# Patient Record
Sex: FEMALE | Race: WHITE | NOT HISPANIC OR LATINO | Employment: OTHER | URBAN - METROPOLITAN AREA
[De-identification: names, ages, dates, MRNs, and addresses within clinical notes are randomized per-mention and may not be internally consistent; named-entity substitution may affect disease eponyms.]

---

## 2017-01-01 ENCOUNTER — DOCUMENTATION (OUTPATIENT)
Dept: CARDIAC REHAB | Facility: HOSPITAL | Age: 82
End: 2017-01-01

## 2017-01-01 ENCOUNTER — TRANSITIONAL CARE MANAGEMENT TELEPHONE ENCOUNTER (OUTPATIENT)
Dept: INTERNAL MEDICINE | Facility: CLINIC | Age: 82
End: 2017-01-01

## 2017-01-01 ENCOUNTER — TELEPHONE (OUTPATIENT)
Dept: INTERNAL MEDICINE | Facility: CLINIC | Age: 82
End: 2017-01-01

## 2017-01-01 ENCOUNTER — PATIENT OUTREACH (OUTPATIENT)
Dept: CASE MANAGEMENT | Facility: OTHER | Age: 82
End: 2017-01-01

## 2017-01-01 ENCOUNTER — EPISODE CHANGES (OUTPATIENT)
Dept: CASE MANAGEMENT | Facility: OTHER | Age: 82
End: 2017-01-01

## 2017-01-01 ENCOUNTER — APPOINTMENT (OUTPATIENT)
Dept: CARDIOLOGY | Facility: HOSPITAL | Age: 82
End: 2017-01-01

## 2017-01-01 ENCOUNTER — OFFICE VISIT (OUTPATIENT)
Dept: ENDOCRINOLOGY | Facility: CLINIC | Age: 82
End: 2017-01-01

## 2017-01-01 ENCOUNTER — APPOINTMENT (OUTPATIENT)
Dept: GENERAL RADIOLOGY | Facility: HOSPITAL | Age: 82
End: 2017-01-01

## 2017-01-01 ENCOUNTER — HOSPITAL ENCOUNTER (INPATIENT)
Facility: HOSPITAL | Age: 82
LOS: 6 days | Discharge: HOME | End: 2017-11-07
Attending: EMERGENCY MEDICINE | Admitting: INTERNAL MEDICINE

## 2017-01-01 VITALS
DIASTOLIC BLOOD PRESSURE: 58 MMHG | SYSTOLIC BLOOD PRESSURE: 108 MMHG | HEART RATE: 61 BPM | HEIGHT: 66 IN | BODY MASS INDEX: 32.47 KG/M2 | WEIGHT: 202 LBS | OXYGEN SATURATION: 93 %

## 2017-01-01 VITALS
RESPIRATION RATE: 16 BRPM | HEIGHT: 66 IN | OXYGEN SATURATION: 98 % | SYSTOLIC BLOOD PRESSURE: 124 MMHG | TEMPERATURE: 97.8 F | HEART RATE: 79 BPM | WEIGHT: 203.6 LBS | BODY MASS INDEX: 32.72 KG/M2 | DIASTOLIC BLOOD PRESSURE: 55 MMHG

## 2017-01-01 VITALS
WEIGHT: 205 LBS | DIASTOLIC BLOOD PRESSURE: 60 MMHG | SYSTOLIC BLOOD PRESSURE: 110 MMHG | HEART RATE: 59 BPM | HEIGHT: 66 IN | BODY MASS INDEX: 32.95 KG/M2 | OXYGEN SATURATION: 94 %

## 2017-01-01 VITALS
SYSTOLIC BLOOD PRESSURE: 124 MMHG | WEIGHT: 206 LBS | HEIGHT: 67 IN | HEART RATE: 75 BPM | BODY MASS INDEX: 32.33 KG/M2 | DIASTOLIC BLOOD PRESSURE: 64 MMHG | OXYGEN SATURATION: 95 %

## 2017-01-01 DIAGNOSIS — N17.9 ACUTE RENAL FAILURE, UNSPECIFIED ACUTE RENAL FAILURE TYPE (HCC): ICD-10-CM

## 2017-01-01 DIAGNOSIS — J18.9 RECURRENT PNEUMONIA: ICD-10-CM

## 2017-01-01 DIAGNOSIS — I21.4 NON-STEMI (NON-ST ELEVATED MYOCARDIAL INFARCTION) (HCC): ICD-10-CM

## 2017-01-01 DIAGNOSIS — N39.0 COMPLICATED UTI (URINARY TRACT INFECTION): Primary | ICD-10-CM

## 2017-01-01 DIAGNOSIS — R09.02 HYPOXIA: ICD-10-CM

## 2017-01-01 DIAGNOSIS — R60.0 LOCALIZED EDEMA: ICD-10-CM

## 2017-01-01 DIAGNOSIS — I10 ESSENTIAL HYPERTENSION: ICD-10-CM

## 2017-01-01 DIAGNOSIS — R53.1 WEAKNESS: ICD-10-CM

## 2017-01-01 DIAGNOSIS — Z78.9 IMPAIRED MOBILITY AND ADLS: ICD-10-CM

## 2017-01-01 DIAGNOSIS — A41.9 SEPSIS, DUE TO UNSPECIFIED ORGANISM: ICD-10-CM

## 2017-01-01 DIAGNOSIS — N18.30 CHRONIC KIDNEY DISEASE (CKD), STAGE III (MODERATE) (HCC): ICD-10-CM

## 2017-01-01 DIAGNOSIS — E11.9 TYPE 2 DIABETES MELLITUS WITHOUT COMPLICATION, UNSPECIFIED LONG TERM INSULIN USE STATUS: Primary | ICD-10-CM

## 2017-01-01 DIAGNOSIS — J44.9 CHRONIC OBSTRUCTIVE PULMONARY DISEASE, UNSPECIFIED COPD TYPE (HCC): Primary | ICD-10-CM

## 2017-01-01 DIAGNOSIS — G40.909 SEIZURE DISORDER (HCC): ICD-10-CM

## 2017-01-01 DIAGNOSIS — R05.9 COUGH: ICD-10-CM

## 2017-01-01 DIAGNOSIS — E78.2 MIXED HYPERLIPIDEMIA: ICD-10-CM

## 2017-01-01 DIAGNOSIS — Z74.09 IMPAIRED FUNCTIONAL MOBILITY, BALANCE, GAIT, AND ENDURANCE: ICD-10-CM

## 2017-01-01 DIAGNOSIS — N39.0 COMPLICATED UTI (URINARY TRACT INFECTION): ICD-10-CM

## 2017-01-01 DIAGNOSIS — I50.31 ACUTE DIASTOLIC CONGESTIVE HEART FAILURE (HCC): ICD-10-CM

## 2017-01-01 DIAGNOSIS — Z74.09 IMPAIRED MOBILITY AND ADLS: ICD-10-CM

## 2017-01-01 DIAGNOSIS — J18.9 PNEUMONIA OF LEFT LOWER LOBE DUE TO INFECTIOUS ORGANISM: Primary | ICD-10-CM

## 2017-01-01 DIAGNOSIS — D72.829 LEUKOCYTOSIS, UNSPECIFIED TYPE: ICD-10-CM

## 2017-01-01 DIAGNOSIS — E11.9 TYPE 2 DIABETES MELLITUS WITHOUT COMPLICATION, UNSPECIFIED LONG TERM INSULIN USE STATUS: ICD-10-CM

## 2017-01-01 DIAGNOSIS — J45.40 MODERATE PERSISTENT ASTHMA WITHOUT COMPLICATION: ICD-10-CM

## 2017-01-01 LAB
ALBUMIN SERPL-MCNC: 3.5 G/DL (ref 3.2–4.8)
ALBUMIN SERPL-MCNC: 3.9 G/DL (ref 3.2–4.8)
ALBUMIN SERPL-MCNC: 4 G/DL (ref 3.2–4.8)
ALBUMIN/GLOB SERPL: 1.5 G/DL (ref 1.5–2.5)
ALBUMIN/GLOB SERPL: 1.5 G/DL (ref 1.5–2.5)
ALBUMIN/GLOB SERPL: 1.8 G/DL (ref 1.5–2.5)
ALP SERPL-CCNC: 150 U/L (ref 25–100)
ALP SERPL-CCNC: 154 U/L (ref 25–100)
ALP SERPL-CCNC: 199 U/L (ref 25–100)
ALT SERPL W P-5'-P-CCNC: 22 U/L (ref 7–40)
ALT SERPL W P-5'-P-CCNC: 34 U/L (ref 7–40)
ALT SERPL W P-5'-P-CCNC: 9 U/L (ref 7–40)
ANION GAP SERPL CALCULATED.3IONS-SCNC: 10 MMOL/L (ref 3–11)
ANION GAP SERPL CALCULATED.3IONS-SCNC: 6 MMOL/L (ref 3–11)
ANION GAP SERPL CALCULATED.3IONS-SCNC: 8 MMOL/L (ref 3–11)
ANION GAP SERPL CALCULATED.3IONS-SCNC: 9 MMOL/L (ref 3–11)
APTT PPP: 28.4 SECONDS (ref 45–60)
APTT PPP: 34.1 SECONDS (ref 45–60)
APTT PPP: 48.8 SECONDS (ref 45–60)
ARTICHOKE IGE QN: 71 MG/DL (ref 0–130)
AST SERPL-CCNC: 11 U/L (ref 0–33)
AST SERPL-CCNC: 36 U/L (ref 0–33)
AST SERPL-CCNC: 56 U/L (ref 0–33)
BACTERIA SPEC AEROBE CULT: NORMAL
BACTERIA SPEC AEROBE CULT: NORMAL
BACTERIA UR QL AUTO: ABNORMAL /HPF
BASOPHILS # BLD AUTO: 0.03 10*3/MM3 (ref 0–0.2)
BASOPHILS # BLD AUTO: 0.06 10*3/MM3 (ref 0–0.2)
BASOPHILS NFR BLD AUTO: 0.1 % (ref 0–1)
BASOPHILS NFR BLD AUTO: 0.3 % (ref 0–1)
BASOPHILS NFR BLD AUTO: 0.5 % (ref 0–1)
BASOPHILS NFR BLD AUTO: 0.6 % (ref 0–1)
BASOPHILS NFR BLD AUTO: 0.7 % (ref 0–1)
BH CV ECHO MEAS - BSA(HAYCOCK): 2.1 M^2
BH CV ECHO MEAS - BSA: 2 M^2
BH CV ECHO MEAS - BZI_BMI: 32.6 KILOGRAMS/M^2
BH CV ECHO MEAS - BZI_METRIC_HEIGHT: 167.6 CM
BH CV ECHO MEAS - BZI_METRIC_WEIGHT: 91.6 KG
BH CV ECHO MEAS - CONTRAST EF (2CH): 46.7 ML/M^2
BH CV ECHO MEAS - CONTRAST EF 4CH: 56.1 ML/M^2
BH CV ECHO MEAS - EDV(CUBED): 106.6 ML
BH CV ECHO MEAS - EDV(MOD-SP2): 150 ML
BH CV ECHO MEAS - EDV(MOD-SP4): 132 ML
BH CV ECHO MEAS - EDV(TEICH): 104.5 ML
BH CV ECHO MEAS - EF(CUBED): 74.7 %
BH CV ECHO MEAS - EF(MOD-SP2): 46.7 %
BH CV ECHO MEAS - EF(MOD-SP4): 51 %
BH CV ECHO MEAS - EF(TEICH): 66.6 %
BH CV ECHO MEAS - ESV(CUBED): 26.9 ML
BH CV ECHO MEAS - ESV(MOD-SP2): 80 ML
BH CV ECHO MEAS - ESV(MOD-SP4): 58 ML
BH CV ECHO MEAS - ESV(TEICH): 34.9 ML
BH CV ECHO MEAS - FS: 36.8 %
BH CV ECHO MEAS - IVS/LVPW: 1
BH CV ECHO MEAS - IVSD: 1.2 CM
BH CV ECHO MEAS - LA DIMENSION: 4.5 CM
BH CV ECHO MEAS - LAT PEAK E' VEL: 11.1 CM/SEC
BH CV ECHO MEAS - LV DIASTOLIC VOL/BSA (35-75): 65.7 ML/M^2
BH CV ECHO MEAS - LV IVRT: 0.09 SEC
BH CV ECHO MEAS - LV MASS(C)D: 202.4 GRAMS
BH CV ECHO MEAS - LV MASS(C)DI: 100.8 GRAMS/M^2
BH CV ECHO MEAS - LV SYSTOLIC VOL/BSA (12-30): 28.9 ML/M^2
BH CV ECHO MEAS - LVIDD: 4.7 CM
BH CV ECHO MEAS - LVIDS: 3 CM
BH CV ECHO MEAS - LVLD AP2: 8 CM
BH CV ECHO MEAS - LVLD AP4: 8.3 CM
BH CV ECHO MEAS - LVLS AP2: 7.3 CM
BH CV ECHO MEAS - LVLS AP4: 7.6 CM
BH CV ECHO MEAS - LVOT AREA (M): 2.8 CM^2
BH CV ECHO MEAS - LVOT AREA: 2.9 CM^2
BH CV ECHO MEAS - LVOT DIAM: 1.9 CM
BH CV ECHO MEAS - LVPWD: 1.1 CM
BH CV ECHO MEAS - MED PEAK E' VEL: 7.02 CM/SEC
BH CV ECHO MEAS - MPA AREA: 9.4 CM^2
BH CV ECHO MEAS - MPA DIAM: 3.5 CM
BH CV ECHO MEAS - MR ALIAS VEL: 27.7 CM/SEC
BH CV ECHO MEAS - MR ERO: 0.2 CM^2
BH CV ECHO MEAS - MR FLOW RATE: 101.2 CM^3/SEC
BH CV ECHO MEAS - MR MAX PG: 107.5 MMHG
BH CV ECHO MEAS - MR MAX VEL: 518.5 CM/SEC
BH CV ECHO MEAS - MR MEAN PG: 67.8 MMHG
BH CV ECHO MEAS - MR MEAN VEL: 381.9 CM/SEC
BH CV ECHO MEAS - MR PISA RADIUS: 0.76 CM
BH CV ECHO MEAS - MR PISA: 3.7 CM^2
BH CV ECHO MEAS - MR VOLUME: 33.1 ML
BH CV ECHO MEAS - MR VTI: 169.5 CM
BH CV ECHO MEAS - MV A MAX VEL: 114.5 CM/SEC
BH CV ECHO MEAS - MV AREA (1 DIAM): 7.5 CM^2
BH CV ECHO MEAS - MV DEC TIME: 0.16 SEC
BH CV ECHO MEAS - MV DIAM: 3.1 CM
BH CV ECHO MEAS - MV E MAX VEL: 131.6 CM/SEC
BH CV ECHO MEAS - MV E/A: 1.1
BH CV ECHO MEAS - MV FLOW AREA(1DIAM): 7.5 CM^2
BH CV ECHO MEAS - PA ACC SLOPE: 728.9 CM/SEC^2
BH CV ECHO MEAS - PA ACC TIME: 0.11 SEC
BH CV ECHO MEAS - PA PR(ACCEL): 31.5 MMHG
BH CV ECHO MEAS - PI END-D VEL: 136.9 CM/SEC
BH CV ECHO MEAS - RAP SYSTOLE: 8 MMHG
BH CV ECHO MEAS - RVSP: 43 MMHG
BH CV ECHO MEAS - SI(CUBED): 39.7 ML/M^2
BH CV ECHO MEAS - SI(MOD-SP2): 34.8 ML/M^2
BH CV ECHO MEAS - SI(MOD-SP4): 36.8 ML/M^2
BH CV ECHO MEAS - SI(TEICH): 34.6 ML/M^2
BH CV ECHO MEAS - SV(CUBED): 79.7 ML
BH CV ECHO MEAS - SV(MOD-SP2): 70 ML
BH CV ECHO MEAS - SV(MOD-SP4): 74 ML
BH CV ECHO MEAS - SV(TEICH): 69.6 ML
BH CV ECHO MEAS - TAPSE (>1.6): 1.6 CM2
BH CV ECHO MEAS - TR MAX V: 35 MMHG
BH CV ECHO MEAS - TR MAX VEL: 294 CM/SEC
BH CV VAS BP LEFT ARM: NORMAL MMHG
BH CV VAS BP LEFT ARM: NORMAL MMHG
BH CV XLRA - RV BASE: 5 CM
BH CV XLRA - RV LENGTH: 7 CM
BH CV XLRA - RV MID: 4.4 CM
BH CV XLRA - TDI S': 8.55 CM/SEC
BILIRUB SERPL-MCNC: 0.4 MG/DL (ref 0.3–1.2)
BILIRUB SERPL-MCNC: 1 MG/DL (ref 0.3–1.2)
BILIRUB SERPL-MCNC: 1.3 MG/DL (ref 0.3–1.2)
BILIRUB UR QL STRIP: NEGATIVE
BNP SERPL-MCNC: 2368 PG/ML (ref 0–100)
BNP SERPL-MCNC: 676 PG/ML (ref 0–100)
BNP SERPL-MCNC: 712 PG/ML (ref 0–100)
BUN BLD-MCNC: 25 MG/DL (ref 9–23)
BUN BLD-MCNC: 25 MG/DL (ref 9–23)
BUN BLD-MCNC: 27 MG/DL (ref 9–23)
BUN BLD-MCNC: 28 MG/DL (ref 9–23)
BUN BLD-MCNC: 29 MG/DL (ref 9–23)
BUN BLD-MCNC: 30 MG/DL (ref 9–23)
BUN BLD-MCNC: 33 MG/DL (ref 9–23)
BUN/CREAT SERPL: 13.9 (ref 7–25)
BUN/CREAT SERPL: 13.9 (ref 7–25)
BUN/CREAT SERPL: 15 (ref 7–25)
BUN/CREAT SERPL: 15 (ref 7–25)
BUN/CREAT SERPL: 17.1 (ref 7–25)
BUN/CREAT SERPL: 17.4 (ref 7–25)
BUN/CREAT SERPL: 17.5 (ref 7–25)
CALCIUM SPEC-SCNC: 10 MG/DL (ref 8.7–10.4)
CALCIUM SPEC-SCNC: 8.8 MG/DL (ref 8.7–10.4)
CALCIUM SPEC-SCNC: 9.1 MG/DL (ref 8.7–10.4)
CALCIUM SPEC-SCNC: 9.3 MG/DL (ref 8.7–10.4)
CALCIUM SPEC-SCNC: 9.4 MG/DL (ref 8.7–10.4)
CHLORIDE SERPL-SCNC: 100 MMOL/L (ref 99–109)
CHLORIDE SERPL-SCNC: 100 MMOL/L (ref 99–109)
CHLORIDE SERPL-SCNC: 101 MMOL/L (ref 99–109)
CHLORIDE SERPL-SCNC: 102 MMOL/L (ref 99–109)
CHLORIDE SERPL-SCNC: 102 MMOL/L (ref 99–109)
CHOLEST SERPL-MCNC: 148 MG/DL (ref 0–200)
CLARITY UR: ABNORMAL
CO2 SERPL-SCNC: 29 MMOL/L (ref 20–31)
CO2 SERPL-SCNC: 30 MMOL/L (ref 20–31)
CO2 SERPL-SCNC: 32 MMOL/L (ref 20–31)
CO2 SERPL-SCNC: 33 MMOL/L (ref 20–31)
CO2 SERPL-SCNC: 36 MMOL/L (ref 20–31)
COLOR UR: ABNORMAL
CREAT BLD-MCNC: 1.6 MG/DL (ref 0.6–1.3)
CREAT BLD-MCNC: 1.7 MG/DL (ref 0.6–1.3)
CREAT BLD-MCNC: 1.8 MG/DL (ref 0.6–1.3)
CREAT BLD-MCNC: 1.9 MG/DL (ref 0.6–1.3)
CREAT BLD-MCNC: 2 MG/DL (ref 0.6–1.3)
CREAT UR-MCNC: 251.8 MG/DL
D-LACTATE SERPL-SCNC: 1.6 MMOL/L (ref 0.5–2)
DEPRECATED RDW RBC AUTO: 52.6 FL (ref 37–54)
DEPRECATED RDW RBC AUTO: 52.6 FL (ref 37–54)
DEPRECATED RDW RBC AUTO: 53.5 FL (ref 37–54)
DEPRECATED RDW RBC AUTO: 54.2 FL (ref 37–54)
DEPRECATED RDW RBC AUTO: 54.4 FL (ref 37–54)
DEPRECATED RDW RBC AUTO: 54.7 FL (ref 37–54)
DEPRECATED RDW RBC AUTO: 54.8 FL (ref 37–54)
DEPRECATED RDW RBC AUTO: 55.8 FL (ref 37–54)
E/E' RATIO: 15.1
EOSINOPHIL # BLD AUTO: 0 10*3/MM3 (ref 0–0.3)
EOSINOPHIL # BLD AUTO: 0.02 10*3/MM3 (ref 0–0.3)
EOSINOPHIL # BLD AUTO: 0.27 10*3/MM3 (ref 0–0.3)
EOSINOPHIL # BLD AUTO: 0.29 10*3/MM3 (ref 0–0.3)
EOSINOPHIL # BLD AUTO: 0.44 10*3/MM3 (ref 0–0.3)
EOSINOPHIL NFR BLD AUTO: 0 % (ref 0–3)
EOSINOPHIL NFR BLD AUTO: 0.1 % (ref 0–3)
EOSINOPHIL NFR BLD AUTO: 2.3 % (ref 0–3)
EOSINOPHIL NFR BLD AUTO: 3.3 % (ref 0–3)
EOSINOPHIL NFR BLD AUTO: 4.4 % (ref 0–3)
ERYTHROCYTE [DISTWIDTH] IN BLOOD BY AUTOMATED COUNT: 14.3 % (ref 11.3–14.5)
ERYTHROCYTE [DISTWIDTH] IN BLOOD BY AUTOMATED COUNT: 14.7 % (ref 11.3–14.5)
ERYTHROCYTE [DISTWIDTH] IN BLOOD BY AUTOMATED COUNT: 14.9 % (ref 11.3–14.5)
FLUAV AG NPH QL: NEGATIVE
FLUBV AG NPH QL IA: NEGATIVE
GFR SERPL CREATININE-BSD FRML MDRD: 24 ML/MIN/1.73
GFR SERPL CREATININE-BSD FRML MDRD: 25 ML/MIN/1.73
GFR SERPL CREATININE-BSD FRML MDRD: 27 ML/MIN/1.73
GFR SERPL CREATININE-BSD FRML MDRD: 28 ML/MIN/1.73
GFR SERPL CREATININE-BSD FRML MDRD: 30 ML/MIN/1.73
GLOBULIN UR ELPH-MCNC: 2.2 GM/DL
GLOBULIN UR ELPH-MCNC: 2.4 GM/DL
GLOBULIN UR ELPH-MCNC: 2.6 GM/DL
GLUCOSE BLD-MCNC: 106 MG/DL (ref 70–100)
GLUCOSE BLD-MCNC: 111 MG/DL (ref 70–100)
GLUCOSE BLD-MCNC: 117 MG/DL (ref 70–100)
GLUCOSE BLD-MCNC: 121 MG/DL (ref 70–100)
GLUCOSE BLD-MCNC: 122 MG/DL (ref 70–100)
GLUCOSE BLD-MCNC: 125 MG/DL (ref 70–100)
GLUCOSE BLD-MCNC: 96 MG/DL (ref 70–100)
GLUCOSE BLDC GLUCOMTR-MCNC: 115 MG/DL (ref 70–130)
GLUCOSE BLDC GLUCOMTR-MCNC: 119 MG/DL (ref 70–130)
GLUCOSE BLDC GLUCOMTR-MCNC: 119 MG/DL (ref 70–130)
GLUCOSE BLDC GLUCOMTR-MCNC: 121 MG/DL (ref 70–130)
GLUCOSE BLDC GLUCOMTR-MCNC: 122 MG/DL (ref 70–130)
GLUCOSE BLDC GLUCOMTR-MCNC: 123 MG/DL (ref 70–130)
GLUCOSE BLDC GLUCOMTR-MCNC: 127 MG/DL (ref 70–130)
GLUCOSE BLDC GLUCOMTR-MCNC: 130 MG/DL (ref 70–130)
GLUCOSE BLDC GLUCOMTR-MCNC: 131 MG/DL (ref 70–130)
GLUCOSE BLDC GLUCOMTR-MCNC: 131 MG/DL (ref 70–130)
GLUCOSE BLDC GLUCOMTR-MCNC: 132 MG/DL (ref 70–130)
GLUCOSE BLDC GLUCOMTR-MCNC: 132 MG/DL (ref 70–130)
GLUCOSE BLDC GLUCOMTR-MCNC: 133 MG/DL (ref 70–130)
GLUCOSE BLDC GLUCOMTR-MCNC: 136 MG/DL (ref 70–130)
GLUCOSE BLDC GLUCOMTR-MCNC: 138 MG/DL (ref 70–130)
GLUCOSE BLDC GLUCOMTR-MCNC: 139 MG/DL (ref 70–130)
GLUCOSE BLDC GLUCOMTR-MCNC: 145 MG/DL (ref 70–130)
GLUCOSE BLDC GLUCOMTR-MCNC: 146 MG/DL (ref 70–130)
GLUCOSE BLDC GLUCOMTR-MCNC: 147 MG/DL (ref 70–130)
GLUCOSE BLDC GLUCOMTR-MCNC: 151 MG/DL (ref 70–130)
GLUCOSE BLDC GLUCOMTR-MCNC: 157 MG/DL (ref 70–130)
GLUCOSE BLDC GLUCOMTR-MCNC: 171 MG/DL (ref 70–130)
GLUCOSE BLDC GLUCOMTR-MCNC: 96 MG/DL (ref 70–130)
GLUCOSE BLDC GLUCOMTR-MCNC: 98 MG/DL (ref 70–130)
GLUCOSE UR STRIP-MCNC: NEGATIVE MG/DL
HBA1C MFR BLD: 5.9 %
HCT VFR BLD AUTO: 31.8 % (ref 34.5–44)
HCT VFR BLD AUTO: 32.1 % (ref 34.5–44)
HCT VFR BLD AUTO: 33.9 % (ref 34.5–44)
HCT VFR BLD AUTO: 34.4 % (ref 34.5–44)
HCT VFR BLD AUTO: 35.2 % (ref 34.5–44)
HCT VFR BLD AUTO: 35.8 % (ref 34.5–44)
HCT VFR BLD AUTO: 37.1 % (ref 34.5–44)
HCT VFR BLD AUTO: 39 % (ref 34.5–44)
HDLC SERPL-MCNC: 49 MG/DL (ref 40–60)
HGB BLD-MCNC: 10.4 G/DL (ref 11.5–15.5)
HGB BLD-MCNC: 10.4 G/DL (ref 11.5–15.5)
HGB BLD-MCNC: 10.7 G/DL (ref 11.5–15.5)
HGB BLD-MCNC: 10.9 G/DL (ref 11.5–15.5)
HGB BLD-MCNC: 11.4 G/DL (ref 11.5–15.5)
HGB BLD-MCNC: 11.9 G/DL (ref 11.5–15.5)
HGB BLD-MCNC: 9.8 G/DL (ref 11.5–15.5)
HGB BLD-MCNC: 9.9 G/DL (ref 11.5–15.5)
HGB UR QL STRIP.AUTO: NEGATIVE
HYALINE CASTS UR QL AUTO: ABNORMAL /LPF
IMM GRANULOCYTES # BLD: 0.01 10*3/MM3 (ref 0–0.03)
IMM GRANULOCYTES # BLD: 0.01 10*3/MM3 (ref 0–0.03)
IMM GRANULOCYTES # BLD: 0.04 10*3/MM3 (ref 0–0.03)
IMM GRANULOCYTES # BLD: 0.05 10*3/MM3 (ref 0–0.03)
IMM GRANULOCYTES # BLD: 0.12 10*3/MM3 (ref 0–0.03)
IMM GRANULOCYTES NFR BLD: 0.1 % (ref 0–0.6)
IMM GRANULOCYTES NFR BLD: 0.1 % (ref 0–0.6)
IMM GRANULOCYTES NFR BLD: 0.3 % (ref 0–0.6)
IMM GRANULOCYTES NFR BLD: 0.3 % (ref 0–0.6)
IMM GRANULOCYTES NFR BLD: 0.5 % (ref 0–0.6)
INR PPP: 1.15
KETONES UR QL STRIP: NEGATIVE
LEFT ATRIUM VOLUME INDEX: 46.3 ML/M2
LEUKOCYTE ESTERASE UR QL STRIP.AUTO: ABNORMAL
LV EF 2D ECHO EST: 51 %
LV EF 2D ECHO EST: 60 %
LYMPHOCYTES # BLD AUTO: 1.58 10*3/MM3 (ref 0.6–4.8)
LYMPHOCYTES # BLD AUTO: 2.17 10*3/MM3 (ref 0.6–4.8)
LYMPHOCYTES # BLD AUTO: 2.23 10*3/MM3 (ref 0.6–4.8)
LYMPHOCYTES # BLD AUTO: 2.29 10*3/MM3 (ref 0.6–4.8)
LYMPHOCYTES # BLD AUTO: 3.41 10*3/MM3 (ref 0.6–4.8)
LYMPHOCYTES NFR BLD AUTO: 19 % (ref 24–44)
LYMPHOCYTES NFR BLD AUTO: 19.1 % (ref 24–44)
LYMPHOCYTES NFR BLD AUTO: 23.1 % (ref 24–44)
LYMPHOCYTES NFR BLD AUTO: 24.8 % (ref 24–44)
LYMPHOCYTES NFR BLD AUTO: 6.7 % (ref 24–44)
Lab: 3.2 CM
Lab: 8.3 CM^2
MCH RBC QN AUTO: 30.4 PG (ref 27–31)
MCH RBC QN AUTO: 30.6 PG (ref 27–31)
MCH RBC QN AUTO: 31 PG (ref 27–31)
MCH RBC QN AUTO: 31.1 PG (ref 27–31)
MCH RBC QN AUTO: 31.3 PG (ref 27–31)
MCH RBC QN AUTO: 31.4 PG (ref 27–31)
MCH RBC QN AUTO: 31.4 PG (ref 27–31)
MCH RBC QN AUTO: 31.9 PG (ref 27–31)
MCHC RBC AUTO-ENTMCNC: 30.2 G/DL (ref 32–36)
MCHC RBC AUTO-ENTMCNC: 30.4 G/DL (ref 32–36)
MCHC RBC AUTO-ENTMCNC: 30.4 G/DL (ref 32–36)
MCHC RBC AUTO-ENTMCNC: 30.5 G/DL (ref 32–36)
MCHC RBC AUTO-ENTMCNC: 30.7 G/DL (ref 32–36)
MCHC RBC AUTO-ENTMCNC: 30.7 G/DL (ref 32–36)
MCHC RBC AUTO-ENTMCNC: 30.8 G/DL (ref 32–36)
MCHC RBC AUTO-ENTMCNC: 30.8 G/DL (ref 32–36)
MCV RBC AUTO: 100.6 FL (ref 80–99)
MCV RBC AUTO: 101.8 FL (ref 80–99)
MCV RBC AUTO: 101.9 FL (ref 80–99)
MCV RBC AUTO: 102.4 FL (ref 80–99)
MCV RBC AUTO: 102.9 FL (ref 80–99)
MCV RBC AUTO: 103.9 FL (ref 80–99)
MONOCYTES # BLD AUTO: 0.86 10*3/MM3 (ref 0–1)
MONOCYTES # BLD AUTO: 1.03 10*3/MM3 (ref 0–1)
MONOCYTES # BLD AUTO: 1.36 10*3/MM3 (ref 0–1)
MONOCYTES # BLD AUTO: 1.67 10*3/MM3 (ref 0–1)
MONOCYTES # BLD AUTO: 1.87 10*3/MM3 (ref 0–1)
MONOCYTES NFR BLD AUTO: 10.4 % (ref 0–12)
MONOCYTES NFR BLD AUTO: 11.7 % (ref 0–12)
MONOCYTES NFR BLD AUTO: 11.8 % (ref 0–12)
MONOCYTES NFR BLD AUTO: 7.1 % (ref 0–12)
MONOCYTES NFR BLD AUTO: 8.7 % (ref 0–12)
NEUTROPHILS # BLD AUTO: 12.53 10*3/MM3 (ref 1.5–8.3)
NEUTROPHILS # BLD AUTO: 20.28 10*3/MM3 (ref 1.5–8.3)
NEUTROPHILS # BLD AUTO: 5.2 10*3/MM3 (ref 1.5–8.3)
NEUTROPHILS # BLD AUTO: 6.24 10*3/MM3 (ref 1.5–8.3)
NEUTROPHILS # BLD AUTO: 7.7 10*3/MM3 (ref 1.5–8.3)
NEUTROPHILS NFR BLD AUTO: 59.3 % (ref 41–71)
NEUTROPHILS NFR BLD AUTO: 63.1 % (ref 41–71)
NEUTROPHILS NFR BLD AUTO: 66.1 % (ref 41–71)
NEUTROPHILS NFR BLD AUTO: 69.9 % (ref 41–71)
NEUTROPHILS NFR BLD AUTO: 85.6 % (ref 41–71)
NITRITE UR QL STRIP: NEGATIVE
PH UR STRIP.AUTO: <=5 [PH] (ref 5–8)
PLAT MORPH BLD: NORMAL
PLAT MORPH BLD: NORMAL
PLATELET # BLD AUTO: 363 10*3/MM3 (ref 150–450)
PLATELET # BLD AUTO: 367 10*3/MM3 (ref 150–450)
PLATELET # BLD AUTO: 379 10*3/MM3 (ref 150–450)
PLATELET # BLD AUTO: 381 10*3/MM3 (ref 150–450)
PLATELET # BLD AUTO: 410 10*3/MM3 (ref 150–450)
PLATELET # BLD AUTO: 420 10*3/MM3 (ref 150–450)
PLATELET # BLD AUTO: 426 10*3/MM3 (ref 150–450)
PLATELET # BLD AUTO: 462 10*3/MM3 (ref 150–450)
PMV BLD AUTO: 10.2 FL (ref 6–12)
PMV BLD AUTO: 10.2 FL (ref 6–12)
PMV BLD AUTO: 10.4 FL (ref 6–12)
PMV BLD AUTO: 10.4 FL (ref 6–12)
PMV BLD AUTO: 10.5 FL (ref 6–12)
PMV BLD AUTO: 10.5 FL (ref 6–12)
PMV BLD AUTO: 10.6 FL (ref 6–12)
PMV BLD AUTO: 10.6 FL (ref 6–12)
POTASSIUM BLD-SCNC: 3.4 MMOL/L (ref 3.5–5.5)
POTASSIUM BLD-SCNC: 3.6 MMOL/L (ref 3.5–5.5)
POTASSIUM BLD-SCNC: 3.6 MMOL/L (ref 3.5–5.5)
POTASSIUM BLD-SCNC: 3.8 MMOL/L (ref 3.5–5.5)
POTASSIUM BLD-SCNC: 4.2 MMOL/L (ref 3.5–5.5)
POTASSIUM BLD-SCNC: 4.6 MMOL/L (ref 3.5–5.5)
POTASSIUM BLD-SCNC: 4.8 MMOL/L (ref 3.5–5.5)
PROCALCITONIN SERPL-MCNC: 25.21 NG/ML
PROT SERPL-MCNC: 5.9 G/DL (ref 5.7–8.2)
PROT SERPL-MCNC: 6.1 G/DL (ref 5.7–8.2)
PROT SERPL-MCNC: 6.6 G/DL (ref 5.7–8.2)
PROT UR QL STRIP: ABNORMAL
PROTHROMBIN TIME: 12.6 SECONDS (ref 9.6–11.5)
RBC # BLD AUTO: 3.15 10*6/MM3 (ref 3.89–5.14)
RBC # BLD AUTO: 3.16 10*6/MM3 (ref 3.89–5.14)
RBC # BLD AUTO: 3.31 10*6/MM3 (ref 3.89–5.14)
RBC # BLD AUTO: 3.42 10*6/MM3 (ref 3.89–5.14)
RBC # BLD AUTO: 3.48 10*6/MM3 (ref 3.89–5.14)
RBC # BLD AUTO: 3.5 10*6/MM3 (ref 3.89–5.14)
RBC # BLD AUTO: 3.57 10*6/MM3 (ref 3.89–5.14)
RBC # BLD AUTO: 3.83 10*6/MM3 (ref 3.89–5.14)
RBC # UR: ABNORMAL /HPF
RBC MORPH BLD: NORMAL
RBC MORPH BLD: NORMAL
REF LAB TEST METHOD: ABNORMAL
SODIUM BLD-SCNC: 139 MMOL/L (ref 132–146)
SODIUM BLD-SCNC: 140 MMOL/L (ref 132–146)
SODIUM BLD-SCNC: 141 MMOL/L (ref 132–146)
SODIUM BLD-SCNC: 143 MMOL/L (ref 132–146)
SODIUM BLD-SCNC: 144 MMOL/L (ref 132–146)
SODIUM UR-SCNC: 22 MMOL/L (ref 30–90)
SP GR UR STRIP: 1.02 (ref 1–1.03)
SQUAMOUS #/AREA URNS HPF: ABNORMAL /HPF
TRIGL SERPL-MCNC: 163 MG/DL (ref 0–150)
TROPONIN I SERPL-MCNC: 10.82 NG/ML
TROPONIN I SERPL-MCNC: 11.13 NG/ML
TROPONIN I SERPL-MCNC: 14.65 NG/ML
TROPONIN I SERPL-MCNC: 5.46 NG/ML (ref 0–0.07)
TROPONIN I SERPL-MCNC: 5.6 NG/ML (ref 0–0.07)
TSH SERPL DL<=0.05 MIU/L-ACNC: 2.35 MIU/ML (ref 0.35–5.35)
UROBILINOGEN UR QL STRIP: ABNORMAL
UUN 24H UR-MCNC: 583 MG/DL
VANCOMYCIN SERPL-MCNC: 14.7 MCG/ML (ref 5–40)
WBC MORPH BLD: NORMAL
WBC MORPH BLD: NORMAL
WBC NRBC COR # BLD: 11.66 10*3/MM3 (ref 3.5–10.8)
WBC NRBC COR # BLD: 17.94 10*3/MM3 (ref 3.5–10.8)
WBC NRBC COR # BLD: 23.68 10*3/MM3 (ref 3.5–10.8)
WBC NRBC COR # BLD: 8.75 10*3/MM3 (ref 3.5–10.8)
WBC NRBC COR # BLD: 8.76 10*3/MM3 (ref 3.5–10.8)
WBC NRBC COR # BLD: 9.48 10*3/MM3 (ref 3.5–10.8)
WBC NRBC COR # BLD: 9.89 10*3/MM3 (ref 3.5–10.8)
WBC NRBC COR # BLD: 9.9 10*3/MM3 (ref 3.5–10.8)
WBC UR QL AUTO: ABNORMAL /HPF

## 2017-01-01 PROCEDURE — 25010000002 PIPERACILLIN SOD-TAZOBACTAM PER 1 G: Performed by: HOSPITALIST

## 2017-01-01 PROCEDURE — 94640 AIRWAY INHALATION TREATMENT: CPT

## 2017-01-01 PROCEDURE — 85730 THROMBOPLASTIN TIME PARTIAL: CPT | Performed by: PHYSICIAN ASSISTANT

## 2017-01-01 PROCEDURE — 82962 GLUCOSE BLOOD TEST: CPT

## 2017-01-01 PROCEDURE — 82570 ASSAY OF URINE CREATININE: CPT | Performed by: HOSPITALIST

## 2017-01-01 PROCEDURE — 85007 BL SMEAR W/DIFF WBC COUNT: CPT | Performed by: PHYSICIAN ASSISTANT

## 2017-01-01 PROCEDURE — 80048 BASIC METABOLIC PNL TOTAL CA: CPT | Performed by: INTERNAL MEDICINE

## 2017-01-01 PROCEDURE — 99233 SBSQ HOSP IP/OBS HIGH 50: CPT | Performed by: HOSPITALIST

## 2017-01-01 PROCEDURE — 85025 COMPLETE CBC W/AUTO DIFF WBC: CPT | Performed by: EMERGENCY MEDICINE

## 2017-01-01 PROCEDURE — 84484 ASSAY OF TROPONIN QUANT: CPT | Performed by: FAMILY MEDICINE

## 2017-01-01 PROCEDURE — 94799 UNLISTED PULMONARY SVC/PX: CPT

## 2017-01-01 PROCEDURE — 99232 SBSQ HOSP IP/OBS MODERATE 35: CPT | Performed by: INTERNAL MEDICINE

## 2017-01-01 PROCEDURE — 99214 OFFICE O/P EST MOD 30 MIN: CPT | Performed by: INTERNAL MEDICINE

## 2017-01-01 PROCEDURE — 25010000002 HEPARIN (PORCINE) PER 1000 UNITS: Performed by: INTERNAL MEDICINE

## 2017-01-01 PROCEDURE — 99496 TRANSJ CARE MGMT HIGH F2F 7D: CPT | Performed by: INTERNAL MEDICINE

## 2017-01-01 PROCEDURE — 90670 PCV13 VACCINE IM: CPT | Performed by: INTERNAL MEDICINE

## 2017-01-01 PROCEDURE — 93306 TTE W/DOPPLER COMPLETE: CPT

## 2017-01-01 PROCEDURE — 93312 ECHO TRANSESOPHAGEAL: CPT | Performed by: INTERNAL MEDICINE

## 2017-01-01 PROCEDURE — 85025 COMPLETE CBC W/AUTO DIFF WBC: CPT | Performed by: PHYSICIAN ASSISTANT

## 2017-01-01 PROCEDURE — 82947 ASSAY GLUCOSE BLOOD QUANT: CPT | Performed by: INTERNAL MEDICINE

## 2017-01-01 PROCEDURE — G0009 ADMIN PNEUMOCOCCAL VACCINE: HCPCS | Performed by: INTERNAL MEDICINE

## 2017-01-01 PROCEDURE — 84540 ASSAY OF URINE/UREA-N: CPT | Performed by: HOSPITALIST

## 2017-01-01 PROCEDURE — 93005 ELECTROCARDIOGRAM TRACING: CPT

## 2017-01-01 PROCEDURE — 25010000002 VANCOMYCIN HCL IN NACL 2-0.9 GM/500ML-% SOLUTION: Performed by: EMERGENCY MEDICINE

## 2017-01-01 PROCEDURE — 99285 EMERGENCY DEPT VISIT HI MDM: CPT

## 2017-01-01 PROCEDURE — 71010 HC CHEST PA OR AP: CPT

## 2017-01-01 PROCEDURE — 25010000002 PIPERACILLIN SOD-TAZOBACTAM PER 1 G: Performed by: EMERGENCY MEDICINE

## 2017-01-01 PROCEDURE — 94760 N-INVAS EAR/PLS OXIMETRY 1: CPT

## 2017-01-01 PROCEDURE — 25010000002 HEPARIN (PORCINE) PER 1000 UNITS: Performed by: HOSPITALIST

## 2017-01-01 PROCEDURE — 85730 THROMBOPLASTIN TIME PARTIAL: CPT

## 2017-01-01 PROCEDURE — 97165 OT EVAL LOW COMPLEX 30 MIN: CPT

## 2017-01-01 PROCEDURE — 25010000002 VANCOMYCIN 10 G RECONSTITUTED SOLUTION

## 2017-01-01 PROCEDURE — 80202 ASSAY OF VANCOMYCIN: CPT

## 2017-01-01 PROCEDURE — 85027 COMPLETE CBC AUTOMATED: CPT | Performed by: HOSPITALIST

## 2017-01-01 PROCEDURE — 84300 ASSAY OF URINE SODIUM: CPT | Performed by: HOSPITALIST

## 2017-01-01 PROCEDURE — 93010 ELECTROCARDIOGRAM REPORT: CPT | Performed by: INTERNAL MEDICINE

## 2017-01-01 PROCEDURE — 25010000002 FUROSEMIDE PER 20 MG: Performed by: INTERNAL MEDICINE

## 2017-01-01 PROCEDURE — 99221 1ST HOSP IP/OBS SF/LOW 40: CPT | Performed by: INTERNAL MEDICINE

## 2017-01-01 PROCEDURE — 80053 COMPREHEN METABOLIC PANEL: CPT | Performed by: HOSPITALIST

## 2017-01-01 PROCEDURE — 80053 COMPREHEN METABOLIC PANEL: CPT | Performed by: EMERGENCY MEDICINE

## 2017-01-01 PROCEDURE — 25010000002 VANCOMYCIN PER 500 MG

## 2017-01-01 PROCEDURE — 94660 CPAP INITIATION&MGMT: CPT

## 2017-01-01 PROCEDURE — 93325 DOPPLER ECHO COLOR FLOW MAPG: CPT

## 2017-01-01 PROCEDURE — 83880 ASSAY OF NATRIURETIC PEPTIDE: CPT | Performed by: EMERGENCY MEDICINE

## 2017-01-01 PROCEDURE — 84484 ASSAY OF TROPONIN QUANT: CPT

## 2017-01-01 PROCEDURE — 85025 COMPLETE CBC W/AUTO DIFF WBC: CPT | Performed by: HOSPITALIST

## 2017-01-01 PROCEDURE — 87804 INFLUENZA ASSAY W/OPTIC: CPT | Performed by: EMERGENCY MEDICINE

## 2017-01-01 PROCEDURE — 83036 HEMOGLOBIN GLYCOSYLATED A1C: CPT | Performed by: INTERNAL MEDICINE

## 2017-01-01 PROCEDURE — 87040 BLOOD CULTURE FOR BACTERIA: CPT | Performed by: EMERGENCY MEDICINE

## 2017-01-01 PROCEDURE — 80048 BASIC METABOLIC PNL TOTAL CA: CPT | Performed by: NURSE PRACTITIONER

## 2017-01-01 PROCEDURE — 80048 BASIC METABOLIC PNL TOTAL CA: CPT | Performed by: HOSPITALIST

## 2017-01-01 PROCEDURE — 93005 ELECTROCARDIOGRAM TRACING: CPT | Performed by: EMERGENCY MEDICINE

## 2017-01-01 PROCEDURE — 85610 PROTHROMBIN TIME: CPT | Performed by: PHYSICIAN ASSISTANT

## 2017-01-01 PROCEDURE — 99239 HOSP IP/OBS DSCHRG MGMT >30: CPT | Performed by: INTERNAL MEDICINE

## 2017-01-01 PROCEDURE — 25010000002 HEPARIN (PORCINE) PER 1000 UNITS

## 2017-01-01 PROCEDURE — 97161 PT EVAL LOW COMPLEX 20 MIN: CPT

## 2017-01-01 PROCEDURE — 25010000002 ENOXAPARIN PER 10 MG: Performed by: EMERGENCY MEDICINE

## 2017-01-01 PROCEDURE — 85007 BL SMEAR W/DIFF WBC COUNT: CPT | Performed by: EMERGENCY MEDICINE

## 2017-01-01 PROCEDURE — 81001 URINALYSIS AUTO W/SCOPE: CPT | Performed by: EMERGENCY MEDICINE

## 2017-01-01 PROCEDURE — 25010000002 MIDAZOLAM PER 1 MG: Performed by: INTERNAL MEDICINE

## 2017-01-01 PROCEDURE — 83605 ASSAY OF LACTIC ACID: CPT | Performed by: EMERGENCY MEDICINE

## 2017-01-01 PROCEDURE — 93312 ECHO TRANSESOPHAGEAL: CPT

## 2017-01-01 PROCEDURE — 80053 COMPREHEN METABOLIC PANEL: CPT | Performed by: INTERNAL MEDICINE

## 2017-01-01 PROCEDURE — 85027 COMPLETE CBC AUTOMATED: CPT | Performed by: NURSE PRACTITIONER

## 2017-01-01 PROCEDURE — 25010000002 FUROSEMIDE PER 20 MG: Performed by: PHYSICIAN ASSISTANT

## 2017-01-01 PROCEDURE — 93005 ELECTROCARDIOGRAM TRACING: CPT | Performed by: NURSE PRACTITIONER

## 2017-01-01 PROCEDURE — 99223 1ST HOSP IP/OBS HIGH 75: CPT | Performed by: HOSPITALIST

## 2017-01-01 PROCEDURE — 84145 PROCALCITONIN (PCT): CPT | Performed by: EMERGENCY MEDICINE

## 2017-01-01 PROCEDURE — 80061 LIPID PANEL: CPT | Performed by: INTERNAL MEDICINE

## 2017-01-01 PROCEDURE — 93320 DOPPLER ECHO COMPLETE: CPT

## 2017-01-01 PROCEDURE — 93306 TTE W/DOPPLER COMPLETE: CPT | Performed by: INTERNAL MEDICINE

## 2017-01-01 PROCEDURE — 25010000002 SULFUR HEXAFLUORIDE MICROSPH 60.7-25 MG RECONSTITUTED SUSPENSION: Performed by: HOSPITALIST

## 2017-01-01 PROCEDURE — 83880 ASSAY OF NATRIURETIC PEPTIDE: CPT | Performed by: INTERNAL MEDICINE

## 2017-01-01 PROCEDURE — 85025 COMPLETE CBC W/AUTO DIFF WBC: CPT | Performed by: INTERNAL MEDICINE

## 2017-01-01 PROCEDURE — 84484 ASSAY OF TROPONIN QUANT: CPT | Performed by: HOSPITALIST

## 2017-01-01 PROCEDURE — 99231 SBSQ HOSP IP/OBS SF/LOW 25: CPT | Performed by: NURSE PRACTITIONER

## 2017-01-01 PROCEDURE — 84443 ASSAY THYROID STIM HORMONE: CPT | Performed by: INTERNAL MEDICINE

## 2017-01-01 PROCEDURE — 93325 DOPPLER ECHO COLOR FLOW MAPG: CPT | Performed by: INTERNAL MEDICINE

## 2017-01-01 RX ORDER — CLONAZEPAM 1 MG/1
1 TABLET ORAL 2 TIMES DAILY
Qty: 60 TABLET | Refills: 3 | Status: SHIPPED | OUTPATIENT
Start: 2017-01-01 | End: 2018-01-01 | Stop reason: SDUPTHER

## 2017-01-01 RX ORDER — CONJUGATED ESTROGENS 0.62 MG/G
CREAM VAGINAL
Refills: 3 | COMMUNITY
Start: 2017-07-26 | End: 2017-01-01

## 2017-01-01 RX ORDER — POTASSIUM CHLORIDE 7.45 MG/ML
10 INJECTION INTRAVENOUS
Status: DISCONTINUED | OUTPATIENT
Start: 2017-01-01 | End: 2017-01-01 | Stop reason: HOSPADM

## 2017-01-01 RX ORDER — CEFTRIAXONE SODIUM 1 G/50ML
1 INJECTION, SOLUTION INTRAVENOUS ONCE
Status: DISCONTINUED | OUTPATIENT
Start: 2017-01-01 | End: 2017-01-01

## 2017-01-01 RX ORDER — ALBUTEROL SULFATE 90 UG/1
2 AEROSOL, METERED RESPIRATORY (INHALATION) EVERY 4 HOURS PRN
COMMUNITY
End: 2018-01-01 | Stop reason: SDUPTHER

## 2017-01-01 RX ORDER — FUROSEMIDE 10 MG/ML
40 INJECTION INTRAMUSCULAR; INTRAVENOUS DAILY
Status: DISCONTINUED | OUTPATIENT
Start: 2017-01-01 | End: 2017-01-01

## 2017-01-01 RX ORDER — INFLUENZA A VIRUS A/MICHIGAN/45/2015 X-275 (H1N1) ANTIGEN (FORMALDEHYDE INACTIVATED), INFLUENZA A VIRUS A/SINGAPORE/INFIMH-16-0019/2016 IVR-186 (H3N2) ANTIGEN (FORMALDEHYDE INACTIVATED), AND INFLUENZA B VIRUS B/MARYLAND/15/2016 BX-69A (A B/COLORADO/6/2017-LIKE VIRUS) ANTIGEN (FORMALDEHYDE INACTIVATED) 60; 60; 60 UG/.5ML; UG/.5ML; UG/.5ML
INJECTION, SUSPENSION INTRAMUSCULAR
Refills: 0 | COMMUNITY
Start: 2017-01-01 | End: 2017-01-01

## 2017-01-01 RX ORDER — FUROSEMIDE 40 MG/1
40 TABLET ORAL DAILY
Qty: 30 TABLET | Refills: 1 | Status: SHIPPED | OUTPATIENT
Start: 2017-01-01 | End: 2018-01-01 | Stop reason: SDUPTHER

## 2017-01-01 RX ORDER — FUROSEMIDE 10 MG/ML
80 INJECTION INTRAMUSCULAR; INTRAVENOUS ONCE
Status: COMPLETED | OUTPATIENT
Start: 2017-01-01 | End: 2017-01-01

## 2017-01-01 RX ORDER — DILTIAZEM HYDROCHLORIDE 60 MG/1
TABLET, FILM COATED ORAL
Qty: 20.4 G | Refills: 0 | Status: SHIPPED | OUTPATIENT
Start: 2017-01-01 | End: 2018-01-01 | Stop reason: SDUPTHER

## 2017-01-01 RX ORDER — ROSUVASTATIN CALCIUM 10 MG/1
10 TABLET, COATED ORAL NIGHTLY
Status: DISCONTINUED | OUTPATIENT
Start: 2017-01-01 | End: 2017-01-01 | Stop reason: HOSPADM

## 2017-01-01 RX ORDER — LEVETIRACETAM 750 MG/1
TABLET ORAL
Qty: 180 TABLET | Refills: 0 | Status: SHIPPED | OUTPATIENT
Start: 2017-01-01 | End: 2017-01-01 | Stop reason: SDUPTHER

## 2017-01-01 RX ORDER — CIPROFLOXACIN 250 MG/1
125 TABLET, FILM COATED ORAL DAILY
COMMUNITY
End: 2017-01-01

## 2017-01-01 RX ORDER — ALLOPURINOL 100 MG/1
200 TABLET ORAL DAILY
COMMUNITY

## 2017-01-01 RX ORDER — POTASSIUM CHLORIDE 750 MG/1
CAPSULE, EXTENDED RELEASE ORAL
Qty: 30 CAPSULE | Refills: 5 | Status: SHIPPED | OUTPATIENT
Start: 2017-01-01 | End: 2017-01-01 | Stop reason: SDUPTHER

## 2017-01-01 RX ORDER — ASPIRIN 81 MG/1
324 TABLET, CHEWABLE ORAL ONCE
Status: COMPLETED | OUTPATIENT
Start: 2017-01-01 | End: 2017-01-01

## 2017-01-01 RX ORDER — MELATONIN
5000 DAILY
Status: DISCONTINUED | OUTPATIENT
Start: 2017-01-01 | End: 2017-01-01 | Stop reason: HOSPADM

## 2017-01-01 RX ORDER — ONDANSETRON 2 MG/ML
4 INJECTION INTRAMUSCULAR; INTRAVENOUS EVERY 6 HOURS PRN
Status: DISCONTINUED | OUTPATIENT
Start: 2017-01-01 | End: 2017-01-01 | Stop reason: HOSPADM

## 2017-01-01 RX ORDER — HEPARIN SODIUM 1000 [USP'U]/ML
30 INJECTION, SOLUTION INTRAVENOUS; SUBCUTANEOUS AS NEEDED
Status: DISCONTINUED | OUTPATIENT
Start: 2017-01-01 | End: 2017-01-01

## 2017-01-01 RX ORDER — CETIRIZINE HYDROCHLORIDE 10 MG/1
10 TABLET ORAL DAILY
Status: DISCONTINUED | OUTPATIENT
Start: 2017-01-01 | End: 2017-01-01 | Stop reason: HOSPADM

## 2017-01-01 RX ORDER — HEPARIN SODIUM 1000 [USP'U]/ML
30 INJECTION, SOLUTION INTRAVENOUS; SUBCUTANEOUS ONCE
Status: COMPLETED | OUTPATIENT
Start: 2017-01-01 | End: 2017-01-01

## 2017-01-01 RX ORDER — LISINOPRIL 5 MG/1
5 TABLET ORAL
Status: DISCONTINUED | OUTPATIENT
Start: 2017-01-01 | End: 2017-01-01

## 2017-01-01 RX ORDER — MIDAZOLAM HYDROCHLORIDE 1 MG/ML
INJECTION INTRAMUSCULAR; INTRAVENOUS
Status: COMPLETED | OUTPATIENT
Start: 2017-01-01 | End: 2017-01-01

## 2017-01-01 RX ORDER — HEPARIN SODIUM 5000 [USP'U]/ML
5000 INJECTION, SOLUTION INTRAVENOUS; SUBCUTANEOUS EVERY 8 HOURS SCHEDULED
Status: DISCONTINUED | OUTPATIENT
Start: 2017-01-01 | End: 2017-01-01 | Stop reason: HOSPADM

## 2017-01-01 RX ORDER — POTASSIUM CHLORIDE 1.5 G/1.77G
40 POWDER, FOR SOLUTION ORAL AS NEEDED
Status: DISCONTINUED | OUTPATIENT
Start: 2017-01-01 | End: 2017-01-01 | Stop reason: ALTCHOICE

## 2017-01-01 RX ORDER — PANTOPRAZOLE SODIUM 40 MG/1
40 TABLET, DELAYED RELEASE ORAL
Status: DISCONTINUED | OUTPATIENT
Start: 2017-01-01 | End: 2017-01-01 | Stop reason: HOSPADM

## 2017-01-01 RX ORDER — ACETAMINOPHEN 325 MG/1
650 TABLET ORAL EVERY 4 HOURS PRN
Status: DISCONTINUED | OUTPATIENT
Start: 2017-01-01 | End: 2017-01-01 | Stop reason: HOSPADM

## 2017-01-01 RX ORDER — ALBUTEROL SULFATE 2.5 MG/3ML
2.5 SOLUTION RESPIRATORY (INHALATION)
Status: DISCONTINUED | OUTPATIENT
Start: 2017-01-01 | End: 2017-01-01 | Stop reason: CLARIF

## 2017-01-01 RX ORDER — POTASSIUM CHLORIDE 750 MG/1
10 CAPSULE, EXTENDED RELEASE ORAL DAILY
Qty: 30 CAPSULE | Refills: 0 | Status: SHIPPED | OUTPATIENT
Start: 2017-01-01 | End: 2018-01-01 | Stop reason: SDUPTHER

## 2017-01-01 RX ORDER — ASPIRIN 81 MG/1
81 TABLET ORAL DAILY
Status: DISCONTINUED | OUTPATIENT
Start: 2017-01-01 | End: 2017-01-01 | Stop reason: HOSPADM

## 2017-01-01 RX ORDER — ONDANSETRON 4 MG/1
4 TABLET, FILM COATED ORAL EVERY 6 HOURS PRN
Status: DISCONTINUED | OUTPATIENT
Start: 2017-01-01 | End: 2017-01-01 | Stop reason: HOSPADM

## 2017-01-01 RX ORDER — POLYETHYLENE GLYCOL 3350 17 G/17G
17 POWDER, FOR SOLUTION ORAL DAILY PRN
COMMUNITY

## 2017-01-01 RX ORDER — ALLOPURINOL 100 MG/1
200 TABLET ORAL DAILY
Status: DISCONTINUED | OUTPATIENT
Start: 2017-01-01 | End: 2017-01-01

## 2017-01-01 RX ORDER — SODIUM CHLORIDE 0.9 % (FLUSH) 0.9 %
1-10 SYRINGE (ML) INJECTION AS NEEDED
Status: DISCONTINUED | OUTPATIENT
Start: 2017-01-01 | End: 2017-01-01 | Stop reason: HOSPADM

## 2017-01-01 RX ORDER — CLOPIDOGREL BISULFATE 75 MG/1
600 TABLET ORAL ONCE
Status: COMPLETED | OUTPATIENT
Start: 2017-01-01 | End: 2017-01-01

## 2017-01-01 RX ORDER — HEPARIN SODIUM 1000 [USP'U]/ML
60 INJECTION, SOLUTION INTRAVENOUS; SUBCUTANEOUS AS NEEDED
Status: DISCONTINUED | OUTPATIENT
Start: 2017-01-01 | End: 2017-01-01

## 2017-01-01 RX ORDER — FUROSEMIDE 40 MG/1
40 TABLET ORAL DAILY
Status: ON HOLD | COMMUNITY
End: 2017-01-01

## 2017-01-01 RX ORDER — CLOPIDOGREL BISULFATE 75 MG/1
75 TABLET ORAL DAILY
Qty: 30 TABLET | Refills: 1 | Status: SHIPPED | OUTPATIENT
Start: 2017-01-01

## 2017-01-01 RX ORDER — BUDESONIDE AND FORMOTEROL FUMARATE DIHYDRATE 80; 4.5 UG/1; UG/1
2 AEROSOL RESPIRATORY (INHALATION)
COMMUNITY
End: 2017-01-01 | Stop reason: SDUPTHER

## 2017-01-01 RX ORDER — LISINOPRIL 10 MG/1
TABLET ORAL
Qty: 90 TABLET | Refills: 1 | Status: SHIPPED | OUTPATIENT
Start: 2017-01-01 | End: 2018-01-01 | Stop reason: SDUPTHER

## 2017-01-01 RX ORDER — MIRTAZAPINE 15 MG/1
15 TABLET, FILM COATED ORAL NIGHTLY PRN
COMMUNITY

## 2017-01-01 RX ORDER — PANTOPRAZOLE SODIUM 40 MG/1
TABLET, DELAYED RELEASE ORAL
Qty: 90 TABLET | Refills: 0 | Status: SHIPPED | OUTPATIENT
Start: 2017-01-01 | End: 2017-01-01 | Stop reason: SDUPTHER

## 2017-01-01 RX ORDER — GUAIFENESIN/DEXTROMETHORPHAN 100-10MG/5
5 SYRUP ORAL EVERY 4 HOURS PRN
Status: DISCONTINUED | OUTPATIENT
Start: 2017-01-01 | End: 2017-01-01 | Stop reason: HOSPADM

## 2017-01-01 RX ORDER — CLOPIDOGREL BISULFATE 75 MG/1
75 TABLET ORAL DAILY
Status: DISCONTINUED | OUTPATIENT
Start: 2017-01-01 | End: 2017-01-01 | Stop reason: HOSPADM

## 2017-01-01 RX ORDER — POTASSIUM CHLORIDE 750 MG/1
40 CAPSULE, EXTENDED RELEASE ORAL AS NEEDED
Status: DISCONTINUED | OUTPATIENT
Start: 2017-01-01 | End: 2017-01-01 | Stop reason: HOSPADM

## 2017-01-01 RX ORDER — VANCOMYCIN HYDROCHLORIDE
15 ONCE
Status: COMPLETED | OUTPATIENT
Start: 2017-01-01 | End: 2017-01-01

## 2017-01-01 RX ORDER — VANCOMYCIN 2 GRAM/500 ML IN 0.9 % SODIUM CHLORIDE INTRAVENOUS
20 ONCE
Status: COMPLETED | OUTPATIENT
Start: 2017-01-01 | End: 2017-01-01

## 2017-01-01 RX ORDER — VANCOMYCIN HYDROCHLORIDE 1 G/200ML
1000 INJECTION, SOLUTION INTRAVENOUS ONCE
Status: COMPLETED | OUTPATIENT
Start: 2017-01-01 | End: 2017-01-01

## 2017-01-01 RX ORDER — ALLOPURINOL 100 MG/1
100 TABLET ORAL DAILY
Status: DISCONTINUED | OUTPATIENT
Start: 2017-01-01 | End: 2017-01-01 | Stop reason: HOSPADM

## 2017-01-01 RX ORDER — ALLOPURINOL 100 MG/1
TABLET ORAL
Qty: 180 TABLET | Refills: 1 | Status: SHIPPED | OUTPATIENT
Start: 2017-01-01

## 2017-01-01 RX ORDER — ASPIRIN 81 MG/1
81 TABLET, CHEWABLE ORAL DAILY
Status: DISCONTINUED | OUTPATIENT
Start: 2017-01-01 | End: 2017-01-01

## 2017-01-01 RX ORDER — FUROSEMIDE 10 MG/ML
40 INJECTION INTRAMUSCULAR; INTRAVENOUS EVERY 12 HOURS
Status: DISCONTINUED | OUTPATIENT
Start: 2017-01-01 | End: 2017-01-01

## 2017-01-01 RX ORDER — ROSUVASTATIN CALCIUM 10 MG/1
10 TABLET, COATED ORAL NIGHTLY
Qty: 30 TABLET | Refills: 1 | Status: SHIPPED | OUTPATIENT
Start: 2017-01-01

## 2017-01-01 RX ORDER — LEVETIRACETAM 750 MG/1
750 TABLET ORAL EVERY 12 HOURS SCHEDULED
Status: DISCONTINUED | OUTPATIENT
Start: 2017-01-01 | End: 2017-01-01 | Stop reason: HOSPADM

## 2017-01-01 RX ORDER — BUDESONIDE AND FORMOTEROL FUMARATE DIHYDRATE 80; 4.5 UG/1; UG/1
2 AEROSOL RESPIRATORY (INHALATION)
Status: DISCONTINUED | OUTPATIENT
Start: 2017-01-01 | End: 2017-01-01 | Stop reason: HOSPADM

## 2017-01-01 RX ORDER — CLONAZEPAM 1 MG/1
1 TABLET ORAL 2 TIMES DAILY
Status: DISCONTINUED | OUTPATIENT
Start: 2017-01-01 | End: 2017-01-01 | Stop reason: HOSPADM

## 2017-01-01 RX ORDER — PANTOPRAZOLE SODIUM 40 MG/1
TABLET, DELAYED RELEASE ORAL
Qty: 90 TABLET | Refills: 1 | Status: SHIPPED | OUTPATIENT
Start: 2017-01-01 | End: 2018-01-01 | Stop reason: SDUPTHER

## 2017-01-01 RX ORDER — POTASSIUM CHLORIDE 750 MG/1
CAPSULE, EXTENDED RELEASE ORAL
Qty: 180 CAPSULE | Refills: 1 | Status: SHIPPED | OUTPATIENT
Start: 2017-01-01 | End: 2017-01-01

## 2017-01-01 RX ORDER — LANOLIN ALCOHOL/MO/W.PET/CERES
1000 CREAM (GRAM) TOPICAL DAILY
Status: DISCONTINUED | OUTPATIENT
Start: 2017-01-01 | End: 2017-01-01 | Stop reason: HOSPADM

## 2017-01-01 RX ORDER — POTASSIUM CHLORIDE 750 MG/1
10 CAPSULE, EXTENDED RELEASE ORAL 2 TIMES DAILY
Status: ON HOLD | COMMUNITY
End: 2017-01-01

## 2017-01-01 RX ORDER — COLCHICINE 0.6 MG/1
0.6 TABLET ORAL DAILY PRN
COMMUNITY

## 2017-01-01 RX ADMIN — CETIRIZINE HYDROCHLORIDE 10 MG: 10 TABLET, FILM COATED ORAL at 08:47

## 2017-01-01 RX ADMIN — TAZOBACTAM SODIUM AND PIPERACILLIN SODIUM 4.5 G: 500; 4 INJECTION, SOLUTION INTRAVENOUS at 06:21

## 2017-01-01 RX ADMIN — TAZOBACTAM SODIUM AND PIPERACILLIN SODIUM 4.5 G: 500; 4 INJECTION, SOLUTION INTRAVENOUS at 06:06

## 2017-01-01 RX ADMIN — HEPARIN SODIUM 5000 UNITS: 5000 INJECTION, SOLUTION INTRAVENOUS; SUBCUTANEOUS at 14:18

## 2017-01-01 RX ADMIN — ALBUTEROL SULFATE 2.5 MG: 2.5 SOLUTION RESPIRATORY (INHALATION) at 13:28

## 2017-01-01 RX ADMIN — ASPIRIN 81 MG: 81 TABLET, COATED ORAL at 08:35

## 2017-01-01 RX ADMIN — Medication 2000 MG: at 08:56

## 2017-01-01 RX ADMIN — HEPARIN SODIUM 5000 UNITS: 5000 INJECTION, SOLUTION INTRAVENOUS; SUBCUTANEOUS at 06:22

## 2017-01-01 RX ADMIN — CETIRIZINE HYDROCHLORIDE 10 MG: 10 TABLET, FILM COATED ORAL at 08:58

## 2017-01-01 RX ADMIN — ALBUTEROL SULFATE 2.5 MG: 2.5 SOLUTION RESPIRATORY (INHALATION) at 07:56

## 2017-01-01 RX ADMIN — CLOPIDOGREL BISULFATE 75 MG: 75 TABLET ORAL at 08:21

## 2017-01-01 RX ADMIN — ALLOPURINOL 200 MG: 100 TABLET ORAL at 08:59

## 2017-01-01 RX ADMIN — LEVETIRACETAM 750 MG: 750 TABLET, FILM COATED ORAL at 20:31

## 2017-01-01 RX ADMIN — TAZOBACTAM SODIUM AND PIPERACILLIN SODIUM 4.5 G: 500; 4 INJECTION, SOLUTION INTRAVENOUS at 14:19

## 2017-01-01 RX ADMIN — ROSUVASTATIN CALCIUM 10 MG: 10 TABLET, FILM COATED ORAL at 20:31

## 2017-01-01 RX ADMIN — TAZOBACTAM SODIUM AND PIPERACILLIN SODIUM 4.5 G: 500; 4 INJECTION, SOLUTION INTRAVENOUS at 23:07

## 2017-01-01 RX ADMIN — CLONAZEPAM 1 MG: 1 TABLET ORAL at 09:47

## 2017-01-01 RX ADMIN — ALLOPURINOL 200 MG: 100 TABLET ORAL at 14:11

## 2017-01-01 RX ADMIN — FUROSEMIDE 40 MG: 10 INJECTION, SOLUTION INTRAMUSCULAR; INTRAVENOUS at 08:46

## 2017-01-01 RX ADMIN — CETIRIZINE HYDROCHLORIDE 10 MG: 10 TABLET, FILM COATED ORAL at 14:10

## 2017-01-01 RX ADMIN — CYANOCOBALAMIN TAB 1000 MCG 1000 MCG: 1000 TAB at 08:21

## 2017-01-01 RX ADMIN — ALBUTEROL SULFATE 2.5 MG: 2.5 SOLUTION RESPIRATORY (INHALATION) at 00:26

## 2017-01-01 RX ADMIN — LEVETIRACETAM 750 MG: 750 TABLET, FILM COATED ORAL at 20:45

## 2017-01-01 RX ADMIN — FUROSEMIDE 80 MG: 10 INJECTION, SOLUTION INTRAMUSCULAR; INTRAVENOUS at 16:45

## 2017-01-01 RX ADMIN — TAZOBACTAM SODIUM AND PIPERACILLIN SODIUM 4.5 G: 500; 4 INJECTION, SOLUTION INTRAVENOUS at 22:24

## 2017-01-01 RX ADMIN — CLOPIDOGREL BISULFATE 75 MG: 75 TABLET ORAL at 08:47

## 2017-01-01 RX ADMIN — LEVETIRACETAM 750 MG: 750 TABLET, FILM COATED ORAL at 14:10

## 2017-01-01 RX ADMIN — ALLOPURINOL 200 MG: 100 TABLET ORAL at 08:47

## 2017-01-01 RX ADMIN — ALBUTEROL SULFATE 2.5 MG: 2.5 SOLUTION RESPIRATORY (INHALATION) at 18:56

## 2017-01-01 RX ADMIN — BUDESONIDE AND FORMOTEROL FUMARATE DIHYDRATE 2 PUFF: 80; 4.5 AEROSOL RESPIRATORY (INHALATION) at 07:23

## 2017-01-01 RX ADMIN — HEPARIN SODIUM 5000 UNITS: 5000 INJECTION, SOLUTION INTRAVENOUS; SUBCUTANEOUS at 15:03

## 2017-01-01 RX ADMIN — ALLOPURINOL 200 MG: 100 TABLET ORAL at 08:21

## 2017-01-01 RX ADMIN — TAZOBACTAM SODIUM AND PIPERACILLIN SODIUM 4.5 G: 500; 4 INJECTION, SOLUTION INTRAVENOUS at 16:45

## 2017-01-01 RX ADMIN — LISINOPRIL 5 MG: 5 TABLET ORAL at 18:12

## 2017-01-01 RX ADMIN — LEVETIRACETAM 750 MG: 750 TABLET, FILM COATED ORAL at 21:13

## 2017-01-01 RX ADMIN — CLONAZEPAM 1 MG: 1 TABLET ORAL at 09:00

## 2017-01-01 RX ADMIN — CLONAZEPAM 1 MG: 1 TABLET ORAL at 08:35

## 2017-01-01 RX ADMIN — ROSUVASTATIN CALCIUM 10 MG: 10 TABLET, FILM COATED ORAL at 21:31

## 2017-01-01 RX ADMIN — BUDESONIDE AND FORMOTEROL FUMARATE DIHYDRATE 2 PUFF: 80; 4.5 AEROSOL RESPIRATORY (INHALATION) at 18:56

## 2017-01-01 RX ADMIN — CETIRIZINE HYDROCHLORIDE 10 MG: 10 TABLET, FILM COATED ORAL at 08:21

## 2017-01-01 RX ADMIN — CLONAZEPAM 1 MG: 1 TABLET ORAL at 08:41

## 2017-01-01 RX ADMIN — VITAMIN D, TAB 1000IU (100/BT) 5000 UNITS: 25 TAB at 08:35

## 2017-01-01 RX ADMIN — CLONAZEPAM 1 MG: 1 TABLET ORAL at 09:01

## 2017-01-01 RX ADMIN — VITAMIN D, TAB 1000IU (100/BT) 5000 UNITS: 25 TAB at 08:47

## 2017-01-01 RX ADMIN — CLONAZEPAM 1 MG: 1 TABLET ORAL at 20:31

## 2017-01-01 RX ADMIN — BUDESONIDE AND FORMOTEROL FUMARATE DIHYDRATE 2 PUFF: 80; 4.5 AEROSOL RESPIRATORY (INHALATION) at 07:53

## 2017-01-01 RX ADMIN — CLONAZEPAM 1 MG: 1 TABLET ORAL at 08:21

## 2017-01-01 RX ADMIN — BUDESONIDE AND FORMOTEROL FUMARATE DIHYDRATE 2 PUFF: 80; 4.5 AEROSOL RESPIRATORY (INHALATION) at 07:07

## 2017-01-01 RX ADMIN — LEVETIRACETAM 750 MG: 750 TABLET, FILM COATED ORAL at 20:50

## 2017-01-01 RX ADMIN — PANTOPRAZOLE SODIUM 40 MG: 40 TABLET, DELAYED RELEASE ORAL at 06:06

## 2017-01-01 RX ADMIN — BUDESONIDE AND FORMOTEROL FUMARATE DIHYDRATE 2 PUFF: 80; 4.5 AEROSOL RESPIRATORY (INHALATION) at 19:49

## 2017-01-01 RX ADMIN — LEVETIRACETAM 750 MG: 750 TABLET, FILM COATED ORAL at 08:36

## 2017-01-01 RX ADMIN — CLOPIDOGREL BISULFATE 75 MG: 75 TABLET ORAL at 08:58

## 2017-01-01 RX ADMIN — TAZOBACTAM SODIUM AND PIPERACILLIN SODIUM 4.5 G: 500; 4 INJECTION, SOLUTION INTRAVENOUS at 06:07

## 2017-01-01 RX ADMIN — BUDESONIDE AND FORMOTEROL FUMARATE DIHYDRATE 2 PUFF: 80; 4.5 AEROSOL RESPIRATORY (INHALATION) at 19:10

## 2017-01-01 RX ADMIN — LEVETIRACETAM 750 MG: 750 TABLET, FILM COATED ORAL at 09:48

## 2017-01-01 RX ADMIN — ASPIRIN 81 MG: 81 TABLET, COATED ORAL at 09:01

## 2017-01-01 RX ADMIN — CLOPIDOGREL BISULFATE 75 MG: 75 TABLET ORAL at 09:47

## 2017-01-01 RX ADMIN — ALBUTEROL SULFATE 2.5 MG: 2.5 SOLUTION RESPIRATORY (INHALATION) at 00:01

## 2017-01-01 RX ADMIN — LEVETIRACETAM 750 MG: 750 TABLET, FILM COATED ORAL at 21:31

## 2017-01-01 RX ADMIN — POTASSIUM CHLORIDE 40 MEQ: 750 CAPSULE, EXTENDED RELEASE ORAL at 09:44

## 2017-01-01 RX ADMIN — ALBUTEROL SULFATE 2.5 MG: 2.5 SOLUTION RESPIRATORY (INHALATION) at 13:09

## 2017-01-01 RX ADMIN — ALBUTEROL SULFATE 2.5 MG: 2.5 SOLUTION RESPIRATORY (INHALATION) at 07:53

## 2017-01-01 RX ADMIN — TAZOBACTAM SODIUM AND PIPERACILLIN SODIUM 4.5 G: 500; 4 INJECTION, SOLUTION INTRAVENOUS at 23:54

## 2017-01-01 RX ADMIN — ROSUVASTATIN CALCIUM 10 MG: 10 TABLET, FILM COATED ORAL at 21:13

## 2017-01-01 RX ADMIN — CLOPIDOGREL BISULFATE 600 MG: 75 TABLET ORAL at 08:56

## 2017-01-01 RX ADMIN — HEPARIN SODIUM 5000 UNITS: 5000 INJECTION, SOLUTION INTRAVENOUS; SUBCUTANEOUS at 06:07

## 2017-01-01 RX ADMIN — TAZOBACTAM SODIUM AND PIPERACILLIN SODIUM 4.5 G: 500; 4 INJECTION, SOLUTION INTRAVENOUS at 13:45

## 2017-01-01 RX ADMIN — ALBUTEROL SULFATE 2.5 MG: 2.5 SOLUTION RESPIRATORY (INHALATION) at 18:49

## 2017-01-01 RX ADMIN — POTASSIUM CHLORIDE 40 MEQ: 750 CAPSULE, EXTENDED RELEASE ORAL at 15:51

## 2017-01-01 RX ADMIN — ALLOPURINOL 200 MG: 100 TABLET ORAL at 09:47

## 2017-01-01 RX ADMIN — ALBUTEROL SULFATE 2.5 MG: 2.5 SOLUTION RESPIRATORY (INHALATION) at 19:49

## 2017-01-01 RX ADMIN — HEPARIN SODIUM 5000 UNITS: 5000 INJECTION, SOLUTION INTRAVENOUS; SUBCUTANEOUS at 20:31

## 2017-01-01 RX ADMIN — HEPARIN SODIUM 5000 UNITS: 5000 INJECTION, SOLUTION INTRAVENOUS; SUBCUTANEOUS at 21:13

## 2017-01-01 RX ADMIN — CETIRIZINE HYDROCHLORIDE 10 MG: 10 TABLET, FILM COATED ORAL at 09:47

## 2017-01-01 RX ADMIN — CETIRIZINE HYDROCHLORIDE 10 MG: 10 TABLET, FILM COATED ORAL at 08:36

## 2017-01-01 RX ADMIN — ENOXAPARIN SODIUM 90 MG: 100 INJECTION SUBCUTANEOUS at 11:28

## 2017-01-01 RX ADMIN — METHOHEXITAL SODIUM 20 MG: 500 INJECTION, POWDER, LYOPHILIZED, FOR SOLUTION INTRAMUSCULAR; INTRAVENOUS; RECTAL at 11:30

## 2017-01-01 RX ADMIN — CLONAZEPAM 1 MG: 1 TABLET ORAL at 20:45

## 2017-01-01 RX ADMIN — HEPARIN SODIUM 5000 UNITS: 5000 INJECTION, SOLUTION INTRAVENOUS; SUBCUTANEOUS at 06:16

## 2017-01-01 RX ADMIN — VITAMIN D, TAB 1000IU (100/BT) 5000 UNITS: 25 TAB at 08:21

## 2017-01-01 RX ADMIN — FUROSEMIDE 40 MG: 10 INJECTION, SOLUTION INTRAMUSCULAR; INTRAVENOUS at 09:46

## 2017-01-01 RX ADMIN — ALBUTEROL SULFATE 2.5 MG: 2.5 SOLUTION RESPIRATORY (INHALATION) at 13:15

## 2017-01-01 RX ADMIN — CLOPIDOGREL BISULFATE 75 MG: 75 TABLET ORAL at 08:36

## 2017-01-01 RX ADMIN — LEVETIRACETAM 750 MG: 750 TABLET, FILM COATED ORAL at 08:59

## 2017-01-01 RX ADMIN — BUDESONIDE AND FORMOTEROL FUMARATE DIHYDRATE 2 PUFF: 80; 4.5 AEROSOL RESPIRATORY (INHALATION) at 21:30

## 2017-01-01 RX ADMIN — SULFUR HEXAFLUORIDE 2 ML: KIT at 16:10

## 2017-01-01 RX ADMIN — ASPIRIN 81 MG: 81 TABLET, COATED ORAL at 08:21

## 2017-01-01 RX ADMIN — LEVETIRACETAM 750 MG: 750 TABLET, FILM COATED ORAL at 08:21

## 2017-01-01 RX ADMIN — CYANOCOBALAMIN TAB 1000 MCG 1000 MCG: 1000 TAB at 09:47

## 2017-01-01 RX ADMIN — VITAMIN D, TAB 1000IU (100/BT) 5000 UNITS: 25 TAB at 08:59

## 2017-01-01 RX ADMIN — TAZOBACTAM SODIUM AND PIPERACILLIN SODIUM 4.5 G: 500; 4 INJECTION, SOLUTION INTRAVENOUS at 23:13

## 2017-01-01 RX ADMIN — BUDESONIDE AND FORMOTEROL FUMARATE DIHYDRATE 2 PUFF: 80; 4.5 AEROSOL RESPIRATORY (INHALATION) at 07:56

## 2017-01-01 RX ADMIN — HEPARIN SODIUM 5000 UNITS: 5000 INJECTION, SOLUTION INTRAVENOUS; SUBCUTANEOUS at 06:06

## 2017-01-01 RX ADMIN — PANTOPRAZOLE SODIUM 40 MG: 40 TABLET, DELAYED RELEASE ORAL at 05:10

## 2017-01-01 RX ADMIN — ASPIRIN 81 MG: 81 TABLET, COATED ORAL at 09:47

## 2017-01-01 RX ADMIN — CLONAZEPAM 1 MG: 1 TABLET ORAL at 21:13

## 2017-01-01 RX ADMIN — ASPIRIN 81 MG: 81 TABLET, COATED ORAL at 08:36

## 2017-01-01 RX ADMIN — BUDESONIDE AND FORMOTEROL FUMARATE DIHYDRATE 2 PUFF: 80; 4.5 AEROSOL RESPIRATORY (INHALATION) at 19:55

## 2017-01-01 RX ADMIN — HEPARIN SODIUM 5000 UNITS: 5000 INJECTION, SOLUTION INTRAVENOUS; SUBCUTANEOUS at 09:45

## 2017-01-01 RX ADMIN — TAZOBACTAM SODIUM AND PIPERACILLIN SODIUM 4.5 G: 500; 4 INJECTION, SOLUTION INTRAVENOUS at 22:16

## 2017-01-01 RX ADMIN — FUROSEMIDE 40 MG: 10 INJECTION, SOLUTION INTRAMUSCULAR; INTRAVENOUS at 09:02

## 2017-01-01 RX ADMIN — HEPARIN SODIUM 2750 UNITS: 1000 INJECTION, SOLUTION INTRAVENOUS; SUBCUTANEOUS at 13:20

## 2017-01-01 RX ADMIN — ALBUTEROL SULFATE 2.5 MG: 2.5 SOLUTION RESPIRATORY (INHALATION) at 13:36

## 2017-01-01 RX ADMIN — ROSUVASTATIN CALCIUM 10 MG: 10 TABLET, FILM COATED ORAL at 20:44

## 2017-01-01 RX ADMIN — ALLOPURINOL 100 MG: 100 TABLET ORAL at 08:34

## 2017-01-01 RX ADMIN — VITAMIN D, TAB 1000IU (100/BT) 5000 UNITS: 25 TAB at 09:48

## 2017-01-01 RX ADMIN — ALBUTEROL SULFATE 2.5 MG: 2.5 SOLUTION RESPIRATORY (INHALATION) at 07:22

## 2017-01-01 RX ADMIN — POTASSIUM CHLORIDE 40 MEQ: 750 CAPSULE, EXTENDED RELEASE ORAL at 09:00

## 2017-01-01 RX ADMIN — TAZOBACTAM SODIUM AND PIPERACILLIN SODIUM 4.5 G: 500; 4 INJECTION, SOLUTION INTRAVENOUS at 08:56

## 2017-01-01 RX ADMIN — CYANOCOBALAMIN TAB 1000 MCG 1000 MCG: 1000 TAB at 09:00

## 2017-01-01 RX ADMIN — ASPIRIN 81 MG 324 MG: 81 TABLET ORAL at 08:55

## 2017-01-01 RX ADMIN — HEPARIN SODIUM 5000 UNITS: 5000 INJECTION, SOLUTION INTRAVENOUS; SUBCUTANEOUS at 20:50

## 2017-01-01 RX ADMIN — ALBUTEROL SULFATE 2.5 MG: 2.5 SOLUTION RESPIRATORY (INHALATION) at 07:07

## 2017-01-01 RX ADMIN — ALBUTEROL SULFATE 2.5 MG: 2.5 SOLUTION RESPIRATORY (INHALATION) at 12:10

## 2017-01-01 RX ADMIN — ALBUTEROL SULFATE 2.5 MG: 2.5 SOLUTION RESPIRATORY (INHALATION) at 00:22

## 2017-01-01 RX ADMIN — ALBUTEROL SULFATE 2.5 MG: 2.5 SOLUTION RESPIRATORY (INHALATION) at 19:55

## 2017-01-01 RX ADMIN — LISINOPRIL 5 MG: 5 TABLET ORAL at 08:29

## 2017-01-01 RX ADMIN — POTASSIUM CHLORIDE 40 MEQ: 750 CAPSULE, EXTENDED RELEASE ORAL at 13:44

## 2017-01-01 RX ADMIN — VANCOMYCIN HYDROCHLORIDE 1250 MG: 10 INJECTION, POWDER, LYOPHILIZED, FOR SOLUTION INTRAVENOUS at 08:59

## 2017-01-01 RX ADMIN — ALBUTEROL SULFATE 2.5 MG: 2.5 SOLUTION RESPIRATORY (INHALATION) at 19:07

## 2017-01-01 RX ADMIN — GUAIFENESIN AND DEXTROMETHORPHAN 5 ML: 100; 10 SYRUP ORAL at 22:51

## 2017-01-01 RX ADMIN — LISINOPRIL 5 MG: 5 TABLET ORAL at 08:57

## 2017-01-01 RX ADMIN — ALLOPURINOL 200 MG: 100 TABLET ORAL at 08:36

## 2017-01-01 RX ADMIN — PANTOPRAZOLE SODIUM 40 MG: 40 TABLET, DELAYED RELEASE ORAL at 06:16

## 2017-01-01 RX ADMIN — FUROSEMIDE 40 MG: 10 INJECTION, SOLUTION INTRAMUSCULAR; INTRAVENOUS at 20:44

## 2017-01-01 RX ADMIN — VANCOMYCIN HYDROCHLORIDE 1000 MG: 1 INJECTION, SOLUTION INTRAVENOUS at 08:34

## 2017-01-01 RX ADMIN — VITAMIN D, TAB 1000IU (100/BT) 5000 UNITS: 25 TAB at 08:34

## 2017-01-01 RX ADMIN — FUROSEMIDE 40 MG: 10 INJECTION, SOLUTION INTRAMUSCULAR; INTRAVENOUS at 19:46

## 2017-01-01 RX ADMIN — TAZOBACTAM SODIUM AND PIPERACILLIN SODIUM 4.5 G: 500; 4 INJECTION, SOLUTION INTRAVENOUS at 14:20

## 2017-01-01 RX ADMIN — ALBUTEROL SULFATE 2.5 MG: 2.5 SOLUTION RESPIRATORY (INHALATION) at 08:39

## 2017-01-01 RX ADMIN — HEPARIN SODIUM 5000 UNITS: 5000 INJECTION, SOLUTION INTRAVENOUS; SUBCUTANEOUS at 13:49

## 2017-01-01 RX ADMIN — ALBUTEROL SULFATE 2.5 MG: 2.5 SOLUTION RESPIRATORY (INHALATION) at 00:27

## 2017-01-01 RX ADMIN — CYANOCOBALAMIN TAB 1000 MCG 1000 MCG: 1000 TAB at 08:34

## 2017-01-01 RX ADMIN — CLONAZEPAM 1 MG: 1 TABLET ORAL at 20:50

## 2017-01-01 RX ADMIN — TAZOBACTAM SODIUM AND PIPERACILLIN SODIUM 4.5 G: 500; 4 INJECTION, SOLUTION INTRAVENOUS at 06:15

## 2017-01-01 RX ADMIN — LEVETIRACETAM 750 MG: 750 TABLET, FILM COATED ORAL at 08:35

## 2017-01-01 RX ADMIN — MIDAZOLAM HYDROCHLORIDE 2 MG: 1 INJECTION, SOLUTION INTRAMUSCULAR; INTRAVENOUS at 11:30

## 2017-01-01 RX ADMIN — ALBUTEROL SULFATE 2.5 MG: 2.5 SOLUTION RESPIRATORY (INHALATION) at 14:24

## 2017-01-01 RX ADMIN — HEPARIN SODIUM 5000 UNITS: 5000 INJECTION, SOLUTION INTRAVENOUS; SUBCUTANEOUS at 16:45

## 2017-01-01 RX ADMIN — BUDESONIDE AND FORMOTEROL FUMARATE DIHYDRATE 2 PUFF: 80; 4.5 AEROSOL RESPIRATORY (INHALATION) at 07:00

## 2017-01-01 RX ADMIN — LEVETIRACETAM 750 MG: 750 TABLET, FILM COATED ORAL at 20:47

## 2017-01-01 RX ADMIN — CLONAZEPAM 1 MG: 1 TABLET ORAL at 21:31

## 2017-01-01 RX ADMIN — TAZOBACTAM SODIUM AND PIPERACILLIN SODIUM 4.5 G: 500; 4 INJECTION, SOLUTION INTRAVENOUS at 15:03

## 2017-01-01 RX ADMIN — PANTOPRAZOLE SODIUM 40 MG: 40 TABLET, DELAYED RELEASE ORAL at 06:07

## 2017-01-01 RX ADMIN — ROSUVASTATIN CALCIUM 10 MG: 10 TABLET, FILM COATED ORAL at 20:47

## 2017-01-01 RX ADMIN — CLOPIDOGREL BISULFATE 75 MG: 75 TABLET ORAL at 08:34

## 2017-01-01 RX ADMIN — ASPIRIN 81 MG: 81 TABLET, COATED ORAL at 08:47

## 2017-01-01 RX ADMIN — HEPARIN SODIUM 5000 UNITS: 5000 INJECTION, SOLUTION INTRAVENOUS; SUBCUTANEOUS at 13:55

## 2017-01-01 RX ADMIN — TAZOBACTAM SODIUM AND PIPERACILLIN SODIUM 4.5 G: 500; 4 INJECTION, SOLUTION INTRAVENOUS at 06:16

## 2017-01-01 RX ADMIN — LEVETIRACETAM 750 MG: 750 TABLET, FILM COATED ORAL at 08:47

## 2017-01-01 RX ADMIN — HEPARIN SODIUM 12.8 UNITS/KG/HR: 10000 INJECTION, SOLUTION INTRAVENOUS at 17:43

## 2017-01-01 RX ADMIN — HEPARIN SODIUM 5000 UNITS: 5000 INJECTION, SOLUTION INTRAVENOUS; SUBCUTANEOUS at 21:31

## 2017-01-01 RX ADMIN — HEPARIN SODIUM 10.8 UNITS/KG/HR: 10000 INJECTION, SOLUTION INTRAVENOUS at 20:53

## 2017-01-01 RX ADMIN — TAZOBACTAM SODIUM AND PIPERACILLIN SODIUM 4.5 G: 500; 4 INJECTION, SOLUTION INTRAVENOUS at 22:02

## 2017-01-01 RX ADMIN — CYANOCOBALAMIN TAB 1000 MCG 1000 MCG: 1000 TAB at 08:36

## 2017-01-01 RX ADMIN — BUDESONIDE AND FORMOTEROL FUMARATE DIHYDRATE 2 PUFF: 80; 4.5 AEROSOL RESPIRATORY (INHALATION) at 08:42

## 2017-01-01 RX ADMIN — CLONAZEPAM 1 MG: 1 TABLET ORAL at 14:09

## 2017-01-01 RX ADMIN — CYANOCOBALAMIN TAB 1000 MCG 1000 MCG: 1000 TAB at 09:01

## 2017-01-01 RX ADMIN — PANTOPRAZOLE SODIUM 40 MG: 40 TABLET, DELAYED RELEASE ORAL at 06:21

## 2017-01-01 RX ADMIN — CLONAZEPAM 1 MG: 1 TABLET ORAL at 20:47

## 2017-01-01 RX ADMIN — ALBUTEROL SULFATE 2.5 MG: 2.5 SOLUTION RESPIRATORY (INHALATION) at 06:52

## 2017-01-01 RX ADMIN — ASPIRIN 81 MG 81 MG: 81 TABLET ORAL at 14:11

## 2017-01-01 RX ADMIN — CYANOCOBALAMIN TAB 1000 MCG 1000 MCG: 1000 TAB at 14:10

## 2017-01-01 RX ADMIN — BUDESONIDE AND FORMOTEROL FUMARATE DIHYDRATE 2 PUFF: 80; 4.5 AEROSOL RESPIRATORY (INHALATION) at 18:49

## 2017-01-01 RX ADMIN — HEPARIN SODIUM 5000 UNITS: 5000 INJECTION, SOLUTION INTRAVENOUS; SUBCUTANEOUS at 20:45

## 2017-01-10 RX ORDER — ALBUTEROL SULFATE 2.5 MG/3ML
2.5 SOLUTION RESPIRATORY (INHALATION) EVERY 6 HOURS PRN
Qty: 120 VIAL | Refills: 4 | Status: SHIPPED | OUTPATIENT
Start: 2017-01-10 | End: 2017-01-01 | Stop reason: HOSPADM

## 2017-02-28 ENCOUNTER — OFFICE VISIT (OUTPATIENT)
Dept: ENDOCRINOLOGY | Facility: CLINIC | Age: 82
End: 2017-02-28

## 2017-02-28 VITALS
DIASTOLIC BLOOD PRESSURE: 62 MMHG | HEIGHT: 64 IN | HEART RATE: 74 BPM | BODY MASS INDEX: 36.7 KG/M2 | OXYGEN SATURATION: 97 % | WEIGHT: 215 LBS | SYSTOLIC BLOOD PRESSURE: 110 MMHG

## 2017-02-28 DIAGNOSIS — E78.2 MIXED HYPERLIPIDEMIA: ICD-10-CM

## 2017-02-28 DIAGNOSIS — I10 ESSENTIAL HYPERTENSION: ICD-10-CM

## 2017-02-28 DIAGNOSIS — E11.9 TYPE 2 DIABETES MELLITUS WITHOUT COMPLICATION, UNSPECIFIED LONG TERM INSULIN USE STATUS: Primary | ICD-10-CM

## 2017-02-28 DIAGNOSIS — M54.50 MIDLINE LOW BACK PAIN WITHOUT SCIATICA, UNSPECIFIED CHRONICITY: ICD-10-CM

## 2017-02-28 DIAGNOSIS — N18.30 CHRONIC KIDNEY DISEASE, STAGE III (MODERATE) (HCC): ICD-10-CM

## 2017-02-28 DIAGNOSIS — R30.0 DYSURIA: ICD-10-CM

## 2017-02-28 DIAGNOSIS — J45.40 MODERATE PERSISTENT ASTHMA WITHOUT COMPLICATION: ICD-10-CM

## 2017-02-28 DIAGNOSIS — M10.071 ACUTE IDIOPATHIC GOUT OF RIGHT FOOT: ICD-10-CM

## 2017-02-28 LAB
ALBUMIN SERPL-MCNC: 3.8 G/DL (ref 3.2–4.8)
ALBUMIN/GLOB SERPL: 1.5 G/DL (ref 1.5–2.5)
ALP SERPL-CCNC: 189 U/L (ref 25–100)
ALT SERPL W P-5'-P-CCNC: 30 U/L (ref 7–40)
ANION GAP SERPL CALCULATED.3IONS-SCNC: 6 MMOL/L (ref 3–11)
ARTICHOKE IGE QN: 126 MG/DL (ref 0–130)
AST SERPL-CCNC: 17 U/L (ref 0–33)
BASOPHILS # BLD AUTO: 0.05 10*3/MM3 (ref 0–0.2)
BASOPHILS NFR BLD AUTO: 0.3 % (ref 0–1)
BILIRUB BLD-MCNC: NEGATIVE MG/DL
BILIRUB SERPL-MCNC: 0.8 MG/DL (ref 0.3–1.2)
BUN BLD-MCNC: 30 MG/DL (ref 9–23)
BUN/CREAT SERPL: 18.8 (ref 7–25)
CALCIUM SPEC-SCNC: 10.3 MG/DL (ref 8.7–10.4)
CHLORIDE SERPL-SCNC: 102 MMOL/L (ref 99–109)
CHOLEST SERPL-MCNC: 214 MG/DL (ref 0–200)
CLARITY, POC: CLEAR
CO2 SERPL-SCNC: 33 MMOL/L (ref 20–31)
COLOR UR: ABNORMAL
CREAT BLD-MCNC: 1.6 MG/DL (ref 0.6–1.3)
DEPRECATED RDW RBC AUTO: 55.8 FL (ref 37–54)
EOSINOPHIL # BLD AUTO: 0.79 10*3/MM3 (ref 0.1–0.3)
EOSINOPHIL NFR BLD AUTO: 4.3 % (ref 0–3)
ERYTHROCYTE [DISTWIDTH] IN BLOOD BY AUTOMATED COUNT: 15.2 % (ref 11.3–14.5)
GFR SERPL CREATININE-BSD FRML MDRD: 30 ML/MIN/1.73
GLOBULIN UR ELPH-MCNC: 2.5 GM/DL
GLUCOSE BLD-MCNC: 103 MG/DL (ref 70–100)
GLUCOSE BLDC GLUCOMTR-MCNC: 135 MG/DL (ref 70–130)
GLUCOSE UR STRIP-MCNC: NEGATIVE MG/DL
HBA1C MFR BLD: 6.4 %
HCT VFR BLD AUTO: 38.2 % (ref 34.5–44)
HDLC SERPL-MCNC: 53 MG/DL (ref 40–60)
HGB BLD-MCNC: 12 G/DL (ref 11.5–15.5)
IMM GRANULOCYTES # BLD: 0.06 10*3/MM3 (ref 0–0.03)
IMM GRANULOCYTES NFR BLD: 0.3 % (ref 0–0.6)
KETONES UR QL: NEGATIVE
LEUKOCYTE EST, POC: ABNORMAL
LYMPHOCYTES # BLD AUTO: 3.47 10*3/MM3 (ref 0.6–4.8)
LYMPHOCYTES NFR BLD AUTO: 19 % (ref 24–44)
MCH RBC QN AUTO: 31.7 PG (ref 27–31)
MCHC RBC AUTO-ENTMCNC: 31.4 G/DL (ref 32–36)
MCV RBC AUTO: 101.1 FL (ref 80–99)
MONOCYTES # BLD AUTO: 1.65 10*3/MM3 (ref 0–1)
MONOCYTES NFR BLD AUTO: 9 % (ref 0–12)
NEUTROPHILS # BLD AUTO: 12.23 10*3/MM3 (ref 1.5–8.3)
NEUTROPHILS NFR BLD AUTO: 67.1 % (ref 41–71)
NITRITE UR-MCNC: NEGATIVE MG/ML
PH UR: 6 [PH] (ref 5–8)
PLAT MORPH BLD: NORMAL
PLATELET # BLD AUTO: 496 10*3/MM3 (ref 150–450)
PMV BLD AUTO: 10.9 FL (ref 6–12)
POTASSIUM BLD-SCNC: 5.5 MMOL/L (ref 3.5–5.5)
PROT SERPL-MCNC: 6.3 G/DL (ref 5.7–8.2)
PROT UR STRIP-MCNC: NEGATIVE MG/DL
RBC # BLD AUTO: 3.78 10*6/MM3 (ref 3.89–5.14)
RBC # UR STRIP: NEGATIVE /UL
RBC MORPH BLD: NORMAL
SODIUM BLD-SCNC: 141 MMOL/L (ref 132–146)
SP GR UR: 1.02 (ref 1–1.03)
TRIGL SERPL-MCNC: 173 MG/DL (ref 0–150)
TSH SERPL DL<=0.05 MIU/L-ACNC: 2.62 MIU/ML (ref 0.35–5.35)
UROBILINOGEN UR QL: NORMAL
WBC MORPH BLD: NORMAL
WBC NRBC COR # BLD: 18.25 10*3/MM3 (ref 3.5–10.8)

## 2017-02-28 PROCEDURE — 84443 ASSAY THYROID STIM HORMONE: CPT | Performed by: INTERNAL MEDICINE

## 2017-02-28 PROCEDURE — 82947 ASSAY GLUCOSE BLOOD QUANT: CPT | Performed by: INTERNAL MEDICINE

## 2017-02-28 PROCEDURE — 99214 OFFICE O/P EST MOD 30 MIN: CPT | Performed by: INTERNAL MEDICINE

## 2017-02-28 PROCEDURE — 85025 COMPLETE CBC W/AUTO DIFF WBC: CPT | Performed by: INTERNAL MEDICINE

## 2017-02-28 PROCEDURE — 80061 LIPID PANEL: CPT | Performed by: INTERNAL MEDICINE

## 2017-02-28 PROCEDURE — 81003 URINALYSIS AUTO W/O SCOPE: CPT | Performed by: INTERNAL MEDICINE

## 2017-02-28 PROCEDURE — 85007 BL SMEAR W/DIFF WBC COUNT: CPT | Performed by: INTERNAL MEDICINE

## 2017-02-28 PROCEDURE — 83036 HEMOGLOBIN GLYCOSYLATED A1C: CPT | Performed by: INTERNAL MEDICINE

## 2017-02-28 PROCEDURE — 80053 COMPREHEN METABOLIC PANEL: CPT | Performed by: INTERNAL MEDICINE

## 2017-02-28 RX ORDER — NITROFURANTOIN 25; 75 MG/1; MG/1
100 CAPSULE ORAL 2 TIMES DAILY
Qty: 10 CAPSULE | Refills: 0 | Status: ON HOLD | OUTPATIENT
Start: 2017-02-28 | End: 2017-06-15 | Stop reason: SDUPTHER

## 2017-02-28 RX ORDER — COLCHICINE 0.6 MG/1
0.6 TABLET ORAL DAILY
Qty: 30 TABLET | Refills: 2 | Status: SHIPPED | OUTPATIENT
Start: 2017-02-28 | End: 2017-01-01

## 2017-02-28 NOTE — ASSESSMENT & PLAN NOTE
Check flp   Not on statin- intolerant (caused weakness)  Benefits reviewed along with risk of continued high cholesterol

## 2017-02-28 NOTE — ASSESSMENT & PLAN NOTE
Controlled with nebulizer, advair  No recent uri / pneumonia  Uses tussionex prn severe cough  Risk of resp suppression discussed

## 2017-02-28 NOTE — ASSESSMENT & PLAN NOTE
Blood sugar and 90 day average sugar reviewed  Results for orders placed or performed in visit on 02/28/17   POC Glycosylated Hemoglobin (Hb A1C)   Result Value Ref Range    Hemoglobin A1C 6.4 %   POC Glucose Fingerstick   Result Value Ref Range    Glucose 135 (A) 70 - 130 mg/dL   POCT urinalysis dipstick, automated   Result Value Ref Range    Color Dark Yellow Yellow, Straw, Dark Yellow, [place]anna    Clarity, UA Clear Clear    Glucose, UA Negative Negative, 1000 mg/dL (3+) mg/dL    Bilirubin Negative Negative    Ketones, UA Negative Negative    Specific Gravity  1.020 1.005 - 1.030    Blood, UA Negative Negative    pH, Urine 6.0 5.0 - 8.0    Protein, POC Negative Negative mg/dL    Urobilinogen, UA Normal Normal    Leukocytes 75 Jose Angel/ul (A) Negative    Nitrite, UA Negative Negative     Doing well overall  Is up to date with foot exam, ur alb neg  Has upcoming eye appt- due for shot  Goals discussed, work on cutting carbs, weight loss discussed

## 2017-02-28 NOTE — PROGRESS NOTES
Avis Us 87 y.o.  [facility]:Follow-up (requesting refill on Tussionex for cough); Diabetes (Type II); Hypertension; Hyperlipidemia; and Gout (currently having gout attack in right foot and also syptoms of UTI with dysuria, urine odor and urinary frequency)    [place]: Follow-up (requesting refill on Tussionex for cough); Diabetes (Type II); Hypertension; Hyperlipidemia; and Gout (currently having gout attack in right foot and also syptoms of UTI with dysuria, urine odor and urinary frequency)    Blood sugar and 90 day average sugar reviewed  Results for orders placed or performed in visit on 02/28/17   POC Glycosylated Hemoglobin (Hb A1C)   Result Value Ref Range    Hemoglobin A1C 6.4 %   POC Glucose Fingerstick   Result Value Ref Range    Glucose 135 (A) 70 - 130 mg/dL   POCT urinalysis dipstick, automated   Result Value Ref Range    Color Dark Yellow Yellow, Straw, Dark Yellow, Keyanna    Clarity, UA Clear Clear    Glucose, UA Negative Negative, 1000 mg/dL (3+) mg/dL    Bilirubin Negative Negative    Ketones, UA Negative Negative    Specific Gravity  1.020 1.005 - 1.030    Blood, UA Negative Negative    pH, Urine 6.0 5.0 - 8.0    Protein, POC Negative Negative mg/dL    Urobilinogen, UA Normal Normal    Leukocytes 75 Jose Angel/ul (A) Negative    Nitrite, UA Negative Negative     bp is good   Has had pain and dysuria recently- dip ua reviewed as well   Macrodantin sent   She has had years of severe pneumonia but is doing much better with asthma on nebulizer, symbicort  No c/o this year  Is due for eye exam- has rescheduled (getting shots in eyes)  Ur alb neg 6/16  Had lab work 9/16  S/p back surgery- vertebral fx - did well   No foot lesion   Is on ace   Intol chol medication   Gout last weekend- foot swollen and tender- seen at [facility]- on colchicine     Allergies   Allergen Reactions   • Clarithromycin Anaphylaxis   • Vioxx [Rofecoxib] Other (See Comments)     Affects kidney function   • Metoprolol Other (See Comments)      "Severe hypotension   • Tramadol Hallucinations     \"crazy out of her head\"       Current Outpatient Prescriptions:   •  albuterol (PROVENTIL) (2.5 MG/3ML) 0.083% nebulizer solution, Take 2.5 mg by nebulization Every 6 (Six) Hours As Needed for wheezing., Disp: 120 vial, Rfl: 4  •  Albuterol Sulfate (VENTOLIN HFA IN), Inhale as needed., Disp: , Rfl:   •  allopurinol (ZYLOPRIM) 100 MG tablet, Take 2 tablets by mouth  daily, Disp: 180 tablet, Rfl: 1  •  ammonium lactate (AMLACTIN) 12 % cream, Apply  topically 2 (two) times a day., Disp: , Rfl:   •  aspirin 81 MG tablet, Take  by mouth daily. In evening, Disp: , Rfl:   •  Cholecalciferol (VITAMIN D3) 5000 UNITS tablet, Take 1 tablet by mouth daily., Disp: , Rfl:   •  ciprofloxacin (CIPRO) 250 MG tablet, Take one-half tablet by  mouth daily, Disp: 45 tablet, Rfl: 1  •  clonazePAM (KlonoPIN) 1 MG tablet, TAKE 1 TABLET BY MOUTH EVERY 12 HOURS AS NEEDED, Disp: 60 tablet, Rfl: 0  •  clotrimazole-betamethasone (LOTRISONE) 1-0.05 % lotion, Apply  topically 2 (two) times a day., Disp: 30 mL, Rfl: 3  •  colestipol (COLESTID) 1 G tablet, Take  by mouth daily. As needed, Disp: , Rfl:   •  fluocinonide (LIDEX) 0.05 % external solution, Apply  topically 2 (two) times a day., Disp: 60 mL, Rfl: 3  •  Hydrocod Polst-CPM Polst ER (TUSSIONEX PENNKINETIC) 10-8 MG/5ML ER suspension, Take one teaspoon BID prn cough, Disp: 115 mL, Rfl: 0  •  hydrOXYzine (VISTARIL) 50 MG capsule, Take  by mouth. Take one tablet Q HS prn sleep, Disp: , Rfl:   •  levETIRAcetam (KEPPRA) 750 MG tablet, Take 1 tablet by mouth 2 (two) times a day., Disp: 180 tablet, Rfl: 1  •  lisinopril (PRINIVIL,ZESTRIL) 10 MG tablet, Take 1 tablet by mouth daily., Disp: 90 tablet, Rfl: 1  •  magnesium oxide (MAG-OX) 400 MG tablet, Take  by mouth daily., Disp: , Rfl:   •  pantoprazole (PROTONIX) 40 MG EC tablet, Take 1 tablet by mouth  daily, Disp: 90 tablet, Rfl: 1  •  potassium chloride (MICRO-K) 10 MEQ CR capsule, 10 mEq 2 " (Two) Times a Day., Disp: , Rfl:   •  SYMBICORT 80-4.5 MCG/ACT inhaler, Use 1 puff twice daily, Disp: 20.4 g, Rfl: 3  •  colchicine (COLCRYS) 0.6 MG tablet, Take  by mouth., Disp: , Rfl:   •  furosemide (LASIX) 80 MG tablet, Take 1 tablet by mouth  daily as directed (Patient taking differently: Take 1/2  tablet by mouth  daily as directed), Disp: 90 tablet, Rfl: 1  •  HYDROcodone-acetaminophen (NORCO) 5-325 MG per tablet, , Disp: , Rfl:   Patient Active Problem List    Diagnosis   • Arthralgia of lumbar spine [M54.5]   • Encounter for eye exam [Z01.00]   • Parasites in stool [B82.9]   • Sepsis [A41.9]   • UTI (urinary tract infection) [N39.0]   • Midline low back pain without sciatica [M54.5]   • Physical debility [R53.81]   • Lumbar strain [S39.012A]   • History of MI (myocardial infarction) [I25.2]   • Abnormal liver function tests [R79.89]     Overview Note:     Impression: 03/03/2016 - mild elevation- will reassess next appt  Impression: 10/12/2015 - check cmp  Impression: 03/11/2015 - update lfts  Impression: 11/03/2014 - update lfts; Description: chronic     • Acute myocardial infarction [I21.3]     Overview Note:     Description: In Hospital for UTI- 7/14- high troponin     • Anxiety [F41.9]   • Knee pain [M25.569]     Overview Note:     Impression: 10/12/2015 - s/p injection - shot 3 weeks ago  try ultram- rx provided;   Impression: 03/03/2016 - using walker, needs bedside table (eating in lift chair)   continue therapy with exercises- has made alot of progress and cautioned her about need to continue  Impression: 10/12/2015 - see above, same;      • Asthma [J45.909]     Overview Note:     Impression: 03/11/2015 - refill albuterol, symbicort  rx provided for tussinex for cough  Impression: 08/11/2014 - taper prednisone as tolerated;      • Calf cramp [R25.2]     Overview Note:     Description: reileved by magnesium     • Candidal intertrigo [B37.2]     Overview Note:     Impression: 06/16/2015 - rx lotrisone  provided  no help with nystatin;      • Cataract [H26.9]     Overview Note:     Description: Post cataract opacification 7/14 - opinion Dr Fuentes, referred Dr Rosado for laser     • Chest pain [R07.9]     Overview Note:     Description: normal cath 1/2     • Chronic kidney disease, stage III (moderate) [N18.3]     Overview Note:     Impression: 03/03/2016 - reassess 3-4 months- get lab hospitalization  Impression: 10/12/2015 - check cmp  Impression: 11/03/2014 - check cmp  Impression: 08/11/2014 - having lab work to f/u this on Thursday  Impression: 07/22/2014 - check cmp  Impression: 04/08/2014 - update creat.; Description: Alports  11/5     • Chronic obstructive pulmonary disease [J44.9]     Overview Note:     Impression: 01/13/2016 - Sent in order for lifetime supply of albuterol for nebulizer.; Description: severe     • Complete atrioventricular block [I44.2]     Overview Note:     Description: s/p pacemaker     • Congestive heart failure [I50.9]     Overview Note:     Impression: 01/13/2016 - Mild CHF seen on cxr during admission. Recheck chest xr to make sure resolved.  Impression: 08/11/2014 - s/p MI- f/u Dr Thompson- difficult due to kidney function; Description: Ronaldo- stress test 7/7  echo mod mr     • Atherosclerosis of coronary artery [I25.10]   • Cough [R05]     Overview Note:     Impression: 03/03/2016 - refill tussionex, call if persistant - h/o recurrent pneumonia but currently clear - cough at night, discussed eating early, no late snacks, keep hob elevated, no productive cough, no fever or chills  Impression: 03/11/2015 - s/p influenza A with known chronic asthma  treated with tamiflu, ceftin and prednisone  oxygen level is low;      • Depression [F32.9]   • T2DM (type 2 diabetes mellitus) [E11.9]     Overview Note:     A1C 6.4 6/28/2016  Impression: 03/03/2016 - blood sugar 107 and hgn a1c 6.2%   is overdue for eye exam- macular degen getting injections   ur alb due 6/15   neuropathy and  foot care stable, no changes- is wearing diabetic shoes  Impression: 10/12/2015 - check cmp, hgn a1c discussed with her- good control - blood sugar 84, hgn a1c 6.3%  is due eye exam   no foot lesion   no callus or ulcer  ur alb due  3/16  Impression: 06/16/2015 - blood sugar is 113, hgn a1c 6.4%  check ur alb today  Impression: 03/11/2015 - check hgn a1c  Impression: 11/03/2014 - excellent blood sugar control - a1c 6.1  reviewed with patient, is up to date with eye exam  neuropathy stable  update ur alb next ov  Impression: 08/11/2014 - continue to monitor sugars, taper prednisone  Impression: 07/22/2014 - blood sugar 115, hgn a1c 6.3% (average 130 or so)  is up to date with eye exam, no neuropathy  ur alb due but has uti  Impression: 04/08/2014 - blood sugar 148, hgn a1c 6.4% (average 135)  is up to date with eye exam, no neuropathy, due ur alb.    discussed blood sugar and goals for a1c.  No severe low blood sugars.;      • Dizziness [R42]   • Dysuria [R30.0]   • Edema [R60.9]   • Gastroesophageal reflux disease [K21.9]   • Primary hypertriglyceridemia [E78.1]     Overview Note:     Impression: 03/03/2016 - check at next visit fasting  Impression: 10/12/2015 - check flp  Impression: 03/11/2015 - check flp  Impression: 04/08/2014 - check flp;      • Fatigue [R53.83]   • Fracture of patella [S82.009A]   • Gout [M10.9]   • Headache [R51]   • Hyperlipidemia [E78.5]     Overview Note:     Impression: 10/12/2015 - check flp  Impression: 03/11/2015 - check flp  Impression: 11/03/2014 - check flp  Impression: 08/11/2014 - refill atorvastatin- check lab from hosp.  Impression: 07/22/2014 - update flp  Impression: 04/08/2014 - check flp;      • Hypertension [I10]     Overview Note:     Impression: 03/03/2016 - bp is good  Impression: 10/12/2015 - bp is better with recheck  Impression: 06/16/2015 - bp is good  Impression: 03/11/2015 - bp is good today  Impression: 11/03/2014 - bp is good today- recently reduced bp med due to  low bp and reduced diuretic due to impairing kidney function  Impression: 08/11/2014 - bp is good  Impression: 07/22/2014 - bp is good  Impression: 04/08/2014 - bp is good;      • Influenza due to influenza A virus [J10.1]     Overview Note:     Impression: 03/02/2015 - rapid influenza screen pos for influenza a  given o2 sat 86% in office, prior h/o pneumonia and baseline asthma now associated with high fever and confusioin I would recommend she be referred to hospital for further treatment.  Dr Lambert on call- discussed with him and will refer to admission;      • Insomnia [G47.00]   • Leukocytosis [D72.829]   • Macrocytosis [D75.89]     Overview Note:     Impression: 11/03/2014 - check b12 levels; Description: on ppi and carafate     • Macular degeneration [H35.30]   • Menopause present [Z78.0]   • Night sweats [R61]     Overview Note:     Impression: 07/22/2014 - has uti (rx provided)  culture obtained;      • Sleep apnea [G47.30]     Overview Note:     Impression: 03/03/2016 - significant improvement with new mask, cpap    continue current therapy.  Impression: 01/13/2016 - Faxed respiratory therapy order to [facility]. They will send order with recommendations based on evaluation.;      • Osteoporosis [M81.0]     Overview Note:     Description: 8/5     • Peripheral neuropathy [G62.9]   • Pneumonia [J18.9]     Overview Note:     Impression: 03/03/2016 - recurrent x 5, has had swallowing study and is employing techniques advised, using symbicort  Impression: 01/13/2016 - Recheck chest xray. Completed doxycycline and steroid taper.;      • Seizure disorder [G40.909]   • Sick sinus syndrome [I49.5]     Overview Note:     Description: 1/15- Kr[employer]a pacemaker interrogation     • Sinus bradycardia [R00.1]     Overview Note:     Description: munir zuletaal 7/10, s/p pacemaker 7/14 Mel     • Thrombophlebitis [I80.9]     Overview Note:     Description: superficial     • Cobalamin deficiency [E53.8]     Overview Note:      Impression: 10/12/2015 - normal levels last check  Impression: 03/11/2015 - vitamin b12 levels;      • Vitamin D deficiency [E55.9]     Overview Note:     Impression: 03/11/2015 - check vitamin D levels  Impression: 11/03/2014 - update vitamin d levels- daughter just restarted supplement  Impression: 04/08/2014 - check vitamin D levels;        Review of Systems   Constitutional: Negative for activity change, appetite change, chills, diaphoresis, fatigue, fever and unexpected weight change.   HENT: Negative for congestion, dental problem, drooling, ear discharge, ear pain, facial swelling, hearing loss, mouth sores, nosebleeds, postnasal drip, rhinorrhea, sinus pressure, sneezing, sore throat, tinnitus, trouble swallowing and voice change.    Eyes: Negative for photophobia, pain, discharge, redness, itching and visual disturbance.   Respiratory: Negative for apnea, cough, choking, chest tightness, shortness of breath, wheezing and stridor.    Cardiovascular: Negative for chest pain, palpitations and leg swelling.   Gastrointestinal: Negative for abdominal distention, abdominal pain, anal bleeding, blood in stool, constipation, diarrhea, nausea, rectal pain and vomiting.   Endocrine: Negative for cold intolerance, heat intolerance, polydipsia, polyphagia and polyuria.   Genitourinary: Negative for decreased urine volume, difficulty urinating, dysuria, enuresis, flank pain, frequency, genital sores, hematuria and urgency.   Musculoskeletal: Positive for arthralgias and back pain. Negative for gait problem, joint swelling, myalgias, neck pain and neck stiffness.   Skin: Negative for color change, pallor, rash and wound.   Allergic/Immunologic: Negative for environmental allergies, food allergies and immunocompromised state.   Neurological: Negative for dizziness, tremors, seizures, syncope, facial asymmetry, speech difficulty, weakness, light-headedness, numbness and headaches.   Hematological: Negative for  "adenopathy. Does not bruise/bleed easily.   Psychiatric/Behavioral: Negative for agitation, behavioral problems, confusion, decreased concentration, dysphoric mood, hallucinations, self-injury, sleep disturbance and suicidal ideas. The patient is not nervous/anxious and is not hyperactive.      Social History     Social History   • Marital status:      Spouse name: N/A   • Number of children: N/A   • Years of education: N/A     Occupational History   • Not on file.     Social History Main Topics   • Smoking status: Never Smoker   • Smokeless tobacco: Never Used   • Alcohol use No   • Drug use: No   • Sexual activity: Defer     Other Topics Concern   • Not on file     Social History Narrative     Family History   Problem Relation Age of Onset   • Heart disease Father    • Heart disease Paternal Grandmother    • Heart disease Paternal Grandfather    • Cancer Mother    • Stomach cancer Other      Family history     Visit Vitals   • /62   • Pulse 74   • Ht 64\" (162.6 cm)   • Wt 215 lb (97.5 kg)   • SpO2 97%   • BMI 36.9 kg/m2     Physical Exam   Constitutional: She is oriented to person, place, and time. She appears well-developed and well-nourished.   HENT:   Head: Normocephalic and atraumatic.   Nose: Nose normal.   Mouth/Throat: Oropharynx is clear and moist.   Eyes: Conjunctivae, EOM and lids are normal. Pupils are equal, round, and reactive to light.   Neck: Trachea normal and normal range of motion. Neck supple. Carotid bruit is not present. No tracheal deviation present. No thyroid mass and no thyromegaly present.   Cardiovascular: Normal rate, regular rhythm, normal heart sounds and intact distal pulses.  Exam reveals no gallop and no friction rub.    No murmur heard.  Pulmonary/Chest: Effort normal and breath sounds normal. No respiratory distress. She has no wheezes.   Musculoskeletal: Normal range of motion. She exhibits no edema or deformity.   Swelling mp joint gt toe    Lymphadenopathy:     " She has no cervical adenopathy.   Neurological: She is alert and oriented to person, place, and time. She has normal reflexes. She displays normal reflexes. No cranial nerve deficit.   Skin: Skin is warm and dry. No rash noted. No cyanosis or erythema. Nails show no clubbing.   Psychiatric: She has a normal mood and affect. Her speech is normal and behavior is normal. Judgment and thought content normal. Cognition and memory are normal.   Nursing note and vitals reviewed.    Results for orders placed or performed in visit on 10/10/16   POC Glucose Fingerstick   Result Value Ref Range    Glucose 97 70 - 130 mg/dL   POC Glycosylated Hemoglobin (Hb A1C)   Result Value Ref Range    Hemoglobin A1C 6.4 %     Avis was seen today for follow-up, diabetes, hypertension, hyperlipidemia and gout.    Diagnoses and all orders for this visit:    Type 2 diabetes mellitus without complication, unspecified long term insulin use status  -     POC Glycosylated Hemoglobin (Hb A1C)  -     POC Glucose Fingerstick      Problem List Items Addressed This Visit        Cardiovascular and Mediastinum    Hyperlipidemia     Check flp   Not on statin- intolerant (caused weakness)  Benefits reviewed along with risk of continued high cholesterol          Hypertension     bp is well controlled             Respiratory    Asthma     Controlled with nebulizer, advair  No recent uri / pneumonia  Uses tussionex prn severe cough  Risk of resp suppression discussed               Endocrine    Diabetes mellitus - Primary     Blood sugar and 90 day average sugar reviewed  Results for orders placed or performed in visit on 02/28/17   POC Glycosylated Hemoglobin (Hb A1C)   Result Value Ref Range    Hemoglobin A1C 6.4 %   POC Glucose Fingerstick   Result Value Ref Range    Glucose 135 (A) 70 - 130 mg/dL   POCT urinalysis dipstick, automated   Result Value Ref Range    Color Dark Yellow Yellow, Straw, Dark Yellow, Keyanna    Clarity, UA Clear Clear    Glucose, UA  Negative Negative, 1000 mg/dL (3+) mg/dL    Bilirubin Negative Negative    Ketones, UA Negative Negative    Specific Gravity  1.020 1.005 - 1.030    Blood, UA Negative Negative    pH, Urine 6.0 5.0 - 8.0    Protein, POC Negative Negative mg/dL    Urobilinogen, UA Normal Normal    Leukocytes 75 Jose Angel/ul (A) Negative    Nitrite, UA Negative Negative     Doing well overall  Is up to date with foot exam, ur alb neg  Has upcoming eye appt- due for shot  Goals discussed, work on cutting carbs, weight loss discussed         Relevant Orders    POC Glycosylated Hemoglobin (Hb A1C) (Completed)    POC Glucose Fingerstick (Completed)    Comprehensive Metabolic Panel    CBC & Differential    TSH    Lipid Panel    CBC Auto Differential       Nervous and Auditory    Dysuria     Pos ua in diabetic- rx macrobid sent          Relevant Orders    POCT urinalysis dipstick, automated (Completed)    Midline low back pain without sciatica     Post op with relief of pain               Genitourinary    Chronic kidney disease, stage III (moderate)     Recheck cmp             Other    Gout     Refill colchicine   Less redness and swelling but not completely gone  Was seen in [facility] and gave her 3 doses of colchicine but no more  Refilled #30 for recurrent dz  Discussed need to continue allopurinol during acute flare         Relevant Orders    CBC & Differential    CBC Auto Differential        Return in about 3 months (around 5/28/2017) for Recheck 30 min .    Kenia Addison MA

## 2017-02-28 NOTE — ASSESSMENT & PLAN NOTE
Refill colchicine   Less redness and swelling but not completely gone  Was seen in [facility] and gave her 3 doses of colchicine but no more  Refilled #30 for recurrent dz  Discussed need to continue allopurinol during acute flare

## 2017-03-03 ENCOUNTER — TELEPHONE (OUTPATIENT)
Dept: ENDOCRINOLOGY | Facility: CLINIC | Age: 82
End: 2017-03-03

## 2017-03-03 RX ORDER — LEVOFLOXACIN 500 MG/1
500 TABLET, FILM COATED ORAL DAILY
Qty: 7 TABLET | Refills: 0 | Status: ON HOLD | OUTPATIENT
Start: 2017-03-03 | End: 2017-06-15

## 2017-03-03 NOTE — TELEPHONE ENCOUNTER
----- Message from Yudelka Canada sent at 3/3/2017  2:10 PM EST -----  Contact: DAUGHTER  MS BRIGGS CALLED TODAY STATING THAT HER MOTHER TOLD HER THAT SOMEONE CALLED FROM THIS OFFICE. MS BRIGGS STATES THAT HER MOTHER CAN'T HEAR OR SEE WELL. SHE THINKS THIS MAY HAVE BEEN A CALL ABOUT LAB RESULTS.     [phone] CHRISTIANA BRIGGS

## 2017-03-03 NOTE — TELEPHONE ENCOUNTER
Medication sent.  Please let her know that she needs to be seen in ER if worsens.  Update on condition Monday.  Thanks, [facility]      Called and spoke with pt's daughter informed her of pt's resutls and to see how pt has been feeling if she is having any infection symptoms per Dr. Herzog, as far as daughter knows she has been fine only dealing with gout pain but is currently being treated for that, had a diarrhea but is a side effect from colchicine, daughter stated that she will give pt a call and see how she has been feeling and then give us a call back.     Bell called back stating that she spoke with pt, pt informed her that she has been coughing at night with some mucus, and has been having shortness of breath as well, thinks that she could possibly have pneumonia. Pt also stated that she does not want to be coming in again or doing anymore test because she is just not feeling well and if you want you can send her medication, pls advise.

## 2017-03-27 RX ORDER — LEVETIRACETAM 750 MG/1
TABLET ORAL
Qty: 180 TABLET | Refills: 1 | Status: SHIPPED | OUTPATIENT
Start: 2017-03-27 | End: 2017-01-01 | Stop reason: SDUPTHER

## 2017-03-27 RX ORDER — LISINOPRIL 10 MG/1
TABLET ORAL
Qty: 90 TABLET | Refills: 1 | Status: SHIPPED | OUTPATIENT
Start: 2017-03-27 | End: 2017-01-01 | Stop reason: HOSPADM

## 2017-04-20 PROCEDURE — 93010 ELECTROCARDIOGRAM REPORT: CPT | Performed by: INTERNAL MEDICINE

## 2017-05-22 ENCOUNTER — HOSPITAL ENCOUNTER (EMERGENCY)
Facility: HOSPITAL | Age: 82
Discharge: HOME | End: 2017-05-22
Attending: EMERGENCY MEDICINE | Admitting: EMERGENCY MEDICINE

## 2017-05-22 ENCOUNTER — APPOINTMENT (OUTPATIENT)
Dept: GENERAL RADIOLOGY | Facility: HOSPITAL | Age: 82
End: 2017-05-22

## 2017-05-22 VITALS
HEIGHT: 66 IN | OXYGEN SATURATION: 95 % | DIASTOLIC BLOOD PRESSURE: 65 MMHG | SYSTOLIC BLOOD PRESSURE: 132 MMHG | WEIGHT: 222 LBS | RESPIRATION RATE: 20 BRPM | BODY MASS INDEX: 35.68 KG/M2 | TEMPERATURE: 97.8 F | HEART RATE: 68 BPM

## 2017-05-22 DIAGNOSIS — N12 PYELONEPHRITIS: ICD-10-CM

## 2017-05-22 DIAGNOSIS — J44.1 COPD EXACERBATION (HCC): Primary | ICD-10-CM

## 2017-05-22 LAB
ALBUMIN SERPL-MCNC: 3.9 G/DL (ref 3.2–4.8)
ALBUMIN/GLOB SERPL: 1.4 G/DL (ref 1.5–2.5)
ALP SERPL-CCNC: 178 U/L (ref 25–100)
ALT SERPL W P-5'-P-CCNC: 9 U/L (ref 7–40)
ANION GAP SERPL CALCULATED.3IONS-SCNC: 3 MMOL/L (ref 3–11)
AST SERPL-CCNC: 13 U/L (ref 0–33)
BACTERIA UR QL AUTO: ABNORMAL /HPF
BASOPHILS # BLD AUTO: 0.05 10*3/MM3 (ref 0–0.2)
BASOPHILS NFR BLD AUTO: 0.2 % (ref 0–1)
BILIRUB SERPL-MCNC: 0.6 MG/DL (ref 0.3–1.2)
BILIRUB UR QL STRIP: NEGATIVE
BNP SERPL-MCNC: 243 PG/ML (ref 0–100)
BUN BLD-MCNC: 38 MG/DL (ref 9–23)
BUN/CREAT SERPL: 25.3 (ref 7–25)
CALCIUM SPEC-SCNC: 10.5 MG/DL (ref 8.7–10.4)
CHLORIDE SERPL-SCNC: 106 MMOL/L (ref 99–109)
CLARITY UR: ABNORMAL
CO2 SERPL-SCNC: 31 MMOL/L (ref 20–31)
COLOR UR: YELLOW
CREAT BLD-MCNC: 1.5 MG/DL (ref 0.6–1.3)
DEPRECATED RDW RBC AUTO: 55.4 FL (ref 37–54)
EOSINOPHIL # BLD AUTO: 0.64 10*3/MM3 (ref 0.1–0.3)
EOSINOPHIL NFR BLD AUTO: 2.7 % (ref 0–3)
ERYTHROCYTE [DISTWIDTH] IN BLOOD BY AUTOMATED COUNT: 14.9 % (ref 11.3–14.5)
GFR SERPL CREATININE-BSD FRML MDRD: 33 ML/MIN/1.73
GLOBULIN UR ELPH-MCNC: 2.8 GM/DL
GLUCOSE BLD-MCNC: 197 MG/DL (ref 70–100)
GLUCOSE UR STRIP-MCNC: NEGATIVE MG/DL
HCT VFR BLD AUTO: 38.9 % (ref 34.5–44)
HGB BLD-MCNC: 11.5 G/DL (ref 11.5–15.5)
HGB UR QL STRIP.AUTO: ABNORMAL
HOLD SPECIMEN: NORMAL
HOLD SPECIMEN: NORMAL
HYALINE CASTS UR QL AUTO: ABNORMAL /LPF
IMM GRANULOCYTES # BLD: 0.09 10*3/MM3 (ref 0–0.03)
IMM GRANULOCYTES NFR BLD: 0.4 % (ref 0–0.6)
KETONES UR QL STRIP: NEGATIVE
LEUKOCYTE ESTERASE UR QL STRIP.AUTO: ABNORMAL
LYMPHOCYTES # BLD AUTO: 1.86 10*3/MM3 (ref 0.6–4.8)
LYMPHOCYTES NFR BLD AUTO: 7.8 % (ref 24–44)
MCH RBC QN AUTO: 30.3 PG (ref 27–31)
MCHC RBC AUTO-ENTMCNC: 29.6 G/DL (ref 32–36)
MCV RBC AUTO: 102.4 FL (ref 80–99)
MONOCYTES # BLD AUTO: 1.01 10*3/MM3 (ref 0–1)
MONOCYTES NFR BLD AUTO: 4.3 % (ref 0–12)
NEUTROPHILS # BLD AUTO: 20.06 10*3/MM3 (ref 1.5–8.3)
NEUTROPHILS NFR BLD AUTO: 84.6 % (ref 41–71)
NITRITE UR QL STRIP: NEGATIVE
PH UR STRIP.AUTO: <=5 [PH] (ref 5–8)
PLAT MORPH BLD: NORMAL
PLATELET # BLD AUTO: 456 10*3/MM3 (ref 150–450)
PMV BLD AUTO: 9.9 FL (ref 6–12)
POTASSIUM BLD-SCNC: 4 MMOL/L (ref 3.5–5.5)
PROT SERPL-MCNC: 6.7 G/DL (ref 5.7–8.2)
PROT UR QL STRIP: ABNORMAL
RBC # BLD AUTO: 3.8 10*6/MM3 (ref 3.89–5.14)
RBC # UR: ABNORMAL /HPF
RBC MORPH BLD: NORMAL
REF LAB TEST METHOD: ABNORMAL
SODIUM BLD-SCNC: 140 MMOL/L (ref 132–146)
SP GR UR STRIP: 1.02 (ref 1–1.03)
SQUAMOUS #/AREA URNS HPF: ABNORMAL /HPF
TROPONIN I SERPL-MCNC: 0 NG/ML (ref 0–0.07)
UROBILINOGEN UR QL STRIP: ABNORMAL
WBC MORPH BLD: NORMAL
WBC NRBC COR # BLD: 23.71 10*3/MM3 (ref 3.5–10.8)
WBC UR QL AUTO: ABNORMAL /HPF
WHOLE BLOOD HOLD SPECIMEN: NORMAL
WHOLE BLOOD HOLD SPECIMEN: NORMAL

## 2017-05-22 PROCEDURE — 85007 BL SMEAR W/DIFF WBC COUNT: CPT | Performed by: EMERGENCY MEDICINE

## 2017-05-22 PROCEDURE — 80053 COMPREHEN METABOLIC PANEL: CPT | Performed by: EMERGENCY MEDICINE

## 2017-05-22 PROCEDURE — 99284 EMERGENCY DEPT VISIT MOD MDM: CPT

## 2017-05-22 PROCEDURE — P9612 CATHETERIZE FOR URINE SPEC: HCPCS

## 2017-05-22 PROCEDURE — 87086 URINE CULTURE/COLONY COUNT: CPT | Performed by: EMERGENCY MEDICINE

## 2017-05-22 PROCEDURE — 93005 ELECTROCARDIOGRAM TRACING: CPT | Performed by: EMERGENCY MEDICINE

## 2017-05-22 PROCEDURE — 84484 ASSAY OF TROPONIN QUANT: CPT

## 2017-05-22 PROCEDURE — 71010 HC CHEST PA OR AP: CPT

## 2017-05-22 PROCEDURE — 83880 ASSAY OF NATRIURETIC PEPTIDE: CPT | Performed by: EMERGENCY MEDICINE

## 2017-05-22 PROCEDURE — 85025 COMPLETE CBC W/AUTO DIFF WBC: CPT | Performed by: EMERGENCY MEDICINE

## 2017-05-22 PROCEDURE — 96365 THER/PROPH/DIAG IV INF INIT: CPT

## 2017-05-22 PROCEDURE — 81001 URINALYSIS AUTO W/SCOPE: CPT | Performed by: EMERGENCY MEDICINE

## 2017-05-22 PROCEDURE — 25010000003 CEFTRIAXONE PER 250 MG: Performed by: EMERGENCY MEDICINE

## 2017-05-22 RX ORDER — CEFTRIAXONE SODIUM 1 G/50ML
1 INJECTION, SOLUTION INTRAVENOUS ONCE
Status: COMPLETED | OUTPATIENT
Start: 2017-05-22 | End: 2017-05-22

## 2017-05-22 RX ORDER — PREDNISONE 20 MG/1
TABLET ORAL
Qty: 10 TABLET | Refills: 0 | Status: ON HOLD | OUTPATIENT
Start: 2017-05-22 | End: 2017-06-15

## 2017-05-22 RX ORDER — FLUCONAZOLE 150 MG/1
150 TABLET ORAL ONCE
Status: COMPLETED | OUTPATIENT
Start: 2017-05-22 | End: 2017-05-22

## 2017-05-22 RX ORDER — FLUCONAZOLE 150 MG/1
TABLET ORAL
Qty: 1 TABLET | Refills: 0 | Status: ON HOLD | OUTPATIENT
Start: 2017-05-22 | End: 2017-06-15

## 2017-05-22 RX ORDER — SODIUM CHLORIDE 0.9 % (FLUSH) 0.9 %
10 SYRINGE (ML) INJECTION AS NEEDED
Status: DISCONTINUED | OUTPATIENT
Start: 2017-05-22 | End: 2017-05-22 | Stop reason: HOSPADM

## 2017-05-22 RX ORDER — CEFDINIR 300 MG/1
300 CAPSULE ORAL 2 TIMES DAILY
Qty: 18 CAPSULE | Refills: 0 | Status: ON HOLD | OUTPATIENT
Start: 2017-05-22 | End: 2017-06-15

## 2017-05-22 RX ADMIN — CEFTRIAXONE SODIUM 1 G: 1 INJECTION, SOLUTION INTRAVENOUS at 12:15

## 2017-05-22 RX ADMIN — FLUCONAZOLE 150 MG: 150 TABLET ORAL at 13:11

## 2017-05-24 LAB — BACTERIA SPEC AEROBE CULT: NORMAL

## 2017-05-28 RX ORDER — ALLOPURINOL 100 MG/1
TABLET ORAL
Qty: 180 TABLET | Status: CANCELLED | OUTPATIENT
Start: 2017-05-28

## 2017-05-28 RX ORDER — CIPROFLOXACIN 250 MG/1
TABLET, FILM COATED ORAL
Qty: 45 TABLET | Status: CANCELLED | OUTPATIENT
Start: 2017-05-28

## 2017-06-01 RX ORDER — ALLOPURINOL 100 MG/1
TABLET ORAL
Qty: 180 TABLET | Refills: 0 | Status: ON HOLD | OUTPATIENT
Start: 2017-06-01 | End: 2017-06-22

## 2017-06-01 RX ORDER — CIPROFLOXACIN 250 MG/1
TABLET, FILM COATED ORAL
Qty: 45 TABLET | Refills: 0 | Status: SHIPPED | OUTPATIENT
Start: 2017-06-01 | End: 2017-06-22 | Stop reason: HOSPADM

## 2017-06-12 ENCOUNTER — TELEPHONE (OUTPATIENT)
Dept: INTERNAL MEDICINE | Facility: CLINIC | Age: 82
End: 2017-06-12

## 2017-06-12 DIAGNOSIS — R30.0 DYSURIA: Primary | ICD-10-CM

## 2017-06-12 PROCEDURE — 81003 URINALYSIS AUTO W/O SCOPE: CPT | Performed by: INTERNAL MEDICINE

## 2017-06-12 NOTE — TELEPHONE ENCOUNTER
THEY WENT TO THE ER 2 WEEKS AGO AND HAD A URINARY TRACK INFECTION. SHE HAS FINISHED ALL MEDICATIONS AND STILL HAVING SYMPTOMS OF UTI AND WANTS TO SEE IF DR. SRINIVASAN WOULD CALL SOMETHING STRONGER IN TO HELP TREAT THE UTI. PATIENT DOES NOT FEEL UP TO COMING IN FOR APPOINTMENT AND WAS HOPING DR. SRINIVASAN WOULD DO THIS FOR HER. YOU CAN REACH THEM BACK [phone]

## 2017-06-12 NOTE — TELEPHONE ENCOUNTER
Patient was advised with Dr Herzog response and she states her daughter will  the sterile cup here tomorrow and bring back on wednesday

## 2017-06-12 NOTE — TELEPHONE ENCOUNTER
VO from Dr Herzog:  Patient needs UA and urine culture somewhere.  She can either come in here or go elsewhere but she has to have results before anything can be done  I lmom for Jacquie to call back

## 2017-06-13 ENCOUNTER — CLINICAL SUPPORT (OUTPATIENT)
Dept: INTERNAL MEDICINE | Facility: CLINIC | Age: 82
End: 2017-06-13

## 2017-06-13 DIAGNOSIS — R30.0 DYSURIA: ICD-10-CM

## 2017-06-13 LAB
BILIRUB BLD-MCNC: NEGATIVE MG/DL
CLARITY, POC: ABNORMAL
COLOR UR: ABNORMAL
GLUCOSE UR STRIP-MCNC: NEGATIVE MG/DL
KETONES UR QL: NEGATIVE
LEUKOCYTE EST, POC: ABNORMAL
NITRITE UR-MCNC: NEGATIVE MG/ML
PH UR: 6 [PH] (ref 5–8)
PROT UR STRIP-MCNC: NEGATIVE MG/DL
RBC # UR STRIP: ABNORMAL /UL
SP GR UR: 1.01 (ref 1–1.03)
UROBILINOGEN UR QL: NORMAL

## 2017-06-14 ENCOUNTER — TELEPHONE (OUTPATIENT)
Dept: ENDOCRINOLOGY | Facility: CLINIC | Age: 82
End: 2017-06-14

## 2017-06-14 RX ORDER — NITROFURANTOIN 25; 75 MG/1; MG/1
100 CAPSULE ORAL 2 TIMES DAILY
Qty: 10 CAPSULE | Refills: 0 | Status: SHIPPED | OUTPATIENT
Start: 2017-06-14 | End: 2017-06-22 | Stop reason: HOSPADM

## 2017-06-14 NOTE — TELEPHONE ENCOUNTER
Please let her know that urine does show some signs of infection.  Culture has been sent and I did sent a prescription for macrobid as well.    Thanks, sparkle

## 2017-06-15 ENCOUNTER — APPOINTMENT (OUTPATIENT)
Dept: GENERAL RADIOLOGY | Facility: HOSPITAL | Age: 82
End: 2017-06-15

## 2017-06-15 ENCOUNTER — APPOINTMENT (OUTPATIENT)
Dept: CT IMAGING | Facility: HOSPITAL | Age: 82
End: 2017-06-15

## 2017-06-15 ENCOUNTER — HOSPITAL ENCOUNTER (INPATIENT)
Facility: HOSPITAL | Age: 82
LOS: 7 days | Discharge: HOME HEALTH | End: 2017-06-22
Attending: EMERGENCY MEDICINE | Admitting: FAMILY MEDICINE

## 2017-06-15 DIAGNOSIS — Z74.09 IMPAIRED FUNCTIONAL MOBILITY, BALANCE, GAIT, AND ENDURANCE: ICD-10-CM

## 2017-06-15 DIAGNOSIS — R06.89 HYPERCAPNIA: ICD-10-CM

## 2017-06-15 DIAGNOSIS — R09.02 HYPOXIA: ICD-10-CM

## 2017-06-15 DIAGNOSIS — D72.829 LEUKOCYTOSIS, UNSPECIFIED TYPE: ICD-10-CM

## 2017-06-15 DIAGNOSIS — N18.30 CHRONIC KIDNEY DISEASE (CKD), STAGE III (MODERATE) (HCC): ICD-10-CM

## 2017-06-15 DIAGNOSIS — I49.5 SSS (SICK SINUS SYNDROME) (HCC): ICD-10-CM

## 2017-06-15 DIAGNOSIS — R06.03 RESPIRATORY DISTRESS: ICD-10-CM

## 2017-06-15 DIAGNOSIS — A41.9 SEPSIS, DUE TO UNSPECIFIED ORGANISM: ICD-10-CM

## 2017-06-15 DIAGNOSIS — J18.9 PNEUMONIA OF BOTH LOWER LOBES DUE TO INFECTIOUS ORGANISM: Primary | ICD-10-CM

## 2017-06-15 PROBLEM — I10 HTN (HYPERTENSION): Status: ACTIVE | Noted: 2017-06-15

## 2017-06-15 PROBLEM — F32.A ANXIETY AND DEPRESSION: Status: ACTIVE | Noted: 2017-06-15

## 2017-06-15 PROBLEM — E78.5 HLD (HYPERLIPIDEMIA): Status: ACTIVE | Noted: 2017-06-15

## 2017-06-15 PROBLEM — G47.33 OSA ON CPAP: Status: ACTIVE | Noted: 2017-06-15

## 2017-06-15 PROBLEM — E66.9 OBESITY: Status: ACTIVE | Noted: 2017-06-15

## 2017-06-15 PROBLEM — E11.9 DM (DIABETES MELLITUS), TYPE 2 (HCC): Status: ACTIVE | Noted: 2017-06-15

## 2017-06-15 PROBLEM — J44.9 COPD (CHRONIC OBSTRUCTIVE PULMONARY DISEASE) (HCC): Status: ACTIVE | Noted: 2017-06-15

## 2017-06-15 PROBLEM — G40.909 SEIZURE DISORDER (HCC): Status: ACTIVE | Noted: 2017-06-15

## 2017-06-15 PROBLEM — H35.30 MACULAR DEGENERATION: Status: ACTIVE | Noted: 2017-06-15

## 2017-06-15 PROBLEM — F41.9 ANXIETY AND DEPRESSION: Status: ACTIVE | Noted: 2017-06-15

## 2017-06-15 PROBLEM — I50.9 CHF (CONGESTIVE HEART FAILURE) (HCC): Status: ACTIVE | Noted: 2017-06-15

## 2017-06-15 PROBLEM — J96.90 RESPIRATORY FAILURE (HCC): Status: ACTIVE | Noted: 2017-06-15

## 2017-06-15 PROBLEM — Z99.89 OSA ON CPAP: Status: ACTIVE | Noted: 2017-06-15

## 2017-06-15 LAB
ALBUMIN SERPL-MCNC: 4.4 G/DL (ref 3.2–4.8)
ALBUMIN/GLOB SERPL: 1.4 G/DL (ref 1.5–2.5)
ALP SERPL-CCNC: 210 U/L (ref 25–100)
ALT SERPL W P-5'-P-CCNC: 17 U/L (ref 7–40)
ALT SERPL W P-5'-P-CCNC: 17 U/L (ref 7–40)
ANION GAP SERPL CALCULATED.3IONS-SCNC: 10 MMOL/L (ref 3–11)
APTT PPP: 27.1 SECONDS (ref 24–31)
ARTERIAL PATENCY WRIST A: ABNORMAL
ARTERIAL PATENCY WRIST A: ABNORMAL
AST SERPL-CCNC: 28 U/L (ref 0–33)
AST SERPL-CCNC: 28 U/L (ref 0–33)
ATMOSPHERIC PRESS: ABNORMAL MMHG
ATMOSPHERIC PRESS: ABNORMAL MMHG
BACTERIA UR QL AUTO: ABNORMAL /HPF
BASE EXCESS BLDA CALC-SCNC: -2.4 MMOL/L (ref 0–2)
BASE EXCESS BLDA CALC-SCNC: 1.4 MMOL/L (ref 0–2)
BASOPHILS # BLD AUTO: 0.06 10*3/MM3 (ref 0–0.2)
BASOPHILS NFR BLD AUTO: 0.2 % (ref 0–1)
BDY SITE: ABNORMAL
BDY SITE: ABNORMAL
BILIRUB SERPL-MCNC: 0.6 MG/DL (ref 0.3–1.2)
BILIRUB UR QL STRIP: NEGATIVE
BNP SERPL-MCNC: 196 PG/ML (ref 0–100)
BUN BLD-MCNC: 26 MG/DL (ref 9–23)
BUN BLDA-MCNC: 29 MG/DL (ref 8–26)
BUN/CREAT SERPL: 14.4 (ref 7–25)
CA-I BLDA-SCNC: 1.28 MMOL/L (ref 1.2–1.32)
CALCIUM SPEC-SCNC: 11 MG/DL (ref 8.7–10.4)
CHLORIDE BLDA-SCNC: 96 MMOL/L (ref 98–109)
CHLORIDE SERPL-SCNC: 98 MMOL/L (ref 99–109)
CLARITY UR: ABNORMAL
CO2 BLDA-SCNC: 28 MMOL/L (ref 24–29)
CO2 BLDA-SCNC: 29 MMOL/L (ref 22–33)
CO2 BLDA-SCNC: 30 MMOL/L (ref 22–33)
CO2 SERPL-SCNC: 31 MMOL/L (ref 20–31)
COHGB MFR BLD: 0.8 % (ref 0–2)
COHGB MFR BLD: 0.9 % (ref 0–2)
COLOR UR: ABNORMAL
CREAT BLD-MCNC: 1.8 MG/DL (ref 0.6–1.3)
CREAT BLDA-MCNC: 1.7 MG/DL (ref 0.6–1.3)
CRP SERPL-MCNC: 2.98 MG/DL (ref 0–1)
D-LACTATE SERPL-SCNC: 2.9 MMOL/L (ref 0.5–2)
D-LACTATE SERPL-SCNC: 6.1 MMOL/L (ref 0.5–2)
DEPRECATED RDW RBC AUTO: 58.4 FL (ref 37–54)
EOSINOPHIL # BLD AUTO: 0.84 10*3/MM3 (ref 0.1–0.3)
EOSINOPHIL NFR BLD AUTO: 2.4 % (ref 0–3)
ERYTHROCYTE [DISTWIDTH] IN BLOOD BY AUTOMATED COUNT: 15.4 % (ref 11.3–14.5)
FOLATE SERPL-MCNC: 7.42 NG/ML (ref 3.2–20)
GFR SERPL CREATININE-BSD FRML MDRD: 27 ML/MIN/1.73
GLOBULIN UR ELPH-MCNC: 3.2 GM/DL
GLUCOSE BLD-MCNC: 302 MG/DL (ref 70–100)
GLUCOSE BLDC GLUCOMTR-MCNC: 157 MG/DL (ref 70–130)
GLUCOSE BLDC GLUCOMTR-MCNC: 180 MG/DL (ref 70–130)
GLUCOSE BLDC GLUCOMTR-MCNC: 193 MG/DL (ref 70–130)
GLUCOSE BLDC GLUCOMTR-MCNC: 205 MG/DL (ref 70–130)
GLUCOSE BLDC GLUCOMTR-MCNC: 304 MG/DL (ref 70–130)
GLUCOSE UR STRIP-MCNC: NEGATIVE MG/DL
HBA1C MFR BLD: 6.4 % (ref 4.8–5.6)
HCO3 BLDA-SCNC: 27.4 MMOL/L (ref 20–26)
HCO3 BLDA-SCNC: 27.5 MMOL/L (ref 20–26)
HCT VFR BLD AUTO: 44.9 % (ref 34.5–44)
HCT VFR BLD CALC: 37.1 %
HCT VFR BLD CALC: 41.4 %
HCT VFR BLDA CALC: 47 % (ref 38–51)
HGB BLD-MCNC: 13.3 G/DL (ref 11.5–15.5)
HGB BLDA-MCNC: 12.1 G/DL (ref 14–18)
HGB BLDA-MCNC: 13.5 G/DL (ref 14–18)
HGB BLDA-MCNC: 16 G/DL (ref 12–17)
HGB UR QL STRIP.AUTO: ABNORMAL
HOLD SPECIMEN: NORMAL
HOLD SPECIMEN: NORMAL
HOROWITZ INDEX BLD+IHG-RTO: 100 %
HOROWITZ INDEX BLD+IHG-RTO: 95 %
HYALINE CASTS UR QL AUTO: ABNORMAL /LPF
IMM GRANULOCYTES # BLD: 0.17 10*3/MM3 (ref 0–0.03)
IMM GRANULOCYTES NFR BLD: 0.5 % (ref 0–0.6)
INR PPP: 1.1 (ref 0.8–1.2)
KETONES UR QL STRIP: ABNORMAL
LEUKOCYTE ESTERASE UR QL STRIP.AUTO: ABNORMAL
LYMPHOCYTES # BLD AUTO: 7.39 10*3/MM3 (ref 0.6–4.8)
LYMPHOCYTES NFR BLD AUTO: 21.5 % (ref 24–44)
MAGNESIUM SERPL-MCNC: 2.5 MG/DL (ref 1.3–2.7)
MCH RBC QN AUTO: 31 PG (ref 27–31)
MCHC RBC AUTO-ENTMCNC: 29.6 G/DL (ref 32–36)
MCV RBC AUTO: 104.7 FL (ref 80–99)
METHGB BLD QL: 0.7 % (ref 0–1.5)
METHGB BLD QL: 0.8 % (ref 0–1.5)
MODALITY: ABNORMAL
MODALITY: ABNORMAL
MONOCYTES # BLD AUTO: 0.88 10*3/MM3 (ref 0–1)
MONOCYTES NFR BLD AUTO: 2.6 % (ref 0–12)
NEUTROPHILS # BLD AUTO: 25.02 10*3/MM3 (ref 1.5–8.3)
NEUTROPHILS NFR BLD AUTO: 72.8 % (ref 41–71)
NITRITE UR QL STRIP: NEGATIVE
OXYHGB MFR BLDV: 91.7 % (ref 94–99)
OXYHGB MFR BLDV: 97.4 % (ref 94–99)
PCO2 BLDA: 50.8 MM HG (ref 35–45)
PCO2 BLDA: 82.9 MM HG (ref 35–45)
PH BLDA: 7.13 PH UNITS (ref 7.35–7.45)
PH BLDA: 7.34 PH UNITS (ref 7.35–7.45)
PH UR STRIP.AUTO: 5.5 [PH] (ref 5–8)
PHOSPHATE SERPL-MCNC: 4.8 MG/DL (ref 2.4–5.1)
PLAT MORPH BLD: NORMAL
PLATELET # BLD AUTO: 522 10*3/MM3 (ref 150–450)
PMV BLD AUTO: 10.3 FL (ref 6–12)
PO2 BLDA: 137 MM HG (ref 83–108)
PO2 BLDA: 91.2 MM HG (ref 83–108)
POTASSIUM BLD-SCNC: 4.5 MMOL/L (ref 3.5–5.5)
POTASSIUM BLDA-SCNC: 4.6 MMOL/L (ref 3.5–4.9)
PROCALCITONIN SERPL-MCNC: 0.09 NG/ML
PROT SERPL-MCNC: 7.6 G/DL (ref 5.7–8.2)
PROT UR QL STRIP: ABNORMAL
PROTHROMBIN TIME: 13.7 SECONDS (ref 12.8–15.2)
RBC # BLD AUTO: 4.29 10*6/MM3 (ref 3.89–5.14)
RBC # UR: ABNORMAL /HPF
RBC MORPH BLD: NORMAL
REF LAB TEST METHOD: ABNORMAL
SAO2 % BLDCOA: 91.7 %
SAO2 % BLDCOA: 97.4 %
SODIUM BLD-SCNC: 139 MMOL/L (ref 132–146)
SODIUM BLDA-SCNC: 139 MMOL/L (ref 138–146)
SP GR UR STRIP: 1.02 (ref 1–1.03)
SQUAMOUS #/AREA URNS HPF: ABNORMAL /HPF
TROPONIN I SERPL-MCNC: 0.02 NG/ML (ref 0–0.07)
TROPONIN I SERPL-MCNC: 0.03 NG/ML
TSH SERPL DL<=0.05 MIU/L-ACNC: 4.41 MIU/ML (ref 0.35–5.35)
URATE SERPL-MCNC: 5.1 MG/DL (ref 3.1–7.8)
UROBILINOGEN UR QL STRIP: ABNORMAL
VIT B12 BLD-MCNC: >2000 PG/ML (ref 211–911)
WBC MORPH BLD: NORMAL
WBC NRBC COR # BLD: 34.36 10*3/MM3 (ref 3.5–10.8)
WBC UR QL AUTO: ABNORMAL /HPF
WHOLE BLOOD HOLD SPECIMEN: NORMAL
WHOLE BLOOD HOLD SPECIMEN: NORMAL

## 2017-06-15 PROCEDURE — 80053 COMPREHEN METABOLIC PANEL: CPT | Performed by: EMERGENCY MEDICINE

## 2017-06-15 PROCEDURE — C1894 INTRO/SHEATH, NON-LASER: HCPCS

## 2017-06-15 PROCEDURE — 85025 COMPLETE CBC W/AUTO DIFF WBC: CPT | Performed by: EMERGENCY MEDICINE

## 2017-06-15 PROCEDURE — B548ZZA ULTRASONOGRAPHY OF SUPERIOR VENA CAVA, GUIDANCE: ICD-10-PCS | Performed by: INTERNAL MEDICINE

## 2017-06-15 PROCEDURE — 25010000002 PIPERACILLIN-TAZOBACTAM: Performed by: EMERGENCY MEDICINE

## 2017-06-15 PROCEDURE — 63710000001 INSULIN LISPRO (HUMAN) PER 5 UNITS: Performed by: INTERNAL MEDICINE

## 2017-06-15 PROCEDURE — 84443 ASSAY THYROID STIM HORMONE: CPT | Performed by: INTERNAL MEDICINE

## 2017-06-15 PROCEDURE — 84100 ASSAY OF PHOSPHORUS: CPT | Performed by: INTERNAL MEDICINE

## 2017-06-15 PROCEDURE — C1751 CATH, INF, PER/CENT/MIDLINE: HCPCS

## 2017-06-15 PROCEDURE — 25010000002 VANCOMYCIN: Performed by: EMERGENCY MEDICINE

## 2017-06-15 PROCEDURE — 25010000002 METHYLPREDNISOLONE PER 125 MG: Performed by: EMERGENCY MEDICINE

## 2017-06-15 PROCEDURE — 94799 UNLISTED PULMONARY SVC/PX: CPT

## 2017-06-15 PROCEDURE — 85007 BL SMEAR W/DIFF WBC COUNT: CPT | Performed by: EMERGENCY MEDICINE

## 2017-06-15 PROCEDURE — 83036 HEMOGLOBIN GLYCOSYLATED A1C: CPT | Performed by: INTERNAL MEDICINE

## 2017-06-15 PROCEDURE — 84484 ASSAY OF TROPONIN QUANT: CPT | Performed by: INTERNAL MEDICINE

## 2017-06-15 PROCEDURE — 82746 ASSAY OF FOLIC ACID SERUM: CPT | Performed by: INTERNAL MEDICINE

## 2017-06-15 PROCEDURE — 87040 BLOOD CULTURE FOR BACTERIA: CPT | Performed by: EMERGENCY MEDICINE

## 2017-06-15 PROCEDURE — 36600 WITHDRAWAL OF ARTERIAL BLOOD: CPT | Performed by: EMERGENCY MEDICINE

## 2017-06-15 PROCEDURE — 99291 CRITICAL CARE FIRST HOUR: CPT | Performed by: INTERNAL MEDICINE

## 2017-06-15 PROCEDURE — 02HV33Z INSERTION OF INFUSION DEVICE INTO SUPERIOR VENA CAVA, PERCUTANEOUS APPROACH: ICD-10-PCS | Performed by: INTERNAL MEDICINE

## 2017-06-15 PROCEDURE — 82962 GLUCOSE BLOOD TEST: CPT

## 2017-06-15 PROCEDURE — 84484 ASSAY OF TROPONIN QUANT: CPT

## 2017-06-15 PROCEDURE — 94640 AIRWAY INHALATION TREATMENT: CPT

## 2017-06-15 PROCEDURE — 99285 EMERGENCY DEPT VISIT HI MDM: CPT

## 2017-06-15 PROCEDURE — 94660 CPAP INITIATION&MGMT: CPT

## 2017-06-15 PROCEDURE — 85014 HEMATOCRIT: CPT

## 2017-06-15 PROCEDURE — 84460 ALANINE AMINO (ALT) (SGPT): CPT | Performed by: EMERGENCY MEDICINE

## 2017-06-15 PROCEDURE — 85730 THROMBOPLASTIN TIME PARTIAL: CPT | Performed by: EMERGENCY MEDICINE

## 2017-06-15 PROCEDURE — 83735 ASSAY OF MAGNESIUM: CPT | Performed by: INTERNAL MEDICINE

## 2017-06-15 PROCEDURE — 82805 BLOOD GASES W/O2 SATURATION: CPT | Performed by: EMERGENCY MEDICINE

## 2017-06-15 PROCEDURE — 86140 C-REACTIVE PROTEIN: CPT | Performed by: INTERNAL MEDICINE

## 2017-06-15 PROCEDURE — 84450 TRANSFERASE (AST) (SGOT): CPT | Performed by: EMERGENCY MEDICINE

## 2017-06-15 PROCEDURE — 84550 ASSAY OF BLOOD/URIC ACID: CPT | Performed by: INTERNAL MEDICINE

## 2017-06-15 PROCEDURE — 5A09357 ASSISTANCE WITH RESPIRATORY VENTILATION, LESS THAN 24 CONSECUTIVE HOURS, CONTINUOUS POSITIVE AIRWAY PRESSURE: ICD-10-PCS | Performed by: INTERNAL MEDICINE

## 2017-06-15 PROCEDURE — 83880 ASSAY OF NATRIURETIC PEPTIDE: CPT | Performed by: INTERNAL MEDICINE

## 2017-06-15 PROCEDURE — 81001 URINALYSIS AUTO W/SCOPE: CPT | Performed by: INTERNAL MEDICINE

## 2017-06-15 PROCEDURE — 80047 BASIC METABLC PNL IONIZED CA: CPT

## 2017-06-15 PROCEDURE — 84145 PROCALCITONIN (PCT): CPT | Performed by: EMERGENCY MEDICINE

## 2017-06-15 PROCEDURE — 25010000002 HEPARIN (PORCINE) PER 1000 UNITS: Performed by: INTERNAL MEDICINE

## 2017-06-15 PROCEDURE — 92610 EVALUATE SWALLOWING FUNCTION: CPT

## 2017-06-15 PROCEDURE — 83605 ASSAY OF LACTIC ACID: CPT | Performed by: EMERGENCY MEDICINE

## 2017-06-15 PROCEDURE — 25010000002 HEPARIN FLUSH (PORCINE) 100 UNIT/ML SOLUTION: Performed by: INTERNAL MEDICINE

## 2017-06-15 PROCEDURE — 85610 PROTHROMBIN TIME: CPT

## 2017-06-15 PROCEDURE — 87086 URINE CULTURE/COLONY COUNT: CPT | Performed by: INTERNAL MEDICINE

## 2017-06-15 PROCEDURE — 93005 ELECTROCARDIOGRAM TRACING: CPT | Performed by: EMERGENCY MEDICINE

## 2017-06-15 PROCEDURE — 25010000002 LEVETRIRACETAM PER 10 MG: Performed by: INTERNAL MEDICINE

## 2017-06-15 PROCEDURE — 25010000002 PIPERACILLIN-TAZOBACTAM: Performed by: INTERNAL MEDICINE

## 2017-06-15 PROCEDURE — 82607 VITAMIN B-12: CPT | Performed by: INTERNAL MEDICINE

## 2017-06-15 PROCEDURE — 51702 INSERT TEMP BLADDER CATH: CPT

## 2017-06-15 PROCEDURE — 94760 N-INVAS EAR/PLS OXIMETRY 1: CPT

## 2017-06-15 PROCEDURE — 71010 HC CHEST PA OR AP: CPT

## 2017-06-15 PROCEDURE — 70450 CT HEAD/BRAIN W/O DYE: CPT

## 2017-06-15 RX ORDER — LORATADINE 10 MG/1
10 CAPSULE, LIQUID FILLED ORAL DAILY PRN
COMMUNITY

## 2017-06-15 RX ORDER — ALBUTEROL SULFATE 2.5 MG/3ML
2.5 SOLUTION RESPIRATORY (INHALATION)
Status: COMPLETED | OUTPATIENT
Start: 2017-06-15 | End: 2017-06-15

## 2017-06-15 RX ORDER — SODIUM CHLORIDE 0.9 % (FLUSH) 0.9 %
10 SYRINGE (ML) INJECTION AS NEEDED
Status: DISCONTINUED | OUTPATIENT
Start: 2017-06-15 | End: 2017-06-15

## 2017-06-15 RX ORDER — ALBUTEROL SULFATE 2.5 MG/3ML
2.5 SOLUTION RESPIRATORY (INHALATION) ONCE
Status: DISCONTINUED | OUTPATIENT
Start: 2017-06-15 | End: 2017-06-16

## 2017-06-15 RX ORDER — ACETAMINOPHEN 325 MG/1
650 TABLET ORAL EVERY 4 HOURS PRN
Status: DISCONTINUED | OUTPATIENT
Start: 2017-06-15 | End: 2017-06-22 | Stop reason: HOSPADM

## 2017-06-15 RX ORDER — SODIUM CHLORIDE 0.9 % (FLUSH) 0.9 %
10 SYRINGE (ML) INJECTION AS NEEDED
Status: DISCONTINUED | OUTPATIENT
Start: 2017-06-15 | End: 2017-06-22 | Stop reason: HOSPADM

## 2017-06-15 RX ORDER — NITROGLYCERIN 20 MG/100ML
100 INJECTION INTRAVENOUS CONTINUOUS
Status: DISCONTINUED | OUTPATIENT
Start: 2017-06-15 | End: 2017-06-15

## 2017-06-15 RX ORDER — METHYLPREDNISOLONE SODIUM SUCCINATE 125 MG/2ML
125 INJECTION, POWDER, LYOPHILIZED, FOR SOLUTION INTRAMUSCULAR; INTRAVENOUS ONCE
Status: COMPLETED | OUTPATIENT
Start: 2017-06-15 | End: 2017-06-15

## 2017-06-15 RX ORDER — ACETAMINOPHEN,DIPHENHYDRAMINE HCL 500; 25 MG/1; MG/1
1 TABLET, FILM COATED ORAL NIGHTLY PRN
COMMUNITY
End: 2017-06-22 | Stop reason: HOSPADM

## 2017-06-15 RX ORDER — IPRATROPIUM BROMIDE AND ALBUTEROL SULFATE 2.5; .5 MG/3ML; MG/3ML
3 SOLUTION RESPIRATORY (INHALATION) ONCE
Status: COMPLETED | OUTPATIENT
Start: 2017-06-15 | End: 2017-06-15

## 2017-06-15 RX ORDER — ONDANSETRON 2 MG/ML
4 INJECTION INTRAMUSCULAR; INTRAVENOUS EVERY 6 HOURS PRN
Status: DISCONTINUED | OUTPATIENT
Start: 2017-06-15 | End: 2017-06-22 | Stop reason: HOSPADM

## 2017-06-15 RX ORDER — NYSTATIN 100000 [USP'U]/G
POWDER TOPICAL EVERY 8 HOURS SCHEDULED
Status: DISCONTINUED | OUTPATIENT
Start: 2017-06-15 | End: 2017-06-22 | Stop reason: HOSPADM

## 2017-06-15 RX ORDER — HEPARIN SODIUM 5000 [USP'U]/ML
5000 INJECTION, SOLUTION INTRAVENOUS; SUBCUTANEOUS EVERY 8 HOURS SCHEDULED
Status: DISCONTINUED | OUTPATIENT
Start: 2017-06-15 | End: 2017-06-22 | Stop reason: HOSPADM

## 2017-06-15 RX ORDER — LANOLIN ALCOHOL/MO/W.PET/CERES
1000 CREAM (GRAM) TOPICAL DAILY
COMMUNITY

## 2017-06-15 RX ORDER — ACETAMINOPHEN 650 MG/1
650 SUPPOSITORY RECTAL ONCE
Status: COMPLETED | OUTPATIENT
Start: 2017-06-15 | End: 2017-06-15

## 2017-06-15 RX ORDER — CLONAZEPAM 1 MG/1
1 TABLET ORAL EVERY 12 HOURS
COMMUNITY
End: 2017-07-03 | Stop reason: SDUPTHER

## 2017-06-15 RX ORDER — IPRATROPIUM BROMIDE AND ALBUTEROL SULFATE 2.5; .5 MG/3ML; MG/3ML
3 SOLUTION RESPIRATORY (INHALATION)
Status: DISCONTINUED | OUTPATIENT
Start: 2017-06-15 | End: 2017-06-17

## 2017-06-15 RX ORDER — FAMOTIDINE 10 MG/ML
20 INJECTION, SOLUTION INTRAVENOUS DAILY
Status: DISCONTINUED | OUTPATIENT
Start: 2017-06-15 | End: 2017-06-17

## 2017-06-15 RX ADMIN — HEPARIN SODIUM 5000 UNITS: 5000 INJECTION, SOLUTION INTRAVENOUS; SUBCUTANEOUS at 22:22

## 2017-06-15 RX ADMIN — TAZOBACTAM SODIUM AND PIPERACILLIN SODIUM 4.5 G: .5; 4 INJECTION, POWDER, LYOPHILIZED, FOR SOLUTION INTRAVENOUS at 02:20

## 2017-06-15 RX ADMIN — ALBUTEROL SULFATE 2.5 MG: 2.5 SOLUTION RESPIRATORY (INHALATION) at 01:28

## 2017-06-15 RX ADMIN — FAMOTIDINE 20 MG: 10 INJECTION, SOLUTION INTRAVENOUS at 09:35

## 2017-06-15 RX ADMIN — ALBUTEROL SULFATE 2.5 MG: 2.5 SOLUTION RESPIRATORY (INHALATION) at 01:26

## 2017-06-15 RX ADMIN — PIPERACILLIN AND TAZOBACTAM 2.25 G: 2; .25 INJECTION, POWDER, LYOPHILIZED, FOR SOLUTION INTRAVENOUS; PARENTERAL at 20:27

## 2017-06-15 RX ADMIN — INSULIN LISPRO 2 UNITS: 100 INJECTION, SOLUTION INTRAVENOUS; SUBCUTANEOUS at 11:45

## 2017-06-15 RX ADMIN — PIPERACILLIN AND TAZOBACTAM 2.25 G: 2; .25 INJECTION, POWDER, LYOPHILIZED, FOR SOLUTION INTRAVENOUS; PARENTERAL at 15:07

## 2017-06-15 RX ADMIN — ALBUTEROL SULFATE 2.5 MG: 2.5 SOLUTION RESPIRATORY (INHALATION) at 01:27

## 2017-06-15 RX ADMIN — HEPARIN SODIUM 5000 UNITS: 5000 INJECTION, SOLUTION INTRAVENOUS; SUBCUTANEOUS at 15:09

## 2017-06-15 RX ADMIN — LEVETIRACETAM 750 MG: 100 INJECTION, SOLUTION INTRAVENOUS at 20:27

## 2017-06-15 RX ADMIN — METHYLPREDNISOLONE SODIUM SUCCINATE 125 MG: 125 INJECTION, POWDER, FOR SOLUTION INTRAMUSCULAR; INTRAVENOUS at 03:09

## 2017-06-15 RX ADMIN — PIPERACILLIN AND TAZOBACTAM 2.25 G: 2; .25 INJECTION, POWDER, LYOPHILIZED, FOR SOLUTION INTRAVENOUS; PARENTERAL at 08:19

## 2017-06-15 RX ADMIN — IPRATROPIUM BROMIDE AND ALBUTEROL SULFATE 3 ML: .5; 3 SOLUTION RESPIRATORY (INHALATION) at 13:29

## 2017-06-15 RX ADMIN — IPRATROPIUM BROMIDE AND ALBUTEROL SULFATE 3 ML: .5; 3 SOLUTION RESPIRATORY (INHALATION) at 01:09

## 2017-06-15 RX ADMIN — NITROGLYCERIN 100 MCG/MIN: 20 INJECTION INTRAVENOUS at 01:34

## 2017-06-15 RX ADMIN — SODIUM CHLORIDE 1000 ML: 9 INJECTION, SOLUTION INTRAVENOUS at 02:52

## 2017-06-15 RX ADMIN — LEVETIRACETAM 750 MG: 100 INJECTION, SOLUTION INTRAVENOUS at 12:59

## 2017-06-15 RX ADMIN — INSULIN LISPRO 2 UNITS: 100 INJECTION, SOLUTION INTRAVENOUS; SUBCUTANEOUS at 18:06

## 2017-06-15 RX ADMIN — HEPARIN 500 UNITS: 100 SYRINGE at 20:27

## 2017-06-15 RX ADMIN — VANCOMYCIN HYDROCHLORIDE 2000 MG: 1 INJECTION, POWDER, LYOPHILIZED, FOR SOLUTION INTRAVENOUS at 03:04

## 2017-06-15 RX ADMIN — INSULIN LISPRO 4 UNITS: 100 INJECTION, SOLUTION INTRAVENOUS; SUBCUTANEOUS at 09:33

## 2017-06-15 RX ADMIN — ACETAMINOPHEN 650 MG: 650 SUPPOSITORY RECTAL at 02:18

## 2017-06-15 RX ADMIN — HEPARIN SODIUM 5000 UNITS: 5000 INJECTION, SOLUTION INTRAVENOUS; SUBCUTANEOUS at 07:07

## 2017-06-15 RX ADMIN — INSULIN LISPRO 2 UNITS: 100 INJECTION, SOLUTION INTRAVENOUS; SUBCUTANEOUS at 20:40

## 2017-06-15 RX ADMIN — IPRATROPIUM BROMIDE AND ALBUTEROL SULFATE 3 ML: .5; 3 SOLUTION RESPIRATORY (INHALATION) at 19:14

## 2017-06-15 RX ADMIN — NYSTATIN: 100000 POWDER TOPICAL at 15:08

## 2017-06-15 RX ADMIN — IPRATROPIUM BROMIDE AND ALBUTEROL SULFATE 3 ML: .5; 3 SOLUTION RESPIRATORY (INHALATION) at 07:36

## 2017-06-15 NOTE — TELEPHONE ENCOUNTER
Jacquie was advised with Dr Herzog comments and recommendations however she states pt was admitted to [facility] yesterday with possible pneumonia  She is currently in ICU with a PIC line for antibiotics.    Jacquie was advised to call here if anything is needed

## 2017-06-16 ENCOUNTER — APPOINTMENT (OUTPATIENT)
Dept: CARDIOLOGY | Facility: HOSPITAL | Age: 82
End: 2017-06-16
Attending: INTERNAL MEDICINE

## 2017-06-16 PROBLEM — J18.9 PNEUMONIA: Status: ACTIVE | Noted: 2017-06-16

## 2017-06-16 LAB
ARTERIAL PATENCY WRIST A: ABNORMAL
ARTERIAL PATENCY WRIST A: ABNORMAL
ATMOSPHERIC PRESS: ABNORMAL MMHG
ATMOSPHERIC PRESS: ABNORMAL MMHG
BASE EXCESS BLDA CALC-SCNC: 4 MMOL/L (ref 0–2)
BASE EXCESS BLDA CALC-SCNC: 4.2 MMOL/L (ref 0–2)
BDY SITE: ABNORMAL
BDY SITE: ABNORMAL
BH CV ECHO MEAS - AO ROOT AREA (BSA CORRECTED): 1.2
BH CV ECHO MEAS - AO ROOT AREA: 4.9 CM^2
BH CV ECHO MEAS - AO ROOT DIAM: 2.5 CM
BH CV ECHO MEAS - BSA(HAYCOCK): 2.1 M^2
BH CV ECHO MEAS - BSA: 2 M^2
BH CV ECHO MEAS - BZI_BMI: 34.8 KILOGRAMS/M^2
BH CV ECHO MEAS - BZI_METRIC_HEIGHT: 165.1 CM
BH CV ECHO MEAS - BZI_METRIC_WEIGHT: 94.8 KG
BH CV ECHO MEAS - CONTRAST EF (2CH): 62.6 ML/M^2
BH CV ECHO MEAS - CONTRAST EF 4CH: 64.4 ML/M^2
BH CV ECHO MEAS - EDV(CUBED): 115.5 ML
BH CV ECHO MEAS - EDV(MOD-SP2): 91 ML
BH CV ECHO MEAS - EDV(MOD-SP4): 101 ML
BH CV ECHO MEAS - EDV(TEICH): 111.2 ML
BH CV ECHO MEAS - EF(CUBED): 74.2 %
BH CV ECHO MEAS - EF(MOD-SP2): 62.6 %
BH CV ECHO MEAS - EF(MOD-SP4): 64.4 %
BH CV ECHO MEAS - EF(TEICH): 65.9 %
BH CV ECHO MEAS - ESV(CUBED): 29.8 ML
BH CV ECHO MEAS - ESV(MOD-SP2): 34 ML
BH CV ECHO MEAS - ESV(MOD-SP4): 36 ML
BH CV ECHO MEAS - ESV(TEICH): 37.9 ML
BH CV ECHO MEAS - FS: 36.3 %
BH CV ECHO MEAS - IVS/LVPW: 0.95
BH CV ECHO MEAS - IVSD: 0.81 CM
BH CV ECHO MEAS - LA DIMENSION: 4 CM
BH CV ECHO MEAS - LA/AO: 1.6
BH CV ECHO MEAS - LAT PEAK E' VEL: 10.3 CM/SEC
BH CV ECHO MEAS - LV DIASTOLIC VOL/BSA (35-75): 50.1 ML/M^2
BH CV ECHO MEAS - LV MASS(C)D: 135.2 GRAMS
BH CV ECHO MEAS - LV MASS(C)DI: 67.1 GRAMS/M^2
BH CV ECHO MEAS - LV SYSTOLIC VOL/BSA (12-30): 17.9 ML/M^2
BH CV ECHO MEAS - LVIDD: 4.9 CM
BH CV ECHO MEAS - LVIDS: 3.1 CM
BH CV ECHO MEAS - LVLD AP2: 6.8 CM
BH CV ECHO MEAS - LVLD AP4: 7 CM
BH CV ECHO MEAS - LVLS AP2: 6 CM
BH CV ECHO MEAS - LVLS AP4: 6 CM
BH CV ECHO MEAS - LVPWD: 0.85 CM
BH CV ECHO MEAS - MED PEAK E' VEL: 8.46 CM/SEC
BH CV ECHO MEAS - MV A MAX VEL: 108 CM/SEC
BH CV ECHO MEAS - MV E MAX VEL: 115 CM/SEC
BH CV ECHO MEAS - MV E/A: 1.1
BH CV ECHO MEAS - PA ACC SLOPE: 601 CM/SEC^2
BH CV ECHO MEAS - PA ACC TIME: 0.12 SEC
BH CV ECHO MEAS - PA PR(ACCEL): 23.7 MMHG
BH CV ECHO MEAS - PI END-D VEL: 126 CM/SEC
BH CV ECHO MEAS - RVDD: 2.7 CM
BH CV ECHO MEAS - SI(CUBED): 42.5 ML/M^2
BH CV ECHO MEAS - SI(MOD-SP2): 28.3 ML/M^2
BH CV ECHO MEAS - SI(MOD-SP4): 32.3 ML/M^2
BH CV ECHO MEAS - SI(TEICH): 36.4 ML/M^2
BH CV ECHO MEAS - SV(CUBED): 85.7 ML
BH CV ECHO MEAS - SV(MOD-SP2): 57 ML
BH CV ECHO MEAS - SV(MOD-SP4): 65 ML
BH CV ECHO MEAS - SV(TEICH): 73.3 ML
BH CV ECHO MEAS - TAPSE (>1.6): 1.4 CM2
BH CV VAS BP LEFT ARM: NORMAL MMHG
BH CV XLRA - RV BASE: 3.7 CM
BH CV XLRA - RV LENGTH: 7.3 CM
BH CV XLRA - RV MID: 2.9 CM
BH CV XLRA - TDI S': 13.5 CM/SEC
CO2 BLDA-SCNC: 29.7 MMOL/L (ref 22–33)
CO2 BLDA-SCNC: 31.6 MMOL/L (ref 22–33)
COHGB MFR BLD: 1.4 % (ref 0–2)
COHGB MFR BLD: 1.5 % (ref 0–2)
E/E' RATIO: 12.4
GLUCOSE BLDC GLUCOMTR-MCNC: 106 MG/DL (ref 70–130)
GLUCOSE BLDC GLUCOMTR-MCNC: 130 MG/DL (ref 70–130)
GLUCOSE BLDC GLUCOMTR-MCNC: 138 MG/DL (ref 70–130)
GLUCOSE BLDC GLUCOMTR-MCNC: 142 MG/DL (ref 70–130)
HCO3 BLDA-SCNC: 28.5 MMOL/L (ref 20–26)
HCO3 BLDA-SCNC: 29.9 MMOL/L (ref 20–26)
HCT VFR BLD CALC: 32.6 %
HCT VFR BLD CALC: 33.1 %
HGB BLDA-MCNC: 10.6 G/DL (ref 14–18)
HGB BLDA-MCNC: 10.8 G/DL (ref 14–18)
HOROWITZ INDEX BLD+IHG-RTO: 28 %
HOROWITZ INDEX BLD+IHG-RTO: 28 %
LEFT ATRIUM VOLUME INDEX: 34.7 ML/M2
LEFT ATRIUM VOLUME: 34.7 CM3
LV EF 2D ECHO EST: 65 %
METHGB BLD QL: 0.7 % (ref 0–1.5)
METHGB BLD QL: 0.8 % (ref 0–1.5)
MODALITY: ABNORMAL
MODALITY: ABNORMAL
OXYHGB MFR BLDV: 96.1 % (ref 94–99)
OXYHGB MFR BLDV: 98.2 % (ref 94–99)
PCO2 BLDA: 40.2 MM HG (ref 35–45)
PCO2 BLDA: 53.5 MM HG (ref 35–45)
PH BLDA: 7.36 PH UNITS (ref 7.35–7.45)
PH BLDA: 7.46 PH UNITS (ref 7.35–7.45)
PO2 BLDA: 107 MM HG (ref 83–108)
PO2 BLDA: 163 MM HG (ref 83–108)
VANCOMYCIN SERPL-MCNC: 16.6 MCG/ML (ref 5–40)

## 2017-06-16 PROCEDURE — 93306 TTE W/DOPPLER COMPLETE: CPT | Performed by: INTERNAL MEDICINE

## 2017-06-16 PROCEDURE — 94799 UNLISTED PULMONARY SVC/PX: CPT

## 2017-06-16 PROCEDURE — 25010000002 HALOPERIDOL LACTATE PER 5 MG: Performed by: NURSE PRACTITIONER

## 2017-06-16 PROCEDURE — 80202 ASSAY OF VANCOMYCIN: CPT | Performed by: INTERNAL MEDICINE

## 2017-06-16 PROCEDURE — 25010000002 HEPARIN (PORCINE) PER 1000 UNITS: Performed by: INTERNAL MEDICINE

## 2017-06-16 PROCEDURE — 94660 CPAP INITIATION&MGMT: CPT

## 2017-06-16 PROCEDURE — 36600 WITHDRAWAL OF ARTERIAL BLOOD: CPT | Performed by: NURSE PRACTITIONER

## 2017-06-16 PROCEDURE — 94760 N-INVAS EAR/PLS OXIMETRY 1: CPT

## 2017-06-16 PROCEDURE — 94640 AIRWAY INHALATION TREATMENT: CPT

## 2017-06-16 PROCEDURE — 25010000002 DIAZEPAM PER 5 MG: Performed by: NURSE PRACTITIONER

## 2017-06-16 PROCEDURE — 25010000002 LEVETRIRACETAM PER 10 MG: Performed by: INTERNAL MEDICINE

## 2017-06-16 PROCEDURE — 82805 BLOOD GASES W/O2 SATURATION: CPT | Performed by: NURSE PRACTITIONER

## 2017-06-16 PROCEDURE — 25010000002 HEPARIN FLUSH (PORCINE) 100 UNIT/ML SOLUTION: Performed by: INTERNAL MEDICINE

## 2017-06-16 PROCEDURE — 82962 GLUCOSE BLOOD TEST: CPT

## 2017-06-16 PROCEDURE — 25010000002 PIPERACILLIN-TAZOBACTAM: Performed by: INTERNAL MEDICINE

## 2017-06-16 PROCEDURE — 99291 CRITICAL CARE FIRST HOUR: CPT | Performed by: INTERNAL MEDICINE

## 2017-06-16 PROCEDURE — 93306 TTE W/DOPPLER COMPLETE: CPT

## 2017-06-16 RX ORDER — CLONAZEPAM 1 MG/1
1 TABLET ORAL NIGHTLY PRN
Status: DISCONTINUED | OUTPATIENT
Start: 2017-06-16 | End: 2017-06-18

## 2017-06-16 RX ORDER — QUETIAPINE FUMARATE 25 MG/1
25 TABLET, FILM COATED ORAL NIGHTLY
Status: DISCONTINUED | OUTPATIENT
Start: 2017-06-16 | End: 2017-06-19

## 2017-06-16 RX ORDER — IPRATROPIUM BROMIDE AND ALBUTEROL SULFATE 2.5; .5 MG/3ML; MG/3ML
3 SOLUTION RESPIRATORY (INHALATION) EVERY 4 HOURS PRN
Status: DISCONTINUED | OUTPATIENT
Start: 2017-06-16 | End: 2017-06-22 | Stop reason: HOSPADM

## 2017-06-16 RX ORDER — DIAZEPAM 5 MG/ML
2.5 INJECTION, SOLUTION INTRAMUSCULAR; INTRAVENOUS ONCE
Status: COMPLETED | OUTPATIENT
Start: 2017-06-16 | End: 2017-06-16

## 2017-06-16 RX ORDER — HALOPERIDOL 5 MG/ML
5 INJECTION INTRAMUSCULAR ONCE
Status: COMPLETED | OUTPATIENT
Start: 2017-06-16 | End: 2017-06-16

## 2017-06-16 RX ADMIN — LEVETIRACETAM 750 MG: 100 INJECTION, SOLUTION INTRAVENOUS at 09:54

## 2017-06-16 RX ADMIN — IPRATROPIUM BROMIDE AND ALBUTEROL SULFATE 3 ML: .5; 3 SOLUTION RESPIRATORY (INHALATION) at 19:24

## 2017-06-16 RX ADMIN — PIPERACILLIN AND TAZOBACTAM 2.25 G: 2; .25 INJECTION, POWDER, LYOPHILIZED, FOR SOLUTION INTRAVENOUS; PARENTERAL at 20:05

## 2017-06-16 RX ADMIN — HEPARIN SODIUM 5000 UNITS: 5000 INJECTION, SOLUTION INTRAVENOUS; SUBCUTANEOUS at 13:23

## 2017-06-16 RX ADMIN — CLONAZEPAM 1 MG: 1 TABLET ORAL at 21:50

## 2017-06-16 RX ADMIN — HEPARIN 500 UNITS: 100 SYRINGE at 08:59

## 2017-06-16 RX ADMIN — IPRATROPIUM BROMIDE AND ALBUTEROL SULFATE 3 ML: .5; 3 SOLUTION RESPIRATORY (INHALATION) at 12:27

## 2017-06-16 RX ADMIN — HEPARIN SODIUM 5000 UNITS: 5000 INJECTION, SOLUTION INTRAVENOUS; SUBCUTANEOUS at 06:00

## 2017-06-16 RX ADMIN — LEVETIRACETAM 750 MG: 100 INJECTION, SOLUTION INTRAVENOUS at 20:05

## 2017-06-16 RX ADMIN — PIPERACILLIN AND TAZOBACTAM 2.25 G: 2; .25 INJECTION, POWDER, LYOPHILIZED, FOR SOLUTION INTRAVENOUS; PARENTERAL at 09:00

## 2017-06-16 RX ADMIN — PIPERACILLIN AND TAZOBACTAM 2.25 G: 2; .25 INJECTION, POWDER, LYOPHILIZED, FOR SOLUTION INTRAVENOUS; PARENTERAL at 01:23

## 2017-06-16 RX ADMIN — DIAZEPAM 2.5 MG: 5 INJECTION, SOLUTION INTRAMUSCULAR; INTRAVENOUS at 01:33

## 2017-06-16 RX ADMIN — HALOPERIDOL LACTATE 5 MG: 5 INJECTION, SOLUTION INTRAMUSCULAR at 03:02

## 2017-06-16 RX ADMIN — IPRATROPIUM BROMIDE AND ALBUTEROL SULFATE 3 ML: .5; 3 SOLUTION RESPIRATORY (INHALATION) at 06:40

## 2017-06-16 RX ADMIN — NYSTATIN: 100000 POWDER TOPICAL at 06:05

## 2017-06-16 RX ADMIN — HEPARIN 500 UNITS: 100 SYRINGE at 20:05

## 2017-06-16 RX ADMIN — FAMOTIDINE 20 MG: 10 INJECTION, SOLUTION INTRAVENOUS at 09:01

## 2017-06-16 RX ADMIN — IPRATROPIUM BROMIDE AND ALBUTEROL SULFATE 3 ML: .5; 3 SOLUTION RESPIRATORY (INHALATION) at 23:41

## 2017-06-16 RX ADMIN — NYSTATIN: 100000 POWDER TOPICAL at 13:24

## 2017-06-16 RX ADMIN — HEPARIN SODIUM 5000 UNITS: 5000 INJECTION, SOLUTION INTRAVENOUS; SUBCUTANEOUS at 23:05

## 2017-06-16 RX ADMIN — PIPERACILLIN AND TAZOBACTAM 2.25 G: 2; .25 INJECTION, POWDER, LYOPHILIZED, FOR SOLUTION INTRAVENOUS; PARENTERAL at 13:23

## 2017-06-16 RX ADMIN — QUETIAPINE FUMARATE 25 MG: 25 TABLET, FILM COATED ORAL at 20:05

## 2017-06-16 RX ADMIN — NYSTATIN: 100000 POWDER TOPICAL at 23:05

## 2017-06-16 RX ADMIN — NYSTATIN: 100000 POWDER TOPICAL at 04:15

## 2017-06-17 ENCOUNTER — APPOINTMENT (OUTPATIENT)
Dept: GENERAL RADIOLOGY | Facility: HOSPITAL | Age: 82
End: 2017-06-17

## 2017-06-17 LAB
ALBUMIN SERPL-MCNC: 3.9 G/DL (ref 3.2–4.8)
ALBUMIN/GLOB SERPL: 1.3 G/DL (ref 1.5–2.5)
ALP SERPL-CCNC: 198 U/L (ref 25–100)
ALT SERPL W P-5'-P-CCNC: 37 U/L (ref 7–40)
ANION GAP SERPL CALCULATED.3IONS-SCNC: 9 MMOL/L (ref 3–11)
ARTERIAL PATENCY WRIST A: POSITIVE
AST SERPL-CCNC: 47 U/L (ref 0–33)
ATMOSPHERIC PRESS: ABNORMAL MMHG
BACTERIA SPEC AEROBE CULT: ABNORMAL
BACTERIA SPEC AEROBE CULT: ABNORMAL
BACTERIA SPEC AEROBE CULT: NORMAL
BASE EXCESS BLDA CALC-SCNC: 2.6 MMOL/L (ref 0–2)
BDY SITE: ABNORMAL
BILIRUB SERPL-MCNC: 0.8 MG/DL (ref 0.3–1.2)
BUN BLD-MCNC: 28 MG/DL (ref 9–23)
BUN/CREAT SERPL: 16.5 (ref 7–25)
CALCIUM SPEC-SCNC: 10.3 MG/DL (ref 8.7–10.4)
CHLORIDE SERPL-SCNC: 106 MMOL/L (ref 99–109)
CO2 BLDA-SCNC: 29.8 MMOL/L (ref 22–33)
CO2 SERPL-SCNC: 28 MMOL/L (ref 20–31)
COHGB MFR BLD: 1.6 % (ref 0–2)
CREAT BLD-MCNC: 1.7 MG/DL (ref 0.6–1.3)
ERYTHROCYTE [SEDIMENTATION RATE] IN BLOOD: 55 MM/HR (ref 0–30)
GFR SERPL CREATININE-BSD FRML MDRD: 28 ML/MIN/1.73
GLOBULIN UR ELPH-MCNC: 2.9 GM/DL
GLUCOSE BLD-MCNC: 182 MG/DL (ref 70–100)
GLUCOSE BLDC GLUCOMTR-MCNC: 132 MG/DL (ref 70–130)
GLUCOSE BLDC GLUCOMTR-MCNC: 136 MG/DL (ref 70–130)
GLUCOSE BLDC GLUCOMTR-MCNC: 154 MG/DL (ref 70–130)
GLUCOSE BLDC GLUCOMTR-MCNC: 189 MG/DL (ref 70–130)
HCO3 BLDA-SCNC: 28.3 MMOL/L (ref 20–26)
HCT VFR BLD CALC: 33.8 %
HGB BLDA-MCNC: 11 G/DL (ref 14–18)
HOROWITZ INDEX BLD+IHG-RTO: 28 %
LDH SERPL-CCNC: 231 U/L (ref 120–246)
MAGNESIUM SERPL-MCNC: 2.4 MG/DL (ref 1.3–2.7)
METHGB BLD QL: 0.5 % (ref 0–1.5)
MODALITY: ABNORMAL
OXYHGB MFR BLDV: 92.4 % (ref 94–99)
PCO2 BLDA: 49.3 MM HG (ref 35–45)
PH BLDA: 7.37 PH UNITS (ref 7.35–7.45)
PHOSPHATE SERPL-MCNC: 3.8 MG/DL (ref 2.4–5.1)
PO2 BLDA: 69.2 MM HG (ref 83–108)
POTASSIUM BLD-SCNC: 4.8 MMOL/L (ref 3.5–5.5)
PROT SERPL-MCNC: 6.8 G/DL (ref 5.7–8.2)
SODIUM BLD-SCNC: 143 MMOL/L (ref 132–146)

## 2017-06-17 PROCEDURE — 82962 GLUCOSE BLOOD TEST: CPT

## 2017-06-17 PROCEDURE — 63710000001 INSULIN LISPRO (HUMAN) PER 5 UNITS: Performed by: INTERNAL MEDICINE

## 2017-06-17 PROCEDURE — 82805 BLOOD GASES W/O2 SATURATION: CPT | Performed by: INTERNAL MEDICINE

## 2017-06-17 PROCEDURE — 25010000002 HEPARIN (PORCINE) PER 1000 UNITS: Performed by: INTERNAL MEDICINE

## 2017-06-17 PROCEDURE — 25010000002 METHYLPREDNISOLONE PER 40 MG: Performed by: INTERNAL MEDICINE

## 2017-06-17 PROCEDURE — 85652 RBC SED RATE AUTOMATED: CPT | Performed by: INTERNAL MEDICINE

## 2017-06-17 PROCEDURE — 25010000002 FUROSEMIDE PER 20 MG: Performed by: INTERNAL MEDICINE

## 2017-06-17 PROCEDURE — 71010 HC CHEST PA OR AP: CPT

## 2017-06-17 PROCEDURE — 94799 UNLISTED PULMONARY SVC/PX: CPT

## 2017-06-17 PROCEDURE — 36600 WITHDRAWAL OF ARTERIAL BLOOD: CPT | Performed by: INTERNAL MEDICINE

## 2017-06-17 PROCEDURE — 25010000002 HEPARIN FLUSH (PORCINE) 100 UNIT/ML SOLUTION: Performed by: INTERNAL MEDICINE

## 2017-06-17 PROCEDURE — 94760 N-INVAS EAR/PLS OXIMETRY 1: CPT

## 2017-06-17 PROCEDURE — 25010000002 DIPHENHYDRAMINE PER 50 MG: Performed by: NURSE PRACTITIONER

## 2017-06-17 PROCEDURE — 80053 COMPREHEN METABOLIC PANEL: CPT | Performed by: INTERNAL MEDICINE

## 2017-06-17 PROCEDURE — 94640 AIRWAY INHALATION TREATMENT: CPT

## 2017-06-17 PROCEDURE — 83735 ASSAY OF MAGNESIUM: CPT | Performed by: INTERNAL MEDICINE

## 2017-06-17 PROCEDURE — 25010000002 METHYLPREDNISOLONE PER 40 MG: Performed by: NURSE PRACTITIONER

## 2017-06-17 PROCEDURE — 84100 ASSAY OF PHOSPHORUS: CPT | Performed by: INTERNAL MEDICINE

## 2017-06-17 PROCEDURE — 25010000002 LEVETRIRACETAM PER 10 MG: Performed by: INTERNAL MEDICINE

## 2017-06-17 PROCEDURE — 83615 LACTATE (LD) (LDH) ENZYME: CPT | Performed by: INTERNAL MEDICINE

## 2017-06-17 PROCEDURE — 99291 CRITICAL CARE FIRST HOUR: CPT | Performed by: INTERNAL MEDICINE

## 2017-06-17 PROCEDURE — 25010000002 PIPERACILLIN-TAZOBACTAM: Performed by: INTERNAL MEDICINE

## 2017-06-17 RX ORDER — METHYLPREDNISOLONE SODIUM SUCCINATE 40 MG/ML
20 INJECTION, POWDER, LYOPHILIZED, FOR SOLUTION INTRAMUSCULAR; INTRAVENOUS EVERY 6 HOURS
Status: DISCONTINUED | OUTPATIENT
Start: 2017-06-17 | End: 2017-06-18

## 2017-06-17 RX ORDER — FUROSEMIDE 10 MG/ML
40 INJECTION INTRAMUSCULAR; INTRAVENOUS 2 TIMES DAILY
Status: COMPLETED | OUTPATIENT
Start: 2017-06-17 | End: 2017-06-18

## 2017-06-17 RX ORDER — DIPHENHYDRAMINE HYDROCHLORIDE 50 MG/ML
25 INJECTION INTRAMUSCULAR; INTRAVENOUS ONCE AS NEEDED
Status: COMPLETED | OUTPATIENT
Start: 2017-06-17 | End: 2017-06-17

## 2017-06-17 RX ORDER — IPRATROPIUM BROMIDE AND ALBUTEROL SULFATE 2.5; .5 MG/3ML; MG/3ML
3 SOLUTION RESPIRATORY (INHALATION)
Status: DISCONTINUED | OUTPATIENT
Start: 2017-06-17 | End: 2017-06-22 | Stop reason: HOSPADM

## 2017-06-17 RX ORDER — HYDRALAZINE HYDROCHLORIDE 20 MG/ML
20 INJECTION INTRAMUSCULAR; INTRAVENOUS EVERY 6 HOURS PRN
Status: DISCONTINUED | OUTPATIENT
Start: 2017-06-17 | End: 2017-06-22 | Stop reason: HOSPADM

## 2017-06-17 RX ORDER — METHYLPREDNISOLONE SODIUM SUCCINATE 40 MG/ML
20 INJECTION, POWDER, LYOPHILIZED, FOR SOLUTION INTRAMUSCULAR; INTRAVENOUS ONCE
Status: COMPLETED | OUTPATIENT
Start: 2017-06-17 | End: 2017-06-17

## 2017-06-17 RX ORDER — GUAIFENESIN 600 MG/1
1200 TABLET, EXTENDED RELEASE ORAL 2 TIMES DAILY
Status: DISCONTINUED | OUTPATIENT
Start: 2017-06-17 | End: 2017-06-17

## 2017-06-17 RX ORDER — FAMOTIDINE 20 MG/1
20 TABLET, FILM COATED ORAL DAILY
Status: DISCONTINUED | OUTPATIENT
Start: 2017-06-17 | End: 2017-06-18

## 2017-06-17 RX ADMIN — RACEPINEPHRINE HYDROCHLORIDE 0.5 ML: 11.25 SOLUTION RESPIRATORY (INHALATION) at 03:06

## 2017-06-17 RX ADMIN — METHYLPREDNISOLONE SODIUM SUCCINATE 20 MG: 40 INJECTION, POWDER, FOR SOLUTION INTRAMUSCULAR; INTRAVENOUS at 08:41

## 2017-06-17 RX ADMIN — METHYLPREDNISOLONE SODIUM SUCCINATE 20 MG: 40 INJECTION, POWDER, FOR SOLUTION INTRAMUSCULAR; INTRAVENOUS at 21:06

## 2017-06-17 RX ADMIN — HEPARIN 500 UNITS: 100 SYRINGE at 20:48

## 2017-06-17 RX ADMIN — INSULIN LISPRO 2 UNITS: 100 INJECTION, SOLUTION INTRAVENOUS; SUBCUTANEOUS at 08:44

## 2017-06-17 RX ADMIN — NYSTATIN: 100000 POWDER TOPICAL at 05:51

## 2017-06-17 RX ADMIN — HEPARIN SODIUM 5000 UNITS: 5000 INJECTION, SOLUTION INTRAVENOUS; SUBCUTANEOUS at 05:50

## 2017-06-17 RX ADMIN — NYSTATIN: 100000 POWDER TOPICAL at 21:06

## 2017-06-17 RX ADMIN — FAMOTIDINE 20 MG: 10 INJECTION, SOLUTION INTRAVENOUS at 08:42

## 2017-06-17 RX ADMIN — PIPERACILLIN AND TAZOBACTAM 2.25 G: 2; .25 INJECTION, POWDER, LYOPHILIZED, FOR SOLUTION INTRAVENOUS; PARENTERAL at 20:47

## 2017-06-17 RX ADMIN — INSULIN LISPRO 2 UNITS: 100 INJECTION, SOLUTION INTRAVENOUS; SUBCUTANEOUS at 18:12

## 2017-06-17 RX ADMIN — FUROSEMIDE 40 MG: 10 INJECTION, SOLUTION INTRAMUSCULAR; INTRAVENOUS at 18:12

## 2017-06-17 RX ADMIN — QUETIAPINE FUMARATE 25 MG: 25 TABLET, FILM COATED ORAL at 21:06

## 2017-06-17 RX ADMIN — IPRATROPIUM BROMIDE AND ALBUTEROL SULFATE 3 ML: .5; 3 SOLUTION RESPIRATORY (INHALATION) at 07:05

## 2017-06-17 RX ADMIN — METHYLPREDNISOLONE SODIUM SUCCINATE 20 MG: 40 INJECTION, POWDER, FOR SOLUTION INTRAMUSCULAR; INTRAVENOUS at 18:11

## 2017-06-17 RX ADMIN — LEVETIRACETAM 750 MG: 100 INJECTION, SOLUTION INTRAVENOUS at 08:42

## 2017-06-17 RX ADMIN — NYSTATIN: 100000 POWDER TOPICAL at 14:51

## 2017-06-17 RX ADMIN — PIPERACILLIN AND TAZOBACTAM 2.25 G: 2; .25 INJECTION, POWDER, LYOPHILIZED, FOR SOLUTION INTRAVENOUS; PARENTERAL at 14:51

## 2017-06-17 RX ADMIN — HEPARIN SODIUM 5000 UNITS: 5000 INJECTION, SOLUTION INTRAVENOUS; SUBCUTANEOUS at 14:51

## 2017-06-17 RX ADMIN — METHYLPREDNISOLONE SODIUM SUCCINATE 20 MG: 40 INJECTION, POWDER, FOR SOLUTION INTRAMUSCULAR; INTRAVENOUS at 01:49

## 2017-06-17 RX ADMIN — IPRATROPIUM BROMIDE AND ALBUTEROL SULFATE 3 ML: .5; 3 SOLUTION RESPIRATORY (INHALATION) at 11:59

## 2017-06-17 RX ADMIN — DIPHENHYDRAMINE HYDROCHLORIDE 25 MG: 50 INJECTION INTRAMUSCULAR; INTRAVENOUS at 01:50

## 2017-06-17 RX ADMIN — PIPERACILLIN AND TAZOBACTAM 2.25 G: 2; .25 INJECTION, POWDER, LYOPHILIZED, FOR SOLUTION INTRAVENOUS; PARENTERAL at 03:24

## 2017-06-17 RX ADMIN — HEPARIN SODIUM 5000 UNITS: 5000 INJECTION, SOLUTION INTRAVENOUS; SUBCUTANEOUS at 21:06

## 2017-06-17 RX ADMIN — LEVETIRACETAM 750 MG: 100 INJECTION, SOLUTION INTRAVENOUS at 22:00

## 2017-06-17 RX ADMIN — HEPARIN 500 UNITS: 100 SYRINGE at 08:42

## 2017-06-17 RX ADMIN — GUAIFENESIN 1200 MG: 600 TABLET, EXTENDED RELEASE ORAL at 08:42

## 2017-06-17 RX ADMIN — PIPERACILLIN AND TAZOBACTAM 2.25 G: 2; .25 INJECTION, POWDER, LYOPHILIZED, FOR SOLUTION INTRAVENOUS; PARENTERAL at 08:42

## 2017-06-17 RX ADMIN — IPRATROPIUM BROMIDE AND ALBUTEROL SULFATE 3 ML: .5; 3 SOLUTION RESPIRATORY (INHALATION) at 16:21

## 2017-06-18 LAB
ALBUMIN SERPL-MCNC: 3.8 G/DL (ref 3.2–4.8)
ALBUMIN/GLOB SERPL: 1.4 G/DL (ref 1.5–2.5)
ALP SERPL-CCNC: 182 U/L (ref 25–100)
ALT SERPL W P-5'-P-CCNC: 51 U/L (ref 7–40)
ANION GAP SERPL CALCULATED.3IONS-SCNC: 9 MMOL/L (ref 3–11)
AST SERPL-CCNC: 53 U/L (ref 0–33)
BASOPHILS # BLD AUTO: 0 10*3/MM3 (ref 0–0.2)
BASOPHILS NFR BLD AUTO: 0 % (ref 0–1)
BILIRUB SERPL-MCNC: 0.6 MG/DL (ref 0.3–1.2)
BNP SERPL-MCNC: 1794 PG/ML (ref 0–100)
BUN BLD-MCNC: 34 MG/DL (ref 9–23)
BUN/CREAT SERPL: 20 (ref 7–25)
CALCIUM SPEC-SCNC: 10.2 MG/DL (ref 8.7–10.4)
CHLORIDE SERPL-SCNC: 105 MMOL/L (ref 99–109)
CO2 SERPL-SCNC: 31 MMOL/L (ref 20–31)
CREAT BLD-MCNC: 1.7 MG/DL (ref 0.6–1.3)
DEPRECATED RDW RBC AUTO: 54.8 FL (ref 37–54)
EOSINOPHIL # BLD AUTO: 0 10*3/MM3 (ref 0.1–0.3)
EOSINOPHIL NFR BLD AUTO: 0 % (ref 0–3)
ERYTHROCYTE [DISTWIDTH] IN BLOOD BY AUTOMATED COUNT: 14.9 % (ref 11.3–14.5)
GFR SERPL CREATININE-BSD FRML MDRD: 28 ML/MIN/1.73
GLOBULIN UR ELPH-MCNC: 2.8 GM/DL
GLUCOSE BLD-MCNC: 158 MG/DL (ref 70–100)
GLUCOSE BLDC GLUCOMTR-MCNC: 140 MG/DL (ref 70–130)
GLUCOSE BLDC GLUCOMTR-MCNC: 153 MG/DL (ref 70–130)
GLUCOSE BLDC GLUCOMTR-MCNC: 172 MG/DL (ref 70–130)
GLUCOSE BLDC GLUCOMTR-MCNC: 190 MG/DL (ref 70–130)
HCT VFR BLD AUTO: 35.8 % (ref 34.5–44)
HGB BLD-MCNC: 10.9 G/DL (ref 11.5–15.5)
IMM GRANULOCYTES # BLD: 0.06 10*3/MM3 (ref 0–0.03)
IMM GRANULOCYTES NFR BLD: 0.6 % (ref 0–0.6)
LYMPHOCYTES # BLD AUTO: 1.42 10*3/MM3 (ref 0.6–4.8)
LYMPHOCYTES NFR BLD AUTO: 14.7 % (ref 24–44)
MAGNESIUM SERPL-MCNC: 2.3 MG/DL (ref 1.3–2.7)
MCH RBC QN AUTO: 30.4 PG (ref 27–31)
MCHC RBC AUTO-ENTMCNC: 30.4 G/DL (ref 32–36)
MCV RBC AUTO: 100 FL (ref 80–99)
MONOCYTES # BLD AUTO: 0.52 10*3/MM3 (ref 0–1)
MONOCYTES NFR BLD AUTO: 5.4 % (ref 0–12)
NEUTROPHILS # BLD AUTO: 7.63 10*3/MM3 (ref 1.5–8.3)
NEUTROPHILS NFR BLD AUTO: 79.3 % (ref 41–71)
PHOSPHATE SERPL-MCNC: 4.4 MG/DL (ref 2.4–5.1)
PLAT MORPH BLD: NORMAL
PLATELET # BLD AUTO: 401 10*3/MM3 (ref 150–450)
PMV BLD AUTO: 10.6 FL (ref 6–12)
POTASSIUM BLD-SCNC: 4.1 MMOL/L (ref 3.5–5.5)
PROCALCITONIN SERPL-MCNC: 17.91 NG/ML
PROT SERPL-MCNC: 6.6 G/DL (ref 5.7–8.2)
RBC # BLD AUTO: 3.58 10*6/MM3 (ref 3.89–5.14)
RBC MORPH BLD: NORMAL
SODIUM BLD-SCNC: 145 MMOL/L (ref 132–146)
WBC MORPH BLD: NORMAL
WBC NRBC COR # BLD: 9.63 10*3/MM3 (ref 3.5–10.8)

## 2017-06-18 PROCEDURE — 63710000001 INSULIN LISPRO (HUMAN) PER 5 UNITS: Performed by: INTERNAL MEDICINE

## 2017-06-18 PROCEDURE — 80053 COMPREHEN METABOLIC PANEL: CPT | Performed by: INTERNAL MEDICINE

## 2017-06-18 PROCEDURE — 25010000002 HYDRALAZINE PER 20 MG: Performed by: INTERNAL MEDICINE

## 2017-06-18 PROCEDURE — 25010000002 HEPARIN FLUSH (PORCINE) 100 UNIT/ML SOLUTION: Performed by: INTERNAL MEDICINE

## 2017-06-18 PROCEDURE — 25010000002 FUROSEMIDE PER 20 MG: Performed by: INTERNAL MEDICINE

## 2017-06-18 PROCEDURE — 85025 COMPLETE CBC W/AUTO DIFF WBC: CPT | Performed by: INTERNAL MEDICINE

## 2017-06-18 PROCEDURE — 94640 AIRWAY INHALATION TREATMENT: CPT

## 2017-06-18 PROCEDURE — 25010000002 HEPARIN (PORCINE) PER 1000 UNITS: Performed by: INTERNAL MEDICINE

## 2017-06-18 PROCEDURE — 84145 PROCALCITONIN (PCT): CPT | Performed by: INTERNAL MEDICINE

## 2017-06-18 PROCEDURE — 25010000002 LEVETRIRACETAM PER 10 MG: Performed by: INTERNAL MEDICINE

## 2017-06-18 PROCEDURE — 94799 UNLISTED PULMONARY SVC/PX: CPT

## 2017-06-18 PROCEDURE — 94760 N-INVAS EAR/PLS OXIMETRY 1: CPT

## 2017-06-18 PROCEDURE — 82962 GLUCOSE BLOOD TEST: CPT

## 2017-06-18 PROCEDURE — 84100 ASSAY OF PHOSPHORUS: CPT | Performed by: INTERNAL MEDICINE

## 2017-06-18 PROCEDURE — 99233 SBSQ HOSP IP/OBS HIGH 50: CPT | Performed by: INTERNAL MEDICINE

## 2017-06-18 PROCEDURE — 94660 CPAP INITIATION&MGMT: CPT

## 2017-06-18 PROCEDURE — 25010000002 PIPERACILLIN-TAZOBACTAM: Performed by: INTERNAL MEDICINE

## 2017-06-18 PROCEDURE — 83880 ASSAY OF NATRIURETIC PEPTIDE: CPT | Performed by: INTERNAL MEDICINE

## 2017-06-18 PROCEDURE — 25010000002 METHYLPREDNISOLONE PER 40 MG: Performed by: INTERNAL MEDICINE

## 2017-06-18 PROCEDURE — 85007 BL SMEAR W/DIFF WBC COUNT: CPT | Performed by: INTERNAL MEDICINE

## 2017-06-18 PROCEDURE — 83735 ASSAY OF MAGNESIUM: CPT | Performed by: INTERNAL MEDICINE

## 2017-06-18 RX ORDER — PANTOPRAZOLE SODIUM 40 MG/1
40 TABLET, DELAYED RELEASE ORAL
Status: DISCONTINUED | OUTPATIENT
Start: 2017-06-19 | End: 2017-06-22 | Stop reason: HOSPADM

## 2017-06-18 RX ORDER — FUROSEMIDE 10 MG/ML
20 INJECTION INTRAMUSCULAR; INTRAVENOUS 2 TIMES DAILY
Status: COMPLETED | OUTPATIENT
Start: 2017-06-18 | End: 2017-06-19

## 2017-06-18 RX ORDER — BUDESONIDE 0.5 MG/2ML
0.5 INHALANT ORAL
Status: DISCONTINUED | OUTPATIENT
Start: 2017-06-18 | End: 2017-06-19

## 2017-06-18 RX ORDER — POTASSIUM CHLORIDE 1.5 G/1.77G
40 POWDER, FOR SOLUTION ORAL 2 TIMES DAILY
Status: COMPLETED | OUTPATIENT
Start: 2017-06-18 | End: 2017-06-19

## 2017-06-18 RX ADMIN — HYDRALAZINE HYDROCHLORIDE 20 MG: 20 INJECTION INTRAMUSCULAR; INTRAVENOUS at 01:09

## 2017-06-18 RX ADMIN — Medication 2.5 MG: at 21:10

## 2017-06-18 RX ADMIN — PIPERACILLIN AND TAZOBACTAM 2.25 G: 2; .25 INJECTION, POWDER, LYOPHILIZED, FOR SOLUTION INTRAVENOUS; PARENTERAL at 21:08

## 2017-06-18 RX ADMIN — IPRATROPIUM BROMIDE AND ALBUTEROL SULFATE 3 ML: .5; 3 SOLUTION RESPIRATORY (INHALATION) at 22:30

## 2017-06-18 RX ADMIN — FUROSEMIDE 40 MG: 10 INJECTION, SOLUTION INTRAMUSCULAR; INTRAVENOUS at 08:46

## 2017-06-18 RX ADMIN — IPRATROPIUM BROMIDE AND ALBUTEROL SULFATE 3 ML: .5; 3 SOLUTION RESPIRATORY (INHALATION) at 16:15

## 2017-06-18 RX ADMIN — QUETIAPINE FUMARATE 25 MG: 25 TABLET, FILM COATED ORAL at 21:11

## 2017-06-18 RX ADMIN — METHYLPREDNISOLONE SODIUM SUCCINATE 20 MG: 40 INJECTION, POWDER, FOR SOLUTION INTRAMUSCULAR; INTRAVENOUS at 04:11

## 2017-06-18 RX ADMIN — HEPARIN SODIUM 5000 UNITS: 5000 INJECTION, SOLUTION INTRAVENOUS; SUBCUTANEOUS at 05:54

## 2017-06-18 RX ADMIN — PIPERACILLIN AND TAZOBACTAM 2.25 G: 2; .25 INJECTION, POWDER, LYOPHILIZED, FOR SOLUTION INTRAVENOUS; PARENTERAL at 01:19

## 2017-06-18 RX ADMIN — BUDESONIDE 0.5 MG: 0.5 INHALANT RESPIRATORY (INHALATION) at 19:32

## 2017-06-18 RX ADMIN — IPRATROPIUM BROMIDE AND ALBUTEROL SULFATE 3 ML: .5; 3 SOLUTION RESPIRATORY (INHALATION) at 00:11

## 2017-06-18 RX ADMIN — NYSTATIN: 100000 POWDER TOPICAL at 21:09

## 2017-06-18 RX ADMIN — NYSTATIN: 100000 POWDER TOPICAL at 16:01

## 2017-06-18 RX ADMIN — PIPERACILLIN AND TAZOBACTAM 2.25 G: 2; .25 INJECTION, POWDER, LYOPHILIZED, FOR SOLUTION INTRAVENOUS; PARENTERAL at 08:46

## 2017-06-18 RX ADMIN — HEPARIN 500 UNITS: 100 SYRINGE at 21:10

## 2017-06-18 RX ADMIN — HEPARIN SODIUM 5000 UNITS: 5000 INJECTION, SOLUTION INTRAVENOUS; SUBCUTANEOUS at 16:01

## 2017-06-18 RX ADMIN — IPRATROPIUM BROMIDE AND ALBUTEROL SULFATE 3 ML: .5; 3 SOLUTION RESPIRATORY (INHALATION) at 19:32

## 2017-06-18 RX ADMIN — LEVETIRACETAM 750 MG: 100 INJECTION, SOLUTION INTRAVENOUS at 08:46

## 2017-06-18 RX ADMIN — INSULIN LISPRO 2 UNITS: 100 INJECTION, SOLUTION INTRAVENOUS; SUBCUTANEOUS at 21:09

## 2017-06-18 RX ADMIN — NYSTATIN: 100000 POWDER TOPICAL at 06:08

## 2017-06-18 RX ADMIN — FAMOTIDINE 20 MG: 20 TABLET ORAL at 08:46

## 2017-06-18 RX ADMIN — INSULIN LISPRO 2 UNITS: 100 INJECTION, SOLUTION INTRAVENOUS; SUBCUTANEOUS at 12:31

## 2017-06-18 RX ADMIN — POTASSIUM CHLORIDE 40 MEQ: 1.5 POWDER, FOR SOLUTION ORAL at 18:00

## 2017-06-18 RX ADMIN — HEPARIN SODIUM 5000 UNITS: 5000 INJECTION, SOLUTION INTRAVENOUS; SUBCUTANEOUS at 21:09

## 2017-06-18 RX ADMIN — FUROSEMIDE 20 MG: 10 INJECTION, SOLUTION INTRAMUSCULAR; INTRAVENOUS at 18:00

## 2017-06-18 RX ADMIN — HEPARIN 500 UNITS: 100 SYRINGE at 08:46

## 2017-06-18 RX ADMIN — IPRATROPIUM BROMIDE AND ALBUTEROL SULFATE 3 ML: .5; 3 SOLUTION RESPIRATORY (INHALATION) at 12:55

## 2017-06-18 RX ADMIN — INSULIN LISPRO 2 UNITS: 100 INJECTION, SOLUTION INTRAVENOUS; SUBCUTANEOUS at 08:56

## 2017-06-18 RX ADMIN — PIPERACILLIN AND TAZOBACTAM 2.25 G: 2; .25 INJECTION, POWDER, LYOPHILIZED, FOR SOLUTION INTRAVENOUS; PARENTERAL at 16:01

## 2017-06-18 RX ADMIN — METHYLPREDNISOLONE SODIUM SUCCINATE 20 MG: 40 INJECTION, POWDER, FOR SOLUTION INTRAMUSCULAR; INTRAVENOUS at 11:01

## 2017-06-18 RX ADMIN — IPRATROPIUM BROMIDE AND ALBUTEROL SULFATE 3 ML: .5; 3 SOLUTION RESPIRATORY (INHALATION) at 08:29

## 2017-06-19 ENCOUNTER — APPOINTMENT (OUTPATIENT)
Dept: GENERAL RADIOLOGY | Facility: HOSPITAL | Age: 82
End: 2017-06-19

## 2017-06-19 PROBLEM — E11.9 DM (DIABETES MELLITUS), TYPE 2 (HCC): Status: RESOLVED | Noted: 2017-06-15 | Resolved: 2017-06-19

## 2017-06-19 LAB
ALBUMIN SERPL-MCNC: 3.4 G/DL (ref 3.2–4.8)
ALBUMIN/GLOB SERPL: 1.4 G/DL (ref 1.5–2.5)
ALP SERPL-CCNC: 152 U/L (ref 25–100)
ALT SERPL W P-5'-P-CCNC: 68 U/L (ref 7–40)
ANION GAP SERPL CALCULATED.3IONS-SCNC: 10 MMOL/L (ref 3–11)
AST SERPL-CCNC: 48 U/L (ref 0–33)
BASOPHILS # BLD AUTO: 0.02 10*3/MM3 (ref 0–0.2)
BASOPHILS NFR BLD AUTO: 0.2 % (ref 0–1)
BILIRUB SERPL-MCNC: 0.4 MG/DL (ref 0.3–1.2)
BUN BLD-MCNC: 40 MG/DL (ref 9–23)
BUN/CREAT SERPL: 21.1 (ref 7–25)
CALCIUM SPEC-SCNC: 9.8 MG/DL (ref 8.7–10.4)
CHLORIDE SERPL-SCNC: 105 MMOL/L (ref 99–109)
CO2 SERPL-SCNC: 30 MMOL/L (ref 20–31)
CREAT BLD-MCNC: 1.9 MG/DL (ref 0.6–1.3)
DEPRECATED RDW RBC AUTO: 55.9 FL (ref 37–54)
EOSINOPHIL # BLD AUTO: 0.01 10*3/MM3 (ref 0.1–0.3)
EOSINOPHIL NFR BLD AUTO: 0.1 % (ref 0–3)
ERYTHROCYTE [DISTWIDTH] IN BLOOD BY AUTOMATED COUNT: 15.2 % (ref 11.3–14.5)
GFR SERPL CREATININE-BSD FRML MDRD: 25 ML/MIN/1.73
GLOBULIN UR ELPH-MCNC: 2.5 GM/DL
GLUCOSE BLD-MCNC: 134 MG/DL (ref 70–100)
GLUCOSE BLDC GLUCOMTR-MCNC: 129 MG/DL (ref 70–130)
HCT VFR BLD AUTO: 33.8 % (ref 34.5–44)
HGB BLD-MCNC: 9.9 G/DL (ref 11.5–15.5)
IMM GRANULOCYTES # BLD: 0.16 10*3/MM3 (ref 0–0.03)
IMM GRANULOCYTES NFR BLD: 1.5 % (ref 0–0.6)
LYMPHOCYTES # BLD AUTO: 2.45 10*3/MM3 (ref 0.6–4.8)
LYMPHOCYTES NFR BLD AUTO: 23.5 % (ref 24–44)
MAGNESIUM SERPL-MCNC: 2.3 MG/DL (ref 1.3–2.7)
MCH RBC QN AUTO: 29.6 PG (ref 27–31)
MCHC RBC AUTO-ENTMCNC: 29.3 G/DL (ref 32–36)
MCV RBC AUTO: 100.9 FL (ref 80–99)
MONOCYTES # BLD AUTO: 1.1 10*3/MM3 (ref 0–1)
MONOCYTES NFR BLD AUTO: 10.6 % (ref 0–12)
NEUTROPHILS # BLD AUTO: 6.67 10*3/MM3 (ref 1.5–8.3)
NEUTROPHILS NFR BLD AUTO: 64.1 % (ref 41–71)
PHOSPHATE SERPL-MCNC: 4 MG/DL (ref 2.4–5.1)
PLAT MORPH BLD: NORMAL
PLATELET # BLD AUTO: 364 10*3/MM3 (ref 150–450)
PMV BLD AUTO: 10.6 FL (ref 6–12)
POTASSIUM BLD-SCNC: 3.9 MMOL/L (ref 3.5–5.5)
PROT SERPL-MCNC: 5.9 G/DL (ref 5.7–8.2)
RBC # BLD AUTO: 3.35 10*6/MM3 (ref 3.89–5.14)
RBC MORPH BLD: NORMAL
SODIUM BLD-SCNC: 145 MMOL/L (ref 132–146)
WBC MORPH BLD: NORMAL
WBC NRBC COR # BLD: 10.41 10*3/MM3 (ref 3.5–10.8)

## 2017-06-19 PROCEDURE — 94799 UNLISTED PULMONARY SVC/PX: CPT

## 2017-06-19 PROCEDURE — 97161 PT EVAL LOW COMPLEX 20 MIN: CPT

## 2017-06-19 PROCEDURE — 25010000002 FUROSEMIDE PER 20 MG: Performed by: INTERNAL MEDICINE

## 2017-06-19 PROCEDURE — 97110 THERAPEUTIC EXERCISES: CPT

## 2017-06-19 PROCEDURE — 71020 HC CHEST PA AND LATERAL: CPT

## 2017-06-19 PROCEDURE — 25010000002 HEPARIN (PORCINE) PER 1000 UNITS: Performed by: INTERNAL MEDICINE

## 2017-06-19 PROCEDURE — 80053 COMPREHEN METABOLIC PANEL: CPT | Performed by: INTERNAL MEDICINE

## 2017-06-19 PROCEDURE — 82962 GLUCOSE BLOOD TEST: CPT

## 2017-06-19 PROCEDURE — 25010000002 HEPARIN FLUSH (PORCINE) 100 UNIT/ML SOLUTION: Performed by: INTERNAL MEDICINE

## 2017-06-19 PROCEDURE — 83735 ASSAY OF MAGNESIUM: CPT | Performed by: INTERNAL MEDICINE

## 2017-06-19 PROCEDURE — 85025 COMPLETE CBC W/AUTO DIFF WBC: CPT | Performed by: INTERNAL MEDICINE

## 2017-06-19 PROCEDURE — 94640 AIRWAY INHALATION TREATMENT: CPT

## 2017-06-19 PROCEDURE — 84100 ASSAY OF PHOSPHORUS: CPT | Performed by: INTERNAL MEDICINE

## 2017-06-19 PROCEDURE — 99233 SBSQ HOSP IP/OBS HIGH 50: CPT | Performed by: INTERNAL MEDICINE

## 2017-06-19 PROCEDURE — 85007 BL SMEAR W/DIFF WBC COUNT: CPT | Performed by: INTERNAL MEDICINE

## 2017-06-19 PROCEDURE — 25010000002 PIPERACILLIN-TAZOBACTAM: Performed by: INTERNAL MEDICINE

## 2017-06-19 RX ORDER — ALLOPURINOL 100 MG/1
100 TABLET ORAL DAILY
Status: DISCONTINUED | OUTPATIENT
Start: 2017-06-19 | End: 2017-06-22 | Stop reason: HOSPADM

## 2017-06-19 RX ORDER — LEVETIRACETAM 750 MG/1
750 TABLET ORAL EVERY 12 HOURS SCHEDULED
Status: DISCONTINUED | OUTPATIENT
Start: 2017-06-19 | End: 2017-06-22 | Stop reason: HOSPADM

## 2017-06-19 RX ORDER — QUETIAPINE FUMARATE 25 MG/1
25 TABLET, FILM COATED ORAL NIGHTLY PRN
Status: DISCONTINUED | OUTPATIENT
Start: 2017-06-19 | End: 2017-06-22 | Stop reason: HOSPADM

## 2017-06-19 RX ORDER — ASPIRIN 81 MG/1
81 TABLET, CHEWABLE ORAL EVERY EVENING
Status: DISCONTINUED | OUTPATIENT
Start: 2017-06-19 | End: 2017-06-22 | Stop reason: HOSPADM

## 2017-06-19 RX ORDER — LISINOPRIL 10 MG/1
10 TABLET ORAL
Status: DISCONTINUED | OUTPATIENT
Start: 2017-06-19 | End: 2017-06-22 | Stop reason: HOSPADM

## 2017-06-19 RX ORDER — BUDESONIDE AND FORMOTEROL FUMARATE DIHYDRATE 80; 4.5 UG/1; UG/1
2 AEROSOL RESPIRATORY (INHALATION)
Status: DISCONTINUED | OUTPATIENT
Start: 2017-06-19 | End: 2017-06-22 | Stop reason: HOSPADM

## 2017-06-19 RX ADMIN — HEPARIN SODIUM 5000 UNITS: 5000 INJECTION, SOLUTION INTRAVENOUS; SUBCUTANEOUS at 06:35

## 2017-06-19 RX ADMIN — ALLOPURINOL 100 MG: 100 TABLET ORAL at 12:08

## 2017-06-19 RX ADMIN — NYSTATIN: 100000 POWDER TOPICAL at 06:35

## 2017-06-19 RX ADMIN — IPRATROPIUM BROMIDE AND ALBUTEROL SULFATE 3 ML: .5; 3 SOLUTION RESPIRATORY (INHALATION) at 11:40

## 2017-06-19 RX ADMIN — NYSTATIN: 100000 POWDER TOPICAL at 21:54

## 2017-06-19 RX ADMIN — NYSTATIN: 100000 POWDER TOPICAL at 14:41

## 2017-06-19 RX ADMIN — PANTOPRAZOLE SODIUM 40 MG: 40 TABLET, DELAYED RELEASE ORAL at 06:34

## 2017-06-19 RX ADMIN — HEPARIN SODIUM 5000 UNITS: 5000 INJECTION, SOLUTION INTRAVENOUS; SUBCUTANEOUS at 21:54

## 2017-06-19 RX ADMIN — BUDESONIDE 0.5 MG: 0.5 INHALANT RESPIRATORY (INHALATION) at 06:44

## 2017-06-19 RX ADMIN — IPRATROPIUM BROMIDE AND ALBUTEROL SULFATE 3 ML: .5; 3 SOLUTION RESPIRATORY (INHALATION) at 14:55

## 2017-06-19 RX ADMIN — BUDESONIDE AND FORMOTEROL FUMARATE DIHYDRATE 2 PUFF: 80; 4.5 AEROSOL RESPIRATORY (INHALATION) at 19:29

## 2017-06-19 RX ADMIN — PIPERACILLIN AND TAZOBACTAM 2.25 G: 2; .25 INJECTION, POWDER, LYOPHILIZED, FOR SOLUTION INTRAVENOUS; PARENTERAL at 14:41

## 2017-06-19 RX ADMIN — IPRATROPIUM BROMIDE AND ALBUTEROL SULFATE 3 ML: .5; 3 SOLUTION RESPIRATORY (INHALATION) at 03:20

## 2017-06-19 RX ADMIN — LEVETIRACETAM 750 MG: 750 TABLET, FILM COATED ORAL at 12:08

## 2017-06-19 RX ADMIN — ASPIRIN 81 MG 81 MG: 81 TABLET ORAL at 17:11

## 2017-06-19 RX ADMIN — IPRATROPIUM BROMIDE AND ALBUTEROL SULFATE 3 ML: .5; 3 SOLUTION RESPIRATORY (INHALATION) at 06:45

## 2017-06-19 RX ADMIN — PIPERACILLIN AND TAZOBACTAM 2.25 G: 2; .25 INJECTION, POWDER, LYOPHILIZED, FOR SOLUTION INTRAVENOUS; PARENTERAL at 09:12

## 2017-06-19 RX ADMIN — HEPARIN 500 UNITS: 100 SYRINGE at 21:54

## 2017-06-19 RX ADMIN — HEPARIN SODIUM 5000 UNITS: 5000 INJECTION, SOLUTION INTRAVENOUS; SUBCUTANEOUS at 14:41

## 2017-06-19 RX ADMIN — PIPERACILLIN AND TAZOBACTAM 2.25 G: 2; .25 INJECTION, POWDER, LYOPHILIZED, FOR SOLUTION INTRAVENOUS; PARENTERAL at 19:45

## 2017-06-19 RX ADMIN — POTASSIUM CHLORIDE 40 MEQ: 1.5 POWDER, FOR SOLUTION ORAL at 09:12

## 2017-06-19 RX ADMIN — PIPERACILLIN AND TAZOBACTAM 2.25 G: 2; .25 INJECTION, POWDER, LYOPHILIZED, FOR SOLUTION INTRAVENOUS; PARENTERAL at 01:49

## 2017-06-19 RX ADMIN — LISINOPRIL 10 MG: 10 TABLET ORAL at 12:08

## 2017-06-19 RX ADMIN — IPRATROPIUM BROMIDE AND ALBUTEROL SULFATE 3 ML: .5; 3 SOLUTION RESPIRATORY (INHALATION) at 19:28

## 2017-06-19 RX ADMIN — Medication 2.5 MG: at 21:54

## 2017-06-19 RX ADMIN — FUROSEMIDE 20 MG: 10 INJECTION, SOLUTION INTRAMUSCULAR; INTRAVENOUS at 09:12

## 2017-06-19 RX ADMIN — HEPARIN 500 UNITS: 100 SYRINGE at 09:12

## 2017-06-19 RX ADMIN — IPRATROPIUM BROMIDE AND ALBUTEROL SULFATE 3 ML: .5; 3 SOLUTION RESPIRATORY (INHALATION) at 23:41

## 2017-06-19 RX ADMIN — LEVETIRACETAM 750 MG: 750 TABLET, FILM COATED ORAL at 21:54

## 2017-06-20 ENCOUNTER — APPOINTMENT (OUTPATIENT)
Dept: GENERAL RADIOLOGY | Facility: HOSPITAL | Age: 82
End: 2017-06-20

## 2017-06-20 LAB
ANION GAP SERPL CALCULATED.3IONS-SCNC: 10 MMOL/L (ref 3–11)
ARTERIAL PATENCY WRIST A: POSITIVE
ATMOSPHERIC PRESS: ABNORMAL MMHG
BACTERIA SPEC AEROBE CULT: NORMAL
BACTERIA SPEC AEROBE CULT: NORMAL
BASE EXCESS BLDA CALC-SCNC: 6 MMOL/L (ref 0–2)
BDY SITE: ABNORMAL
BUN BLD-MCNC: 30 MG/DL (ref 9–23)
BUN/CREAT SERPL: 17.6 (ref 7–25)
CALCIUM SPEC-SCNC: 9.6 MG/DL (ref 8.7–10.4)
CHLORIDE SERPL-SCNC: 106 MMOL/L (ref 99–109)
CO2 BLDA-SCNC: 33.7 MMOL/L (ref 22–33)
CO2 SERPL-SCNC: 28 MMOL/L (ref 20–31)
COHGB MFR BLD: 1.1 % (ref 0–2)
CREAT BLD-MCNC: 1.7 MG/DL (ref 0.6–1.3)
GFR SERPL CREATININE-BSD FRML MDRD: 28 ML/MIN/1.73
GLUCOSE BLD-MCNC: 131 MG/DL (ref 70–100)
HCO3 BLDA-SCNC: 32 MMOL/L (ref 20–26)
HCT VFR BLD CALC: 33.8 %
HGB BLDA-MCNC: 11 G/DL (ref 14–18)
HOROWITZ INDEX BLD+IHG-RTO: 40 %
METHGB BLD QL: 1 % (ref 0–1.5)
MODALITY: ABNORMAL
OXYHGB MFR BLDV: 94.9 % (ref 94–99)
PCO2 BLDA: 55.7 MM HG (ref 35–45)
PH BLDA: 7.37 PH UNITS (ref 7.35–7.45)
PO2 BLDA: 89.2 MM HG (ref 83–108)
POTASSIUM BLD-SCNC: 3.8 MMOL/L (ref 3.5–5.5)
SODIUM BLD-SCNC: 144 MMOL/L (ref 132–146)
TROPONIN I SERPL-MCNC: 0.08 NG/ML
TROPONIN I SERPL-MCNC: 0.08 NG/ML
TROPONIN I SERPL-MCNC: 0.09 NG/ML

## 2017-06-20 PROCEDURE — 36600 WITHDRAWAL OF ARTERIAL BLOOD: CPT | Performed by: INTERNAL MEDICINE

## 2017-06-20 PROCEDURE — 25010000002 PIPERACILLIN-TAZOBACTAM: Performed by: INTERNAL MEDICINE

## 2017-06-20 PROCEDURE — 94799 UNLISTED PULMONARY SVC/PX: CPT

## 2017-06-20 PROCEDURE — 84484 ASSAY OF TROPONIN QUANT: CPT | Performed by: INTERNAL MEDICINE

## 2017-06-20 PROCEDURE — 94640 AIRWAY INHALATION TREATMENT: CPT

## 2017-06-20 PROCEDURE — 80048 BASIC METABOLIC PNL TOTAL CA: CPT | Performed by: INTERNAL MEDICINE

## 2017-06-20 PROCEDURE — 93005 ELECTROCARDIOGRAM TRACING: CPT | Performed by: PHYSICIAN ASSISTANT

## 2017-06-20 PROCEDURE — 25010000002 ONDANSETRON PER 1 MG: Performed by: NURSE PRACTITIONER

## 2017-06-20 PROCEDURE — 25010000002 HEPARIN FLUSH (PORCINE) 100 UNIT/ML SOLUTION: Performed by: INTERNAL MEDICINE

## 2017-06-20 PROCEDURE — 25010000002 METHYLPREDNISOLONE PER 125 MG: Performed by: INTERNAL MEDICINE

## 2017-06-20 PROCEDURE — 25010000002 HYDRALAZINE PER 20 MG: Performed by: INTERNAL MEDICINE

## 2017-06-20 PROCEDURE — 25010000002 HEPARIN (PORCINE) PER 1000 UNITS: Performed by: INTERNAL MEDICINE

## 2017-06-20 PROCEDURE — 71010 HC CHEST PA OR AP: CPT

## 2017-06-20 PROCEDURE — 82805 BLOOD GASES W/O2 SATURATION: CPT | Performed by: INTERNAL MEDICINE

## 2017-06-20 PROCEDURE — 84484 ASSAY OF TROPONIN QUANT: CPT | Performed by: PHYSICIAN ASSISTANT

## 2017-06-20 PROCEDURE — 94660 CPAP INITIATION&MGMT: CPT

## 2017-06-20 PROCEDURE — 99291 CRITICAL CARE FIRST HOUR: CPT | Performed by: INTERNAL MEDICINE

## 2017-06-20 RX ORDER — METHYLPREDNISOLONE SODIUM SUCCINATE 125 MG/2ML
60 INJECTION, POWDER, LYOPHILIZED, FOR SOLUTION INTRAMUSCULAR; INTRAVENOUS EVERY 8 HOURS SCHEDULED
Status: DISCONTINUED | OUTPATIENT
Start: 2017-06-20 | End: 2017-06-21

## 2017-06-20 RX ORDER — LORAZEPAM 0.5 MG/1
0.25 TABLET ORAL EVERY 6 HOURS PRN
Status: DISCONTINUED | OUTPATIENT
Start: 2017-06-20 | End: 2017-06-22 | Stop reason: HOSPADM

## 2017-06-20 RX ORDER — ONDANSETRON 2 MG/ML
4 INJECTION INTRAMUSCULAR; INTRAVENOUS EVERY 6 HOURS PRN
Status: DISCONTINUED | OUTPATIENT
Start: 2017-06-20 | End: 2017-06-22 | Stop reason: HOSPADM

## 2017-06-20 RX ADMIN — IPRATROPIUM BROMIDE AND ALBUTEROL SULFATE 3 ML: .5; 3 SOLUTION RESPIRATORY (INHALATION) at 19:21

## 2017-06-20 RX ADMIN — IPRATROPIUM BROMIDE AND ALBUTEROL SULFATE 3 ML: .5; 3 SOLUTION RESPIRATORY (INHALATION) at 07:14

## 2017-06-20 RX ADMIN — IPRATROPIUM BROMIDE AND ALBUTEROL SULFATE 3 ML: .5; 3 SOLUTION RESPIRATORY (INHALATION) at 23:38

## 2017-06-20 RX ADMIN — ONDANSETRON 4 MG: 2 INJECTION INTRAMUSCULAR; INTRAVENOUS at 06:02

## 2017-06-20 RX ADMIN — METHYLPREDNISOLONE SODIUM SUCCINATE 60 MG: 125 INJECTION, POWDER, FOR SOLUTION INTRAMUSCULAR; INTRAVENOUS at 13:58

## 2017-06-20 RX ADMIN — IPRATROPIUM BROMIDE AND ALBUTEROL SULFATE 3 ML: .5; 3 SOLUTION RESPIRATORY (INHALATION) at 17:22

## 2017-06-20 RX ADMIN — PANTOPRAZOLE SODIUM 40 MG: 40 TABLET, DELAYED RELEASE ORAL at 05:09

## 2017-06-20 RX ADMIN — METHYLPREDNISOLONE SODIUM SUCCINATE 60 MG: 125 INJECTION, POWDER, FOR SOLUTION INTRAMUSCULAR; INTRAVENOUS at 09:26

## 2017-06-20 RX ADMIN — PIPERACILLIN AND TAZOBACTAM 2.25 G: 2; .25 INJECTION, POWDER, LYOPHILIZED, FOR SOLUTION INTRAVENOUS; PARENTERAL at 14:02

## 2017-06-20 RX ADMIN — NYSTATIN: 100000 POWDER TOPICAL at 05:09

## 2017-06-20 RX ADMIN — BUDESONIDE AND FORMOTEROL FUMARATE DIHYDRATE 2 PUFF: 80; 4.5 AEROSOL RESPIRATORY (INHALATION) at 07:14

## 2017-06-20 RX ADMIN — ALLOPURINOL 100 MG: 100 TABLET ORAL at 08:10

## 2017-06-20 RX ADMIN — PIPERACILLIN AND TAZOBACTAM 2.25 G: 2; .25 INJECTION, POWDER, LYOPHILIZED, FOR SOLUTION INTRAVENOUS; PARENTERAL at 19:40

## 2017-06-20 RX ADMIN — HEPARIN SODIUM 5000 UNITS: 5000 INJECTION, SOLUTION INTRAVENOUS; SUBCUTANEOUS at 13:57

## 2017-06-20 RX ADMIN — LISINOPRIL 10 MG: 10 TABLET ORAL at 08:10

## 2017-06-20 RX ADMIN — PIPERACILLIN AND TAZOBACTAM 2.25 G: 2; .25 INJECTION, POWDER, LYOPHILIZED, FOR SOLUTION INTRAVENOUS; PARENTERAL at 08:10

## 2017-06-20 RX ADMIN — BUDESONIDE AND FORMOTEROL FUMARATE DIHYDRATE 2 PUFF: 80; 4.5 AEROSOL RESPIRATORY (INHALATION) at 19:21

## 2017-06-20 RX ADMIN — HEPARIN 500 UNITS: 100 SYRINGE at 08:16

## 2017-06-20 RX ADMIN — IPRATROPIUM BROMIDE AND ALBUTEROL SULFATE 3 ML: .5; 3 SOLUTION RESPIRATORY (INHALATION) at 03:30

## 2017-06-20 RX ADMIN — LORAZEPAM 0.25 MG: 0.5 TABLET ORAL at 07:10

## 2017-06-20 RX ADMIN — NYSTATIN 1 APPLICATION: 100000 POWDER TOPICAL at 14:01

## 2017-06-20 RX ADMIN — HEPARIN SODIUM 5000 UNITS: 5000 INJECTION, SOLUTION INTRAVENOUS; SUBCUTANEOUS at 21:04

## 2017-06-20 RX ADMIN — QUETIAPINE FUMARATE 25 MG: 25 TABLET, FILM COATED ORAL at 21:04

## 2017-06-20 RX ADMIN — Medication 2.5 MG: at 21:04

## 2017-06-20 RX ADMIN — NYSTATIN: 100000 POWDER TOPICAL at 21:04

## 2017-06-20 RX ADMIN — METHYLPREDNISOLONE SODIUM SUCCINATE 60 MG: 125 INJECTION, POWDER, FOR SOLUTION INTRAMUSCULAR; INTRAVENOUS at 21:04

## 2017-06-20 RX ADMIN — PIPERACILLIN AND TAZOBACTAM 2.25 G: 2; .25 INJECTION, POWDER, LYOPHILIZED, FOR SOLUTION INTRAVENOUS; PARENTERAL at 02:13

## 2017-06-20 RX ADMIN — HEPARIN 500 UNITS: 100 SYRINGE at 21:05

## 2017-06-20 RX ADMIN — LEVETIRACETAM 750 MG: 750 TABLET, FILM COATED ORAL at 21:04

## 2017-06-20 RX ADMIN — LEVETIRACETAM 750 MG: 750 TABLET, FILM COATED ORAL at 08:10

## 2017-06-20 RX ADMIN — ASPIRIN 81 MG 81 MG: 81 TABLET ORAL at 17:01

## 2017-06-20 RX ADMIN — IPRATROPIUM BROMIDE AND ALBUTEROL SULFATE 3 ML: .5; 3 SOLUTION RESPIRATORY (INHALATION) at 13:03

## 2017-06-20 RX ADMIN — HEPARIN SODIUM 5000 UNITS: 5000 INJECTION, SOLUTION INTRAVENOUS; SUBCUTANEOUS at 05:09

## 2017-06-20 RX ADMIN — HYDRALAZINE HYDROCHLORIDE 20 MG: 20 INJECTION INTRAMUSCULAR; INTRAVENOUS at 05:09

## 2017-06-21 PROCEDURE — 25010000002 PIPERACILLIN-TAZOBACTAM: Performed by: INTERNAL MEDICINE

## 2017-06-21 PROCEDURE — 94640 AIRWAY INHALATION TREATMENT: CPT

## 2017-06-21 PROCEDURE — 97110 THERAPEUTIC EXERCISES: CPT

## 2017-06-21 PROCEDURE — 94799 UNLISTED PULMONARY SVC/PX: CPT

## 2017-06-21 PROCEDURE — 97116 GAIT TRAINING THERAPY: CPT

## 2017-06-21 PROCEDURE — 25010000002 METHYLPREDNISOLONE PER 125 MG: Performed by: INTERNAL MEDICINE

## 2017-06-21 PROCEDURE — 99233 SBSQ HOSP IP/OBS HIGH 50: CPT | Performed by: INTERNAL MEDICINE

## 2017-06-21 PROCEDURE — 25010000002 HEPARIN FLUSH (PORCINE) 100 UNIT/ML SOLUTION: Performed by: INTERNAL MEDICINE

## 2017-06-21 PROCEDURE — 25010000002 HEPARIN (PORCINE) PER 1000 UNITS: Performed by: INTERNAL MEDICINE

## 2017-06-21 RX ORDER — PREDNISONE 20 MG/1
20 TABLET ORAL
Status: DISCONTINUED | OUTPATIENT
Start: 2017-06-22 | End: 2017-06-22 | Stop reason: HOSPADM

## 2017-06-21 RX ADMIN — BUDESONIDE AND FORMOTEROL FUMARATE DIHYDRATE 2 PUFF: 80; 4.5 AEROSOL RESPIRATORY (INHALATION) at 07:31

## 2017-06-21 RX ADMIN — IPRATROPIUM BROMIDE AND ALBUTEROL SULFATE 3 ML: .5; 3 SOLUTION RESPIRATORY (INHALATION) at 23:52

## 2017-06-21 RX ADMIN — NYSTATIN: 100000 POWDER TOPICAL at 21:34

## 2017-06-21 RX ADMIN — HEPARIN SODIUM 5000 UNITS: 5000 INJECTION, SOLUTION INTRAVENOUS; SUBCUTANEOUS at 21:33

## 2017-06-21 RX ADMIN — QUETIAPINE FUMARATE 25 MG: 25 TABLET, FILM COATED ORAL at 21:33

## 2017-06-21 RX ADMIN — HEPARIN 500 UNITS: 100 SYRINGE at 08:16

## 2017-06-21 RX ADMIN — METHYLPREDNISOLONE SODIUM SUCCINATE 60 MG: 125 INJECTION, POWDER, FOR SOLUTION INTRAMUSCULAR; INTRAVENOUS at 05:31

## 2017-06-21 RX ADMIN — PIPERACILLIN AND TAZOBACTAM 2.25 G: 2; .25 INJECTION, POWDER, LYOPHILIZED, FOR SOLUTION INTRAVENOUS; PARENTERAL at 08:15

## 2017-06-21 RX ADMIN — HEPARIN SODIUM 5000 UNITS: 5000 INJECTION, SOLUTION INTRAVENOUS; SUBCUTANEOUS at 05:31

## 2017-06-21 RX ADMIN — ASPIRIN 81 MG 81 MG: 81 TABLET ORAL at 17:31

## 2017-06-21 RX ADMIN — Medication 2.5 MG: at 21:33

## 2017-06-21 RX ADMIN — PIPERACILLIN AND TAZOBACTAM 2.25 G: 2; .25 INJECTION, POWDER, LYOPHILIZED, FOR SOLUTION INTRAVENOUS; PARENTERAL at 20:30

## 2017-06-21 RX ADMIN — HEPARIN SODIUM 5000 UNITS: 5000 INJECTION, SOLUTION INTRAVENOUS; SUBCUTANEOUS at 14:58

## 2017-06-21 RX ADMIN — PIPERACILLIN AND TAZOBACTAM 2.25 G: 2; .25 INJECTION, POWDER, LYOPHILIZED, FOR SOLUTION INTRAVENOUS; PARENTERAL at 02:15

## 2017-06-21 RX ADMIN — IPRATROPIUM BROMIDE AND ALBUTEROL SULFATE 3 ML: .5; 3 SOLUTION RESPIRATORY (INHALATION) at 16:29

## 2017-06-21 RX ADMIN — LISINOPRIL 10 MG: 10 TABLET ORAL at 08:16

## 2017-06-21 RX ADMIN — LEVETIRACETAM 750 MG: 750 TABLET, FILM COATED ORAL at 21:33

## 2017-06-21 RX ADMIN — HEPARIN 500 UNITS: 100 SYRINGE at 21:34

## 2017-06-21 RX ADMIN — NYSTATIN: 100000 POWDER TOPICAL at 05:31

## 2017-06-21 RX ADMIN — PIPERACILLIN AND TAZOBACTAM 2.25 G: 2; .25 INJECTION, POWDER, LYOPHILIZED, FOR SOLUTION INTRAVENOUS; PARENTERAL at 14:58

## 2017-06-21 RX ADMIN — LEVETIRACETAM 750 MG: 750 TABLET, FILM COATED ORAL at 08:16

## 2017-06-21 RX ADMIN — IPRATROPIUM BROMIDE AND ALBUTEROL SULFATE 3 ML: .5; 3 SOLUTION RESPIRATORY (INHALATION) at 07:31

## 2017-06-21 RX ADMIN — IPRATROPIUM BROMIDE AND ALBUTEROL SULFATE 3 ML: .5; 3 SOLUTION RESPIRATORY (INHALATION) at 12:07

## 2017-06-21 RX ADMIN — BUDESONIDE AND FORMOTEROL FUMARATE DIHYDRATE 2 PUFF: 80; 4.5 AEROSOL RESPIRATORY (INHALATION) at 19:29

## 2017-06-21 RX ADMIN — PANTOPRAZOLE SODIUM 40 MG: 40 TABLET, DELAYED RELEASE ORAL at 05:30

## 2017-06-21 RX ADMIN — IPRATROPIUM BROMIDE AND ALBUTEROL SULFATE 3 ML: .5; 3 SOLUTION RESPIRATORY (INHALATION) at 03:38

## 2017-06-21 RX ADMIN — ALLOPURINOL 100 MG: 100 TABLET ORAL at 08:16

## 2017-06-22 ENCOUNTER — TELEPHONE (OUTPATIENT)
Dept: ENDOCRINOLOGY | Facility: CLINIC | Age: 82
End: 2017-06-22

## 2017-06-22 VITALS
BODY MASS INDEX: 37.12 KG/M2 | OXYGEN SATURATION: 92 % | WEIGHT: 222.8 LBS | HEIGHT: 65 IN | TEMPERATURE: 98.9 F | HEART RATE: 83 BPM | SYSTOLIC BLOOD PRESSURE: 143 MMHG | DIASTOLIC BLOOD PRESSURE: 72 MMHG | RESPIRATION RATE: 18 BRPM

## 2017-06-22 PROCEDURE — 25010000002 HEPARIN (PORCINE) PER 1000 UNITS: Performed by: INTERNAL MEDICINE

## 2017-06-22 PROCEDURE — 94799 UNLISTED PULMONARY SVC/PX: CPT

## 2017-06-22 PROCEDURE — 25010000002 PIPERACILLIN-TAZOBACTAM: Performed by: INTERNAL MEDICINE

## 2017-06-22 PROCEDURE — 63710000001 PREDNISONE PER 1 MG: Performed by: INTERNAL MEDICINE

## 2017-06-22 PROCEDURE — 97116 GAIT TRAINING THERAPY: CPT

## 2017-06-22 PROCEDURE — 25010000002 HEPARIN FLUSH (PORCINE) 100 UNIT/ML SOLUTION: Performed by: INTERNAL MEDICINE

## 2017-06-22 PROCEDURE — 94760 N-INVAS EAR/PLS OXIMETRY 1: CPT

## 2017-06-22 PROCEDURE — 94640 AIRWAY INHALATION TREATMENT: CPT

## 2017-06-22 PROCEDURE — 99239 HOSP IP/OBS DSCHRG MGMT >30: CPT | Performed by: NURSE PRACTITIONER

## 2017-06-22 RX ORDER — PREDNISONE 10 MG/1
10 TABLET ORAL
Qty: 5 TABLET | Refills: 0 | Status: SHIPPED | OUTPATIENT
Start: 2017-06-22 | End: 2017-06-28

## 2017-06-22 RX ORDER — ALLOPURINOL 100 MG/1
100 TABLET ORAL DAILY
Qty: 180 TABLET | Refills: 0 | Status: SHIPPED | OUTPATIENT
Start: 2017-06-22 | End: 2017-01-01

## 2017-06-22 RX ORDER — POTASSIUM CHLORIDE 750 MG/1
10 CAPSULE, EXTENDED RELEASE ORAL 3 TIMES WEEKLY
Start: 2017-06-23 | End: 2017-07-20 | Stop reason: SDUPTHER

## 2017-06-22 RX ORDER — FUROSEMIDE 80 MG
TABLET ORAL
Start: 2017-06-22 | End: 2017-08-15 | Stop reason: HOSPADM

## 2017-06-22 RX ADMIN — PREDNISONE 20 MG: 20 TABLET ORAL at 08:05

## 2017-06-22 RX ADMIN — ALLOPURINOL 100 MG: 100 TABLET ORAL at 08:05

## 2017-06-22 RX ADMIN — PIPERACILLIN AND TAZOBACTAM 2.25 G: 2; .25 INJECTION, POWDER, LYOPHILIZED, FOR SOLUTION INTRAVENOUS; PARENTERAL at 01:18

## 2017-06-22 RX ADMIN — HEPARIN SODIUM 5000 UNITS: 5000 INJECTION, SOLUTION INTRAVENOUS; SUBCUTANEOUS at 14:24

## 2017-06-22 RX ADMIN — Medication: at 09:55

## 2017-06-22 RX ADMIN — IPRATROPIUM BROMIDE AND ALBUTEROL SULFATE 3 ML: .5; 3 SOLUTION RESPIRATORY (INHALATION) at 04:06

## 2017-06-22 RX ADMIN — LISINOPRIL 10 MG: 10 TABLET ORAL at 08:05

## 2017-06-22 RX ADMIN — HEPARIN SODIUM 5000 UNITS: 5000 INJECTION, SOLUTION INTRAVENOUS; SUBCUTANEOUS at 05:16

## 2017-06-22 RX ADMIN — PIPERACILLIN AND TAZOBACTAM 2.25 G: 2; .25 INJECTION, POWDER, LYOPHILIZED, FOR SOLUTION INTRAVENOUS; PARENTERAL at 08:05

## 2017-06-22 RX ADMIN — BUDESONIDE AND FORMOTEROL FUMARATE DIHYDRATE 2 PUFF: 80; 4.5 AEROSOL RESPIRATORY (INHALATION) at 07:17

## 2017-06-22 RX ADMIN — IPRATROPIUM BROMIDE AND ALBUTEROL SULFATE 3 ML: .5; 3 SOLUTION RESPIRATORY (INHALATION) at 12:49

## 2017-06-22 RX ADMIN — LEVETIRACETAM 750 MG: 750 TABLET, FILM COATED ORAL at 08:05

## 2017-06-22 RX ADMIN — IPRATROPIUM BROMIDE AND ALBUTEROL SULFATE 3 ML: .5; 3 SOLUTION RESPIRATORY (INHALATION) at 07:17

## 2017-06-22 RX ADMIN — PIPERACILLIN AND TAZOBACTAM 2.25 G: 2; .25 INJECTION, POWDER, LYOPHILIZED, FOR SOLUTION INTRAVENOUS; PARENTERAL at 14:24

## 2017-06-22 RX ADMIN — HEPARIN 500 UNITS: 100 SYRINGE at 09:00

## 2017-06-22 RX ADMIN — NYSTATIN 1 APPLICATION: 100000 POWDER TOPICAL at 14:25

## 2017-06-22 RX ADMIN — NYSTATIN: 100000 POWDER TOPICAL at 05:16

## 2017-06-22 RX ADMIN — PANTOPRAZOLE SODIUM 40 MG: 40 TABLET, DELAYED RELEASE ORAL at 05:16

## 2017-06-23 ENCOUNTER — TELEPHONE (OUTPATIENT)
Dept: INTERNAL MEDICINE | Facility: CLINIC | Age: 82
End: 2017-06-23

## 2017-06-23 NOTE — TELEPHONE ENCOUNTER
Patient was advised she needs to be seen for OV before rx will be given.  She states she is scheduled next week for hospital FU and will ask for cough med then  I did ask her if she asked for the prescription when she was discharged and she states she was told to call here for the prescription.

## 2017-06-23 NOTE — TELEPHONE ENCOUNTER
Should be seen for follow up .  I am concerned about using tussionex as it will suppress her breathing and cough.  I would not fill this until office visit.  Thanks, [facility]      Looks like recently admitted to [facility] for pneumonia  Is scheduled with APRN for hospital FU   Last rx was 2- for tussionex

## 2017-06-23 NOTE — TELEPHONE ENCOUNTER
Yuriy WALSH called to you you know that this patient is under home health for PT, OT, and nursing. She has an appointment with you next week and they will be faxing you addt'l info.

## 2017-06-26 ENCOUNTER — TRANSITIONAL CARE MANAGEMENT TELEPHONE ENCOUNTER (OUTPATIENT)
Dept: INTERNAL MEDICINE | Facility: CLINIC | Age: 82
End: 2017-06-26

## 2017-06-27 ENCOUNTER — TELEPHONE (OUTPATIENT)
Dept: INTERNAL MEDICINE | Facility: CLINIC | Age: 82
End: 2017-06-27

## 2017-06-28 ENCOUNTER — OFFICE VISIT (OUTPATIENT)
Dept: INTERNAL MEDICINE | Facility: CLINIC | Age: 82
End: 2017-06-28

## 2017-06-28 VITALS
WEIGHT: 207 LBS | HEIGHT: 65 IN | OXYGEN SATURATION: 97 % | RESPIRATION RATE: 20 BRPM | BODY MASS INDEX: 34.49 KG/M2 | DIASTOLIC BLOOD PRESSURE: 74 MMHG | HEART RATE: 73 BPM | SYSTOLIC BLOOD PRESSURE: 126 MMHG

## 2017-06-28 DIAGNOSIS — J45.40 MODERATE PERSISTENT ASTHMA WITHOUT COMPLICATION: ICD-10-CM

## 2017-06-28 DIAGNOSIS — J96.02 ACUTE RESPIRATORY FAILURE WITH HYPOXIA AND HYPERCAPNIA (HCC): Primary | ICD-10-CM

## 2017-06-28 DIAGNOSIS — R05.9 COUGH: ICD-10-CM

## 2017-06-28 DIAGNOSIS — J96.01 ACUTE RESPIRATORY FAILURE WITH HYPOXIA AND HYPERCAPNIA (HCC): Primary | ICD-10-CM

## 2017-06-28 DIAGNOSIS — Z09 HOSPITAL DISCHARGE FOLLOW-UP: ICD-10-CM

## 2017-06-28 LAB
ANION GAP SERPL CALCULATED.3IONS-SCNC: 11 MMOL/L (ref 3–11)
BUN BLD-MCNC: 26 MG/DL (ref 9–23)
BUN/CREAT SERPL: 16.3 (ref 7–25)
CALCIUM SPEC-SCNC: 9.6 MG/DL (ref 8.7–10.4)
CHLORIDE SERPL-SCNC: 97 MMOL/L (ref 99–109)
CO2 SERPL-SCNC: 33 MMOL/L (ref 20–31)
CREAT BLD-MCNC: 1.6 MG/DL (ref 0.6–1.3)
GFR SERPL CREATININE-BSD FRML MDRD: 30 ML/MIN/1.73
GLUCOSE BLD-MCNC: 64 MG/DL (ref 70–100)
POTASSIUM BLD-SCNC: 4.7 MMOL/L (ref 3.5–5.5)
SODIUM BLD-SCNC: 141 MMOL/L (ref 132–146)

## 2017-06-28 PROCEDURE — 99496 TRANSJ CARE MGMT HIGH F2F 7D: CPT | Performed by: NURSE PRACTITIONER

## 2017-06-28 PROCEDURE — 80048 BASIC METABOLIC PNL TOTAL CA: CPT | Performed by: NURSE PRACTITIONER

## 2017-06-28 NOTE — PROGRESS NOTES
Transitional Care Follow Up Visit  Subjective     Avis Us is a 87 y.o. female who presents for a transitional care management visit.    Within 48 business hours after discharge our office contacted her via telephone to coordinate her care and needs.      I reviewed and discussed the details of that call along with the discharge summary, hospital problems, inpatient lab results, inpatient diagnostic studies, and consultation reports with Avis.    Date of TCM Phone Call 9/7/2016 6/26/2017   [facility]   Date of Admission 9/3/2016 6/15/2017   Date of Discharge 9/5/2016 6/22/2017   Discharge Disposition Home or Self Care Home or Self Care       History of Present Illness   Course During Hospital Stay:   She was admitted through the ER on 6/15/17 with pneumonia and respiratory failure  possibly r/t pulmonary edema after taking macrobid. Patient was admitted to ICU for further treatment of possible pneumonia with associated acute on chronic hypoxemic/hypercarbic respiratory failure. Patient was started on Zosyn. Patient blood cultures had NGTD and urine cultures showed E coli and enterococcus faecalis. patient had a total of 8 days treatment with IV Zosyn for UTI and pneumonia. Leukocytosis improved.     Patient was treated with Bipap and steroids with significant improvement. Patient did have some agitation and hallucinations and was given Seroquel. They resolved and patient is at baseline now. Patient continued to improve and remained stable and was transferred to telemetry on 6/19/17 and hospital medicine took over care. Patient was weaned off of oxygen to RA. Patient was started on a small dose of PO prednisone and  sent home on a taper dose.     She was discharged to home on 6/22/17.    Has been home for one week, has home health visits, daughter is with her several days and son lives 5 houses away.     In addition to symbicort, She uses nebulizer twice daily and  albuterol inhaler once/day preventively. She is rarely wheezing. Cough is her predominate asthma symptom. She has been using tussionex for years mostly at bedtime to suppress cough that interferes with sleep. It was discontinued in the hospital but daughter reports that hospital doctor said she could resume at home & she would like a refill. She has tried multiple other cough suppressents in the past but have been suboptimal in effectiveness.     She has follow up appointments with OT, PT and need plan to reschedule cardiology appointment that had been scheduled for tomorrow (too many appointments this week).  The following portions of the patient's history were reviewed and updated as appropriate: allergies, current medications, past family history, past medical history, past social history, past surgical history and problem list.  Patient Active Problem List   Diagnosis   • Abnormal liver function tests   • Acute myocardial infarction   • Anxiety   • Knee pain   • Asthma   • Calf cramp   • Candidal intertrigo   • Cataract   • Chest pain   • Chronic kidney disease, stage III (moderate)   • Chronic obstructive pulmonary disease   • Complete atrioventricular block   • Congestive heart failure   • Atherosclerosis of coronary artery   • Cough   • Depression   • Diabetes mellitus   • Dizziness   • Dysuria   • Edema   • Gastroesophageal reflux disease   • Primary hypertriglyceridemia   • Fatigue   • Fracture of patella   • Gout   • Headache   • Hyperlipidemia   • Hypertension   • Influenza due to influenza A virus   • Insomnia   • Leukocytosis   • Macrocytosis   • Macular degeneration   • Menopause present   • Night sweats   • Obstructive sleep apnea syndrome   • Osteoporosis   • Peripheral neuropathy   • Pneumonia   • Seizure disorder   • Sick sinus syndrome   • Sinus bradycardia   • Thrombophlebitis   • Cobalamin deficiency   • Vitamin D deficiency   • Physical debility   • Lumbar strain   • History of MI (myocardial  infarction)   • Midline low back pain without sciatica   • Arthralgia of lumbar spine   • Encounter for eye exam   • Parasites in stool   • Sepsis   • UTI (urinary tract infection)   • Acute on chronic Respiratory failure. Not requiring ventilatory support   • Acute on Chronic kidney disease (CKD), stage III (moderate)   • Exacerbation of COPD    • H/O SSS (sick sinus syndrome) s/p PPM 2014   • CHF (congestive heart failure)   • Anxiety and depression   • Macular degeneration   • ANDRIY on CPAP   • Seizure disorder on Keppra   • HTN and possible diastolic dysfunction   • HLD (hyperlipidemia)   • Obesity, BMI 33.7   • Pneumonia versus acute reaction to nitrofurantoin     Past Surgical History:   Procedure Laterality Date   • CHOLECYSTECTOMY     • COLONOSCOPY      10 years ago   • HERNIA REPAIR     • KYPHOPLASTY N/A 9/23/2016    Procedure: KYPHOPLASTY T12;  Surgeon: Charles Nguyen MD;  Location: [facility];  Service:    • PACEMAKER IMPLANTATION     • UMBILICAL HERNIA REPAIR         Blood Pressures during visit    06/28/17 1033   BP: 126/74     Current Outpatient Prescriptions:   •  albuterol (PROVENTIL) (2.5 MG/3ML) 0.083% nebulizer solution, Take 2.5 mg by nebulization Every 6 (Six) Hours As Needed for wheezing., Disp: 120 vial, Rfl: 4  •  Albuterol Sulfate (VENTOLIN HFA IN), Inhale as needed., Disp: , Rfl:   •  allopurinol (ZYLOPRIM) 100 MG tablet, Take 1 tablet by mouth Daily., Disp: 180 tablet, Rfl: 0  •  aspirin 81 MG tablet, Take 81 mg by mouth Daily. In evening, Disp: , Rfl:   •  Cholecalciferol (VITAMIN D3) 5000 UNITS tablet, Take 1 tablet by mouth daily., Disp: , Rfl:   •  clonazePAM (KlonoPIN) 1 MG tablet, Take 1 mg by mouth Every 12 (Twelve) Hours., Disp: , Rfl:   •  clotrimazole-betamethasone (LOTRISONE) 1-0.05 % lotion, Apply  topically 2 (two) times a day. (Patient taking differently: Apply 1 application topically 2 (Two) Times a Day. Under breasts), Disp: 30 mL, Rfl: 3  •  colchicine 0.6 MG tablet, Take 1  tablet by mouth Daily. (Patient taking differently: Take 0.6 mg by mouth Daily As Needed (gout flare).), Disp: 30 tablet, Rfl: 2  •  colestipol (COLESTID) 1 G tablet, Take 1 g by mouth Daily As Needed (loose stools). As needed, Disp: , Rfl:   •  fluocinonide (LIDEX) 0.05 % external solution, Apply  topically 2 (two) times a day. (Patient taking differently: Apply 1 application topically 2 (Two) Times a Day. Scalp - psoriasis), Disp: 60 mL, Rfl: 3  •  furosemide (LASIX) 80 MG tablet, Take 1/2 tab on mon, wed, and Friday. If increased edema or 5 lb wt gain in 3 days take daily., Disp: , Rfl:   •  levETIRAcetam (KEPPRA) 750 MG tablet, Take 1 tablet by mouth two  times daily, Disp: 180 tablet, Rfl: 1  •  lisinopril (PRINIVIL,ZESTRIL) 10 MG tablet, Take 1 tablet by mouth  daily, Disp: 90 tablet, Rfl: 1  •  Loratadine (CLARITIN) 10 MG capsule, Take 10 mg by mouth Daily As Needed (asthma)., Disp: , Rfl:   •  magnesium oxide (MAG-OX) 400 MG tablet, Take 400 mg by mouth Daily., Disp: , Rfl:   •  Multiple Vitamins-Minerals (EYE HEALTH) capsule, Take 1 capsule by mouth Daily., Disp: , Rfl:   •  pantoprazole (PROTONIX) 40 MG EC tablet, Take 1 tablet by mouth  daily, Disp: 90 tablet, Rfl: 1  •  potassium chloride (MICRO-K) 10 MEQ CR capsule, Take 1 capsule by mouth 3 (Three) Times a Week. Only take potassium when a lasix dose is taken, Disp: , Rfl:   •  SYMBICORT 80-4.5 MCG/ACT inhaler, Use 1 puff twice daily (Patient taking differently: 1 puff daily), Disp: 20.4 g, Rfl: 3  •  vitamin B-12 (CYANOCOBALAMIN) 1000 MCG tablet, Take 1,000 mcg by mouth Daily., Disp: , Rfl:          Review of Systems   Constitutional: Negative.    HENT: Negative.    Respiratory: Positive for cough (worse at night) and wheezing (occasional). Negative for chest tightness.    Cardiovascular: Positive for leg swelling (mild). Negative for chest pain.   Genitourinary: Negative for difficulty urinating.   Neurological: Negative for speech difficulty.         Is at baseline     Hematological: Bruises/bleeds easily (left hand bruised from IV or phlebotomy in hospital.).       Objective   Physical Exam   Constitutional: She is oriented to person, place, and time. She appears well-developed and well-nourished. No distress.   Eyes: Conjunctivae are normal.   Cardiovascular: Normal rate, regular rhythm, normal heart sounds and intact distal pulses.    Pulmonary/Chest: Effort normal and breath sounds normal.   Musculoskeletal: She exhibits edema (1= soft, no pitting of BLE). She exhibits no tenderness (of calfs).   Ambulates with cane, minimal assistance     Neurological: She is alert and oriented to person, place, and time.   Skin: Skin is warm and dry. Ecchymosis (left hand) noted.        Psychiatric: She has a normal mood and affect.     Vitals:    06/28/17 1033   BP: 126/74   Pulse: 73   Resp: 20   SpO2: 97%     Results for orders placed or performed in visit on 06/28/17   Basic Metabolic Panel   Result Value Ref Range    Glucose 64 (L) 70 - 100 mg/dL    BUN 26 (H) 9 - 23 mg/dL    Creatinine 1.60 (H) 0.60 - 1.30 mg/dL    Sodium 141 132 - 146 mmol/L    Potassium 4.7 3.5 - 5.5 mmol/L    Chloride 97 (L) 99 - 109 mmol/L    CO2 33.0 (H) 20.0 - 31.0 mmol/L    Calcium 9.6 8.7 - 10.4 mg/dL    eGFR Non African Amer 30 (L) >60 mL/min/1.73    BUN/Creatinine Ratio 16.3 7.0 - 25.0    Anion Gap 11.0 3.0 - 11.0 mmol/L         Assessment/Plan   Aivs was seen today for transition of care and med refill.    Diagnoses and all orders for this visit:    Hospital Discharge Follow UP    Acute respiratory failure with hypoxia and hypercapnia  -     Basic Metabolic Panel    Cough  Tussionex refilled at patient and daughter persistence following lengthy discussion of risks of respiratory depression and falls. They both verbalized that they understand the risks.  Dr Tapia consulted and signed Rx.        Moderate persistent asthma without complication    Encouraged to decrease nebulizer treatment to  once/day unless symptoms of wheezing or cough increase.      Follow up with Dr Herzog in 1 month for chronic care maintenance.   OT/ PT as scheduled. Daughter to call & reschedule with cardiology.      COREY Lopez

## 2017-07-07 RX ORDER — CLONAZEPAM 1 MG/1
TABLET ORAL
Qty: 60 TABLET | Refills: 0 | Status: SHIPPED | OUTPATIENT
Start: 2017-07-07 | End: 2017-01-01 | Stop reason: SDUPTHER

## 2017-07-07 NOTE — TELEPHONE ENCOUNTER
MS BRIGGS CALLED TO FOLLOW UP N THIS REQUEST. I DID INFORM HER THAT THIS WOULD NEED TO BE A WRITTEN RX TO BE PICKED UP AT THIS OFFICE, SHE WOULD LIKE A RETURN CALL ONCE THIS IS READY.  CALL BACK [phone]

## 2017-07-07 NOTE — TELEPHONE ENCOUNTER
Last rx for clonazepam was refilled on 5-8-2017 for # 60 - one bid  States she takes this to prevent seizures  Contract on file  Ermias updated today

## 2017-07-17 RX ORDER — LEVETIRACETAM 750 MG/1
TABLET ORAL
Qty: 180 TABLET | OUTPATIENT
Start: 2017-07-17

## 2017-07-17 RX ORDER — POTASSIUM CHLORIDE 750 MG/1
CAPSULE, EXTENDED RELEASE ORAL
Qty: 180 CAPSULE | OUTPATIENT
Start: 2017-07-17

## 2017-07-19 ENCOUNTER — TELEPHONE (OUTPATIENT)
Dept: INTERNAL MEDICINE | Facility: CLINIC | Age: 82
End: 2017-07-19

## 2017-07-19 NOTE — TELEPHONE ENCOUNTER
Pt's mom (Jacquie Contreras) called stating [facility] called to let them know pt's RX for Potassium had been denied for additonal refills and wanted to know why. Requesting a call back    Phone [phone]

## 2017-07-19 NOTE — TELEPHONE ENCOUNTER
"According to med list, COREY Lala is the ordering provider. It was updated \"no print\" on 6/23/17.     Please advise.   "

## 2017-07-20 RX ORDER — POTASSIUM CHLORIDE 750 MG/1
10 CAPSULE, EXTENDED RELEASE ORAL 3 TIMES WEEKLY
Qty: 27 CAPSULE | Refills: 0 | Status: SHIPPED | OUTPATIENT
Start: 2017-07-21 | End: 2017-07-20 | Stop reason: SDUPTHER

## 2017-07-20 RX ORDER — POTASSIUM CHLORIDE 750 MG/1
10 CAPSULE, EXTENDED RELEASE ORAL 3 TIMES WEEKLY
Qty: 27 CAPSULE | Refills: 0 | Status: SHIPPED | OUTPATIENT
Start: 2017-07-21 | End: 2017-01-01 | Stop reason: SDUPTHER

## 2017-07-29 ENCOUNTER — APPOINTMENT (OUTPATIENT)
Dept: GENERAL RADIOLOGY | Facility: HOSPITAL | Age: 82
End: 2017-07-29

## 2017-07-29 ENCOUNTER — HOSPITAL ENCOUNTER (INPATIENT)
Facility: HOSPITAL | Age: 82
LOS: 3 days | Discharge: HOME | End: 2017-08-01
Attending: EMERGENCY MEDICINE | Admitting: HOSPITALIST

## 2017-07-29 DIAGNOSIS — Z78.9 IMPAIRED MOBILITY AND ADLS: ICD-10-CM

## 2017-07-29 DIAGNOSIS — N30.01 ACUTE CYSTITIS WITH HEMATURIA: Primary | ICD-10-CM

## 2017-07-29 DIAGNOSIS — R53.83 LETHARGY: ICD-10-CM

## 2017-07-29 DIAGNOSIS — Z74.09 IMPAIRED FUNCTIONAL MOBILITY, BALANCE, GAIT, AND ENDURANCE: ICD-10-CM

## 2017-07-29 DIAGNOSIS — D72.829 LEUKOCYTOSIS, UNSPECIFIED TYPE: ICD-10-CM

## 2017-07-29 DIAGNOSIS — R62.7 ADULT FAILURE TO THRIVE: ICD-10-CM

## 2017-07-29 DIAGNOSIS — Z74.09 IMPAIRED MOBILITY AND ADLS: ICD-10-CM

## 2017-07-29 PROBLEM — N39.0 COMPLICATED UTI (URINARY TRACT INFECTION): Status: ACTIVE | Noted: 2017-07-29

## 2017-07-29 PROBLEM — N95.2 SENILE ATROPHIC VAGINITIS: Status: ACTIVE | Noted: 2017-07-29

## 2017-07-29 PROBLEM — N95.2 SENILE ATROPHIC VAGINITIS: Chronic | Status: ACTIVE | Noted: 2017-07-29

## 2017-07-29 LAB
ALBUMIN SERPL-MCNC: 4.2 G/DL (ref 3.2–4.8)
ALBUMIN/GLOB SERPL: 1.3 G/DL (ref 1.5–2.5)
ALP SERPL-CCNC: 233 U/L (ref 25–100)
ALT SERPL W P-5'-P-CCNC: 89 U/L (ref 7–40)
ANION GAP SERPL CALCULATED.3IONS-SCNC: 6 MMOL/L (ref 3–11)
AST SERPL-CCNC: 102 U/L (ref 0–33)
BACTERIA UR QL AUTO: ABNORMAL /HPF
BASOPHILS # BLD AUTO: 0.06 10*3/MM3 (ref 0–0.2)
BASOPHILS NFR BLD AUTO: 0.4 % (ref 0–1)
BILIRUB SERPL-MCNC: 0.6 MG/DL (ref 0.3–1.2)
BILIRUB UR QL STRIP: NEGATIVE
BNP SERPL-MCNC: 234 PG/ML (ref 0–100)
BUN BLD-MCNC: 22 MG/DL (ref 9–23)
BUN/CREAT SERPL: 12.9 (ref 7–25)
CALCIUM SPEC-SCNC: 9.9 MG/DL (ref 8.7–10.4)
CHLORIDE SERPL-SCNC: 104 MMOL/L (ref 99–109)
CLARITY UR: ABNORMAL
CO2 SERPL-SCNC: 31 MMOL/L (ref 20–31)
COLOR UR: YELLOW
CREAT BLD-MCNC: 1.7 MG/DL (ref 0.6–1.3)
DEPRECATED RDW RBC AUTO: 56.2 FL (ref 37–54)
EOSINOPHIL # BLD AUTO: 0.19 10*3/MM3 (ref 0–0.3)
EOSINOPHIL NFR BLD AUTO: 1.4 % (ref 0–3)
ERYTHROCYTE [DISTWIDTH] IN BLOOD BY AUTOMATED COUNT: 15.4 % (ref 11.3–14.5)
GFR SERPL CREATININE-BSD FRML MDRD: 28 ML/MIN/1.73
GLOBULIN UR ELPH-MCNC: 3.2 GM/DL
GLUCOSE BLD-MCNC: 137 MG/DL (ref 70–100)
GLUCOSE UR STRIP-MCNC: NEGATIVE MG/DL
HCT VFR BLD AUTO: 39 % (ref 34.5–44)
HGB BLD-MCNC: 12.2 G/DL (ref 11.5–15.5)
HGB UR QL STRIP.AUTO: ABNORMAL
HOLD SPECIMEN: NORMAL
HOLD SPECIMEN: NORMAL
HYALINE CASTS UR QL AUTO: ABNORMAL /LPF
IMM GRANULOCYTES # BLD: 0.04 10*3/MM3 (ref 0–0.03)
IMM GRANULOCYTES NFR BLD: 0.3 % (ref 0–0.6)
KETONES UR QL STRIP: NEGATIVE
LEUKOCYTE ESTERASE UR QL STRIP.AUTO: ABNORMAL
LYMPHOCYTES # BLD AUTO: 0.85 10*3/MM3 (ref 0.6–4.8)
LYMPHOCYTES NFR BLD AUTO: 6.4 % (ref 24–44)
MCH RBC QN AUTO: 31.1 PG (ref 27–31)
MCHC RBC AUTO-ENTMCNC: 31.3 G/DL (ref 32–36)
MCV RBC AUTO: 99.5 FL (ref 80–99)
MONOCYTES # BLD AUTO: 1.23 10*3/MM3 (ref 0–1)
MONOCYTES NFR BLD AUTO: 9.2 % (ref 0–12)
NEUTROPHILS # BLD AUTO: 10.99 10*3/MM3 (ref 1.5–8.3)
NEUTROPHILS NFR BLD AUTO: 82.3 % (ref 41–71)
NITRITE UR QL STRIP: POSITIVE
PH UR STRIP.AUTO: 5.5 [PH] (ref 5–8)
PLAT MORPH BLD: NORMAL
PLATELET # BLD AUTO: 457 10*3/MM3 (ref 150–450)
PMV BLD AUTO: 10 FL (ref 6–12)
POTASSIUM BLD-SCNC: 4.6 MMOL/L (ref 3.5–5.5)
PROT SERPL-MCNC: 7.4 G/DL (ref 5.7–8.2)
PROT UR QL STRIP: ABNORMAL
RBC # BLD AUTO: 3.92 10*6/MM3 (ref 3.89–5.14)
RBC # UR: ABNORMAL /HPF
RBC MORPH BLD: NORMAL
REF LAB TEST METHOD: ABNORMAL
SODIUM BLD-SCNC: 141 MMOL/L (ref 132–146)
SP GR UR STRIP: 1.01 (ref 1–1.03)
SQUAMOUS #/AREA URNS HPF: ABNORMAL /HPF
TROPONIN I SERPL-MCNC: 0 NG/ML (ref 0–0.07)
UROBILINOGEN UR QL STRIP: ABNORMAL
WBC CLUMPS # UR AUTO: ABNORMAL /HPF
WBC MORPH BLD: NORMAL
WBC NRBC COR # BLD: 13.36 10*3/MM3 (ref 3.5–10.8)
WBC UR QL AUTO: ABNORMAL /HPF
WHOLE BLOOD HOLD SPECIMEN: NORMAL
WHOLE BLOOD HOLD SPECIMEN: NORMAL

## 2017-07-29 PROCEDURE — 85025 COMPLETE CBC W/AUTO DIFF WBC: CPT | Performed by: EMERGENCY MEDICINE

## 2017-07-29 PROCEDURE — 81001 URINALYSIS AUTO W/SCOPE: CPT | Performed by: EMERGENCY MEDICINE

## 2017-07-29 PROCEDURE — 81003 URINALYSIS AUTO W/O SCOPE: CPT | Performed by: EMERGENCY MEDICINE

## 2017-07-29 PROCEDURE — 99223 1ST HOSP IP/OBS HIGH 75: CPT | Performed by: HOSPITALIST

## 2017-07-29 PROCEDURE — 87086 URINE CULTURE/COLONY COUNT: CPT | Performed by: EMERGENCY MEDICINE

## 2017-07-29 PROCEDURE — 80053 COMPREHEN METABOLIC PANEL: CPT | Performed by: EMERGENCY MEDICINE

## 2017-07-29 PROCEDURE — 99285 EMERGENCY DEPT VISIT HI MDM: CPT

## 2017-07-29 PROCEDURE — 84484 ASSAY OF TROPONIN QUANT: CPT

## 2017-07-29 PROCEDURE — 83880 ASSAY OF NATRIURETIC PEPTIDE: CPT | Performed by: EMERGENCY MEDICINE

## 2017-07-29 PROCEDURE — 93005 ELECTROCARDIOGRAM TRACING: CPT | Performed by: EMERGENCY MEDICINE

## 2017-07-29 PROCEDURE — 87077 CULTURE AEROBIC IDENTIFY: CPT | Performed by: EMERGENCY MEDICINE

## 2017-07-29 PROCEDURE — 25010000002 CEFTRIAXONE PER 250 MG: Performed by: EMERGENCY MEDICINE

## 2017-07-29 PROCEDURE — 71010 HC CHEST PA OR AP: CPT

## 2017-07-29 PROCEDURE — 85007 BL SMEAR W/DIFF WBC COUNT: CPT | Performed by: EMERGENCY MEDICINE

## 2017-07-29 PROCEDURE — 87186 SC STD MICRODIL/AGAR DIL: CPT | Performed by: EMERGENCY MEDICINE

## 2017-07-29 RX ORDER — DIPHENHYDRAMINE HCL 25 MG
25 CAPSULE ORAL NIGHTLY PRN
Status: DISCONTINUED | OUTPATIENT
Start: 2017-07-29 | End: 2017-08-01 | Stop reason: HOSPADM

## 2017-07-29 RX ORDER — CIPROFLOXACIN 250 MG/1
250 TABLET, FILM COATED ORAL DAILY
COMMUNITY
End: 2017-07-29

## 2017-07-29 RX ORDER — ACETAMINOPHEN,DIPHENHYDRAMINE HCL 500; 25 MG/1; MG/1
1 TABLET, FILM COATED ORAL NIGHTLY
COMMUNITY

## 2017-07-29 RX ORDER — IPRATROPIUM BROMIDE AND ALBUTEROL SULFATE 2.5; .5 MG/3ML; MG/3ML
3 SOLUTION RESPIRATORY (INHALATION)
Status: DISCONTINUED | OUTPATIENT
Start: 2017-07-30 | End: 2017-08-01 | Stop reason: HOSPADM

## 2017-07-29 RX ORDER — ESTRADIOL 0.1 MG/G
2 CREAM VAGINAL 2 TIMES WEEKLY
COMMUNITY
End: 2017-01-01

## 2017-07-29 RX ORDER — CEFTRIAXONE SODIUM 1 G/50ML
1 INJECTION, SOLUTION INTRAVENOUS ONCE
Status: COMPLETED | OUTPATIENT
Start: 2017-07-29 | End: 2017-07-29

## 2017-07-29 RX ORDER — SODIUM CHLORIDE 0.9 % (FLUSH) 0.9 %
10 SYRINGE (ML) INJECTION AS NEEDED
Status: DISCONTINUED | OUTPATIENT
Start: 2017-07-29 | End: 2017-08-01 | Stop reason: HOSPADM

## 2017-07-29 RX ORDER — SACCHAROMYCES BOULARDII 250 MG
250 CAPSULE ORAL 2 TIMES DAILY
Status: DISCONTINUED | OUTPATIENT
Start: 2017-07-30 | End: 2017-08-01 | Stop reason: HOSPADM

## 2017-07-29 RX ORDER — IPRATROPIUM BROMIDE AND ALBUTEROL SULFATE 2.5; .5 MG/3ML; MG/3ML
3 SOLUTION RESPIRATORY (INHALATION) EVERY 4 HOURS PRN
Status: DISCONTINUED | OUTPATIENT
Start: 2017-07-29 | End: 2017-08-01 | Stop reason: HOSPADM

## 2017-07-29 RX ORDER — SENNA AND DOCUSATE SODIUM 50; 8.6 MG/1; MG/1
2 TABLET, FILM COATED ORAL NIGHTLY PRN
Status: DISCONTINUED | OUTPATIENT
Start: 2017-07-29 | End: 2017-08-01 | Stop reason: HOSPADM

## 2017-07-29 RX ADMIN — CEFTRIAXONE SODIUM 1 G: 1 INJECTION, SOLUTION INTRAVENOUS at 20:28

## 2017-07-30 LAB
ANION GAP SERPL CALCULATED.3IONS-SCNC: 6 MMOL/L (ref 3–11)
BASOPHILS # BLD AUTO: 0.04 10*3/MM3 (ref 0–0.2)
BASOPHILS NFR BLD AUTO: 0.4 % (ref 0–1)
BNP SERPL-MCNC: 189 PG/ML (ref 0–100)
BUN BLD-MCNC: 26 MG/DL (ref 9–23)
BUN/CREAT SERPL: 16.3 (ref 7–25)
CALCIUM SPEC-SCNC: 9.5 MG/DL (ref 8.7–10.4)
CHLORIDE SERPL-SCNC: 101 MMOL/L (ref 99–109)
CO2 SERPL-SCNC: 31 MMOL/L (ref 20–31)
CREAT BLD-MCNC: 1.6 MG/DL (ref 0.6–1.3)
DEPRECATED RDW RBC AUTO: 58.6 FL (ref 37–54)
EOSINOPHIL # BLD AUTO: 0.01 10*3/MM3 (ref 0–0.3)
EOSINOPHIL NFR BLD AUTO: 0.1 % (ref 0–3)
ERYTHROCYTE [DISTWIDTH] IN BLOOD BY AUTOMATED COUNT: 15.7 % (ref 11.3–14.5)
GFR SERPL CREATININE-BSD FRML MDRD: 30 ML/MIN/1.73
GLUCOSE BLD-MCNC: 171 MG/DL (ref 70–100)
HCT VFR BLD AUTO: 35.3 % (ref 34.5–44)
HGB BLD-MCNC: 10.5 G/DL (ref 11.5–15.5)
IMM GRANULOCYTES # BLD: 0.02 10*3/MM3 (ref 0–0.03)
IMM GRANULOCYTES NFR BLD: 0.2 % (ref 0–0.6)
LYMPHOCYTES # BLD AUTO: 0.9 10*3/MM3 (ref 0.6–4.8)
LYMPHOCYTES NFR BLD AUTO: 8.8 % (ref 24–44)
MCH RBC QN AUTO: 30.6 PG (ref 27–31)
MCHC RBC AUTO-ENTMCNC: 29.7 G/DL (ref 32–36)
MCV RBC AUTO: 102.9 FL (ref 80–99)
MONOCYTES # BLD AUTO: 0.16 10*3/MM3 (ref 0–1)
MONOCYTES NFR BLD AUTO: 1.6 % (ref 0–12)
NEUTROPHILS # BLD AUTO: 9.11 10*3/MM3 (ref 1.5–8.3)
NEUTROPHILS NFR BLD AUTO: 88.9 % (ref 41–71)
PLATELET # BLD AUTO: 409 10*3/MM3 (ref 150–450)
PMV BLD AUTO: 10.2 FL (ref 6–12)
POTASSIUM BLD-SCNC: 4.6 MMOL/L (ref 3.5–5.5)
RBC # BLD AUTO: 3.43 10*6/MM3 (ref 3.89–5.14)
SODIUM BLD-SCNC: 138 MMOL/L (ref 132–146)
WBC NRBC COR # BLD: 10.24 10*3/MM3 (ref 3.5–10.8)

## 2017-07-30 PROCEDURE — 94660 CPAP INITIATION&MGMT: CPT

## 2017-07-30 PROCEDURE — 94799 UNLISTED PULMONARY SVC/PX: CPT

## 2017-07-30 PROCEDURE — 25010000002 HEPARIN (PORCINE) PER 1000 UNITS: Performed by: NURSE PRACTITIONER

## 2017-07-30 PROCEDURE — 99233 SBSQ HOSP IP/OBS HIGH 50: CPT | Performed by: INTERNAL MEDICINE

## 2017-07-30 PROCEDURE — 83880 ASSAY OF NATRIURETIC PEPTIDE: CPT | Performed by: NURSE PRACTITIONER

## 2017-07-30 PROCEDURE — 85025 COMPLETE CBC W/AUTO DIFF WBC: CPT | Performed by: NURSE PRACTITIONER

## 2017-07-30 PROCEDURE — 80048 BASIC METABOLIC PNL TOTAL CA: CPT | Performed by: NURSE PRACTITIONER

## 2017-07-30 PROCEDURE — 25010000002 CEFTRIAXONE PER 250 MG: Performed by: NURSE PRACTITIONER

## 2017-07-30 PROCEDURE — 94640 AIRWAY INHALATION TREATMENT: CPT

## 2017-07-30 PROCEDURE — 94760 N-INVAS EAR/PLS OXIMETRY 1: CPT

## 2017-07-30 PROCEDURE — 63710000001 PREDNISONE PER 1 MG: Performed by: INTERNAL MEDICINE

## 2017-07-30 PROCEDURE — 25010000002 METHYLPREDNISOLONE PER 125 MG: Performed by: NURSE PRACTITIONER

## 2017-07-30 PROCEDURE — 25010000002 VANCOMYCIN

## 2017-07-30 RX ORDER — LANOLIN ALCOHOL/MO/W.PET/CERES
1000 CREAM (GRAM) TOPICAL DAILY
Status: DISCONTINUED | OUTPATIENT
Start: 2017-07-30 | End: 2017-08-01 | Stop reason: HOSPADM

## 2017-07-30 RX ORDER — LISINOPRIL 10 MG/1
10 TABLET ORAL
Status: DISCONTINUED | OUTPATIENT
Start: 2017-07-30 | End: 2017-08-01 | Stop reason: HOSPADM

## 2017-07-30 RX ORDER — HEPARIN SODIUM 5000 [USP'U]/ML
5000 INJECTION, SOLUTION INTRAVENOUS; SUBCUTANEOUS EVERY 12 HOURS SCHEDULED
Status: DISCONTINUED | OUTPATIENT
Start: 2017-07-30 | End: 2017-08-01 | Stop reason: HOSPADM

## 2017-07-30 RX ORDER — PREDNISONE 20 MG/1
40 TABLET ORAL
Status: DISCONTINUED | OUTPATIENT
Start: 2017-07-30 | End: 2017-08-01 | Stop reason: HOSPADM

## 2017-07-30 RX ORDER — CEFTRIAXONE SODIUM 1 G/50ML
1 INJECTION, SOLUTION INTRAVENOUS EVERY 24 HOURS
Status: DISCONTINUED | OUTPATIENT
Start: 2017-07-30 | End: 2017-08-01 | Stop reason: HOSPADM

## 2017-07-30 RX ORDER — ACETAMINOPHEN 325 MG/1
650 TABLET ORAL EVERY 4 HOURS PRN
Status: DISCONTINUED | OUTPATIENT
Start: 2017-07-30 | End: 2017-08-01 | Stop reason: HOSPADM

## 2017-07-30 RX ORDER — BUDESONIDE AND FORMOTEROL FUMARATE DIHYDRATE 80; 4.5 UG/1; UG/1
2 AEROSOL RESPIRATORY (INHALATION)
Status: DISCONTINUED | OUTPATIENT
Start: 2017-07-30 | End: 2017-08-01 | Stop reason: HOSPADM

## 2017-07-30 RX ORDER — PANTOPRAZOLE SODIUM 40 MG/1
40 TABLET, DELAYED RELEASE ORAL
Status: DISCONTINUED | OUTPATIENT
Start: 2017-07-30 | End: 2017-08-01 | Stop reason: HOSPADM

## 2017-07-30 RX ORDER — CLONAZEPAM 1 MG/1
1 TABLET ORAL 2 TIMES DAILY PRN
Status: DISCONTINUED | OUTPATIENT
Start: 2017-07-30 | End: 2017-08-01 | Stop reason: HOSPADM

## 2017-07-30 RX ORDER — VITS A,C,E/LUTEIN/MINERALS 300MCG-200
1 TABLET ORAL DAILY
Status: DISCONTINUED | OUTPATIENT
Start: 2017-07-30 | End: 2017-08-01 | Stop reason: HOSPADM

## 2017-07-30 RX ORDER — METHYLPREDNISOLONE SODIUM SUCCINATE 125 MG/2ML
60 INJECTION, POWDER, LYOPHILIZED, FOR SOLUTION INTRAMUSCULAR; INTRAVENOUS ONCE
Status: COMPLETED | OUTPATIENT
Start: 2017-07-30 | End: 2017-07-30

## 2017-07-30 RX ORDER — GUAIFENESIN AND DEXTROMETHORPHAN HYDROBROMIDE 600; 30 MG/1; MG/1
1 TABLET, EXTENDED RELEASE ORAL EVERY 12 HOURS SCHEDULED
Status: DISCONTINUED | OUTPATIENT
Start: 2017-07-30 | End: 2017-08-01 | Stop reason: HOSPADM

## 2017-07-30 RX ORDER — ASPIRIN 81 MG/1
81 TABLET, CHEWABLE ORAL DAILY
Status: DISCONTINUED | OUTPATIENT
Start: 2017-07-30 | End: 2017-08-01 | Stop reason: HOSPADM

## 2017-07-30 RX ORDER — SODIUM CHLORIDE 9 MG/ML
75 INJECTION, SOLUTION INTRAVENOUS ONCE
Status: COMPLETED | OUTPATIENT
Start: 2017-07-30 | End: 2017-07-30

## 2017-07-30 RX ORDER — SODIUM CHLORIDE 0.9 % (FLUSH) 0.9 %
1-10 SYRINGE (ML) INJECTION AS NEEDED
Status: DISCONTINUED | OUTPATIENT
Start: 2017-07-30 | End: 2017-08-01 | Stop reason: HOSPADM

## 2017-07-30 RX ORDER — ALLOPURINOL 100 MG/1
100 TABLET ORAL DAILY
Status: DISCONTINUED | OUTPATIENT
Start: 2017-07-30 | End: 2017-08-01 | Stop reason: HOSPADM

## 2017-07-30 RX ADMIN — ASPIRIN 81 MG CHEWABLE TABLET 81 MG: 81 TABLET CHEWABLE at 20:54

## 2017-07-30 RX ADMIN — HEPARIN SODIUM 5000 UNITS: 5000 INJECTION, SOLUTION INTRAVENOUS; SUBCUTANEOUS at 20:54

## 2017-07-30 RX ADMIN — CLONAZEPAM 1 MG: 1 TABLET ORAL at 02:13

## 2017-07-30 RX ADMIN — ALLOPURINOL 100 MG: 100 TABLET ORAL at 09:20

## 2017-07-30 RX ADMIN — CONJUGATED ESTROGENS 0.5 APPLICATION: 0.62 CREAM VAGINAL at 09:20

## 2017-07-30 RX ADMIN — IPRATROPIUM BROMIDE AND ALBUTEROL SULFATE 3 ML: .5; 3 SOLUTION RESPIRATORY (INHALATION) at 22:57

## 2017-07-30 RX ADMIN — PREDNISONE 40 MG: 20 TABLET ORAL at 13:06

## 2017-07-30 RX ADMIN — CEFTRIAXONE SODIUM 1 G: 1 INJECTION, SOLUTION INTRAVENOUS at 20:53

## 2017-07-30 RX ADMIN — GUAIFENESIN AND DEXTROMETHORPHAN HYDROBROMIDE 1 TABLET: 600; 30 TABLET, EXTENDED RELEASE ORAL at 20:54

## 2017-07-30 RX ADMIN — Medication 250 MG: at 17:50

## 2017-07-30 RX ADMIN — IPRATROPIUM BROMIDE AND ALBUTEROL SULFATE 3 ML: .5; 3 SOLUTION RESPIRATORY (INHALATION) at 11:44

## 2017-07-30 RX ADMIN — IPRATROPIUM BROMIDE AND ALBUTEROL SULFATE 3 ML: .5; 3 SOLUTION RESPIRATORY (INHALATION) at 18:48

## 2017-07-30 RX ADMIN — PANTOPRAZOLE SODIUM 40 MG: 40 TABLET, DELAYED RELEASE ORAL at 09:20

## 2017-07-30 RX ADMIN — IPRATROPIUM BROMIDE AND ALBUTEROL SULFATE 3 ML: .5; 3 SOLUTION RESPIRATORY (INHALATION) at 06:34

## 2017-07-30 RX ADMIN — BUDESONIDE AND FORMOTEROL FUMARATE DIHYDRATE 2 PUFF: 80; 4.5 AEROSOL RESPIRATORY (INHALATION) at 18:48

## 2017-07-30 RX ADMIN — Medication 250 MG: at 09:21

## 2017-07-30 RX ADMIN — LEVETIRACETAM 750 MG: 250 TABLET, FILM COATED ORAL at 20:53

## 2017-07-30 RX ADMIN — BUDESONIDE AND FORMOTEROL FUMARATE DIHYDRATE 2 PUFF: 80; 4.5 AEROSOL RESPIRATORY (INHALATION) at 06:34

## 2017-07-30 RX ADMIN — ASPIRIN 81 MG CHEWABLE TABLET 81 MG: 81 TABLET CHEWABLE at 02:13

## 2017-07-30 RX ADMIN — IPRATROPIUM BROMIDE AND ALBUTEROL SULFATE 3 ML: .5; 3 SOLUTION RESPIRATORY (INHALATION) at 00:19

## 2017-07-30 RX ADMIN — HEPARIN SODIUM 5000 UNITS: 5000 INJECTION, SOLUTION INTRAVENOUS; SUBCUTANEOUS at 09:21

## 2017-07-30 RX ADMIN — VANCOMYCIN HYDROCHLORIDE 1750 MG: 1 INJECTION, POWDER, LYOPHILIZED, FOR SOLUTION INTRAVENOUS at 02:10

## 2017-07-30 RX ADMIN — GUAIFENESIN AND DEXTROMETHORPHAN HYDROBROMIDE 1 TABLET: 600; 30 TABLET, EXTENDED RELEASE ORAL at 09:20

## 2017-07-30 RX ADMIN — LISINOPRIL 10 MG: 10 TABLET ORAL at 09:21

## 2017-07-30 RX ADMIN — SODIUM CHLORIDE 75 ML/HR: 9 INJECTION, SOLUTION INTRAVENOUS at 02:13

## 2017-07-30 RX ADMIN — METHYLPREDNISOLONE SODIUM SUCCINATE 60 MG: 125 INJECTION, POWDER, FOR SOLUTION INTRAMUSCULAR; INTRAVENOUS at 02:18

## 2017-07-30 RX ADMIN — Medication 1 TABLET: at 09:21

## 2017-07-30 RX ADMIN — GUAIFENESIN AND DEXTROMETHORPHAN HYDROBROMIDE 1 TABLET: 600; 30 TABLET, EXTENDED RELEASE ORAL at 02:13

## 2017-07-30 RX ADMIN — CYANOCOBALAMIN TAB 1000 MCG 1000 MCG: 1000 TAB at 09:21

## 2017-07-30 RX ADMIN — LEVETIRACETAM 750 MG: 250 TABLET, FILM COATED ORAL at 09:21

## 2017-07-31 LAB
BACTERIA SPEC AEROBE CULT: ABNORMAL
VANCOMYCIN SERPL-MCNC: 15.6 MCG/ML (ref 5–40)

## 2017-07-31 PROCEDURE — 94760 N-INVAS EAR/PLS OXIMETRY 1: CPT

## 2017-07-31 PROCEDURE — 94660 CPAP INITIATION&MGMT: CPT

## 2017-07-31 PROCEDURE — 97165 OT EVAL LOW COMPLEX 30 MIN: CPT | Performed by: OCCUPATIONAL THERAPIST

## 2017-07-31 PROCEDURE — 97116 GAIT TRAINING THERAPY: CPT

## 2017-07-31 PROCEDURE — 25010000002 HEPARIN (PORCINE) PER 1000 UNITS: Performed by: NURSE PRACTITIONER

## 2017-07-31 PROCEDURE — 97530 THERAPEUTIC ACTIVITIES: CPT | Performed by: OCCUPATIONAL THERAPIST

## 2017-07-31 PROCEDURE — 63710000001 PREDNISONE PER 1 MG: Performed by: INTERNAL MEDICINE

## 2017-07-31 PROCEDURE — 94799 UNLISTED PULMONARY SVC/PX: CPT

## 2017-07-31 PROCEDURE — 94640 AIRWAY INHALATION TREATMENT: CPT

## 2017-07-31 PROCEDURE — 99232 SBSQ HOSP IP/OBS MODERATE 35: CPT | Performed by: INTERNAL MEDICINE

## 2017-07-31 PROCEDURE — 25010000002 CEFTRIAXONE PER 250 MG: Performed by: NURSE PRACTITIONER

## 2017-07-31 PROCEDURE — 97162 PT EVAL MOD COMPLEX 30 MIN: CPT

## 2017-07-31 PROCEDURE — 25010000002 VANCOMYCIN

## 2017-07-31 PROCEDURE — 80202 ASSAY OF VANCOMYCIN: CPT

## 2017-07-31 PROCEDURE — 97110 THERAPEUTIC EXERCISES: CPT

## 2017-07-31 RX ADMIN — ALLOPURINOL 100 MG: 100 TABLET ORAL at 08:26

## 2017-07-31 RX ADMIN — HEPARIN SODIUM 5000 UNITS: 5000 INJECTION, SOLUTION INTRAVENOUS; SUBCUTANEOUS at 20:12

## 2017-07-31 RX ADMIN — GUAIFENESIN AND DEXTROMETHORPHAN HYDROBROMIDE 1 TABLET: 600; 30 TABLET, EXTENDED RELEASE ORAL at 08:28

## 2017-07-31 RX ADMIN — Medication 1 TABLET: at 08:27

## 2017-07-31 RX ADMIN — IPRATROPIUM BROMIDE AND ALBUTEROL SULFATE 3 ML: .5; 3 SOLUTION RESPIRATORY (INHALATION) at 18:05

## 2017-07-31 RX ADMIN — PANTOPRAZOLE SODIUM 40 MG: 40 TABLET, DELAYED RELEASE ORAL at 05:12

## 2017-07-31 RX ADMIN — CEFTRIAXONE SODIUM 1 G: 1 INJECTION, SOLUTION INTRAVENOUS at 20:14

## 2017-07-31 RX ADMIN — CLONAZEPAM 1 MG: 1 TABLET ORAL at 01:18

## 2017-07-31 RX ADMIN — IPRATROPIUM BROMIDE AND ALBUTEROL SULFATE 3 ML: .5; 3 SOLUTION RESPIRATORY (INHALATION) at 12:58

## 2017-07-31 RX ADMIN — VANCOMYCIN HYDROCHLORIDE 500 MG: 500 INJECTION, POWDER, LYOPHILIZED, FOR SOLUTION INTRAVENOUS at 15:34

## 2017-07-31 RX ADMIN — PREDNISONE 40 MG: 20 TABLET ORAL at 08:28

## 2017-07-31 RX ADMIN — GUAIFENESIN AND DEXTROMETHORPHAN HYDROBROMIDE 1 TABLET: 600; 30 TABLET, EXTENDED RELEASE ORAL at 20:12

## 2017-07-31 RX ADMIN — CYANOCOBALAMIN TAB 1000 MCG 1000 MCG: 1000 TAB at 08:26

## 2017-07-31 RX ADMIN — Medication 250 MG: at 17:05

## 2017-07-31 RX ADMIN — BUDESONIDE AND FORMOTEROL FUMARATE DIHYDRATE 2 PUFF: 80; 4.5 AEROSOL RESPIRATORY (INHALATION) at 18:05

## 2017-07-31 RX ADMIN — Medication 250 MG: at 09:57

## 2017-07-31 RX ADMIN — ASPIRIN 81 MG CHEWABLE TABLET 81 MG: 81 TABLET CHEWABLE at 20:12

## 2017-07-31 RX ADMIN — BUDESONIDE AND FORMOTEROL FUMARATE DIHYDRATE 2 PUFF: 80; 4.5 AEROSOL RESPIRATORY (INHALATION) at 06:50

## 2017-07-31 RX ADMIN — HEPARIN SODIUM 5000 UNITS: 5000 INJECTION, SOLUTION INTRAVENOUS; SUBCUTANEOUS at 08:28

## 2017-07-31 RX ADMIN — IPRATROPIUM BROMIDE AND ALBUTEROL SULFATE 3 ML: .5; 3 SOLUTION RESPIRATORY (INHALATION) at 06:50

## 2017-07-31 RX ADMIN — CLONAZEPAM 1 MG: 1 TABLET ORAL at 20:12

## 2017-07-31 RX ADMIN — LEVETIRACETAM 750 MG: 250 TABLET, FILM COATED ORAL at 20:12

## 2017-07-31 RX ADMIN — LISINOPRIL 10 MG: 10 TABLET ORAL at 08:27

## 2017-07-31 RX ADMIN — LEVETIRACETAM 750 MG: 250 TABLET, FILM COATED ORAL at 08:28

## 2017-08-01 VITALS
TEMPERATURE: 98.1 F | HEIGHT: 66 IN | BODY MASS INDEX: 32.95 KG/M2 | OXYGEN SATURATION: 95 % | SYSTOLIC BLOOD PRESSURE: 156 MMHG | DIASTOLIC BLOOD PRESSURE: 79 MMHG | HEART RATE: 77 BPM | WEIGHT: 205 LBS | RESPIRATION RATE: 16 BRPM

## 2017-08-01 PROCEDURE — 97116 GAIT TRAINING THERAPY: CPT

## 2017-08-01 PROCEDURE — 94640 AIRWAY INHALATION TREATMENT: CPT

## 2017-08-01 PROCEDURE — 94799 UNLISTED PULMONARY SVC/PX: CPT

## 2017-08-01 PROCEDURE — 97110 THERAPEUTIC EXERCISES: CPT

## 2017-08-01 PROCEDURE — 63710000001 PREDNISONE PER 1 MG: Performed by: INTERNAL MEDICINE

## 2017-08-01 PROCEDURE — 25010000002 HEPARIN (PORCINE) PER 1000 UNITS: Performed by: NURSE PRACTITIONER

## 2017-08-01 PROCEDURE — 99239 HOSP IP/OBS DSCHRG MGMT >30: CPT | Performed by: HOSPITALIST

## 2017-08-01 PROCEDURE — 94660 CPAP INITIATION&MGMT: CPT

## 2017-08-01 RX ORDER — AMLODIPINE BESYLATE 5 MG/1
5 TABLET ORAL DAILY
Qty: 30 TABLET | Refills: 0 | Status: SHIPPED | OUTPATIENT
Start: 2017-08-01 | End: 2017-01-01

## 2017-08-01 RX ORDER — CEFPODOXIME PROXETIL 200 MG/1
200 TABLET, FILM COATED ORAL EVERY 12 HOURS
Qty: 14 TABLET | Refills: 0 | Status: SHIPPED | OUTPATIENT
Start: 2017-08-01 | End: 2017-08-15 | Stop reason: HOSPADM

## 2017-08-01 RX ORDER — PREDNISONE 20 MG/1
TABLET ORAL
Qty: 4 TABLET | Refills: 0 | Status: SHIPPED | OUTPATIENT
Start: 2017-08-01 | End: 2017-08-15 | Stop reason: HOSPADM

## 2017-08-01 RX ORDER — SACCHAROMYCES BOULARDII 250 MG
250 CAPSULE ORAL 2 TIMES DAILY
Qty: 20 CAPSULE | Refills: 0 | Status: SHIPPED | OUTPATIENT
Start: 2017-08-01 | End: 2017-01-01

## 2017-08-01 RX ADMIN — CYANOCOBALAMIN TAB 1000 MCG 1000 MCG: 1000 TAB at 09:48

## 2017-08-01 RX ADMIN — PREDNISONE 40 MG: 20 TABLET ORAL at 09:47

## 2017-08-01 RX ADMIN — CONJUGATED ESTROGENS 0.5 APPLICATION: 0.62 CREAM VAGINAL at 09:49

## 2017-08-01 RX ADMIN — GUAIFENESIN AND DEXTROMETHORPHAN HYDROBROMIDE 1 TABLET: 600; 30 TABLET, EXTENDED RELEASE ORAL at 09:48

## 2017-08-01 RX ADMIN — Medication 250 MG: at 09:48

## 2017-08-01 RX ADMIN — BUDESONIDE AND FORMOTEROL FUMARATE DIHYDRATE 2 PUFF: 80; 4.5 AEROSOL RESPIRATORY (INHALATION) at 07:18

## 2017-08-01 RX ADMIN — LISINOPRIL 10 MG: 10 TABLET ORAL at 09:48

## 2017-08-01 RX ADMIN — PANTOPRAZOLE SODIUM 40 MG: 40 TABLET, DELAYED RELEASE ORAL at 09:48

## 2017-08-01 RX ADMIN — Medication 1 TABLET: at 09:48

## 2017-08-01 RX ADMIN — LEVETIRACETAM 750 MG: 250 TABLET, FILM COATED ORAL at 09:49

## 2017-08-01 RX ADMIN — ALLOPURINOL 100 MG: 100 TABLET ORAL at 09:48

## 2017-08-01 RX ADMIN — HEPARIN SODIUM 5000 UNITS: 5000 INJECTION, SOLUTION INTRAVENOUS; SUBCUTANEOUS at 09:48

## 2017-08-01 RX ADMIN — IPRATROPIUM BROMIDE AND ALBUTEROL SULFATE 3 ML: .5; 3 SOLUTION RESPIRATORY (INHALATION) at 07:19

## 2017-08-01 NOTE — PLAN OF CARE
Problem: Infection, Risk/Actual (Adult)  Goal: Identify Related Risk Factors and Signs and Symptoms  Outcome: Ongoing (interventions implemented as appropriate)    07/31/17 1935   Infection, Risk/Actual   Infection, Risk/Actual: Related Risk Factors age extremes;chronic illness/condition;exposure to microbes   Signs and Symptoms (Infection, Risk/Actual) weakness

## 2017-08-01 NOTE — DISCHARGE SUMMARY
[facility] Medicine Services  DISCHARGE SUMMARY       Date of Admission: 7/29/2017  Date of Discharge:  8/1/2017  Primary Care Physician: Carol Herzog MD  Consulting Physician(s)          None           Discharge Diagnoses:  Active Hospital Problems (** Indicates Principal Problem)    Diagnosis Date Noted   • **Complicated UTI (urinary tract infection) [N39.0] 07/29/2017     Chronic. Followed by Dr. Duque. Has been on low tabatha Cipro for suppression but last culture in June 2017 grew fluquinalone resistant E Coli and Enterococcus.      • Senile atrophic vaginitis [N95.2] 07/29/2017     Just started on topical premarin cream by Dr. Duque     • Acute cystitis with hematuria [N30.01] 07/29/2017   • ANDRIY on CPAP [G47.33] 06/15/2017   • Chronic kidney disease, stage III (moderate) [N18.3] 05/02/2016     Impression: 03/03/2016 - reassess 3-4 months- get lab hospitalization  Impression: 10/12/2015 - check cmp  Impression: 11/03/2014 - check cmp  Impression: 08/11/2014 - having lab work to f/u this on Thursday  Impression: 07/22/2014 - check cmp  Impression: 04/08/2014 - update creat.; Description: Nafisa naidu 11/5  Impression: 03/03/2016 - reassess 3-4 months- get lab hospitalization  Impression: 10/12/2015 - check cmp  Impression: 11/03/2014 - check cmp  Impression: 08/11/2014 - having lab work to f/u this on Thursday  Impression: 07/22/2014 - check cmp  Impression: 04/08/2014 - update creat.; Description: Nafisa naidu 11/5     • Chronic obstructive pulmonary disease [J44.9] 05/02/2016     Impression: 01/13/2016 - Sent in order for lifetime supply of albuterol for nebulizer.; Description: severe  Impression: 01/13/2016 - Sent in order for lifetime supply of albuterol for nebulizer.; Description: severe        Resolved Hospital Problems    Diagnosis Date Noted Date Resolved   No resolved problems to display.       Presenting Problem/History of Present Illness  Acute cystitis with hematuria  [N30.01]     Chief Complaint on Day of Discharge: f/u UTI  History of Present Illness on Day of Discharge:   Patient resting comfortably in bed when seen.  Family at bedside.  She reports episodes of coughing this morning that resolved with breathing treatment.  No fevers, chills, no abdominal pain.  No nausea or vomiting.  Able to get up under own power and walk to bathroom.  Both patient and family feel that discharge to home his best option for them.    Hospital Course  Mrs. Us is an 87-year-old female with a past history of anxiety, Asthma/COPD, CAD, chronic left ventricular diastolic heart failure, chronic kidney disease stage III, depression, type 2 diabetes mellitus, ANDRIY, senile atrophic vaginitis, remote DVT, recurrent UTIs, antibiotic resistance, and HLP who presented to the [facility] ER on 7/29 with acute dyspnea associated with fever, productive, cough, and rhinorrhea. Evaluation in the ER was negative for focal infiltrate and pt was found to have a UTI in the setting of a long standing history of recurrent UTIs for which she has been followed closely by Dr. Osuna, her urologist, as an outpatient. Pt is schedule for an outpatient cystoscopy on 8/8. Pt had E. Coli in her urine cx and was transition to PO cefpodoxime at discharge, to complete a total 10 day course.      Patient's acute dyspnea was managed as COPD with acute exacerbation.  She'll complete a 5 day course of oral prednisone and will continue Symbicort and as needed bronchodilators at discharge.  Her respiratory status improved to baseline prior to discharge.    Patient did have some uncontrolled hypertension during the last 24 hours of her hospitalization.  Per patient and son, her blood pressure is usually well controlled at home.  I added amlodipine 5 mg daily to her medication regimen at discharge and recommended follow-up with her PCP regarding this.    Given concern for functional debility, PT/OT was consulted while inpatient and found  that the patient was appropriate to return home with family's assistance. Her son is her primary caregiver.       Procedures Performed         Consults:   Consults     No orders found from 6/30/2017 to 7/30/2017.          Pertinent Test Results:      Results from last 7 days  Lab Units 07/30/17  0823 07/29/17  1855   WBC 10*3/mm3 10.24 13.36*   HEMOGLOBIN g/dL 10.5* 12.2   HEMATOCRIT % 35.3 39.0   PLATELETS 10*3/mm3 409 457*       Results from last 7 days  Lab Units 07/30/17  0823 07/29/17  1855   SODIUM mmol/L 138 141   POTASSIUM mmol/L 4.6 4.6   CHLORIDE mmol/L 101 104   CO2 mmol/L 31.0 31.0   BUN mg/dL 26* 22   CREATININE mg/dL 1.60* 1.70*   CALCIUM mg/dL 9.5 9.9   BILIRUBIN mg/dL  --  0.6   ALK PHOS U/L  --  233*   ALT (SGPT) U/L  --  89*   AST (SGOT) U/L  --  102*   GLUCOSE mg/dL 171* 137*       Urinalysis: Large blood, positive nitrite, large leukocyte esterase, too numerous to count WBC, 3+ bacteria    Imaging Results (all)     Procedure Component Value Units Date/Time    XR Chest 1 View [[id number]] Collected:  07/30/17 1105     Updated:  07/31/17 0945    Narrative:          EXAMINATION: XR CHEST 1 VW - 07/29/2017     INDICATION: SOA triage protocol      COMPARISON: 06/20/2017     FINDINGS: Portable chest reveals the cardiomediastinal silhouettes are  within normal limits. Cardiac pacer leads are in satisfactory position.  Lung fields are grossly clear. No focal parenchymal opacification is  present. No pleural effusion or pneumothorax. Degenerative change is  seen within the spine. The pulmonary vascularity is within normal  limits.         Impression:       Chronic changes seen in the lung fields with no acute  parenchymal disease.     DICTATED:     07/30/2017  EDITED:         07/30/2017     This report was finalized on 7/31/2017 9:43 AM by Dr. Daina Galvez MD.               Cultures:  Urine Culture   Date Value Ref Range Status   07/29/2017 >100,000 CFU/mL Escherichia coli (A)  Final   Isolate is  "resistant to ampicillin, cephalothin, ciprofloxacin, and levofloxacin  Isolate is resistant to cefuroxime, ampicillin-sulbactam  Isolate is sensitive to aztreonam, cefepime, cefotaxime, ceftriaxone, ertapenem, gentamicin, meropenem, nitrofurantoin, Zosyn, tetracycline, tobramycin, trimethoprim-sulfamethoxazole      Condition on Discharge:  stable    Physical Exam on Discharge:/79  Pulse 77  Temp 98.1 °F (36.7 °C) (Oral)   Resp 16  Ht 66\" (167.6 cm)  Wt 205 lb (93 kg)  SpO2 95%  BMI 33.09 kg/m2  Physical Exam  Constitutional: no acute distress, awake, alert, hard of hearing  Respiratory: Clear to auscultation bilaterally, diminished throughout but no wheezing, nonlabored respirations   Cardiovascular: RRR, no murmurs, rubs, or gallops, palpable pedal pulses bilaterally  Gastrointestinal: Positive bowel sounds, soft, nontender, nondistended, obese  Musculoskeletal: Trace bilateral ankle edema  Psychiatric: oriented x 3, appropriate affect, cooperative  Neurologic: Strength symmetric in all extremities       Discharge Disposition  Home or Self Care    Discharge Medications   Avis Us   Home Medication Instructions STEPHY:[id number]    Printed on:08/01/17 1108   Medication Information                      albuterol (PROVENTIL) (2.5 MG/3ML) 0.083% nebulizer solution  Take 2.5 mg by nebulization Every 6 (Six) Hours As Needed for wheezing.             Albuterol Sulfate (VENTOLIN HFA IN)  Inhale as needed.             allopurinol (ZYLOPRIM) 100 MG tablet  Take 1 tablet by mouth Daily.             amLODIPine (NORVASC) 5 MG tablet  Take 1 tablet by mouth Daily.             aspirin 81 MG tablet  Take 81 mg by mouth Daily. In evening             cefpodoxime (VANTIN) 200 MG tablet  Take 1 tablet by mouth Every 12 (Twelve) Hours.             Cholecalciferol (VITAMIN D3) 5000 UNITS tablet  Take 1 tablet by mouth daily.             clonazePAM (KlonoPIN) 1 MG tablet  TAKE 1 TABLET BY MOUTH EVERY 12 HOURS AS " NEEDED             colchicine 0.6 MG tablet  Take 1 tablet by mouth Daily.             colestipol (COLESTID) 1 G tablet  Take 1 g by mouth Daily As Needed (loose stools). As needed             diphenhydrAMINE-acetaminophen (TYLENOL PM)  MG tablet per tablet  Take 1 tablet by mouth At Night As Needed for Sleep.             estradiol (ESTRACE) 0.1 MG/GM vaginal cream  Insert 2 g into the vagina 2 (Two) Times a Week. Tuesday and Sunday             fluocinonide (LIDEX) 0.05 % external solution  Apply  topically 2 (two) times a day.             furosemide (LASIX) 80 MG tablet  Take 1/2 tab on mon, wed, and Friday. If increased edema or 5 lb wt gain in 3 days take daily.             levETIRAcetam (KEPPRA) 750 MG tablet  Take 1 tablet by mouth two  times daily             lisinopril (PRINIVIL,ZESTRIL) 10 MG tablet  Take 1 tablet by mouth  daily             Loratadine (CLARITIN) 10 MG capsule  Take 10 mg by mouth Daily As Needed (asthma).             magnesium oxide (MAG-OX) 400 MG tablet  Take 400 mg by mouth Daily.             Multiple Vitamins-Minerals (EYE HEALTH) capsule  Take 1 capsule by mouth Daily.             pantoprazole (PROTONIX) 40 MG EC tablet  Take 1 tablet by mouth  daily             potassium chloride (MICRO-K) 10 MEQ CR capsule  Take 1 capsule by mouth 3 (Three) Times a Week. Only take potassium when a lasix dose is taken             predniSONE (DELTASONE) 20 MG tablet  Take 2 tabs daily x 2 days then stop             saccharomyces boulardii (FLORASTOR) 250 MG capsule  Take 1 capsule by mouth 2 (Two) Times a Day.             SYMBICORT 80-4.5 MCG/ACT inhaler  Use 1 puff twice daily             vitamin B-12 (CYANOCOBALAMIN) 1000 MCG tablet  Take 1,000 mcg by mouth Daily.                 Discharge Diet:   Diet Instructions     Diet: Regular, Consistent Carbohydrate; Thin Liquids, No Restrictions       Discharge Diet:   Regular  Consistent Carbohydrate      Fluid Consistency:  Thin Liquids, No  Restrictions                 Discharge Care Plan / Instructions:  - Optimize genitourinary hygiene.  I had a long discussion with patient and son about this.      Activity at Discharge:   Activity Instructions     Activity as Tolerated                     Follow-up Appointments  No future appointments.  Additional Instructions for the Follow-ups that You Need to Schedule     Additional Discharge Follow-Up (Specify Provider)    As directed    To:  Dr. Osuna   Follow Up:  1 Week       Discharge Follow-up with PCP    As directed    Follow Up Details:  in 1-2 weeks                 Test Results Pending at Discharge       Carlos Grijalva MD 08/01/17 11:07 AM    Time: Discharge 35 min    Please note that portions of this note may have been completed with a voice recognition program. Efforts were made to edit the dictations, but occasionally words are mistranscribed.

## 2017-08-01 NOTE — THERAPY DISCHARGE NOTE
Acute Care - Physical Therapy Treatment Note/Discharge  [facility]     Patient Name: Avis Us  : 9/3/1929  MRN: [id number]  Today's Date: 2017  Onset of Illness/Injury or Date of Surgery Date: 17  Date of Referral to PT: 17  Referring Physician: COREY Alexis    Admit Date: 2017    Visit Dx:    ICD-10-CM ICD-9-CM   1. Acute cystitis with hematuria N30.01 595.0   2. Leukocytosis, unspecified type D72.829 288.60   3. Adult failure to thrive R62.7 783.7   4. Lethargy R53.83 780.79   5. Impaired functional mobility, balance, gait, and endurance Z74.09 V49.89   6. Impaired mobility and ADLs Z74.09 799.89     Patient Active Problem List   Diagnosis   • Abnormal liver function tests   • Acute myocardial infarction   • Anxiety   • Knee pain   • Asthma   • Calf cramp   • Candidal intertrigo   • Cataract   • Chest pain   • Chronic kidney disease, stage III (moderate)   • Chronic obstructive pulmonary disease   • Complete atrioventricular block   • Congestive heart failure   • Atherosclerosis of coronary artery   • Cough   • Depression   • Diabetes mellitus   • Dizziness   • Dysuria   • Edema   • Gastroesophageal reflux disease   • Primary hypertriglyceridemia   • Fatigue   • Fracture of patella   • Gout   • Headache   • Hyperlipidemia   • Hypertension   • Influenza due to influenza A virus   • Insomnia   • Leukocytosis   • Macrocytosis   • Macular degeneration   • Menopause present   • Night sweats   • Obstructive sleep apnea syndrome   • Osteoporosis   • Peripheral neuropathy   • Pneumonia   • Seizure disorder   • Sick sinus syndrome   • Sinus bradycardia   • Thrombophlebitis   • Cobalamin deficiency   • Vitamin D deficiency   • Physical debility   • Lumbar strain   • History of MI (myocardial infarction)   • Midline low back pain without sciatica   • Arthralgia of lumbar spine   • Encounter for eye exam   • Parasites in stool   • Sepsis   • UTI (urinary tract infection)   • Acute on  chronic Respiratory failure. Not requiring ventilatory support   • Acute on Chronic kidney disease (CKD), stage III (moderate)   • Exacerbation of COPD    • H/O SSS (sick sinus syndrome) s/p PPM 2014   • CHF (congestive heart failure)   • Anxiety and depression   • Macular degeneration   • ANDRIY on CPAP   • Seizure disorder on Keppra   • HTN and possible diastolic dysfunction   • HLD (hyperlipidemia)   • Obesity, BMI 33.7   • Pneumonia versus acute reaction to nitrofurantoin   • Complicated UTI (urinary tract infection)   • Adult failure to thrive   • Senile atrophic vaginitis   • Acute cystitis with hematuria       Physical Therapy Education     Title: PT OT SLP Therapies (Active)     Topic: Physical Therapy (Done)     Point: Mobility training (Done)    Learning Progress Summary    Learner Readiness Method Response Comment Documented by Status   Patient FABRICIO Rain H VU, DU DM 08/01/17 1230 Done   Family FABRICIO Rain H VU, DU DM 08/01/17 1230 Done               Point: Home exercise program (Done)    Learning Progress Summary    Learner Readiness Method Response Comment Documented by Status   Patient FABRICIO Rain H VU, DU DM 08/01/17 1230 Done   Family FABRICIO Rain H VU, DU DM 08/01/17 1230 Done               Point: Body mechanics (Done)    Learning Progress Summary    Learner Readiness Method Response Comment Documented by Status   Patient FABRICIO Rain H VU, DU DM 08/01/17 1230 Done   Family FABRICIO Rain H VU, DU DM 08/01/17 1230 Done               Point: Precautions (Done)    Learning Progress Summary    Learner Readiness Method Response Comment Documented by Status   Patient FABRICIO Rain H VU, DU DM 08/01/17 1230 Done   Family FABRICIO Rain H VU, DU DM 08/01/17 1230 Done                      User Key     Initials Effective Dates Name Provider Type Discipline    DM 06/19/15 -  Portia Smith, PT Physical Therapist PT                    IP PT Goals       08/01/17 1230 07/31/17 1159       Bed Mobility PT LTG    Bed Mobility PT LTG, Date  Established  07/31/17  -DM     Bed Mobility PT LTG, Time to Achieve  5 days  -DM     Bed Mobility PT LTG, Activity Type  all bed mobility  -DM     Bed Mobility PT LTG, [place] Level  independent  -DM     Bed Mobility PT LTG, Date Goal Reviewed 08/01/17  -DM      Bed Mobility PT LTG, Outcome goal not met  -DM      Bed Mobility PT LTG, Reason Goal Not Met discharged from facility  -DM      Transfer Training PT LTG    Transfer Training PT LTG, Date Established  07/31/17  -DM     Transfer Training PT LTG, Time to Achieve  5 days  -DM     Transfer Training PT LTG, Activity Type  bed to chair /chair to bed;sit to stand/stand to sit  -DM     Transfer Training PT LTG, [place] Level  independent  -DM     Transfer Training PT LTG, Assist Device  walker, rolling  -DM     Transfer Training PT  LTG, Date Goal Reviewed 08/01/17  -DM      Transfer Training PT LTG, Outcome goal not met  -DM      Transfer Training PT LTG, Reason Goal Not Met discharged from facility  -DM      Gait Training PT LTG    Gait Training Goal PT LTG, Date Established  07/31/17  -DM     Gait Training Goal PT LTG, Time to Achieve  5 days  -DM     Gait Training Goal PT LTG, [place] Level  supervision required   stable VS  -DM     Gait Training Goal PT LTG, Assist Device  walker, rolling  -DM     Gait Training Goal PT LTG, Distance to Achieve  400  -DM     Gait Training Goal PT LTG, Date Goal Reviewed 08/01/17  -DM      Gait Training Goal PT LTG, Outcome goal not met  -DM      Gait Training Goal PT LTG, Reason Goal Not Met discharged from facility  -DM      Stair Training PT LTG    Stair Training Goal PT LTG, Date Established 07/31/17  -DM 07/31/17  -DM     Stair Training Goal PT LTG, Time to Achieve 5 days  -DM 5 days  -DM     Stair Training Goal PT LTG, Number of Steps 1  -DM 1  -DM     Stair Training Goal PT LTG, [place] Level contact guard assist  -DM contact guard assist  -DM     Stair Training Goal PT LTG, Assist Device walker, rolling   -DM walker, rolling  -DM     Stair Training Goal PT LTG, Outcome goal not met  -DM      Stair Training Goal PT LTG, Reason Goal Not Met discharged from facility  -DM        User Key  (r) = Recorded By, (t) = Taken By, (c) = Cosigned By    Initials Name Provider Type    DM Portia Smith, PT Physical Therapist              Adult Rehabilitation Note       08/01/17 1145          Rehab Assessment/Intervention    Discipline physical therapist  -DM      Document Type therapy note (daily note);discharge summary  -DM      Subjective Information agree to therapy;complains of;weakness   d/c home later today;ref. HHPT f/u(doesn't want home seen)  -DM      Patient Effort, Rehab Treatment good  -DM      Symptoms Noted During/After Treatment fatigue  -DM      Precautions/Limitations fall precautions  -DM      Recorded by [DM] Portia Smith, PT      Vital Signs    Pre Systolic BP Rehab 159  -DM      Pre Treatment Diastolic BP 79  -DM      Post Systolic BP Rehab 156  -DM      Post Treatment Diastolic BP 79  -DM      Pretreatment Heart Rate (beats/min) 61  -DM      Intratreatment Heart Rate (beats/min) 77  -DM      Posttreatment Heart Rate (beats/min) 66  -DM      Pre SpO2 (%) 93  -DM      O2 Delivery Pre Treatment room air  -DM      Post SpO2 (%) 93  -DM      O2 Delivery Post Treatment room air  -DM      Pre Patient Position Supine  -DM      Intra Patient Position Standing  -DM      Post Patient Position Sitting  -DM      Recorded by [DM] Portia Smith, PT      Pain Assessment    Pain Assessment No/denies pain  -DM      Recorded by [DM] Portia Smith, PT      Vision Assessment/Intervention    Visual Impairment WFL with corrective lenses  -DM      Recorded by [DM] Portia Smith, PT      Cognitive Assessment/Intervention    Current Cognitive/Communication Assessment functional  -DM      Orientation Status oriented x 4  -DM      Follows Commands/Answers Questions 100% of the time;able to follow single-step instructions;needs  cueing;needs increased time;needs repetition  -DM      Personal Safety WNL/WFL  -DM      Personal Safety Interventions fall prevention program maintained;gait belt;nonskid shoes/slippers when out of bed  -DM      Recorded by [DM] Portia Smith, PT      Bed Mobility, Assessment/Treatment    Bed Mobility, Assistive Device bed rails;head of bed elevated  -DM      Bed Mobility, Roll Left, [place] conditional independence  -DM      Bed Mob, Supine to Sit, [place] minimum assist (75% patient effort);verbal cues required   cues to use bedrail,but impulsively grabbed P.T.'s arm  -DM      Bed Mobility, Safety Issues decreased use of legs for bridging/pushing  -DM      Bed Mobility, Impairments strength decreased;impaired balance  -DM      Recorded by [DM] Portia Smith, PT      Transfer Assessment/Treatment    Transfers, Sit-Stand [place] contact guard assist  -DM      Transfers, Stand-Sit [place] contact guard assist;verbal cues required  -DM      Transfers, Sit-Stand-Sit, Assist Device rolling walker  -DM      Transfer, Safety Issues weight-shifting ability decreased   pt not backing up fully to chair w/AD as cued;did 2nd trial  -DM      Transfer, Impairments impaired balance  -DM      Recorded by [DM] Portia Smith, PT      Gait Assessment/Treatment    Gait, [place] Level contact guard assist;verbal cues required  -DM      Gait, Assistive Device rolling walker  -DM      Gait, Distance (Feet) 200  -DM      Gait, Gait Pattern Analysis swing-through gait  -DM      Gait, Gait Deviations best decreased;decreased heel strike;narrow base;step length decreased  -DM      Gait, Safety Issues step length decreased  -DM      Gait, Impairments strength decreased;impaired balance  -DM      Gait, Comment 1 stand.rest at window   R.T. arrived for pt.  -DM      Recorded by [DM] Portia Smith, PT      Stairs Assessment/Treatment    Number of Stairs def.1 step d/t R.T. arrival, then medicare rep.  -DM       Recorded by [DM] Portia Smith PT      Motor Skills/Interventions    Additional Documentation Balance Skills Training (Group)  -DM      Recorded by [OLY] Portia Smith PT      Balance Skills Training    Sitting-Level of Assistance Independent  -DM      Sitting-Balance Support Feet supported  -DM      Sitting-Balance Activities Trunk control activities   LE exer  -DM      Sitting # of Minutes 4  -DM      Standing-Level of Assistance Contact guard  -DM      Static Standing Balance Support assistive device  -DM      Standing-Balance Activities Weight Shift A-P   MIP  -DM      Standing Balance # of Minutes 1  -DM      Gait Balance-Level of Assistance Contact guard  -DM      Gait Balance Support assistive device  -DM      Gait Balance Activities side-stepping  -DM      Recorded by [OLY] Portia Smith PT      Therapy Exercises    Bilateral Lower Extremities AROM:;10 reps;sitting;standing;ankle pumps/circles;glut sets;heel slides;hip abduction/adduction;hip ER;hip flexion;hip IR;LAQ;quad sets;SAQ;5 reps   HS sets;can't find her HEP COPY (issued new copy)  -DM      Bilateral Upper Extremity --   verbally instructed in UE EXER; Will do after lunch  -DM      Recorded by [OLY] Portia Smith PT      Positioning and Restraints    Pre-Treatment Position in bed  -DM      Post Treatment Position chair  -DM      In Chair notified nsg;reclined;call light within reach;encouraged to call for assist;exit alarm on;with family/caregiver;legs elevated  -DM      Recorded by [OLY] Portia Smith PT        User Key  (r) = Recorded By, (t) = Taken By, (c) = Cosigned By    Initials Name Effective Dates    OLY Simth PT 06/19/15 -           PT Recommendation and Plan  Anticipated Discharge Disposition: home with assist (refuses home health (statement to CM & P.T./O.T.l)  PT Frequency: daily  Plan of Care Review  Plan Of Care Reviewed With: patient, daughter, son  Progress: progress towards functional goals is fair  Outcome  Summary/Follow up Plan: unable to meet goals d/t d/c home today, but son & dtr will supv. HEP/mobil. @ home (refuses HHPT f/u); has amb to [facility] indep 9 x's since P.T. rx  yest.;desat to 93% w/ gt; will d/c later today           Outcome Measures       08/01/17 1145 07/31/17 1106 07/31/17 1055    How much help from another person do you currently need...    Turning from your back to your side while in flat bed without using bedrails? 4  -DM 3  -DM     Moving from lying on back to sitting on the side of a flat bed without bedrails? 4  -DM 3  -DM     Moving to and from a bed to a chair (including a wheelchair)? 3  -DM 3  -DM     Standing up from a chair using your arms (e.g., wheelchair, bedside chair)? 3  -DM 3  -DM     Climbing 3-5 steps with a railing? 3  -DM 3  -DM     To walk in hospital room? 3  -DM 3  -DM     AM-PAC 6 Clicks Score 20  -DM 18  -DM     How much help from another is currently needed...    Putting on and taking off regular lower body clothing?   3  -AR    Bathing (including washing, rinsing, and drying)   3  -AR    Toileting (which includes using toilet bed pan or urinal)   3  -AR    Putting on and taking off regular upper body clothing   3  -AR    Taking care of personal grooming (such as brushing teeth)   3  -AR    Eating meals   4  -AR    Score   19  -AR    Functional Assessment    Outcome Measure Options AM-PAC 6 Clicks Basic Mobility (PT)  -DM AM-PAC 6 Clicks Basic Mobility (PT)  -DM AM-PAC 6 Clicks Daily Activity (OT)  -AR      User Key  (r) = Recorded By, (t) = Taken By, (c) = Cosigned By    Initials Name Provider Type    DM Portia Smith, PT Physical Therapist    ERON Bernardo, OT Occupational Therapist           Time Calculation:         PT Charges       08/01/17 1230          Time Calculation    Start Time 1145  -DM      PT Received On 08/01/17  -DM      PT Goal Re-Cert Due Date 08/10/17  -DM      Time Calculation- PT    Total Timed Code Minutes- PT 30 minute(s)  -DM        User Key   (r) = Recorded By, (t) = Taken By, (c) = Cosigned By    Initials Name Provider Type    DM Potria Smith, PT Physical Therapist          Therapy Charges for Today     Code Description Service Date Service Provider Modifiers Qty    79853559471 HC PT EVAL MOD COMPLEXITY 2 7/31/2017 Portia Smith, PT GP 1    79974766123 HC PT THER PROC EA 15 MIN 7/31/2017 Portia Smith, PT GP 1    71872388354 HC GAIT TRAINING EA 15 MIN 7/31/2017 Portia Smith, PT GP 1    99425260620 HC PT THER PROC EA 15 MIN 8/1/2017 Portia Smith, PT GP 1    11961219210 HC GAIT TRAINING EA 15 MIN 8/1/2017 Portia Smith, PT GP 1          PT G-Codes  Outcome Measure Options: AM-PAC 6 Clicks Basic Mobility (PT)    PT Discharge Summary  Anticipated Discharge Disposition: home with assist (refuses home health (statement to CM & P.T./O.T.l)    Portia Smith, PT  8/1/2017

## 2017-08-01 NOTE — PLAN OF CARE
Problem: Patient Care Overview (Adult)  Goal: Plan of Care Review  Outcome: Unable to achieve outcome(s) by discharge Date Met:  08/01/17 08/01/17 1230   Patient Care Overview   Progress progress towards functional goals is fair   Outcome Evaluation   Outcome Summary/Follow up Plan unable to meet goals d/t d/c home today, but son & dtr will supv. HEP/mobil. @ home (refuses HHPT f/u); has amb to [facility] indep 9 x's since P.T. rx yest.;desat to 93% w/ gt; will d/c later today    Coping/Psychosocial Response Interventions   Plan Of Care Reviewed With patient;daughter;son         Problem: Inpatient Physical Therapy  Goal: Bed Mobility Goal LTG- PT  Outcome: Unable to achieve outcome(s) by discharge Date Met:  08/01/17 07/31/17 1159 08/01/17 1230   Bed Mobility PT LTG   Bed Mobility PT LTG, Date Established 07/31/17 --    Bed Mobility PT LTG, Time to Achieve 5 days --    Bed Mobility PT LTG, Activity Type all bed mobility --    Bed Mobility PT LTG, [place] Level independent --    Bed Mobility PT LTG, Date Goal Reviewed --  08/01/17   Bed Mobility PT LTG, Outcome --  goal not met   Bed Mobility PT LTG, Reason Goal Not Met --  discharged from facility       Goal: Transfer Training Goal 1 LTG- PT  Outcome: Unable to achieve outcome(s) by discharge Date Met:  08/01/17 07/31/17 1159 08/01/17 1230   Transfer Training PT LTG   Transfer Training PT LTG, Date Established 07/31/17 --    Transfer Training PT LTG, Time to Achieve 5 days --    Transfer Training PT LTG, Activity Type bed to chair /chair to bed;sit to stand/stand to sit --    Transfer Training PT LTG, [place] Level independent --    Transfer Training PT LTG, Assist Device walker, rolling --    Transfer Training PT LTG, Date Goal Reviewed --  08/01/17   Transfer Training PT LTG, Outcome --  goal not met   Transfer Training PT LTG, Reason Goal Not Met --  discharged from facility       Goal: Gait Training Goal LTG- PT  Outcome: Unable to achieve outcome(s)  by discharge Date Met:  08/01/17 07/31/17 1159 08/01/17 1230   Gait Training PT LTG   Gait Training Goal PT LTG, Date Established 07/31/17 --    Gait Training Goal PT LTG, Time to Achieve 5 days --    Gait Training Goal PT LTG, [place] Level supervision required  (stable VS) --    Gait Training Goal PT LTG, Assist Device walker, rolling --    Gait Training Goal PT LTG, Distance to Achieve 400 --    Gait Training Goal PT LTG, Date Goal Reviewed --  08/01/17   Gait Training Goal PT LTG, Outcome --  goal not met   Gait Training Goal PT LTG, Reason Goal Not Met --  discharged from facility       Goal: Stair Training Goal LTG- PT  Outcome: Unable to achieve outcome(s) by discharge Date Met:  08/01/17 08/01/17 1230   Stair Training PT LTG   Stair Training Goal PT LTG, Date Established 07/31/17   Stair Training Goal PT LTG, Time to Achieve 5 days   Stair Training Goal PT LTG, Number of Steps 1   Stair Training Goal PT LTG, [place] Level contact guard assist   Stair Training Goal PT LTG, Assist Device walker, rolling   Stair Training Goal PT LTG, Outcome goal not met   Stair Training Goal PT LTG, Reason Goal Not Met discharged from facility

## 2017-08-03 ENCOUNTER — TRANSITIONAL CARE MANAGEMENT TELEPHONE ENCOUNTER (OUTPATIENT)
Dept: INTERNAL MEDICINE | Facility: CLINIC | Age: 82
End: 2017-08-03

## 2017-08-03 NOTE — OUTREACH NOTE
Dr Herzog is PCP but Hospital follow up is scheduled with Juany Enciso. Please advise on request for Tussionex.

## 2017-08-03 NOTE — OUTREACH NOTE
JAYSON call completed.  Please refer to TCM call flowsheet for call documentation.  Patient would like a prescription for tussinex.

## 2017-08-03 NOTE — PROGRESS NOTES
Dr. Herzog, thank you for the update.  Barbie Hernandez and I will make sure to contact you or Kenia for JAYSON scheduling if no openings in your schedule.  Thanks, Anum

## 2017-08-03 NOTE — THERAPY DISCHARGE NOTE
Acute Care - Occupational Therapy Discharge Summary  [facility]     Patient Name: Avis Us  : 9/3/1929  MRN: [id number]    Today's Date: 8/3/2017  Onset of Illness/Injury or Date of Surgery Date: 17    Date of Referral to OT: 17  Referring Physician: COERY Alexis      Admit Date: 2017        OT Recommendation and Plan    Visit Dx:    ICD-10-CM ICD-9-CM   1. Acute cystitis with hematuria N30.01 595.0   2. Leukocytosis, unspecified type D72.829 288.60   3. Adult failure to thrive R62.7 783.7   4. Lethargy R53.83 780.79   5. Impaired functional mobility, balance, gait, and endurance Z74.09 V49.89   6. Impaired mobility and ADLs Z74.09 799.89                     OT Goals       17 1327          Transfer Training OT LTG    Transfer Training OT LTG, Date Established 17  -AR      Transfer Training OT LTG, Time to Achieve by discharge  -AR      Transfer Training OT LTG, Activity Type sit to stand/stand to sit;toilet  -AR      Transfer Training OT LTG, [place] Level verbal cues required;supervision required  -AR      Transfer Training OT LTG, Assist Device commode, bedside;walker, rolling  -AR      Patient Education OT LTG    Patient Education OT LTG, Date Established 17  -AR      Patient Education OT LTG, Time to Achieve by discharge  -AR      Patient Education OT LTG, Education Type HEP;home safety;energy conservation;work simplification;adaptive breathing  -AR      Patient Education OT LTG, Education Understanding demonstrates adequately;verbalizes understanding  -AR      LB Dressing OT LTG    LB Dressing Goal OT LTG, Date Established 17  -AR      LB Dressing Goal OT LTG, Time to Achieve by discharge  -AR      LB Dressing Goal OT LTG, Activity Type Pt will complete LB dressing task  -AR      LB Dressing Goal OT LTG, [place] Level verbal cues required;supervision required  -AR        User Key  (r) = Recorded By, (t) = Taken By, (c) = Cosigned By    Initials  Name Provider Type    ERON Bernardo OT Occupational Therapist                Outcome Measures       08/01/17 1145          How much help from another person do you currently need...    Turning from your back to your side while in flat bed without using bedrails? 4  -DM      Moving from lying on back to sitting on the side of a flat bed without bedrails? 4  -DM      Moving to and from a bed to a chair (including a wheelchair)? 3  -DM      Standing up from a chair using your arms (e.g., wheelchair, bedside chair)? 3  -DM      Climbing 3-5 steps with a railing? 3  -DM      To walk in hospital room? 3  -DM      AM-PAC 6 Clicks Score 20  -DM      Functional Assessment    Outcome Measure Options AM-PAC 6 Clicks Basic Mobility (PT)  -DM        User Key  (r) = Recorded By, (t) = Taken By, (c) = Cosigned By    Initials Name Provider Type    OLY Smtih, PT Physical Therapist              OT Discharge Summary  Reason for Discharge: Discharge from facility  Outcomes Achieved: Refer to plan of care for updates on goals achieved  Discharge Destination: Home      Chioma Mo OT  8/3/2017

## 2017-08-05 ENCOUNTER — APPOINTMENT (OUTPATIENT)
Dept: GENERAL RADIOLOGY | Facility: HOSPITAL | Age: 82
End: 2017-08-05

## 2017-08-05 ENCOUNTER — HOSPITAL ENCOUNTER (INPATIENT)
Facility: HOSPITAL | Age: 82
LOS: 9 days | Discharge: NURSING, REHAB OR LONG-TERM CARE | End: 2017-08-15
Attending: EMERGENCY MEDICINE | Admitting: INTERNAL MEDICINE

## 2017-08-05 ENCOUNTER — APPOINTMENT (OUTPATIENT)
Dept: CT IMAGING | Facility: HOSPITAL | Age: 82
End: 2017-08-05

## 2017-08-05 DIAGNOSIS — Z74.09 IMPAIRED FUNCTIONAL MOBILITY, BALANCE, GAIT, AND ENDURANCE: ICD-10-CM

## 2017-08-05 DIAGNOSIS — Z78.9 IMPAIRED MOBILITY AND ADLS: ICD-10-CM

## 2017-08-05 DIAGNOSIS — Z74.09 IMPAIRED MOBILITY AND ADLS: ICD-10-CM

## 2017-08-05 DIAGNOSIS — N39.0 ACUTE URINARY TRACT INFECTION: Primary | ICD-10-CM

## 2017-08-05 DIAGNOSIS — J44.9 CHRONIC OBSTRUCTIVE PULMONARY DISEASE, UNSPECIFIED COPD TYPE (HCC): ICD-10-CM

## 2017-08-05 DIAGNOSIS — N32.1 COLO-VESICAL FISTULA: ICD-10-CM

## 2017-08-05 LAB
ALBUMIN SERPL-MCNC: 4 G/DL (ref 3.2–4.8)
ALBUMIN/GLOB SERPL: 1.3 G/DL (ref 1.5–2.5)
ALP SERPL-CCNC: 232 U/L (ref 25–100)
ALT SERPL W P-5'-P-CCNC: 96 U/L (ref 7–40)
ANION GAP SERPL CALCULATED.3IONS-SCNC: 7 MMOL/L (ref 3–11)
AST SERPL-CCNC: 28 U/L (ref 0–33)
BACTERIA UR QL AUTO: ABNORMAL /HPF
BASOPHILS # BLD AUTO: 0.02 10*3/MM3 (ref 0–0.2)
BASOPHILS NFR BLD AUTO: 0.1 % (ref 0–1)
BILIRUB SERPL-MCNC: 0.7 MG/DL (ref 0.3–1.2)
BILIRUB UR QL STRIP: NEGATIVE
BUN BLD-MCNC: 20 MG/DL (ref 9–23)
BUN/CREAT SERPL: 15.4 (ref 7–25)
CALCIUM SPEC-SCNC: 9.9 MG/DL (ref 8.7–10.4)
CHLORIDE SERPL-SCNC: 95 MMOL/L (ref 99–109)
CLARITY UR: ABNORMAL
CO2 SERPL-SCNC: 34 MMOL/L (ref 20–31)
COLOR UR: ABNORMAL
CREAT BLD-MCNC: 1.3 MG/DL (ref 0.6–1.3)
D-LACTATE SERPL-SCNC: 1 MMOL/L (ref 0.5–2)
DEPRECATED RDW RBC AUTO: 55.9 FL (ref 37–54)
EOSINOPHIL # BLD AUTO: 0.24 10*3/MM3 (ref 0–0.3)
EOSINOPHIL NFR BLD AUTO: 1.1 % (ref 0–3)
ERYTHROCYTE [DISTWIDTH] IN BLOOD BY AUTOMATED COUNT: 15.5 % (ref 11.3–14.5)
GFR SERPL CREATININE-BSD FRML MDRD: 39 ML/MIN/1.73
GLOBULIN UR ELPH-MCNC: 3.1 GM/DL
GLUCOSE BLD-MCNC: 136 MG/DL (ref 70–100)
GLUCOSE UR STRIP-MCNC: NEGATIVE MG/DL
HCT VFR BLD AUTO: 39.3 % (ref 34.5–44)
HGB BLD-MCNC: 12.3 G/DL (ref 11.5–15.5)
HGB UR QL STRIP.AUTO: ABNORMAL
HOLD SPECIMEN: NORMAL
HOLD SPECIMEN: NORMAL
HYALINE CASTS UR QL AUTO: ABNORMAL /LPF
IMM GRANULOCYTES # BLD: 0.21 10*3/MM3 (ref 0–0.03)
IMM GRANULOCYTES NFR BLD: 0.9 % (ref 0–0.6)
KETONES UR QL STRIP: ABNORMAL
LEUKOCYTE ESTERASE UR QL STRIP.AUTO: ABNORMAL
LIPASE SERPL-CCNC: 30 U/L (ref 6–51)
LYMPHOCYTES # BLD AUTO: 3.63 10*3/MM3 (ref 0.6–4.8)
LYMPHOCYTES NFR BLD AUTO: 16 % (ref 24–44)
MCH RBC QN AUTO: 30.8 PG (ref 27–31)
MCHC RBC AUTO-ENTMCNC: 31.3 G/DL (ref 32–36)
MCV RBC AUTO: 98.5 FL (ref 80–99)
MONOCYTES # BLD AUTO: 1.78 10*3/MM3 (ref 0–1)
MONOCYTES NFR BLD AUTO: 7.9 % (ref 0–12)
NEUTROPHILS # BLD AUTO: 16.75 10*3/MM3 (ref 1.5–8.3)
NEUTROPHILS NFR BLD AUTO: 74 % (ref 41–71)
NITRITE UR QL STRIP: NEGATIVE
PH UR STRIP.AUTO: 6 [PH] (ref 5–8)
PLATELET # BLD AUTO: 418 10*3/MM3 (ref 150–450)
PMV BLD AUTO: 9.8 FL (ref 6–12)
POTASSIUM BLD-SCNC: 4.4 MMOL/L (ref 3.5–5.5)
PROT SERPL-MCNC: 7.1 G/DL (ref 5.7–8.2)
PROT UR QL STRIP: ABNORMAL
RBC # BLD AUTO: 3.99 10*6/MM3 (ref 3.89–5.14)
RBC # UR: ABNORMAL /HPF
REF LAB TEST METHOD: ABNORMAL
SODIUM BLD-SCNC: 136 MMOL/L (ref 132–146)
SP GR UR STRIP: 1.02 (ref 1–1.03)
SQUAMOUS #/AREA URNS HPF: ABNORMAL /HPF
TROPONIN I SERPL-MCNC: 0.01 NG/ML (ref 0–0.07)
UROBILINOGEN UR QL STRIP: ABNORMAL
WBC NRBC COR # BLD: 22.63 10*3/MM3 (ref 3.5–10.8)
WBC UR QL AUTO: ABNORMAL /HPF
WHOLE BLOOD HOLD SPECIMEN: NORMAL
WHOLE BLOOD HOLD SPECIMEN: NORMAL

## 2017-08-05 PROCEDURE — 87086 URINE CULTURE/COLONY COUNT: CPT

## 2017-08-05 PROCEDURE — 83605 ASSAY OF LACTIC ACID: CPT | Performed by: EMERGENCY MEDICINE

## 2017-08-05 PROCEDURE — 85025 COMPLETE CBC W/AUTO DIFF WBC: CPT

## 2017-08-05 PROCEDURE — 84484 ASSAY OF TROPONIN QUANT: CPT

## 2017-08-05 PROCEDURE — 81003 URINALYSIS AUTO W/O SCOPE: CPT

## 2017-08-05 PROCEDURE — 81001 URINALYSIS AUTO W/SCOPE: CPT

## 2017-08-05 PROCEDURE — 83690 ASSAY OF LIPASE: CPT

## 2017-08-05 PROCEDURE — 80053 COMPREHEN METABOLIC PANEL: CPT

## 2017-08-05 PROCEDURE — 99284 EMERGENCY DEPT VISIT MOD MDM: CPT

## 2017-08-05 PROCEDURE — 87040 BLOOD CULTURE FOR BACTERIA: CPT | Performed by: EMERGENCY MEDICINE

## 2017-08-05 PROCEDURE — 93296 REM INTERROG EVL PM/IDS: CPT | Performed by: PHYSICIAN ASSISTANT

## 2017-08-05 PROCEDURE — 93294 REM INTERROG EVL PM/LDLS PM: CPT | Performed by: PHYSICIAN ASSISTANT

## 2017-08-05 PROCEDURE — 74176 CT ABD & PELVIS W/O CONTRAST: CPT

## 2017-08-05 PROCEDURE — 71010 HC CHEST PA OR AP: CPT

## 2017-08-05 PROCEDURE — 93005 ELECTROCARDIOGRAM TRACING: CPT

## 2017-08-05 RX ORDER — SODIUM CHLORIDE 0.9 % (FLUSH) 0.9 %
10 SYRINGE (ML) INJECTION AS NEEDED
Status: DISCONTINUED | OUTPATIENT
Start: 2017-08-05 | End: 2017-08-15 | Stop reason: HOSPADM

## 2017-08-06 PROBLEM — K21.9 GERD (GASTROESOPHAGEAL REFLUX DISEASE): Status: ACTIVE | Noted: 2017-08-06

## 2017-08-06 PROBLEM — I51.89 DIASTOLIC DYSFUNCTION: Status: ACTIVE | Noted: 2017-08-06

## 2017-08-06 PROBLEM — E11.9 TYPE 2 DIABETES MELLITUS (HCC): Status: ACTIVE | Noted: 2017-08-06

## 2017-08-06 PROBLEM — N39.0 ACUTE URINARY TRACT INFECTION: Status: ACTIVE | Noted: 2017-08-06

## 2017-08-06 PROBLEM — R53.1 WEAKNESS: Status: ACTIVE | Noted: 2017-08-06

## 2017-08-06 PROBLEM — A41.9 SEPSIS (HCC): Status: ACTIVE | Noted: 2017-08-06

## 2017-08-06 PROBLEM — N32.1 COLOVESICAL FISTULA: Status: ACTIVE | Noted: 2017-08-06

## 2017-08-06 PROBLEM — N30.00 ACUTE CYSTITIS WITHOUT HEMATURIA: Status: ACTIVE | Noted: 2017-08-06

## 2017-08-06 LAB
GLUCOSE BLDC GLUCOMTR-MCNC: 111 MG/DL (ref 70–130)
GLUCOSE BLDC GLUCOMTR-MCNC: 123 MG/DL (ref 70–130)
GLUCOSE BLDC GLUCOMTR-MCNC: 130 MG/DL (ref 70–130)
GLUCOSE BLDC GLUCOMTR-MCNC: 152 MG/DL (ref 70–130)

## 2017-08-06 PROCEDURE — 25010000002 MEROPENEM: Performed by: PHYSICIAN ASSISTANT

## 2017-08-06 PROCEDURE — 63710000001 INSULIN LISPRO (HUMAN) PER 5 UNITS: Performed by: PHYSICIAN ASSISTANT

## 2017-08-06 PROCEDURE — 82962 GLUCOSE BLOOD TEST: CPT

## 2017-08-06 PROCEDURE — 25010000002 MEROPENEM: Performed by: EMERGENCY MEDICINE

## 2017-08-06 PROCEDURE — 25010000002 CEFTRIAXONE PER 250 MG: Performed by: EMERGENCY MEDICINE

## 2017-08-06 PROCEDURE — 94660 CPAP INITIATION&MGMT: CPT

## 2017-08-06 PROCEDURE — 94799 UNLISTED PULMONARY SVC/PX: CPT

## 2017-08-06 PROCEDURE — 94760 N-INVAS EAR/PLS OXIMETRY 1: CPT

## 2017-08-06 PROCEDURE — 94640 AIRWAY INHALATION TREATMENT: CPT

## 2017-08-06 PROCEDURE — 25010000002 HEPARIN (PORCINE) PER 1000 UNITS: Performed by: COLON & RECTAL SURGERY

## 2017-08-06 PROCEDURE — 99223 1ST HOSP IP/OBS HIGH 75: CPT | Performed by: FAMILY MEDICINE

## 2017-08-06 RX ORDER — BENZONATATE 100 MG/1
100 CAPSULE ORAL 3 TIMES DAILY PRN
Status: DISCONTINUED | OUTPATIENT
Start: 2017-08-06 | End: 2017-08-15 | Stop reason: HOSPADM

## 2017-08-06 RX ORDER — SODIUM CHLORIDE 9 MG/ML
100 INJECTION, SOLUTION INTRAVENOUS CONTINUOUS
Status: ACTIVE | OUTPATIENT
Start: 2017-08-06 | End: 2017-08-06

## 2017-08-06 RX ORDER — CEFTRIAXONE SODIUM 1 G/50ML
1 INJECTION, SOLUTION INTRAVENOUS ONCE
Status: COMPLETED | OUTPATIENT
Start: 2017-08-06 | End: 2017-08-06

## 2017-08-06 RX ORDER — DEXTROSE MONOHYDRATE 25 G/50ML
25 INJECTION, SOLUTION INTRAVENOUS
Status: DISCONTINUED | OUTPATIENT
Start: 2017-08-06 | End: 2017-08-15 | Stop reason: HOSPADM

## 2017-08-06 RX ORDER — ASPIRIN 81 MG/1
81 TABLET, CHEWABLE ORAL DAILY
Status: DISCONTINUED | OUTPATIENT
Start: 2017-08-06 | End: 2017-08-07

## 2017-08-06 RX ORDER — NICOTINE POLACRILEX 4 MG
15 LOZENGE BUCCAL
Status: DISCONTINUED | OUTPATIENT
Start: 2017-08-06 | End: 2017-08-15 | Stop reason: HOSPADM

## 2017-08-06 RX ORDER — HEPARIN SODIUM 5000 [USP'U]/ML
5000 INJECTION, SOLUTION INTRAVENOUS; SUBCUTANEOUS EVERY 8 HOURS SCHEDULED
Status: DISCONTINUED | OUTPATIENT
Start: 2017-08-06 | End: 2017-08-08

## 2017-08-06 RX ORDER — DOCUSATE SODIUM 100 MG/1
100 CAPSULE, LIQUID FILLED ORAL 2 TIMES DAILY PRN
Status: DISCONTINUED | OUTPATIENT
Start: 2017-08-06 | End: 2017-08-15 | Stop reason: HOSPADM

## 2017-08-06 RX ORDER — ALLOPURINOL 100 MG/1
100 TABLET ORAL 2 TIMES DAILY
Status: DISCONTINUED | OUTPATIENT
Start: 2017-08-06 | End: 2017-08-15 | Stop reason: HOSPADM

## 2017-08-06 RX ORDER — BUDESONIDE AND FORMOTEROL FUMARATE DIHYDRATE 80; 4.5 UG/1; UG/1
2 AEROSOL RESPIRATORY (INHALATION)
Status: DISCONTINUED | OUTPATIENT
Start: 2017-08-06 | End: 2017-08-15 | Stop reason: HOSPADM

## 2017-08-06 RX ORDER — SODIUM CHLORIDE 0.9 % (FLUSH) 0.9 %
1-10 SYRINGE (ML) INJECTION AS NEEDED
Status: DISCONTINUED | OUTPATIENT
Start: 2017-08-06 | End: 2017-08-15 | Stop reason: HOSPADM

## 2017-08-06 RX ORDER — ACETAMINOPHEN 10 MG/ML
1000 INJECTION, SOLUTION INTRAVENOUS ONCE
Status: COMPLETED | OUTPATIENT
Start: 2017-08-07 | End: 2017-08-07

## 2017-08-06 RX ORDER — SACCHAROMYCES BOULARDII 250 MG
250 CAPSULE ORAL 2 TIMES DAILY
Status: DISCONTINUED | OUTPATIENT
Start: 2017-08-06 | End: 2017-08-15 | Stop reason: HOSPADM

## 2017-08-06 RX ORDER — ESTRADIOL 0.1 MG/G
2 CREAM VAGINAL 2 TIMES WEEKLY
Status: DISCONTINUED | OUTPATIENT
Start: 2017-08-07 | End: 2017-08-15 | Stop reason: HOSPADM

## 2017-08-06 RX ORDER — ACETAMINOPHEN 325 MG/1
650 TABLET ORAL EVERY 4 HOURS PRN
Status: DISCONTINUED | OUTPATIENT
Start: 2017-08-06 | End: 2017-08-15 | Stop reason: HOSPADM

## 2017-08-06 RX ORDER — ONDANSETRON 2 MG/ML
4 INJECTION INTRAMUSCULAR; INTRAVENOUS EVERY 6 HOURS PRN
Status: DISCONTINUED | OUTPATIENT
Start: 2017-08-06 | End: 2017-08-07 | Stop reason: HOSPADM

## 2017-08-06 RX ORDER — LEVETIRACETAM 750 MG/1
750 TABLET ORAL DAILY
Status: DISCONTINUED | OUTPATIENT
Start: 2017-08-06 | End: 2017-08-15 | Stop reason: HOSPADM

## 2017-08-06 RX ORDER — IPRATROPIUM BROMIDE AND ALBUTEROL SULFATE 2.5; .5 MG/3ML; MG/3ML
3 SOLUTION RESPIRATORY (INHALATION) EVERY 4 HOURS PRN
Status: DISCONTINUED | OUTPATIENT
Start: 2017-08-06 | End: 2017-08-15 | Stop reason: HOSPADM

## 2017-08-06 RX ORDER — AMLODIPINE BESYLATE 5 MG/1
5 TABLET ORAL DAILY
Status: DISCONTINUED | OUTPATIENT
Start: 2017-08-06 | End: 2017-08-14

## 2017-08-06 RX ORDER — LISINOPRIL 10 MG/1
10 TABLET ORAL
Status: DISCONTINUED | OUTPATIENT
Start: 2017-08-06 | End: 2017-08-07

## 2017-08-06 RX ORDER — CLONAZEPAM 1 MG/1
1 TABLET ORAL 2 TIMES DAILY PRN
Status: DISCONTINUED | OUTPATIENT
Start: 2017-08-06 | End: 2017-08-12

## 2017-08-06 RX ORDER — ONDANSETRON 4 MG/1
4 TABLET, FILM COATED ORAL EVERY 6 HOURS PRN
Status: DISCONTINUED | OUTPATIENT
Start: 2017-08-06 | End: 2017-08-07 | Stop reason: HOSPADM

## 2017-08-06 RX ORDER — PANTOPRAZOLE SODIUM 40 MG/1
40 TABLET, DELAYED RELEASE ORAL
Status: DISCONTINUED | OUTPATIENT
Start: 2017-08-06 | End: 2017-08-15 | Stop reason: HOSPADM

## 2017-08-06 RX ADMIN — ALLOPURINOL 100 MG: 100 TABLET ORAL at 08:58

## 2017-08-06 RX ADMIN — LISINOPRIL 10 MG: 10 TABLET ORAL at 08:58

## 2017-08-06 RX ADMIN — INSULIN LISPRO 2 UNITS: 100 INJECTION, SOLUTION INTRAVENOUS; SUBCUTANEOUS at 20:56

## 2017-08-06 RX ADMIN — ALLOPURINOL 100 MG: 100 TABLET ORAL at 17:27

## 2017-08-06 RX ADMIN — ACETAMINOPHEN 650 MG: 325 TABLET, FILM COATED ORAL at 13:12

## 2017-08-06 RX ADMIN — BUDESONIDE AND FORMOTEROL FUMARATE DIHYDRATE 2 PUFF: 80; 4.5 AEROSOL RESPIRATORY (INHALATION) at 19:44

## 2017-08-06 RX ADMIN — IPRATROPIUM BROMIDE AND ALBUTEROL SULFATE 3 ML: .5; 3 SOLUTION RESPIRATORY (INHALATION) at 09:34

## 2017-08-06 RX ADMIN — ACETAMINOPHEN 650 MG: 325 TABLET, FILM COATED ORAL at 23:59

## 2017-08-06 RX ADMIN — HEPARIN SODIUM 5000 UNITS: 5000 INJECTION, SOLUTION INTRAVENOUS; SUBCUTANEOUS at 13:56

## 2017-08-06 RX ADMIN — CEFTRIAXONE SODIUM 1 G: 1 INJECTION, SOLUTION INTRAVENOUS at 00:49

## 2017-08-06 RX ADMIN — SODIUM CHLORIDE 100 ML/HR: 9 INJECTION, SOLUTION INTRAVENOUS at 13:52

## 2017-08-06 RX ADMIN — BENZONATATE 100 MG: 100 CAPSULE, LIQUID FILLED ORAL at 18:07

## 2017-08-06 RX ADMIN — Medication 250 MG: at 08:58

## 2017-08-06 RX ADMIN — Medication 250 MG: at 17:27

## 2017-08-06 RX ADMIN — MEROPENEM 1 G: 1 INJECTION, POWDER, FOR SOLUTION INTRAVENOUS at 13:56

## 2017-08-06 RX ADMIN — IPRATROPIUM BROMIDE AND ALBUTEROL SULFATE 3 ML: .5; 3 SOLUTION RESPIRATORY (INHALATION) at 19:44

## 2017-08-06 RX ADMIN — LEVETIRACETAM 750 MG: 750 TABLET, FILM COATED ORAL at 08:58

## 2017-08-06 RX ADMIN — BUDESONIDE AND FORMOTEROL FUMARATE DIHYDRATE 2 PUFF: 80; 4.5 AEROSOL RESPIRATORY (INHALATION) at 09:34

## 2017-08-06 RX ADMIN — AMLODIPINE BESYLATE 5 MG: 5 TABLET ORAL at 08:59

## 2017-08-06 RX ADMIN — MEROPENEM 1 G: 1 INJECTION, POWDER, FOR SOLUTION INTRAVENOUS at 01:43

## 2017-08-06 RX ADMIN — ASPIRIN 81 MG CHEWABLE TABLET 81 MG: 81 TABLET CHEWABLE at 08:59

## 2017-08-06 RX ADMIN — HEPARIN SODIUM 5000 UNITS: 5000 INJECTION, SOLUTION INTRAVENOUS; SUBCUTANEOUS at 21:00

## 2017-08-06 RX ADMIN — PANTOPRAZOLE SODIUM 40 MG: 40 TABLET, DELAYED RELEASE ORAL at 06:42

## 2017-08-06 RX ADMIN — SODIUM CHLORIDE 100 ML/HR: 9 INJECTION, SOLUTION INTRAVENOUS at 04:08

## 2017-08-06 RX ADMIN — BENZONATATE 100 MG: 100 CAPSULE, LIQUID FILLED ORAL at 07:42

## 2017-08-06 NOTE — PLAN OF CARE
Problem: Infection, Risk/Actual (Adult)  Goal: Identify Related Risk Factors and Signs and Symptoms  Outcome: Ongoing (interventions implemented as appropriate)    08/06/17 0812   Infection, Risk/Actual   Infection, Risk/Actual: Related Risk Factors other (see comments)  (fistula)   Signs and Symptoms (Infection, Risk/Actual) weakness       Goal: Infection Prevention/Resolution  Outcome: Ongoing (interventions implemented as appropriate)    08/06/17 0940   Infection, Risk/Actual (Adult)   Infection Prevention/Resolution making progress toward outcome

## 2017-08-06 NOTE — PLAN OF CARE
Problem: Patient Care Overview (Adult)  Goal: Plan of Care Review  Outcome: Ongoing (interventions implemented as appropriate)    08/06/17 0547   Coping/Psychosocial Response Interventions   Plan Of Care Reviewed With patient   Patient Care Overview   Progress unable to show any progress toward functional goals         Problem: Fall Risk (Adult)  Goal: Identify Related Risk Factors and Signs and Symptoms  Outcome: Ongoing (interventions implemented as appropriate)    08/06/17 0598   Fall Risk   Fall Risk: Related Risk Factors age-related changes;fatigue/slow reaction;gait/mobility problems;history of falls;environment unfamiliar   Fall Risk: Signs and Symptoms presence of risk factors

## 2017-08-06 NOTE — ED PROVIDER NOTES
"Subjective   History of Present Illness  This 87-year-old female is brought to the emergency department for evaluation primarily of cough.  Triage notes note abdominal pain as a chief complaint however the patient and her daughter both feel that any abdominal discomfort she is experiencing is as a result of her \"severe cough\".  Regardless she has had this cough since being discharged from the hospital on the first of this month at that time a chest film demonstrated only chronic changes.  Her hospitalization was for a multidrug resistant urinary tract infection on top of a chronic cystitis.  The patient was treated with antibiotics and is ultimately sent home on Anc without significant improvement.  Her cough is described as fairly severe at times associated with abdominal discomfort it is nonproductive there's been no noted fever she's had no nausea vomiting or diarrhea.  There's been no report of melena or hematochezia.  She complains of occasional dysuria.  She denies associated back pain.  Her daughter notes that she has become progressively more weak and is unable to even ambulate to the bathroom any longer and has fallen recently requiring several family members to get her out of the floor.    Past medical history is significant for history of chronic urinary tract issues as noted.  The patient had been on prophylactic Cipro however had a multidrug resistant organism requiring hospitalization recently as noted.  Other medical issues including history congestive heart failure, chronic kidney disease stage III, chronic obstructive pulmonary disease not on oxygen, diabetes, coronary vascular disease, and hypertension.    Current medications as noted on the chart    Social history she continues to live with family members as primary caregivers and assistance with activities of daily living  Review of Systems   Constitutional: Positive for activity change and appetite change. Negative for chills and fever. "   Respiratory: Positive for cough and shortness of breath. Negative for chest tightness.    Cardiovascular: Negative for chest pain, palpitations and leg swelling.   Gastrointestinal: Positive for abdominal pain. Negative for blood in stool, diarrhea, nausea and vomiting.   Genitourinary: Negative for dysuria, frequency and urgency.   Neurological: Positive for weakness.   All other systems reviewed and are negative.      Past Medical History:   Diagnosis Date   • Abnormal liver function test    • Anxiety    • Arthralgia of lumbar spine    • Asthma    • CAD (coronary artery disease)    • Calf cramp    • Candidal intertrigo    • Cataract    • Chest pain    • CHF (congestive heart failure)    • Chronic kidney disease, stage III (moderate)    • Chronic UTI (urinary tract infection)     last infection1 1/2 years ago. Cipro prophalactically   • Complete heart block    • COPD (chronic obstructive pulmonary disease)    • Cough    • Depression    • Diabetes mellitus    • Dizziness    • DVT (deep venous thrombosis)     1950's, last one 5 years   • Dysuria    • Edema    • Fatigue    • Fracture of patella    • GERD (gastroesophageal reflux disease)    • Gout    • Headache    • Hyperlipidemia    • Hypertension    • Hypertriglyceridemia    • Influenza A    • Insomnia    • Kidney failure     early stages, due to Celebrex   • Knee pain    • Leukocytosis    • Macrocytosis    • Macular degeneration    • Menopause    • Myocardial infarction    • Night sweats    • ANDRIY (obstructive sleep apnea)    • Osteoporosis with fracture    • Parasites in stool    • Peripheral neuropathy    • Pneumonia    • Psoriasis    • Seizures     last one 5 years ago   • Sepsis    • Sinus bradycardia    • SSS (sick sinus syndrome)    • Thrombophlebitis    • UTI (urinary tract infection)    • Vitamin B12 deficiency    • Vitamin D deficiency        Allergies   Allergen Reactions   • Clarithromycin Anaphylaxis   • Macrobid [Nitrofurantoin Monohyd Macro] Swelling  "    Flash pulmonary edema   • Vioxx [Rofecoxib] Other (See Comments)     Affects kidney function   • Metoprolol Other (See Comments)     Severe hypotension   • Statins Myalgia     Weakness    • Tramadol Hallucinations     \"crazy out of her head\"       Past Surgical History:   Procedure Laterality Date   • CHOLECYSTECTOMY     • COLONOSCOPY      10 years ago   • HERNIA REPAIR     • KYPHOPLASTY N/A 9/23/2016    Procedure: KYPHOPLASTY T12;  Surgeon: Charles Nguyen MD;  Location: [facility];  Service:    • PACEMAKER IMPLANTATION     • UMBILICAL HERNIA REPAIR         Family History   Problem Relation Age of Onset   • Heart disease Father    • Heart disease Paternal Grandmother    • Heart disease Paternal Grandfather    • Cancer Mother    • Stomach cancer Other      Family history       Social History     Social History   • Marital status:      Spouse name: N/A   • Number of children: 2   • Years of education: N/A     Social History Main Topics   • Smoking status: Never Smoker   • Smokeless tobacco: Never Used   • Alcohol use No   • Drug use: No   • Sexual activity: Defer     Other Topics Concern   • None     Social History Narrative    The patient lives with her children.  Requires help with all ADL's and prepare meals, grocery shopping,  medications.           Objective   Physical Exam   Constitutional: She is oriented to person, place, and time. She appears well-developed and well-nourished. No distress.   HENT:   Head: Normocephalic and atraumatic.   Mouth/Throat: Oropharynx is clear and moist.   Eyes: Pupils are equal, round, and reactive to light. No scleral icterus.   Neck: Normal range of motion. Neck supple. No tracheal deviation present. No thyromegaly present.   Cardiovascular: Normal rate and regular rhythm.    No murmur heard.  Pulmonary/Chest: Effort normal. No respiratory distress. She has no rales.   Abdominal: Soft. Bowel sounds are normal. She exhibits no distension. There is tenderness. There is " no rebound and no guarding.   Musculoskeletal: She exhibits no edema.   Neurological: She is alert and oriented to person, place, and time. No cranial nerve deficit. Coordination normal.   Skin: Skin is warm and dry. No rash noted. She is not diaphoretic.   Psychiatric: She has a normal mood and affect. Her behavior is normal.   Nursing note and vitals reviewed.    On abdominal examination there is some mild diffuse discomfort to palpation.  She is mildly tympanitic to percussion.  No masses are palpated of the patient's degree of obesity makes it difficult to assess organ size.  Bowel sounds are present.  There is no CVA or flank tenderness.    The patient's white count is elevated at 32,000.  H&H are normal.  Urinalysis shows too numerous to count white cells per high-power field.  There is large leukocyte esterase.  Small blood.  A culture is pending.  Blood cultures are likewise pending.    CT the abdomen and pelvis demonstrates a collection of fluid and gas between the mid sigmoid colon and anterior bladder with small amount of gas in the bladder.  Findings are suggestive of a colovesical fistula.    Antibiotics have been ordered and have consult did with urology by phone.  In speaking with Dr. Ramos he concurs with antibiotic administration.  He indicates that there are no acute urologic interventions to be performed at this time.    Admission will be arranged with the hospitalist service.  Procedures         ED Course  ED Course                  MDM    Final diagnoses:   Acute urinary tract infection   [place]-vesical fistula            Bright Dooley MD  08/06/17 0559

## 2017-08-06 NOTE — H&P
[facility] Medicine Services  HISTORY AND PHYSICAL    Primary Care Physician: Carol Herzog MD    Subjective     Chief Complaint: dysuria    History of Present Illness:   This is an 87 year old female with a past medical history significant for COPD not on supplemental oxygen, CAD, DM2, HTN, CKD III, anxiety/depression and ANDRIY on CPAP who presents with complaints of persistent dysuria. There is associated suprapubic pain which is relieved with urination, urinary frequency and incontinence. Denies gross hematuria, fever, or N/VD. Also noting a chronic cough which is non productive and worse at night. Denies SOB, congestion or chest pain. Patient was recently discharged from this service 8 days ago after being admitted for COPD exacerbation and acute UTI. Urine culture isolated E. Coli. She was discharged with a 10 day course of PO cefpodoxime and a 5 days course of oral prednisone. States she was complaint with meds. Records reviewed indicate a long standing history of recurrent UTIs with antibiotic resistance and previous treatment with prophylactic Cipro. She is followed per urology by Dr. Osuna and was scheduled for outpatient cystoscopy on 8/8. Now returning to [facility] for further evaluation and treatment.    Emergency Department Evaluation: vitals stable. Blood glucose elevated at 136. Alk phos increased to 232. WBC elevated at 22.6. Chemistry and hematology otherwise favorable. Urinalysis demonstrates acute infection with too numerous to count white cells. CT abdomen demonstrates a new collection of fluid and gas between the mid sigmoid colon and anterior bladder and small amount of gas in the bladder suggestive of a fistula.      Review of Systems   Constitutional: Negative for fatigue and fever.   HENT: Negative for congestion and trouble swallowing.    Eyes: Negative for photophobia and visual disturbance.   Respiratory: Positive for cough. Negative for shortness of breath  and wheezing.    Cardiovascular: Negative for chest pain.   Gastrointestinal: Positive for abdominal pain. Negative for diarrhea, nausea and vomiting.   Endocrine: Negative for cold intolerance and heat intolerance.   Genitourinary: Positive for dysuria and frequency. Negative for flank pain and hematuria.   Musculoskeletal: Negative for back pain and gait problem.   Skin: Negative for pallor and rash.   Allergic/Immunologic: Negative for immunocompromised state.   Neurological: Negative for syncope and headaches.   Hematological: Negative for adenopathy.   Psychiatric/Behavioral: Negative for agitation and confusion.      Otherwise complete ROS performed and negative except as mentioned in the HPI.    Past Medical History:   Diagnosis Date   • Abnormal liver function test    • Anxiety    • Arthralgia of lumbar spine    • Asthma    • CAD (coronary artery disease)    • Calf cramp    • Candidal intertrigo    • Cataract    • Chest pain    • CHF (congestive heart failure)    • Chronic kidney disease, stage III (moderate)    • Chronic UTI (urinary tract infection)     last infection1 1/2 years ago. Cipro prophalactically   • Complete heart block    • COPD (chronic obstructive pulmonary disease)    • Cough    • Depression    • Diabetes mellitus    • Dizziness    • DVT (deep venous thrombosis)     1950's, last one 5 years   • Dysuria    • Edema    • Fatigue    • Fracture of patella    • GERD (gastroesophageal reflux disease)    • Gout    • Headache    • Hyperlipidemia    • Hypertension    • Hypertriglyceridemia    • Influenza A    • Insomnia    • Kidney failure     early stages, due to Celebrex   • Knee pain    • Leukocytosis    • Macrocytosis    • Macular degeneration    • Menopause    • Myocardial infarction    • Night sweats    • ANDRIY (obstructive sleep apnea)    • Osteoporosis with fracture    • Parasites in stool    • Peripheral neuropathy    • Pneumonia    • Psoriasis    • Seizures     last one 5 years ago   • Sepsis   "  • Sinus bradycardia    • SSS (sick sinus syndrome)    • Thrombophlebitis    • UTI (urinary tract infection)    • Vitamin B12 deficiency    • Vitamin D deficiency        Past Surgical History:   Procedure Laterality Date   • CHOLECYSTECTOMY     • COLONOSCOPY      10 years ago   • HERNIA REPAIR     • KYPHOPLASTY N/A 9/23/2016    Procedure: KYPHOPLASTY T12;  Surgeon: Charles Nguyen MD;  Location: [facility];  Service:    • PACEMAKER IMPLANTATION     • UMBILICAL HERNIA REPAIR         Family History   Problem Relation Age of Onset   • Heart disease Father    • Heart disease Paternal Grandmother    • Heart disease Paternal Grandfather    • Cancer Mother    • Stomach cancer Other      Family history       Social History     Social History   • Marital status:      Spouse name: N/A   • Number of children: 2   • Years of education: N/A     Occupational History   • Not on file.     Social History Main Topics   • Smoking status: Never Smoker   • Smokeless tobacco: Never Used   • Alcohol use No   • Drug use: No   • Sexual activity: Defer     Other Topics Concern   • Not on file     Social History Narrative    The patient lives with her children.  Requires help with all ADL's and prepare meals, grocery shopping,  medications.       Medications:    (Not in a hospital admission)    Allergies:  Allergies   Allergen Reactions   • Clarithromycin Anaphylaxis   • Macrobid [Nitrofurantoin Monohyd Macro] Swelling     Flash pulmonary edema   • Vioxx [Rofecoxib] Other (See Comments)     Affects kidney function   • Metoprolol Other (See Comments)     Severe hypotension   • Statins Myalgia     Weakness    • Tramadol Hallucinations     \"crazy out of her head\"         Objective     Physical Exam:  Vital Signs: /68  Pulse 79  Temp 98.7 °F (37.1 °C) (Oral)   Resp 18  Ht 66\" (167.6 cm)  Wt 208 lb (94.3 kg)  SpO2 97%  BMI 33.57 kg/m2  Physical Exam  Temp:  [98.7 °F (37.1 °C)] 98.7 °F (37.1 °C)  Heart Rate:  [79-86] 79  Resp:  " [18] 18  BP: (149-154)/(68-74) 149/68  Constitutional: no acute distress, awake, alert, morbidly obese  Eyes: PERRLA, sclerae anicteric, no conjunctival injection, left ptosis  Neck: supple, no thyromegaly, trachea midline  Respiratory: Clear to auscultation bilaterally, nonlabored respirations   Cardiovascular: RRR, no murmurs, rubs, or gallops, palpable pedal pulses bilaterally  Gastrointestinal: Positive bowel sounds, soft, nondistended, tenderness to palpation of lower abdomen and suprapubic region, no guarding or rebound tenderness  Musculoskeletal: No bilateral ankle edema, no clubbing or cyanosis to bilateral lower extremities  Psychiatric: oriented x 3, appropriate affect, cooperative  Neurologic: Strength symmetric in all extremities, Cranial Nerves grossly intact to confrontation         Results Reviewed:    Results from last 7 days  Lab Units 08/05/17  2045   WBC 10*3/mm3 22.63*   HEMOGLOBIN g/dL 12.3   PLATELETS 10*3/mm3 418       Results from last 7 days  Lab Units 08/05/17  2045   SODIUM mmol/L 136   POTASSIUM mmol/L 4.4   CO2 mmol/L 34.0*   CREATININE mg/dL 1.30   GLUCOSE mg/dL 136*   CALCIUM mg/dL 9.9       I have personally reviewed and interpreted available lab data, radiology studies and ECG obtained at time of admission.     Assessment / Plan     Problem List:   Hospital Problem List     * (Principal)Acute cystitis without hematuria    Colovesical fistula    Chronic kidney disease, stage III (moderate)    Overview Addendum 2/28/2017 11:56 AM by Carol Herzog MD     Impression: 03/03/2016 - reassess 3-4 months- get lab hospitalization  Impression: 10/12/2015 - check cmp  Impression: 11/03/2014 - check cmp  Impression: 08/11/2014 - having lab work to f/u this on Thursday  Impression: 07/22/2014 - check cmp  Impression: 04/08/2014 - update creat.; Description: Nafisa naidu 11/5  Impression: 03/03/2016 - reassess 3-4 months- get lab hospitalization  Impression: 10/12/2015 - check cmp   Impression: 11/03/2014 - check cmp  Impression: 08/11/2014 - having lab work to f/u this on Thursday  Impression: 07/22/2014 - check cmp  Impression: 04/08/2014 - update creat.; Description: Nafisa naidu 11/5         Chronic obstructive pulmonary disease    Overview Addendum 2/28/2017 11:56 AM by Carol Herzog MD     Impression: 01/13/2016 - Sent in order for lifetime supply of albuterol for nebulizer.; Description: severe  Impression: 01/13/2016 - Sent in order for lifetime supply of albuterol for nebulizer.; Description: severe         Hypertension    Overview Addendum 2/28/2017 11:56 AM by Carol Herzog MD     Impression: 03/03/2016 - bp is good  Impression: 10/12/2015 - bp is better with recheck  Impression: 06/16/2015 - bp is good  Impression: 03/11/2015 - bp is good today  Impression: 11/03/2014 - bp is good today- recently reduced bp med due to low bp and reduced diuretic due to impairing kidney function  Impression: 08/11/2014 - bp is good  Impression: 07/22/2014 - bp is good  Impression: 04/08/2014 - bp is good;   Impression: 03/03/2016 - bp is good  Impression: 10/12/2015 - bp is better with recheck  Impression: 06/16/2015 - bp is good  Impression: 03/11/2015 - bp is good today  Impression: 11/03/2014 - bp is good today- recently reduced bp med due to low bp and reduced diuretic due to impairing kidney function  Impression: 08/11/2014 - bp is good  Impression: 07/22/2014 - bp is good  Impression: 04/08/2014 - bp is good;          Diastolic dysfunction    Type 2 diabetes mellitus    Hyperlipidemia    Overview Addendum 2/28/2017 11:56 AM by Carol Herzog MD     Impression: 10/12/2015 - check flp  Impression: 03/11/2015 - check flp  Impression: 11/03/2014 - check flp  Impression: 08/11/2014 - refill atorvastatin- check lab from hosp.  Impression: 07/22/2014 - update flp  Impression: 04/08/2014 - check flp;   Impression: 10/12/2015 - check flp  Impression: 03/11/2015 - check flp   Impression: 11/03/2014 - check flp  Impression: 08/11/2014 - refill atorvastatin- check lab from hosp.  Impression: 07/22/2014 - update flp  Impression: 04/08/2014 - check flp;          Anxiety and depression    ANDRIY on CPAP    GERD (gastroesophageal reflux disease)    Weakness          Plan:  1. Acute cystitis without hematuria:  - complicated and multifactorial secondary to incontinence, previous multidrug resistance, and fistula  - failed out patient treatment on PO Vantin after recent inpatient hospital stay related to MDR E. Coli UTI  - administered rocephin in ED. Discontinue and start Meropenem for best enteric coverage.  - consult to urology, pt normally sees Dr. Osuna ([facility]) and was scheduled for outpt cystoscopy this week  - saline 75 cc/hr X 10 hours  - am labs    2. Colovesical fistula:  - demonstrated on CT. New since Last CT in January.  - Consult to colorectal surgery and urology    3. CKD III:  - stable. Baseline creatinine 1.3-1.6  - monitor and avoid additional nephrotoxic agents    4. COPD:  - not on supplemental oxygen.   - continue symbicort as needed  - as needed pulmonary toilet; duo nebs, mucolytics, antitussives    5. DM2:  - controlled and stable. Hold home insulin  - scheduled accu checks with [facility]    6. Hypertension:  - stable and controlled. Plan to continue home meds.    7. Weakness:  - patient recently sustained mechanical fall since discharge  - Pt/OT treat and eval    Patient stable for admission to TELEMETRY. Labs and vitals routine per nursing.    DVT prophylaxis: SCD/TEDs  Code Status: conditional, DNR  Admission Status: Patient will be admitted to INPATIENT status due to the need for care which can only be reasonably provided in an hospital setting such as aggressive/expedited ancillary services and/or consultation services and the necessity for IV antibiotics, close physician monitoring and need for possible procedures.  In such, I feel patient’s risk for adverse outcomes  "and need for care warrant INPATIENT evaluation and predict the patient’s care encounter to likely last beyond 2 midnights.       Mejia Johnson PA-C 08/06/17 3:18 AM        Brief Attending Note       I have seen and examined the patient, performing an independent face-to-face diagnostic evaluation with plan of care reviewed and developed with the advanced practice clinician (APC).      Brief Summary Statement/HPI:   Recently admitted 7/29-8/1/17 for MDR E. Coli UTI and d/c'd on remainder of PO cefpodoxime based on sensitivities.  Has hx of recurring UTI's and prophylactic Cipro Rx, followed by urologist ([facility] at [facility]) and was scheduled for cystoscopy this coming week on 8/8/17 as part of ongoing eval. Brought to ED by family for persistent dysuria and general fatigue despite ongoing PO antibiotics for UTI. Associated with mild suprapubic \"full feeling\" and urine frequency.  Denies fever, hematuria, chills/rigors, diarrhea.  Imaging tin ED confirms new finding of colovesicular fistula.      Attending Physical Exam:  Temp:  [98.7 °F (37.1 °C)] 98.7 °F (37.1 °C)  Heart Rate:  [79-86] 79  Resp:  [18] 18  BP: (149-154)/(68-74) 149/68  Constitutional: no acute distress, awake, alert  Eyes: PERRLA, sclerae anicteric, no conjunctival injection  Neck: supple, no thyromegaly, trachea midline  Respiratory: Clear to auscultation bilaterally, nonlabored respirations   Cardiovascular: RRR, no murmur  Gastrointestinal: Positive bowel sounds, soft, nontender, nondistended  Musculoskeletal: No bilateral ankle edema, no clubbing or cyanosis to bilateral lower extremities  Psychiatric: oriented x 3, appropriate affect, cooperative  Neurologic: Movements symmetric in all extremities, Cranial Nerves grossly intact to confrontation         Brief Assessment/Plan :      See above for further detailed assessment and plan developed with APC which I have reviewed and/or edited.        Violeta Lawson MD  08/06/17  5:25 AM         "

## 2017-08-06 NOTE — PROGRESS NOTES
"      HOSPITALIST DAILY PROGRESS NOTE    Chief Complaint: dysuria    Subjective   SUBJECTIVE/OVERNIGHT EVENTS     Pt admitted earlier this am by my colleague, Dr. Lawson.  Pt still feeling poorly and I was just notified by RN that she is currently febrile.    Review of Systems:  Gen-as above  CV-no chest pain, no palpitations  Resp-no cough, no dyspnea  GI-no N/V/D, no abd pain    Objective   OBJECTIVE   I have reviewed the vital signs.  /65 (BP Location: Left arm, Patient Position: Lying)  Pulse 83  Temp 100.3 °F (37.9 °C) (Axillary)   Resp 18  Ht 66\" (167.6 cm)  Wt 208 lb (94.3 kg)  SpO2 93%  BMI 33.57 kg/m2    Physical Exam:  Gen-no acute distress  CV-RRR, S1 S2 normal, no m/r/g  Resp-CTAB, no wheezes  Abd-soft, NT, ND, +BS  Ext-no edema  Neuro-A&Ox3, no focal deficits  Psych-appropriate mood    Results:  I have reviewed the labs, culture data, radiology results, and diagnostic studies.      Results from last 7 days  Lab Units 08/05/17 2045   WBC 10*3/mm3 22.63*   HEMOGLOBIN g/dL 12.3   HEMATOCRIT % 39.3   PLATELETS 10*3/mm3 418       Results from last 7 days  Lab Units 08/05/17 2045   SODIUM mmol/L 136   POTASSIUM mmol/L 4.4   CHLORIDE mmol/L 95*   CO2 mmol/L 34.0*   BUN mg/dL 20   CREATININE mg/dL 1.30   GLUCOSE mg/dL 136*   CALCIUM mg/dL 9.9       Culture Data:  Cultures:           Radiology Results:  Imaging Results (last 24 hours)     Procedure Component Value Units Date/Time    XR Chest 1 View [[id number]]  (Abnormal) Collected:  08/05/17 2037     Updated:  08/05/17 2225    Narrative:       EXAM:    XR Chest, 1 View    CLINICAL HISTORY:    87 years old, female; Pain; Other: Upper abdominal pain; Additional info:   Upper abdominal pain triage protocol    TECHNIQUE:    Frontal view of the chest.    EXAM DATE/TIME:    8/5/2017 8:37 PM    COMPARISON:    CR - XR CHEST 1 VW 7/29/2017 7:09:10 PM    FINDINGS:    Patient is rotated and lung volumes are low, limiting assessment.      Otherwise no focal " RIGHT pulmonary consolidation is demonstrated.      Probable mild atelectasis at LEFT lung base.      Otherwise no significant obscuration of lateral costophrenic angles is   demonstrated.      No significant vascular congestion is demonstrated.      There is scattered pulmonary scarring bilaterally.      Visualized cardiac silhouette size appears mild to moderately enlarged,   accentuated by low lung volumes. Pericardial effusion not excluded.      There are atherosclerotic calcifications in the thoracic aorta.      Stable position of pacer leads.      Impression:         Patient is rotated and lung volumes are low, limiting assessment.      Otherwise no definite acute pulmonary process demonstrated.      Visualized cardiac silhouette size appears mild to moderately enlarged,   accentuated by low lung volumes. Pericardial effusion not excluded.    THIS DOCUMENT HAS BEEN ELECTRONICALLY SIGNED BY FRANCISCO UMANZOR MD    CT Abdomen Pelvis Without Contrast [[id number]]  (Abnormal) Collected:  08/05/17 2156     Updated:  08/06/17 0040    Narrative:       EXAM:    CT Abdomen and Pelvis Without Intravenous Contrast    CLINICAL HISTORY:    87 years old, female; Pain; Abdominal pain; Localized; Left; Prior surgery    TECHNIQUE:    Axial computed tomography images of the abdomen and pelvis without   intravenous contrast.  This CT exam was performed using one or more of the   following dose reduction techniques:  automated exposure control, adjustment of   the mA and/or kV according to patient size, and/or use of iterative   reconstruction technique.    Coronal reformatted images were created and reviewed.    COMPARISON:    CT ABD PELVIS WO CONTRAST 1/2/2016 9:44:05 AM    FINDINGS:    Mild atelectasis at bilateral lung bases.    Status post cholecystectomy. The liver, pancreas, adrenal glands, and kidneys   are unremarkable within the limits of a noncontrast study.    No renal collecting system stones identified. No hydronephrosis  on either side.    A normal-appearing appendix is seen in the right lower quadrant. No evidence of   bowel obstruction. Diverticulosis of the distal transverse colon, descending   colon, and sigmoid colon. There is a 5.3 cm x 3.5 cm collection of fluid and   gas between the mid sigmoid colon and anterior bladder on series 3 image 68.   Small amount of gas in the bladder. These findings suggest a fistula, possibly   related to diverticulitis or inflammatory bowel disease. This finding is new   since the prior study.    The uterus is unremarkable.    The abdominal aorta is non-aneurysmal.    Moderate compression deformity of the T12 vertebral body (status post   vertebroplasty). Mild degenerative changes of the lower thoracic spine and the   lumbar spine.      Impression:       1. Collection of fluid and gas between the mid sigmoid colon and anterior   bladder. Small amount of gas in the bladder. These findings suggest a fistula,   possibly related to diverticulitis or inflammatory bowel disease. This finding   is new since the prior study.    The findings were discussed with Dr. Dooley on 8/6/2017 12:37 AM EDT.    THIS DOCUMENT HAS BEEN ELECTRONICALLY SIGNED BY GERMANIA LONDON MD          I have reviewed the medications.      Assessment/Plan   ASSESSMENT/PLAN    Principal Problem:    Sepsis  Active Problems:    Chronic kidney disease, stage III (moderate)    Chronic obstructive pulmonary disease    Hyperlipidemia    Hypertension    Anxiety and depression    ANDRIY on CPAP    GERD (gastroesophageal reflux disease)    Diastolic dysfunction    Type 2 diabetes mellitus    Acute cystitis without hematuria    Colovesical fistula    Weakness    Recently admitted 7/29-8/1/17 for MDR E. Coli UTI and d/c'd on remainder of PO cefpodoxime based on sensitivities.  Has hx of recurring UTI's and prophylactic Cipro Rx, followed by urologist (Thao at [facility]) and was scheduled for cystoscopy this coming week on 8/8/17 as part of  "ongoing eval. Brought to ED by family for persistent dysuria and general fatigue despite ongoing PO antibiotics for UTI. Associated with mild suprapubic \"full feeling\" and urine frequency.  Denies fever, hematuria, chills/rigors, diarrhea.  Imaging in ED confirms new finding of colovesicular fistula.       Plan:  --continue IV ABX, await UCx results  --IVFs  --blood cultures PENDING  --CRS and Urology both consulted for new findings of fistula.  Possibly OR later today.  --monitor closely and repeat labs in am      I expect patient to be discharged in: EZEQUIEL Aguirre MD  08/06/17  12:54 PM          "

## 2017-08-06 NOTE — CONSULTS
Avis Us  [id number]  [id number]  9/3/1929    Patient Care Team:  Carol Herzog MD as PCP - General  Carol Herzog MD as PCP - Family Medicine  Carol Herzog MD as PCP - Claims Attributed  Paco Mcknight, RN as Care Coordinator (Population Health)    Consulting Provider Dr. Aguirre    Reason for Consult Colovesical fistula    Subjective     Patient is a 87 y.o. female presents with recurrent urinary tract infections with acute presentation of a febrile illness to the emergency room yesterday.  He became weak and fell at home prior to her son bring her into the emergency room.  She was scheduled for cystoscopy in a few weeks with Dr. Osuna for further evaluation however, the patient says she's been passing stool with her urine for several months now.  CT scan consistent with significant sigmoid diverticular disease with sigmoid colon adherent to the bladder with air in the bladder.  There is also associated fluid collection outside of the colon and the bladder.  Significant leukocytosis.  Denies much abdominal pain.  Reports feeling better today than yesterday.  No recent bowel changes.  Lives with her son. New productive cough.  Walks around the house with a walker.    Review of Systems   Pertinent items are noted in HPI, all other systems reviewed and negative. Specifically, there is no F/C/N/V/NSAID abuse, recent abx, new/unusual HA or visual disturbances, CP. Limb swelling, gait disturbance, new rashes or arthritis.       History  Past Medical History:   Diagnosis Date   • Abnormal liver function test    • Anxiety    • Arthralgia of lumbar spine    • Asthma    • CAD (coronary artery disease)    • Calf cramp    • Candidal intertrigo    • Cataract    • Chest pain    • CHF (congestive heart failure)    • Chronic kidney disease, stage III (moderate)    • Chronic UTI (urinary tract infection)     last infection1 1/2 years ago. Cipro prophalactically   • Complete heart block    • COPD  (chronic obstructive pulmonary disease)    • Cough    • Depression    • Diabetes mellitus    • Dizziness    • DVT (deep venous thrombosis)     1950's, last one 5 years   • Dysuria    • Edema    • Fatigue    • Fracture of patella    • GERD (gastroesophageal reflux disease)    • Gout    • Headache    • Hyperlipidemia    • Hypertension    • Hypertriglyceridemia    • Influenza A    • Insomnia    • Kidney failure     early stages, due to Celebrex   • Knee pain    • Leukocytosis    • Macrocytosis    • Macular degeneration    • Menopause    • Myocardial infarction    • Night sweats    • ANDRIY (obstructive sleep apnea)    • Osteoporosis with fracture    • Parasites in stool    • Peripheral neuropathy    • Pneumonia    • Psoriasis    • Seizures     last one 5 years ago   • Sepsis    • Sinus bradycardia    • SSS (sick sinus syndrome)    • Thrombophlebitis    • UTI (urinary tract infection)    • Vitamin B12 deficiency    • Vitamin D deficiency      Past Surgical History:   Procedure Laterality Date   • CHOLECYSTECTOMY     • COLONOSCOPY      10 years ago   • HERNIA REPAIR     • KYPHOPLASTY N/A 9/23/2016    Procedure: KYPHOPLASTY T12;  Surgeon: Charles Nguyen MD;  Location: [facility];  Service:    • PACEMAKER IMPLANTATION     • UMBILICAL HERNIA REPAIR       Family History   Problem Relation Age of Onset   • Heart disease Father    • Heart disease Paternal Grandmother    • Heart disease Paternal Grandfather    • Cancer Mother    • Stomach cancer Other      Family history     Social History   Substance Use Topics   • Smoking status: Never Smoker   • Smokeless tobacco: Never Used   • Alcohol use No     Prescriptions Prior to Admission   Medication Sig Dispense Refill Last Dose   • albuterol (PROVENTIL) (2.5 MG/3ML) 0.083% nebulizer solution Take 2.5 mg by nebulization Every 6 (Six) Hours As Needed for wheezing. 120 vial 4 Taking   • Albuterol Sulfate (VENTOLIN HFA IN) Inhale as needed.   Taking   • allopurinol (ZYLOPRIM) 100 MG  tablet Take 1 tablet by mouth Daily. (Patient taking differently: Take 100 mg by mouth 2 (Two) Times a Day.) 180 tablet 0    • amLODIPine (NORVASC) 5 MG tablet Take 1 tablet by mouth Daily. 30 tablet 0    • aspirin 81 MG tablet Take 81 mg by mouth Daily. In evening   Taking   • cefpodoxime (VANTIN) 200 MG tablet Take 1 tablet by mouth Every 12 (Twelve) Hours. 14 tablet 0    • Cholecalciferol (VITAMIN D3) 5000 UNITS tablet Take 1 tablet by mouth daily.   Taking   • clonazePAM (KlonoPIN) 1 MG tablet TAKE 1 TABLET BY MOUTH EVERY 12 HOURS AS NEEDED 60 tablet 0    • colchicine 0.6 MG tablet Take 1 tablet by mouth Daily. (Patient taking differently: Take 0.6 mg by mouth Daily As Needed (gout flare).) 30 tablet 2    • colestipol (COLESTID) 1 G tablet Take 1 g by mouth Daily As Needed (loose stools). As needed   Taking   • diphenhydrAMINE-acetaminophen (TYLENOL PM)  MG tablet per tablet Take 1 tablet by mouth At Night As Needed for Sleep.      • estradiol (ESTRACE) 0.1 MG/GM vaginal cream Insert 2 g into the vagina 2 (Two) Times a Week. Tuesday and Sunday      • fluocinonide (LIDEX) 0.05 % external solution Apply  topically 2 (two) times a day. (Patient taking differently: Apply 1 application topically 2 (Two) Times a Day. Scalp - psoriasis) 60 mL 3 Taking   • furosemide (LASIX) 80 MG tablet Take 1/2 tab on mon, wed, and Friday. If increased edema or 5 lb wt gain in 3 days take daily. (Patient taking differently: 40 mg.)      • levETIRAcetam (KEPPRA) 750 MG tablet Take 1 tablet by mouth two  times daily 180 tablet 1    • lisinopril (PRINIVIL,ZESTRIL) 10 MG tablet Take 1 tablet by mouth  daily 90 tablet 1    • Loratadine (CLARITIN) 10 MG capsule Take 10 mg by mouth Daily As Needed (asthma).      • magnesium oxide (MAG-OX) 400 MG tablet Take 400 mg by mouth Daily.   Taking   • Multiple Vitamins-Minerals (EYE HEALTH) capsule Take 1 capsule by mouth Daily.      • pantoprazole (PROTONIX) 40 MG EC tablet Take 1 tablet by  "mouth  daily 90 tablet 1 Taking   • potassium chloride (MICRO-K) 10 MEQ CR capsule Take 1 capsule by mouth 3 (Three) Times a Week. Only take potassium when a lasix dose is taken (Patient taking differently: Take 10 mEq by mouth 2 (Two) Times a Day. Only take potassium when a lasix dose is taken) 27 capsule 0    • predniSONE (DELTASONE) 20 MG tablet Take 2 tabs daily x 2 days then stop 4 tablet 0    • saccharomyces boulardii (FLORASTOR) 250 MG capsule Take 1 capsule by mouth 2 (Two) Times a Day. 20 capsule 0    • SYMBICORT 80-4.5 MCG/ACT inhaler Use 1 puff twice daily (Patient taking differently: 1 puff daily) 20.4 g 3 Taking   • vitamin B-12 (CYANOCOBALAMIN) 1000 MCG tablet Take 1,000 mcg by mouth Daily.        Allergies:  Clarithromycin; Macrobid [nitrofurantoin monohyd macro]; Vioxx [rofecoxib]; Metoprolol; Statins; and Tramadol    Objective     Vital Signs  Blood pressure 142/65, pulse 83, temperature 100.3 °F (37.9 °C), temperature source Axillary, resp. rate 18, height 66\" (167.6 cm), weight 208 lb (94.3 kg), SpO2 93 %, not currently breastfeeding.  I/O last 3 completed shifts:  In: 1100 [I.V.:950; IV Piggyback:150]  Out: -     Physical Exam:  General Appearance: sleepy, pleasant, appears stated age, cooperative and chronically ill appearing  Head: normocephalic, without obvious abnormality and atraumatic  Eyes: lids and lashes normal, conjunctivae and sclerae normal, no icterus, no pallor and corneas clear  Neck: trachea midline  Lungs: respirations regular, respirations even and respirations unlabored  Heart: regular rhythm & normal rate  Rectal:  Deferred  Abdomen: no masses, soft, mild tender to palpation LLQ, no guarding and no rebound tenderness  Skin: no bleeding, bruising or rash, good turgor  Neurologic: Mental Status orientated to person, place, time and situation, Cranial Nerves cranial nerves 2 - 12 grossly intact as examined  Psych: normal      Results Review:   LABS    Results from last 7 " days  Lab Units 08/05/17 2045   WBC 10*3/mm3 22.63*   HEMOGLOBIN g/dL 12.3   HEMATOCRIT % 39.3   PLATELETS 10*3/mm3 418       Results from last 7 days  Lab Units 08/05/17 2045   SODIUM mmol/L 136   POTASSIUM mmol/L 4.4   CHLORIDE mmol/L 95*   CO2 mmol/L 34.0*   BUN mg/dL 20   CREATININE mg/dL 1.30   GLUCOSE mg/dL 136*   CALCIUM mg/dL 9.9       IMAGING  Imaging Results (last 72 hours)     Procedure Component Value Units Date/Time    XR Chest 1 View [[id number]]  (Abnormal) Collected:  08/05/17 2037     Updated:  08/05/17 2225    Narrative:       EXAM:    XR Chest, 1 View    CLINICAL HISTORY:    87 years old, female; Pain; Other: Upper abdominal pain; Additional info:   Upper abdominal pain triage protocol    TECHNIQUE:    Frontal view of the chest.    EXAM DATE/TIME:    8/5/2017 8:37 PM    COMPARISON:    CR - XR CHEST 1 VW 7/29/2017 7:09:10 PM    FINDINGS:    Patient is rotated and lung volumes are low, limiting assessment.      Otherwise no focal RIGHT pulmonary consolidation is demonstrated.      Probable mild atelectasis at LEFT lung base.      Otherwise no significant obscuration of lateral costophrenic angles is   demonstrated.      No significant vascular congestion is demonstrated.      There is scattered pulmonary scarring bilaterally.      Visualized cardiac silhouette size appears mild to moderately enlarged,   accentuated by low lung volumes. Pericardial effusion not excluded.      There are atherosclerotic calcifications in the thoracic aorta.      Stable position of pacer leads.      Impression:         Patient is rotated and lung volumes are low, limiting assessment.      Otherwise no definite acute pulmonary process demonstrated.      Visualized cardiac silhouette size appears mild to moderately enlarged,   accentuated by low lung volumes. Pericardial effusion not excluded.    THIS DOCUMENT HAS BEEN ELECTRONICALLY SIGNED BY FRANCISCO UMANZOR MD    CT Abdomen Pelvis Without Contrast [[id number]]  (Abnormal)  Collected:  08/05/17 2156     Updated:  08/06/17 0040    Narrative:       EXAM:    CT Abdomen and Pelvis Without Intravenous Contrast    CLINICAL HISTORY:    87 years old, female; Pain; Abdominal pain; Localized; Left; Prior surgery    TECHNIQUE:    Axial computed tomography images of the abdomen and pelvis without   intravenous contrast.  This CT exam was performed using one or more of the   following dose reduction techniques:  automated exposure control, adjustment of   the mA and/or kV according to patient size, and/or use of iterative   reconstruction technique.    Coronal reformatted images were created and reviewed.    COMPARISON:    CT ABD PELVIS WO CONTRAST 1/2/2016 9:44:05 AM    FINDINGS:    Mild atelectasis at bilateral lung bases.    Status post cholecystectomy. The liver, pancreas, adrenal glands, and kidneys   are unremarkable within the limits of a noncontrast study.    No renal collecting system stones identified. No hydronephrosis on either side.    A normal-appearing appendix is seen in the right lower quadrant. No evidence of   bowel obstruction. Diverticulosis of the distal transverse colon, descending   colon, and sigmoid colon. There is a 5.3 cm x 3.5 cm collection of fluid and   gas between the mid sigmoid colon and anterior bladder on series 3 image 68.   Small amount of gas in the bladder. These findings suggest a fistula, possibly   related to diverticulitis or inflammatory bowel disease. This finding is new   since the prior study.    The uterus is unremarkable.    The abdominal aorta is non-aneurysmal.    Moderate compression deformity of the T12 vertebral body (status post   vertebroplasty). Mild degenerative changes of the lower thoracic spine and the   lumbar spine.      Impression:       1. Collection of fluid and gas between the mid sigmoid colon and anterior   bladder. Small amount of gas in the bladder. These findings suggest a fistula,   possibly related to diverticulitis or  inflammatory bowel disease. This finding   is new since the prior study.    The findings were discussed with Dr. Dooley on 8/6/2017 12:37 AM EDT.    THIS DOCUMENT HAS BEEN ELECTRONICALLY SIGNED BY GERMANIA LONDON MD           I reviewed the patient's new clinical results.    Assessment/Plan     Principal Problem:    Sepsis  Active Problems:    Chronic kidney disease, stage III (moderate)    Chronic obstructive pulmonary disease    Hyperlipidemia    Hypertension    Anxiety and depression    ANDRIY on CPAP    GERD (gastroesophageal reflux disease)    Diastolic dysfunction    Type 2 diabetes mellitus    Acute cystitis without hematuria    Colovesical fistula    Weakness  Greater than 30 minute discussion held with the patient and her family regarding treatment of colovesical fistula in the setting of diverticular disease, advanced age and multiple medical comorbidities.  She is not toxic needing emergency surgery at this point.  Medical management likely to fail, although is certainly an option.  Risks,  Benefits and alternatives of laparoscopic diverting colostomy were also offered.  Recent reports not wanting to live with a colostomy.  We will discuss it among themselves and I will have the ostomy nurses meet with them in the morning for further information and discussion.  Tentatively, I will put her on the schedule for 1:30 tomorrow afternoon.  Patient will need clear liquids today, nothing by mouth after midnight and 2 enemas to clear her rectum they desired to proceed with an operation.  Continue IV fluids and antibiotics for now.    I discussed the patients findings and my recommendations with patient, family and nursing staff.     Chioma Gordon MD  08/06/17  1:01 PM

## 2017-08-06 NOTE — CONSULTS
Consult    Referring Provider:No ref. provider found  Reason for Consultation: UTI    Patient Care Team:  Carol Herzog MD as PCP - General  Carol Herzog MD as PCP - Family Medicine  Carol Herzog MD as PCP - Claims Attributed  Paco Mcknight, RN as Care Coordinator ([facility])    Chief complaint   Chief Complaint   Patient presents with   • Abdominal Pain       Subjective .     History of present illness:  I was asked to evaluate the patient in relation to recurrent UTIs. She was admitted last night for fatigue, fevers, lethargy. She had a CT scan that showed air in the bladder, possible UTI, had possible colovesical fistula. History is obtained from the patient and family. Apparently she saw Dr. Osuna last week in the office. CT scan was planned as well as cystoscopy for this next week. The patient has a cystoscopy scheduled for Tuesday. They are unaware of the CT scan results from his office. Patient feels better today. She is presently on antibiotics and IV hydration.         Past Medical History:   Diagnosis Date   • Abnormal liver function test    • Anxiety    • Arthralgia of lumbar spine    • Asthma    • CAD (coronary artery disease)    • Calf cramp    • Candidal intertrigo    • Cataract    • Chest pain    • CHF (congestive heart failure)    • Chronic kidney disease, stage III (moderate)    • Chronic UTI (urinary tract infection)     last infection1 1/2 years ago. Cipro prophalactically   • Complete heart block    • COPD (chronic obstructive pulmonary disease)    • Cough    • Depression    • Diabetes mellitus    • Dizziness    • DVT (deep venous thrombosis)     1950's, last one 5 years   • Dysuria    • Edema    • Fatigue    • Fracture of patella    • GERD (gastroesophageal reflux disease)    • Gout    • Headache    • Hyperlipidemia    • Hypertension    • Hypertriglyceridemia    • Influenza A    • Insomnia    • Kidney failure     early stages, due to Celebrex   • Knee pain     • Leukocytosis    • Macrocytosis    • Macular degeneration    • Menopause    • Myocardial infarction    • Night sweats    • ANDRIY (obstructive sleep apnea)    • Osteoporosis with fracture    • Parasites in stool    • Peripheral neuropathy    • Pneumonia    • Psoriasis    • Seizures     last one 5 years ago   • Sepsis    • Sinus bradycardia    • SSS (sick sinus syndrome)    • Thrombophlebitis    • UTI (urinary tract infection)    • Vitamin B12 deficiency    • Vitamin D deficiency      Past Surgical History:   Procedure Laterality Date   • CHOLECYSTECTOMY     • COLONOSCOPY      10 years ago   • HERNIA REPAIR     • KYPHOPLASTY N/A 9/23/2016    Procedure: KYPHOPLASTY T12;  Surgeon: Charles Nguyen MD;  Location: [facility];  Service:    • PACEMAKER IMPLANTATION     • UMBILICAL HERNIA REPAIR           Current Facility-Administered Medications:   •  acetaminophen (TYLENOL) tablet 650 mg, 650 mg, Oral, Q4H PRN, Mejia Johnson PA-C  •  allopurinol (ZYLOPRIM) tablet 100 mg, 100 mg, Oral, BID, Violeta Lawson MD, 100 mg at 08/06/17 0858  •  amLODIPine (NORVASC) tablet 5 mg, 5 mg, Oral, Daily, Mejia Johnson PA-C, 5 mg at 08/06/17 0859  •  aspirin chewable tablet 81 mg, 81 mg, Oral, Daily, Mejia Johnson PA-C, 81 mg at 08/06/17 0859  •  benzonatate (TESSALON) capsule 100 mg, 100 mg, Oral, TID PRN, Mejia Johnson PA-C, 100 mg at 08/06/17 0742  •  budesonide-formoterol (SYMBICORT) 80-4.5 MCG/ACT inhaler 2 puff, 2 puff, Inhalation, BID - RT, Violeta Lawson MD, 2 puff at 08/06/17 0934  •  clonazePAM (KlonoPIN) tablet 1 mg, 1 mg, Oral, BID PRN, Violeta Lawson MD  •  dextrose (D50W) solution 25 g, 25 g, Intravenous, Q15 Min PRN, Mejia Johnson PA-C  •  dextrose (GLUTOSE) oral gel 15 g, 15 g, Oral, Q15 Min PRN, Mejia Johnson PA-C  •  docusate sodium (COLACE) capsule 100 mg, 100 mg, Oral, BID PRN, eMjia Johnson PA-C  •  [START ON 8/7/2017] estradiol (ESTRACE) vaginal cream 2 g, 2 g, Vaginal, Once per day on Mon ThVioleta childers  "MD Renny  •  glucagon (GLUCAGEN) injection 1 mg, 1 mg, Subcutaneous, Q15 Min PRN, Mejia Johnson PA-C  •  insulin lispro (humaLOG) injection 0-7 Units, 0-7 Units, Subcutaneous, 4x Daily AC & at Bedtime, Mejia Johnson PA-C  •  ipratropium-albuterol (DUO-NEB) nebulizer solution 3 mL, 3 mL, Nebulization, Q4H PRN, Mejia Johnson PA-C, 3 mL at 08/06/17 0934  •  levETIRAcetam (KEPPRA) tablet 750 mg, 750 mg, Oral, Daily, Violeta Lawson MD, 750 mg at 08/06/17 0858  •  lisinopril (PRINIVIL,ZESTRIL) tablet 10 mg, 10 mg, Oral, Q24H, Violeta Lawson MD, 10 mg at 08/06/17 0858  •  melatonin sublingual tablet 5 mg, 5 mg, Sublingual, Nightly PRN, Mejia Johnson PA-C  •  meropenem (MERREM) 1 g/100 mL 0.9% NS VTB (mbp), 1 g, Intravenous, Q12H, Mejia Johnson PA-C  •  ondansetron (ZOFRAN) tablet 4 mg, 4 mg, Oral, Q6H PRN **OR** ondansetron (ZOFRAN) injection 4 mg, 4 mg, Intravenous, Q6H PRN, Mejia Johnson PA-C  •  pantoprazole (PROTONIX) EC tablet 40 mg, 40 mg, Oral, Q AM, Violeta Lawson MD, 40 mg at 08/06/17 0642  •  saccharomyces boulardii (FLORASTOR) capsule 250 mg, 250 mg, Oral, BID, Violeta Lawson MD, 250 mg at 08/06/17 0858  •  sodium chloride 0.9 % flush 1-10 mL, 1-10 mL, Intravenous, PRN, Mejia Johnson PA-C  •  sodium chloride 0.9 % flush 10 mL, 10 mL, Intravenous, PRN, Bright Dooley MD  •  sodium chloride 0.9 % infusion, 100 mL/hr, Intravenous, Continuous, Bennicia L Alex, PA-C, Last Rate: 100 mL/hr at 08/06/17 0408, 100 mL/hr at 08/06/17 0408  Clarithromycin; Macrobid [nitrofurantoin monohyd macro]; Vioxx [rofecoxib]; Metoprolol; Statins; and Tramadol    Review of Systems  Pertinent items are noted in HPI, all other systems reviewed and negative    Objective     Vital Signs   /79 (BP Location: Left arm, Patient Position: Lying)  Pulse 82  Temp 99 °F (37.2 °C) (Oral)   Resp 18  Ht 66\" (167.6 cm)  Wt 208 lb (94.3 kg)  SpO2 93%  BMI 33.57 kg/m2    Physical Exam:  General Appearance   COmfortable no " distress.    Back: No No kyphosis present, no scoliosis present,no tenderness to percussion or Palpation  Lungs: Respirations regular, even and  unlabored    Chest Wall: No abnormalities observed  Abdomen: No masses, no organomegaly, soft   non-tender, non-distended, no guarding, no rebound  Extremities: Moves all extremities well, no edemam no cyanosis, no redness    Skin: No bleeding, bruising or rash    Neurologic: Cranial Nerves 1-12 grossly intact    Results Review:  Lab Results (last 24 hours)     Procedure Component Value Units Date/Time    CBC & Differential [[id number]] Collected:  08/05/17 2045    Specimen:  Blood Updated:  08/05/17 2107    Narrative:       The following orders were created for panel order CBC & Differential.  Procedure                               Abnormality         Status                     ---------                               -----------         ------                     CBC Auto Differential[[id number]]        Abnormal            Final result                 Please view results for these tests on the individual orders.    CBC Auto Differential [[id number]]  (Abnormal) Collected:  08/05/17 2045    Specimen:  Blood Updated:  08/05/17 2107     WBC 22.63 (H) 10*3/mm3      RBC 3.99 10*6/mm3      Hemoglobin 12.3 g/dL      Hematocrit 39.3 %      MCV 98.5 fL      MCH 30.8 pg      MCHC 31.3 (L) g/dL      RDW 15.5 (H) %      RDW-SD 55.9 (H) fl      MPV 9.8 fL      Platelets 418 10*3/mm3      Neutrophil % 74.0 (H) %      Lymphocyte % 16.0 (L) %      Monocyte % 7.9 %      Eosinophil % 1.1 %      Basophil % 0.1 %      Immature Grans % 0.9 (H) %      Neutrophils, Absolute 16.75 (H) 10*3/mm3      Lymphocytes, Absolute 3.63 10*3/mm3      Monocytes, Absolute 1.78 (H) 10*3/mm3      Eosinophils, Absolute 0.24 10*3/mm3      Basophils, Absolute 0.02 10*3/mm3      Immature Grans, Absolute 0.21 (H) 10*3/mm3     POC Troponin, Rapid [[id number]]  (Normal) Collected:  08/05/17 2054    Specimen:  Blood  Updated:  08/05/17 2110     Troponin I 0.01 ng/mL       Serial Number: [id number]:  [id number]       Comprehensive Metabolic Panel [[id number]]  (Abnormal) Collected:  08/05/17 2045    Specimen:  Blood Updated:  08/05/17 2120     Glucose 136 (H) mg/dL      BUN 20 mg/dL      Creatinine 1.30 mg/dL      Sodium 136 mmol/L      Potassium 4.4 mmol/L      Chloride 95 (L) mmol/L      CO2 34.0 (H) mmol/L      Calcium 9.9 mg/dL      Total Protein 7.1 g/dL      Albumin 4.00 g/dL      ALT (SGPT) 96 (H) U/L      AST (SGOT) 28 U/L      Alkaline Phosphatase 232 (H) U/L      Total Bilirubin 0.7 mg/dL      eGFR Non African Amer 39 (L) mL/min/1.73      Globulin 3.1 gm/dL      A/G Ratio 1.3 (L) g/dL      BUN/Creatinine Ratio 15.4     Anion Gap 7.0 mmol/L     Narrative:       [employer] Guidelines    Stage     Description        GFR  1         Normal or High     90+  2         Mild decrease      60-89  3         Moderate decrease  30-59  4         Severe decrease    15-29  5         Kidney failure     <15    Lipase [[id number]]  (Normal) Collected:  08/05/17 2045    Specimen:  Blood Updated:  08/05/17 2120     Lipase 30 U/L     Urine Culture [[id number]] Collected:  08/05/17 2052    Specimen:  Urine from Urine, Clean Catch Updated:  08/05/17 2121    Urinalysis With / Culture If Indicated [[id number]]  (Abnormal) Collected:  08/05/17 2052    Specimen:  Urine from Urine, Clean Catch Updated:  08/05/17 2122     Color, UA Dark Yellow (A)     Appearance, UA Cloudy (A)     pH, UA 6.0     Specific Gravity, UA 1.021     Glucose, UA Negative     Ketones, UA Trace (A)     Bilirubin, UA Negative     Blood, UA Small (1+) (A)     Protein, UA Trace (A)     Leuk Esterase, UA Large (3+) (A)     Nitrite, UA Negative     Urobilinogen, UA 0.2 E.U./dL    Urinalysis, Microscopic Only [[id number]]  (Abnormal) Collected:  08/05/17 2052    Specimen:  Urine from Urine, Clean Catch Updated:  08/05/17 2123     RBC, UA 0-2 /HPF      WBC, UA Too  Numerous to Count (A) /HPF      Bacteria, UA None Seen /HPF      Squamous Epithelial Cells, UA None Seen /HPF      Hyaline Casts, UA 0-6 /LPF      Methodology Automated Microscopy    Light Blue Top [[id number]] Collected:  08/05/17 2045    Specimen:  Blood Updated:  08/05/17 2201     Extra Tube hold for add-on      Auto resulted       Green Top (Gel) [[id number]] Collected:  08/05/17 2045    Specimen:  Blood Updated:  08/05/17 2201     Extra Tube Hold for add-ons.      Auto resulted.       Lavender Top [[id number]] Collected:  08/05/17 2045    Specimen:  Blood Updated:  08/05/17 2201     Extra Tube hold for add-on      Auto resulted       Gold Top - SST [[id number]] Collected:  08/05/17 2045    Specimen:  Blood Updated:  08/05/17 2201     Extra Tube Hold for add-ons.      Auto resulted.       Gonzales Draw [[id number]] Collected:  08/05/17 2045    Specimen:  Blood Updated:  08/05/17 2224    Narrative:       The following orders were created for panel order Gonzales Draw.  Procedure                               Abnormality         Status                     ---------                               -----------         ------                     Light Blue Top[[id number]]                                   Final result               Green Top (Gel)[[id number]]                                  Final result               Lavender Top[[id number]]                                     Final result               Gold Top - SST[[id number]]                                   Final result               Green Top (No Gel)[[id number]]                                                            Please view results for these tests on the individual orders.    Lactic Acid, Plasma [[id number]]  (Normal) Collected:  08/05/17 2219    Specimen:  Blood Updated:  08/05/17 2255     Lactate 1.0 mmol/L       Falsely depressed results may occur on samples drawn from patients receiving N-Acetylcysteine (NAC) or Metamizole.       Blood Culture [[id number]]  Collected:  08/05/17 2040    Specimen:  Blood from Arm, Right Updated:  08/06/17 0343    Blood Culture [[id number]] Collected:  08/05/17 2045    Specimen:  Blood from Arm, Left Updated:  08/06/17 0344    POC Glucose Fingerstick [[id number]]  (Normal) Collected:  08/06/17 0741    Specimen:  Blood Updated:  08/06/17 0745     Glucose 123 mg/dL     Narrative:       Meter: [id number] : [id number] [employer]    POC Glucose Fingerstick [[id number]]  (Normal) Collected:  08/06/17 1124    Specimen:  Blood Updated:  08/06/17 1126     Glucose 130 mg/dL     Narrative:       Meter: [id number] : [id number] [employer]        Imaging Results (last 72 hours)     Procedure Component Value Units Date/Time    XR Chest 1 View [[id number]]  (Abnormal) Collected:  08/05/17 2037     Updated:  08/05/17 2225    Narrative:       EXAM:    XR Chest, 1 View    CLINICAL HISTORY:    87 years old, female; Pain; Other: Upper abdominal pain; Additional info:   Upper abdominal pain triage protocol    TECHNIQUE:    Frontal view of the chest.    EXAM DATE/TIME:    8/5/2017 8:37 PM    COMPARISON:    CR - XR CHEST 1 VW 7/29/2017 7:09:10 PM    FINDINGS:    Patient is rotated and lung volumes are low, limiting assessment.      Otherwise no focal RIGHT pulmonary consolidation is demonstrated.      Probable mild atelectasis at LEFT lung base.      Otherwise no significant obscuration of lateral costophrenic angles is   demonstrated.      No significant vascular congestion is demonstrated.      There is scattered pulmonary scarring bilaterally.      Visualized cardiac silhouette size appears mild to moderately enlarged,   accentuated by low lung volumes. Pericardial effusion not excluded.      There are atherosclerotic calcifications in the thoracic aorta.      Stable position of pacer leads.      Impression:         Patient is rotated and lung volumes are low, limiting assessment.      Otherwise no definite acute pulmonary process demonstrated.       Visualized cardiac silhouette size appears mild to moderately enlarged,   accentuated by low lung volumes. Pericardial effusion not excluded.    THIS DOCUMENT HAS BEEN ELECTRONICALLY SIGNED BY FRANCISCO UMANZOR MD    CT Abdomen Pelvis Without Contrast [[id number]]  (Abnormal) Collected:  08/05/17 2156     Updated:  08/06/17 0040    Narrative:       EXAM:    CT Abdomen and Pelvis Without Intravenous Contrast    CLINICAL HISTORY:    87 years old, female; Pain; Abdominal pain; Localized; Left; Prior surgery    TECHNIQUE:    Axial computed tomography images of the abdomen and pelvis without   intravenous contrast.  This CT exam was performed using one or more of the   following dose reduction techniques:  automated exposure control, adjustment of   the mA and/or kV according to patient size, and/or use of iterative   reconstruction technique.    Coronal reformatted images were created and reviewed.    COMPARISON:    CT ABD PELVIS WO CONTRAST 1/2/2016 9:44:05 AM    FINDINGS:    Mild atelectasis at bilateral lung bases.    Status post cholecystectomy. The liver, pancreas, adrenal glands, and kidneys   are unremarkable within the limits of a noncontrast study.    No renal collecting system stones identified. No hydronephrosis on either side.    A normal-appearing appendix is seen in the right lower quadrant. No evidence of   bowel obstruction. Diverticulosis of the distal transverse colon, descending   colon, and sigmoid colon. There is a 5.3 cm x 3.5 cm collection of fluid and   gas between the mid sigmoid colon and anterior bladder on series 3 image 68.   Small amount of gas in the bladder. These findings suggest a fistula, possibly   related to diverticulitis or inflammatory bowel disease. This finding is new   since the prior study.    The uterus is unremarkable.    The abdominal aorta is non-aneurysmal.    Moderate compression deformity of the T12 vertebral body (status post   vertebroplasty). Mild degenerative changes of  the lower thoracic spine and the   lumbar spine.      Impression:       1. Collection of fluid and gas between the mid sigmoid colon and anterior   bladder. Small amount of gas in the bladder. These findings suggest a fistula,   possibly related to diverticulitis or inflammatory bowel disease. This finding   is new since the prior study.    The findings were discussed with Dr. Dooley on 8/6/2017 12:37 AM EDT.    THIS DOCUMENT HAS BEEN ELECTRONICALLY SIGNED BY GERMANIA LONDON MD           I reviewed the patient's new clinical results.      Assessment/Plan     Principal Problem:    Acute cystitis without hematuria  Active Problems:    Chronic kidney disease, stage III (moderate)    Chronic obstructive pulmonary disease    Hyperlipidemia    Hypertension    Anxiety and depression    ANDRIY on CPAP    GERD (gastroesophageal reflux disease)    Diastolic dysfunction    Type 2 diabetes mellitus    Colovesical fistula    Weakness      I discussed the patients findings and my recommendations with patient and family  I discussed with the patient and family the CT findings which certainly show air within the bladder but this certainly could be consistent with an E. Coli UTI or other gas-forming organism, not necessarily a fistula. In order to diagnose this, cystoscopy with or without colonoscopy would be most appropriate. I would not do this with an active UTI, and therefore I recommended to the patient and family that she continue on her antibiotics, keep her plans to have Dr. Osuna perform cystoscopy. They will just adjust the date to following discharge.    Per the family, Dr. Helm the colovesical surgeon has plans to do a diverting colostomy. I did indicate to them I have no issues with a diverting colostomy without colonoscopy and will leave that decision-making and management to the colorectal surgeons. However, I did indicate it is important for her to have followup cystoscopy which they agreed to. At this point I have no  plans to continue to care for the patient, but if you have any concerns or questions, please contact me.       Micah Ramos MD  08/06/17  12:25 PM    Time:

## 2017-08-06 NOTE — PLAN OF CARE
Problem: Infection, Risk/Actual (Adult)  Goal: Identify Related Risk Factors and Signs and Symptoms  Outcome: Ongoing (interventions implemented as appropriate)    08/06/17 0812   Infection, Risk/Actual   Infection, Risk/Actual: Related Risk Factors other (see comments)  (fistula)   Signs and Symptoms (Infection, Risk/Actual) weakness       Goal: Infection Prevention/Resolution  Outcome: Ongoing (interventions implemented as appropriate)    08/06/17 0812   Infection, Risk/Actual (Adult)   Infection Prevention/Resolution making progress toward outcome

## 2017-08-06 NOTE — PROGRESS NOTES
"Adult Nutrition  Assessment/PES    Patient Name:  Avis Us  YOB: 1929  MRN: [id number]  Admit Date:  8/5/2017    Assessment Date:  8/6/2017        Reason for Assessment       08/06/17 1500    Reason for Assessment    Reason For Assessment/Visit identified at risk by screening criteria    Identified At Risk By Screening Criteria unintentional loss of 10 lbs or more in the past 2 mos    Time Spent (min) 20    Diagnosis Diagnosis    Cardiac CAD;CHF;MI;PPM;Hypercholesterolemia   History of SSS, MI    Endocrine DM Type 2;Gout    Gastrointestinal GERD/Reflux;Fistula   colovesical fistula    Infectious Disease UTI   without hematuria, history of chronic UTI    Neurological Seizure   History of seizure, peripheral neuropathy    Pulmonary/Critical Care COPD;Obstructive sleep apnea;CPAP;Asthma    Renal CKD   stage 3              Nutrition/Diet History       08/06/17 1505    Nutrition/Diet History    Reported/Observed By Patient;Family;RN    Food Habit/Preferences Dislike Supplement    Other Family in room reports patient has gradually lost weight over the past year. Reports she has been in and out of the hospital al within the past 6 months but her weight has been staying around 209 lbs recently. States her appetite has been poor. Patient reports she has \"no appetite, nothing sounds good.\" Has had some nausea/vomiting symptoms in the past few months. Could not quantify PO intake. Per RN and family, patient had bites of breakfast and lunch today. No nausea/vomiting today. Family reports plan is for surgery tomorrow. Patient does not like nutrition supplements            Anthropometrics       08/06/17 1507    Anthropometrics (Special Considerations)    Height Used for Calculations 1.676 m (5' 6\")    Weight Used for Calculations 94.3 kg (207 lb 14.3 oz)   stated weight per previous charting    Usual Body Weight (UBW)    Usual Body Weight 94.8 kg (209 lb)   reported by patients family          "   Labs/Tests/Procedures/Meds       08/06/17 1508    Labs/Tests/Procedures/Meds    Labs/Tests Review Reviewed                Nutrition Prescription Ordered       08/06/17 1508    Nutrition Prescription PO    Current PO Diet Clear Liquid            Evaluation of Received Nutrient/Fluid Intake       08/06/17 1508    PO Evaluation    Number of Days PO Intake Evaluated Insufficient Data   no recorded PO intake          Problem/Interventions:        Problem 1       08/06/17 1508    Nutrition Diagnoses Problem 1    Problem 1 Inadequate Nutrient Intake    Etiology (related to) Medical Diagnosis    Gastrointestinal Fistula     Signs/Symptoms (evidenced by) Clear Liquid Diet                Intervention Goal       08/06/17 1509    Intervention Goal    General Nutrition support treatment            Nutrition Intervention       08/06/17 1509    Nutrition Intervention    RD/Tech Action Follow Tx progress;Care plan reviewd;Supplement offered/refused;Encourage intake              Education/Evaluation       08/06/17 1509    Monitor/Evaluation    Monitor Per protocol;PO intake          Electronically signed by:  Lluvia Wlide  08/06/17 3:09 PM

## 2017-08-06 NOTE — PLAN OF CARE
Problem: Fall Risk (Adult)  Goal: Identify Related Risk Factors and Signs and Symptoms  Outcome: Ongoing (interventions implemented as appropriate)    08/06/17 0547   Fall Risk   Fall Risk: Related Risk Factors age-related changes;fatigue/slow reaction;gait/mobility problems;history of falls;environment unfamiliar   Fall Risk: Signs and Symptoms presence of risk factors       Goal: Absence of Falls  Outcome: Ongoing (interventions implemented as appropriate)    08/06/17 0812   Fall Risk (Adult)   Absence of Falls making progress toward outcome

## 2017-08-07 ENCOUNTER — ANESTHESIA (OUTPATIENT)
Dept: PERIOP | Facility: HOSPITAL | Age: 82
End: 2017-08-07

## 2017-08-07 ENCOUNTER — ANESTHESIA EVENT (OUTPATIENT)
Dept: PERIOP | Facility: HOSPITAL | Age: 82
End: 2017-08-07

## 2017-08-07 ENCOUNTER — CLINICAL SUPPORT NO REQUIREMENTS (OUTPATIENT)
Dept: CARDIOLOGY | Facility: CLINIC | Age: 82
End: 2017-08-07

## 2017-08-07 DIAGNOSIS — I44.2 COMPLETE ATRIOVENTRICULAR BLOCK (HCC): ICD-10-CM

## 2017-08-07 LAB
ANION GAP SERPL CALCULATED.3IONS-SCNC: 7 MMOL/L (ref 3–11)
BUN BLD-MCNC: 12 MG/DL (ref 9–23)
BUN/CREAT SERPL: 10.9 (ref 7–25)
CALCIUM SPEC-SCNC: 8.9 MG/DL (ref 8.7–10.4)
CHLORIDE SERPL-SCNC: 100 MMOL/L (ref 99–109)
CO2 SERPL-SCNC: 30 MMOL/L (ref 20–31)
CREAT BLD-MCNC: 1.1 MG/DL (ref 0.6–1.3)
DEPRECATED RDW RBC AUTO: 55.2 FL (ref 37–54)
ERYTHROCYTE [DISTWIDTH] IN BLOOD BY AUTOMATED COUNT: 15.4 % (ref 11.3–14.5)
GFR SERPL CREATININE-BSD FRML MDRD: 47 ML/MIN/1.73
GLUCOSE BLD-MCNC: 107 MG/DL (ref 70–100)
GLUCOSE BLDC GLUCOMTR-MCNC: 113 MG/DL (ref 70–130)
GLUCOSE BLDC GLUCOMTR-MCNC: 132 MG/DL (ref 70–130)
GLUCOSE BLDC GLUCOMTR-MCNC: 210 MG/DL (ref 70–130)
HCT VFR BLD AUTO: 31.2 % (ref 34.5–44)
HCT VFR BLD AUTO: 33.9 % (ref 34.5–44)
HGB BLD-MCNC: 10 G/DL (ref 11.5–15.5)
HGB BLD-MCNC: 9.7 G/DL (ref 11.5–15.5)
MCH RBC QN AUTO: 30.5 PG (ref 27–31)
MCHC RBC AUTO-ENTMCNC: 31.1 G/DL (ref 32–36)
MCV RBC AUTO: 98.1 FL (ref 80–99)
PLATELET # BLD AUTO: 351 10*3/MM3 (ref 150–450)
PMV BLD AUTO: 10.2 FL (ref 6–12)
POTASSIUM BLD-SCNC: 4 MMOL/L (ref 3.5–5.5)
RBC # BLD AUTO: 3.18 10*6/MM3 (ref 3.89–5.14)
SODIUM BLD-SCNC: 137 MMOL/L (ref 132–146)
WBC NRBC COR # BLD: 13.42 10*3/MM3 (ref 3.5–10.8)

## 2017-08-07 PROCEDURE — 99233 SBSQ HOSP IP/OBS HIGH 50: CPT | Performed by: INTERNAL MEDICINE

## 2017-08-07 PROCEDURE — 94660 CPAP INITIATION&MGMT: CPT

## 2017-08-07 PROCEDURE — 94799 UNLISTED PULMONARY SVC/PX: CPT

## 2017-08-07 PROCEDURE — 94640 AIRWAY INHALATION TREATMENT: CPT

## 2017-08-07 PROCEDURE — 25010000002 DEXAMETHASONE PER 1 MG: Performed by: NURSE ANESTHETIST, CERTIFIED REGISTERED

## 2017-08-07 PROCEDURE — 85027 COMPLETE CBC AUTOMATED: CPT | Performed by: FAMILY MEDICINE

## 2017-08-07 PROCEDURE — 25010000002 ONDANSETRON PER 1 MG: Performed by: NURSE ANESTHETIST, CERTIFIED REGISTERED

## 2017-08-07 PROCEDURE — 25010000002 HEPARIN (PORCINE) PER 1000 UNITS: Performed by: COLON & RECTAL SURGERY

## 2017-08-07 PROCEDURE — 85014 HEMATOCRIT: CPT | Performed by: COLON & RECTAL SURGERY

## 2017-08-07 PROCEDURE — 25010000002 PROPOFOL 10 MG/ML EMULSION: Performed by: NURSE ANESTHETIST, CERTIFIED REGISTERED

## 2017-08-07 PROCEDURE — 94002 VENT MGMT INPAT INIT DAY: CPT

## 2017-08-07 PROCEDURE — 82962 GLUCOSE BLOOD TEST: CPT

## 2017-08-07 PROCEDURE — 85018 HEMOGLOBIN: CPT | Performed by: COLON & RECTAL SURGERY

## 2017-08-07 PROCEDURE — 63710000001 INSULIN LISPRO (HUMAN) PER 5 UNITS: Performed by: PHYSICIAN ASSISTANT

## 2017-08-07 PROCEDURE — 94760 N-INVAS EAR/PLS OXIMETRY 1: CPT

## 2017-08-07 PROCEDURE — 80048 BASIC METABOLIC PNL TOTAL CA: CPT | Performed by: FAMILY MEDICINE

## 2017-08-07 PROCEDURE — 25010000002 FENTANYL CITRATE (PF) 100 MCG/2ML SOLUTION: Performed by: NURSE ANESTHETIST, CERTIFIED REGISTERED

## 2017-08-07 PROCEDURE — 0D1N4Z4 BYPASS SIGMOID COLON TO CUTANEOUS, PERCUTANEOUS ENDOSCOPIC APPROACH: ICD-10-PCS | Performed by: COLON & RECTAL SURGERY

## 2017-08-07 PROCEDURE — 25010000002 MEROPENEM: Performed by: PHYSICIAN ASSISTANT

## 2017-08-07 PROCEDURE — 25010000002 MORPHINE SULFATE (PF) 2 MG/ML SOLUTION: Performed by: COLON & RECTAL SURGERY

## 2017-08-07 PROCEDURE — 25010000002 NEOSTIGMINE PER 0.5 MG: Performed by: NURSE ANESTHETIST, CERTIFIED REGISTERED

## 2017-08-07 RX ORDER — SODIUM CHLORIDE, SODIUM LACTATE, POTASSIUM CHLORIDE, CALCIUM CHLORIDE 600; 310; 30; 20 MG/100ML; MG/100ML; MG/100ML; MG/100ML
9 INJECTION, SOLUTION INTRAVENOUS CONTINUOUS PRN
Status: DISCONTINUED | OUTPATIENT
Start: 2017-08-07 | End: 2017-08-07 | Stop reason: HOSPADM

## 2017-08-07 RX ORDER — LIDOCAINE HYDROCHLORIDE 10 MG/ML
INJECTION, SOLUTION INFILTRATION; PERINEURAL AS NEEDED
Status: DISCONTINUED | OUTPATIENT
Start: 2017-08-07 | End: 2017-08-07 | Stop reason: SURG

## 2017-08-07 RX ORDER — DEXAMETHASONE SODIUM PHOSPHATE 4 MG/ML
INJECTION, SOLUTION INTRA-ARTICULAR; INTRALESIONAL; INTRAMUSCULAR; INTRAVENOUS; SOFT TISSUE AS NEEDED
Status: DISCONTINUED | OUTPATIENT
Start: 2017-08-07 | End: 2017-08-07 | Stop reason: SURG

## 2017-08-07 RX ORDER — LIDOCAINE HYDROCHLORIDE 10 MG/ML
0.5 INJECTION, SOLUTION EPIDURAL; INFILTRATION; INTRACAUDAL; PERINEURAL ONCE AS NEEDED
Status: COMPLETED | OUTPATIENT
Start: 2017-08-07 | End: 2017-08-07

## 2017-08-07 RX ORDER — BUPIVACAINE HYDROCHLORIDE AND EPINEPHRINE 2.5; 5 MG/ML; UG/ML
INJECTION, SOLUTION INFILTRATION; PERINEURAL AS NEEDED
Status: DISCONTINUED | OUTPATIENT
Start: 2017-08-07 | End: 2017-08-07 | Stop reason: HOSPADM

## 2017-08-07 RX ORDER — PROPOFOL 10 MG/ML
VIAL (ML) INTRAVENOUS CONTINUOUS PRN
Status: DISCONTINUED | OUTPATIENT
Start: 2017-08-07 | End: 2017-08-07 | Stop reason: SURG

## 2017-08-07 RX ORDER — PROPOFOL 10 MG/ML
VIAL (ML) INTRAVENOUS AS NEEDED
Status: DISCONTINUED | OUTPATIENT
Start: 2017-08-07 | End: 2017-08-07 | Stop reason: SURG

## 2017-08-07 RX ORDER — DEXTROSE MONOHYDRATE, SODIUM CHLORIDE, SODIUM LACTATE, POTASSIUM CHLORIDE, CALCIUM CHLORIDE 5; 600; 310; 179; 20 G/100ML; MG/100ML; MG/100ML; MG/100ML; MG/100ML
125 INJECTION, SOLUTION INTRAVENOUS CONTINUOUS
Status: DISCONTINUED | OUTPATIENT
Start: 2017-08-07 | End: 2017-08-08

## 2017-08-07 RX ORDER — HYDROMORPHONE HYDROCHLORIDE 1 MG/ML
0.25 INJECTION, SOLUTION INTRAMUSCULAR; INTRAVENOUS; SUBCUTANEOUS
Status: DISCONTINUED | OUTPATIENT
Start: 2017-08-07 | End: 2017-08-07 | Stop reason: HOSPADM

## 2017-08-07 RX ORDER — HYDROMORPHONE HYDROCHLORIDE 1 MG/ML
0.5 INJECTION, SOLUTION INTRAMUSCULAR; INTRAVENOUS; SUBCUTANEOUS
Status: DISCONTINUED | OUTPATIENT
Start: 2017-08-07 | End: 2017-08-15 | Stop reason: HOSPADM

## 2017-08-07 RX ORDER — FENTANYL CITRATE 50 UG/ML
50 INJECTION, SOLUTION INTRAMUSCULAR; INTRAVENOUS
Status: DISCONTINUED | OUTPATIENT
Start: 2017-08-07 | End: 2017-08-07 | Stop reason: HOSPADM

## 2017-08-07 RX ORDER — MORPHINE SULFATE 2 MG/ML
1 INJECTION, SOLUTION INTRAMUSCULAR; INTRAVENOUS EVERY 4 HOURS PRN
Status: DISCONTINUED | OUTPATIENT
Start: 2017-08-07 | End: 2017-08-15 | Stop reason: HOSPADM

## 2017-08-07 RX ORDER — NALOXONE HCL 0.4 MG/ML
0.4 VIAL (ML) INJECTION
Status: DISCONTINUED | OUTPATIENT
Start: 2017-08-07 | End: 2017-08-15 | Stop reason: HOSPADM

## 2017-08-07 RX ORDER — ONDANSETRON 2 MG/ML
4 INJECTION INTRAMUSCULAR; INTRAVENOUS ONCE AS NEEDED
Status: DISCONTINUED | OUTPATIENT
Start: 2017-08-07 | End: 2017-08-07 | Stop reason: HOSPADM

## 2017-08-07 RX ORDER — HYDROMORPHONE HYDROCHLORIDE 1 MG/ML
0.5 INJECTION, SOLUTION INTRAMUSCULAR; INTRAVENOUS; SUBCUTANEOUS
Status: DISCONTINUED | OUTPATIENT
Start: 2017-08-07 | End: 2017-08-07 | Stop reason: HOSPADM

## 2017-08-07 RX ORDER — ALBUTEROL SULFATE 90 UG/1
AEROSOL, METERED RESPIRATORY (INHALATION) AS NEEDED
Status: DISCONTINUED | OUTPATIENT
Start: 2017-08-07 | End: 2017-08-07 | Stop reason: SURG

## 2017-08-07 RX ORDER — LABETALOL HYDROCHLORIDE 5 MG/ML
5 INJECTION, SOLUTION INTRAVENOUS
Status: DISCONTINUED | OUTPATIENT
Start: 2017-08-07 | End: 2017-08-07 | Stop reason: HOSPADM

## 2017-08-07 RX ORDER — VECURONIUM BROMIDE 1 MG/ML
INJECTION, POWDER, LYOPHILIZED, FOR SOLUTION INTRAVENOUS AS NEEDED
Status: DISCONTINUED | OUTPATIENT
Start: 2017-08-07 | End: 2017-08-07 | Stop reason: SURG

## 2017-08-07 RX ORDER — FENTANYL CITRATE 50 UG/ML
INJECTION, SOLUTION INTRAMUSCULAR; INTRAVENOUS AS NEEDED
Status: DISCONTINUED | OUTPATIENT
Start: 2017-08-07 | End: 2017-08-07 | Stop reason: SURG

## 2017-08-07 RX ORDER — ONDANSETRON 2 MG/ML
INJECTION INTRAMUSCULAR; INTRAVENOUS AS NEEDED
Status: DISCONTINUED | OUTPATIENT
Start: 2017-08-07 | End: 2017-08-07 | Stop reason: SURG

## 2017-08-07 RX ORDER — GLYCOPYRROLATE 0.2 MG/ML
INJECTION INTRAMUSCULAR; INTRAVENOUS AS NEEDED
Status: DISCONTINUED | OUTPATIENT
Start: 2017-08-07 | End: 2017-08-07 | Stop reason: SURG

## 2017-08-07 RX ORDER — SODIUM CHLORIDE 9 MG/ML
INJECTION, SOLUTION INTRAVENOUS AS NEEDED
Status: DISCONTINUED | OUTPATIENT
Start: 2017-08-07 | End: 2017-08-07 | Stop reason: HOSPADM

## 2017-08-07 RX ORDER — MAGNESIUM HYDROXIDE 1200 MG/15ML
LIQUID ORAL AS NEEDED
Status: DISCONTINUED | OUTPATIENT
Start: 2017-08-07 | End: 2017-08-07 | Stop reason: HOSPADM

## 2017-08-07 RX ORDER — ROCURONIUM BROMIDE 10 MG/ML
INJECTION, SOLUTION INTRAVENOUS AS NEEDED
Status: DISCONTINUED | OUTPATIENT
Start: 2017-08-07 | End: 2017-08-07 | Stop reason: SURG

## 2017-08-07 RX ADMIN — SODIUM CHLORIDE, POTASSIUM CHLORIDE, SODIUM LACTATE AND CALCIUM CHLORIDE 9 ML/HR: 600; 310; 30; 20 INJECTION, SOLUTION INTRAVENOUS at 12:26

## 2017-08-07 RX ADMIN — FENTANYL CITRATE 25 MCG: 50 INJECTION, SOLUTION INTRAMUSCULAR; INTRAVENOUS at 14:13

## 2017-08-07 RX ADMIN — ONDANSETRON 4 MG: 2 INJECTION INTRAMUSCULAR; INTRAVENOUS at 14:31

## 2017-08-07 RX ADMIN — LISINOPRIL 10 MG: 10 TABLET ORAL at 09:16

## 2017-08-07 RX ADMIN — ROBINUL 0.2 MG: 0.2 INJECTION INTRAMUSCULAR; INTRAVENOUS at 14:55

## 2017-08-07 RX ADMIN — BUDESONIDE AND FORMOTEROL FUMARATE DIHYDRATE 2 PUFF: 80; 4.5 AEROSOL RESPIRATORY (INHALATION) at 20:01

## 2017-08-07 RX ADMIN — PROPOFOL 25 MCG/KG/MIN: 10 INJECTION, EMULSION INTRAVENOUS at 13:43

## 2017-08-07 RX ADMIN — VECURONIUM BROMIDE 1 MG: 1 INJECTION, POWDER, LYOPHILIZED, FOR SOLUTION INTRAVENOUS at 14:15

## 2017-08-07 RX ADMIN — ALBUTEROL SULFATE 5 PUFF: 90 AEROSOL, METERED RESPIRATORY (INHALATION) at 14:06

## 2017-08-07 RX ADMIN — ROBINUL 0.2 MG: 0.2 INJECTION INTRAMUSCULAR; INTRAVENOUS at 15:00

## 2017-08-07 RX ADMIN — PROPOFOL 120 MG: 10 INJECTION, EMULSION INTRAVENOUS at 13:37

## 2017-08-07 RX ADMIN — Medication 3 MG: at 14:45

## 2017-08-07 RX ADMIN — SODIUM CHLORIDE, POTASSIUM CHLORIDE, SODIUM LACTATE AND CALCIUM CHLORIDE: 600; 310; 30; 20 INJECTION, SOLUTION INTRAVENOUS at 14:47

## 2017-08-07 RX ADMIN — ROBINUL 0.4 MG: 0.2 INJECTION INTRAMUSCULAR; INTRAVENOUS at 14:45

## 2017-08-07 RX ADMIN — ALBUTEROL SULFATE 3 PUFF: 90 AEROSOL, METERED RESPIRATORY (INHALATION) at 14:48

## 2017-08-07 RX ADMIN — ASPIRIN 81 MG CHEWABLE TABLET 81 MG: 81 TABLET CHEWABLE at 09:16

## 2017-08-07 RX ADMIN — IPRATROPIUM BROMIDE AND ALBUTEROL SULFATE 3 ML: .5; 3 SOLUTION RESPIRATORY (INHALATION) at 15:37

## 2017-08-07 RX ADMIN — BENZONATATE 100 MG: 100 CAPSULE, LIQUID FILLED ORAL at 23:44

## 2017-08-07 RX ADMIN — ACETAMINOPHEN 1000 MG: 10 INJECTION, SOLUTION INTRAVENOUS at 13:52

## 2017-08-07 RX ADMIN — ROCURONIUM BROMIDE 20 MG: 10 INJECTION INTRAVENOUS at 14:00

## 2017-08-07 RX ADMIN — DEXAMETHASONE SODIUM PHOSPHATE 4 MG: 4 INJECTION, SOLUTION INTRAMUSCULAR; INTRAVENOUS at 14:31

## 2017-08-07 RX ADMIN — ALBUTEROL SULFATE 5 PUFF: 90 AEROSOL, METERED RESPIRATORY (INHALATION) at 14:20

## 2017-08-07 RX ADMIN — ROCURONIUM BROMIDE 30 MG: 10 INJECTION INTRAVENOUS at 13:37

## 2017-08-07 RX ADMIN — Medication 1 MG: at 15:00

## 2017-08-07 RX ADMIN — LIDOCAINE HYDROCHLORIDE 100 MG: 10 INJECTION, SOLUTION INFILTRATION; PERINEURAL at 13:37

## 2017-08-07 RX ADMIN — FENTANYL CITRATE 50 MCG: 50 INJECTION, SOLUTION INTRAMUSCULAR; INTRAVENOUS at 13:36

## 2017-08-07 RX ADMIN — PROPOFOL 30 MG: 10 INJECTION, EMULSION INTRAVENOUS at 14:01

## 2017-08-07 RX ADMIN — PANTOPRAZOLE SODIUM 40 MG: 40 TABLET, DELAYED RELEASE ORAL at 06:01

## 2017-08-07 RX ADMIN — HEPARIN SODIUM 5000 UNITS: 5000 INJECTION, SOLUTION INTRAVENOUS; SUBCUTANEOUS at 21:15

## 2017-08-07 RX ADMIN — MORPHINE SULFATE 1 MG: 2 INJECTION, SOLUTION INTRAMUSCULAR; INTRAVENOUS at 23:50

## 2017-08-07 RX ADMIN — BENZONATATE 100 MG: 100 CAPSULE, LIQUID FILLED ORAL at 09:16

## 2017-08-07 RX ADMIN — MEROPENEM 1 G: 1 INJECTION, POWDER, FOR SOLUTION INTRAVENOUS at 01:35

## 2017-08-07 RX ADMIN — FENTANYL CITRATE 50 MCG: 50 INJECTION, SOLUTION INTRAMUSCULAR; INTRAVENOUS at 14:03

## 2017-08-07 RX ADMIN — HEPARIN SODIUM 5000 UNITS: 5000 INJECTION, SOLUTION INTRAVENOUS; SUBCUTANEOUS at 06:01

## 2017-08-07 RX ADMIN — ALLOPURINOL 100 MG: 100 TABLET ORAL at 09:16

## 2017-08-07 RX ADMIN — INSULIN LISPRO 3 UNITS: 100 INJECTION, SOLUTION INTRAVENOUS; SUBCUTANEOUS at 21:17

## 2017-08-07 RX ADMIN — POTASSIUM CHLORIDE, SODIUM CHLORIDE, CALCIUM CHLORIDE, SODIUM LACTATE, AND DEXTROSE MONOHYDRATE 125 ML/HR: 1.79; 6; .2; 3.1; 5 INJECTION, SOLUTION INTRAVENOUS at 18:01

## 2017-08-07 RX ADMIN — BUDESONIDE AND FORMOTEROL FUMARATE DIHYDRATE 2 PUFF: 80; 4.5 AEROSOL RESPIRATORY (INHALATION) at 10:54

## 2017-08-07 RX ADMIN — AMLODIPINE BESYLATE 5 MG: 5 TABLET ORAL at 09:16

## 2017-08-07 RX ADMIN — Medication 250 MG: at 09:15

## 2017-08-07 RX ADMIN — LABETALOL HYDROCHLORIDE 5 MG: 5 INJECTION, SOLUTION INTRAVENOUS at 15:54

## 2017-08-07 RX ADMIN — MEROPENEM 1 G: 1 INJECTION, POWDER, FOR SOLUTION INTRAVENOUS at 13:46

## 2017-08-07 RX ADMIN — ESTRADIOL 2 G: 0.1 CREAM VAGINAL at 09:20

## 2017-08-07 RX ADMIN — LEVETIRACETAM 750 MG: 750 TABLET, FILM COATED ORAL at 09:15

## 2017-08-07 RX ADMIN — Medication 10 ML: at 12:26

## 2017-08-07 RX ADMIN — LIDOCAINE HYDROCHLORIDE 0.2 ML: 10 INJECTION, SOLUTION EPIDURAL; INFILTRATION; INTRACAUDAL; PERINEURAL at 12:26

## 2017-08-07 RX ADMIN — Medication 1 MG: at 14:55

## 2017-08-07 NOTE — BRIEF OP NOTE
LAPAROSCOPIC  Colostomy Procedure Note    Avis Us  8/5/2017 - 8/7/2017    Pre-op Diagnosis:   Colovesical fistula    Post-op Diagnosis:     Same    Procedure/CPT® Codes:      Procedure(s):  LAP COLOSTOMY    Surgeon(s):  Chioma Gordon MD    Anesthesia: General with Block    Staff:   Circulator: Tyrell Key RN  Scrub Person: Abbey Perkins  Assistant: Purnima Mccloud PA-C    Estimated Blood Loss: *No blood loss documented*  Urine Voided: 115 mL    Specimens:                * No specimens in log *      Drains:   Urethral Catheter 08/07/17 1440 100% silicone 16 10 (Active)           Findings: Colovesical fistula noted in the midsigmoid colon    Complications: None      Chioma Gordon MD     Date: 8/7/2017  Time: 2:54 PM

## 2017-08-07 NOTE — PROGRESS NOTES
"      HOSPITALIST DAILY PROGRESS NOTE    Chief Complaint: dysuria    Subjective   SUBJECTIVE/OVERNIGHT EVENTS     Pt still complains of feeling poorly.  Unsure of what the plan is in terms of her procedure today (unable to recall/ understand the details of her conversation with Dr. Gordon yesterday).    Pt apparently desatted this am when she was sleeping but was not on her CPAP at the time.  She is now on 2.5L BNC with sats in the low 90s.  She denies wearing O2 at home.     Review of Systems:  Gen-as above  CV-no chest pain, no palpitations  Resp-no cough, no dyspnea  GI-no N/V/D, no abd pain    Objective   OBJECTIVE   I have reviewed the vital signs.  /76  Pulse 68  Temp 98.2 °F (36.8 °C) (Oral)   Resp 18  Ht 66\" (167.6 cm)  Wt 208 lb (94.3 kg)  SpO2 94%  BMI 33.57 kg/m2    Physical Exam:  Gen-no acute distress  CV-RRR, S1 S2 normal, no m/r/g  Resp-CTAB, no wheezes  Abd-soft, NT, ND, +BS  Ext-no edema  Neuro-A&Ox3, no focal deficits  Psych-appropriate mood    Results:  I have reviewed the labs, culture data, radiology results, and diagnostic studies.      Results from last 7 days  Lab Units 08/07/17  0634 08/05/17  2045   WBC 10*3/mm3 13.42* 22.63*   HEMOGLOBIN g/dL 9.7* 12.3   HEMATOCRIT % 31.2* 39.3   PLATELETS 10*3/mm3 351 418       Results from last 7 days  Lab Units 08/07/17  0634   SODIUM mmol/L 137   POTASSIUM mmol/L 4.0   CHLORIDE mmol/L 100   CO2 mmol/L 30.0   BUN mg/dL 12   CREATININE mg/dL 1.10   GLUCOSE mg/dL 107*   CALCIUM mg/dL 8.9       Culture Data:  Cultures:  UCx NGTD         Radiology Results:  Imaging Results (last 24 hours)     ** No results found for the last 24 hours. **          I have reviewed the medications.      Assessment/Plan   ASSESSMENT/PLAN    Principal Problem:    Sepsis  Active Problems:    Chronic kidney disease, stage III (moderate)    Chronic obstructive pulmonary disease    Hyperlipidemia    Hypertension    Anxiety and depression    ANDRIY on CPAP    GERD " "(gastroesophageal reflux disease)    Diastolic dysfunction    Type 2 diabetes mellitus    Acute cystitis without hematuria    Colovesical fistula    Weakness    Recently admitted 7/29-8/1/17 for MDR E. Coli UTI and d/c'd on remainder of PO cefpodoxime based on sensitivities.  Has hx of recurring UTI's and prophylactic Cipro Rx, followed by urologist ([facility] at [facility]) and was scheduled for cystoscopy this coming week on 8/8/17 as part of ongoing eval. Brought to ED by family for persistent dysuria and general fatigue despite ongoing PO antibiotics for UTI. Associated with mild suprapubic \"full feeling\" and urine frequency.  Denies fever, hematuria, chills/rigors, diarrhea.  Imaging in ED confirms new finding of colovesicular fistula.       Plan:  --continue IV ABX, await final UCx results  --IVFs  --blood cultures NGTD  --CRS and Urology both consulted for new findings of fistula.  Possibly OR later today pending family decision  --monitor closely and repeat labs in am      I expect patient to be discharged in: TBD    Nyla Aguirre MD  08/07/17  11:34 AM          "

## 2017-08-07 NOTE — ANESTHESIA POSTPROCEDURE EVALUATION
Patient: Avis C Us    Procedure Summary     Date Anesthesia Start Anesthesia Stop Room / Location    08/07/17 1329  [facility] / [facility]       Procedure Diagnosis Surgeon Provider    LAP COLOSTOMY (N/A Abdomen) No diagnosis on file. MD Joe Valentine MD          Anesthesia Type: general  Last vitals  BP   (!) 190/100 (08/07/17 1520)    Temp   98.6 °F (37 °C) (08/07/17 1520)    Pulse   100 (08/07/17 1520)   Resp   14 (08/07/17 1520)    SpO2   91 % (08/07/17 1520)      Post Anesthesia Care and Evaluation    Patient location during evaluation: PACU  Patient participation: complete - patient participated  Level of consciousness: awake  Pain score: 0  Pain management: adequate  Airway patency: patent  Anesthetic complications: No anesthetic complications  PONV Status: none  Cardiovascular status: hemodynamically stable and acceptable  Respiratory status: acceptable, ETT and ventilator  Hydration status: acceptable    Comments: Able to follow commands, becoming agitated with Ventilator. Still unable to extubate at this time.

## 2017-08-07 NOTE — PLAN OF CARE
Problem: Fall Risk (Adult)  Goal: Identify Related Risk Factors and Signs and Symptoms  Outcome: Ongoing (interventions implemented as appropriate)    08/07/17 0422   Fall Risk   Fall Risk: Related Risk Factors age-related changes;fatigue/slow reaction;history of falls;environment unfamiliar   Fall Risk: Signs and Symptoms presence of risk factors       Goal: Absence of Falls  Outcome: Ongoing (interventions implemented as appropriate)    08/07/17 0422   Fall Risk (Adult)   Absence of Falls making progress toward outcome         Problem: Infection, Risk/Actual (Adult)  Goal: Identify Related Risk Factors and Signs and Symptoms  Outcome: Ongoing (interventions implemented as appropriate)    08/07/17 0422   Infection, Risk/Actual   Infection, Risk/Actual: Related Risk Factors other (see comments)   Signs and Symptoms (Infection, Risk/Actual) weakness       Goal: Infection Prevention/Resolution  Outcome: Ongoing (interventions implemented as appropriate)    08/07/17 0422   Infection, Risk/Actual (Adult)   Infection Prevention/Resolution making progress toward outcome

## 2017-08-07 NOTE — ANESTHESIA PREPROCEDURE EVALUATION
Anesthesia Evaluation     Patient summary reviewed and Nursing notes reviewed          Airway   Mallampati: III  TM distance: >3 FB  Neck ROM: full  no difficulty expected  Dental - normal exam     Pulmonary - normal exam   (+) COPD, asthma, sleep apnea,   Cardiovascular - normal exam    ECG reviewed    (+) pacemaker (secondary SSS) pacemaker interrogated Yesterday, hypertension, hyperlipidemia    ROS comment: Echo 6/17: normal EF, mild MR    Neuro/Psych  (+) seizures (last 5 yrs ago), CVA,    GI/Hepatic/Renal/Endo    (+) morbid obesity, GERD, diabetes mellitus,     Musculoskeletal (-) negative ROS    Abdominal  - normal exam    Bowel sounds: normal.   Substance History - negative use     OB/GYN negative ob/gyn ROS         Other                                    Anesthesia Plan    ASA 4     general     intravenous induction   Anesthetic plan and risks discussed with patient.    Plan discussed with CRNA.

## 2017-08-07 NOTE — NURSING NOTE
[facility] nurse for stoma marking and education for colostomy.  Esequiel colostomy book and Coloplast starter kit reviewed.  Demonstrated how to change an appliance, reviewed the different types of appliances, educated about diet, skin care, daughter (retired nurse) had many questions, all answered to the best of my ability.  Stoma marking done to the LLQ, within vision, avoiding skin folds and belt line.  Please consult WOCN after surgery if needed. - Thank you

## 2017-08-07 NOTE — ANESTHESIA PROCEDURE NOTES
Airway  Urgency: elective    Airway not difficult    General Information and Staff    Patient location during procedure: OR  CRNA: JUAN ANTONIO JACOBSEN    Indications and Patient Condition  Indications for airway management: airway protection    Preoxygenated: yes  MILS not maintained throughout  Mask difficulty assessment: 1 - vent by mask    Final Airway Details  Final airway type: endotracheal airway      Successful airway: ETT  Cuffed: yes   Successful intubation technique: direct laryngoscopy  Facilitating devices/methods: intubating stylet  Endotracheal tube insertion site: oral  Blade: Jo-Ann  Blade size: #3  ETT size: 7.0 mm  Cormack-Lehane Classification: grade IIb - view of arytenoids or posterior of glottis only  Placement verified by: chest auscultation and capnometry   Measured from: lips  ETT to lips (cm): 20  Number of attempts at approach: 1    Additional Comments  Negative epigastric sounds, Breath sound equal bilaterally with symmetric chest rise and fall

## 2017-08-07 NOTE — PROGRESS NOTES
Discharge Planning Assessment  [facility]     Patient Name: Avis Us  MRN: [id number]  Today's Date: 8/7/2017    Admit Date: 8/5/2017          Discharge Needs Assessment       08/07/17 1557    Living Environment    Lives With alone    Living Arrangements house    Home Accessibility no concerns    Stair Railings at Home none    Type of Financial/Environmental Concern none    Transportation Available car;family or friend will provide    Living Environment Comment Son lives very close and comes to help several times through out the day with cooking and household chores     Living Environment    Provides Primary Care For no one    Primary Care Provided By child(salty) (specify)    Quality Of Family Relationships supportive;helpful;involved    Able to Return to Prior Living Arrangements yes    Discharge Needs Assessment    Concerns To Be Addressed no discharge needs identified;denies needs/concerns at this time    Readmission Within The Last 30 Days no previous admission in last 30 days    Anticipated Changes Related to Illness none    Equipment Currently Used at Home bipap/ cpap;cane, quad;cane, straight;grab bar;power chair, (recliner lift);wheelchair;walker, rolling;commode    Equipment Needed After Discharge none    Discharge Disposition home or self-care            Discharge Plan       08/07/17 1604    Case Management/Social Work Plan    Plan Home     Patient/Family In Agreement With Plan yes    Additional Comments Spoke with patient and family friend at bedside - Patient does live alone however her son lives just down the street and comes in several times during the day to help with household chores and the meals. The patient has just about every piece of DME she could need including the life alert button should she be in need when alone - Patient feels comfortable returning home at this point and denies any discharge needs - CM will continue to follow - mark anthony salvador cm/rn [phone]         Discharge  Placement     No information found                Demographic Summary       08/07/17 1541    Referral Information    Admission Type inpatient    Arrived From admitted as an inpatient    Referral Source admission list    Reason For Consult discharge planning    Record Reviewed history and physical;medical record    Contact Information    Permission Granted to Share Information With     Primary Care Physician Information    Name Carol Herzog             Functional Status       08/07/17 1553    Functional Status Current    Current Functional Level Comment Please see nurses notes     Functional Status Prior    Ambulation 1-->assistive equipment    Transferring 2-->assistive person    Toileting 1-->assistive equipment    Bathing 1-->assistive equipment    Dressing 0-->independent    Eating 0-->independent    Communication 0-->understands/communicates without difficulty    Swallowing 0-->swallows foods/liquids without difficulty    IADL    Medications independent    Meal Preparation assistive person    Housekeeping assistive person    Laundry assistive person    Shopping assistive person    Oral Care independent    Activity Tolerance    Current Activity Limitations none    Usual Activity Tolerance moderate    Current Activity Tolerance moderate    Employment/Financial    Employment/Finance Comments Patient has Medicare A & B and [employer]             Psychosocial     None            Abuse/Neglect     None            Legal     None            Substance Abuse     None            Patient Forms     None          Leila Perez RN

## 2017-08-08 ENCOUNTER — APPOINTMENT (OUTPATIENT)
Dept: GENERAL RADIOLOGY | Facility: HOSPITAL | Age: 82
End: 2017-08-08

## 2017-08-08 LAB
ANION GAP SERPL CALCULATED.3IONS-SCNC: 6 MMOL/L (ref 3–11)
ANION GAP SERPL CALCULATED.3IONS-SCNC: 7 MMOL/L (ref 3–11)
BACTERIA SPEC AEROBE CULT: NORMAL
BASOPHILS # BLD AUTO: 0 10*3/MM3 (ref 0–0.2)
BASOPHILS NFR BLD AUTO: 0 % (ref 0–1)
BNP SERPL-MCNC: 807 PG/ML (ref 0–100)
BUN BLD-MCNC: 11 MG/DL (ref 9–23)
BUN BLD-MCNC: 9 MG/DL (ref 9–23)
BUN/CREAT SERPL: 12.2 (ref 7–25)
BUN/CREAT SERPL: 7.5 (ref 7–25)
CALCIUM SPEC-SCNC: 8.9 MG/DL (ref 8.7–10.4)
CALCIUM SPEC-SCNC: 9.7 MG/DL (ref 8.7–10.4)
CHLORIDE SERPL-SCNC: 100 MMOL/L (ref 99–109)
CHLORIDE SERPL-SCNC: 101 MMOL/L (ref 99–109)
CO2 SERPL-SCNC: 27 MMOL/L (ref 20–31)
CO2 SERPL-SCNC: 30 MMOL/L (ref 20–31)
CREAT BLD-MCNC: 0.9 MG/DL (ref 0.6–1.3)
CREAT BLD-MCNC: 1.2 MG/DL (ref 0.6–1.3)
DEPRECATED RDW RBC AUTO: 54.2 FL (ref 37–54)
EOSINOPHIL # BLD AUTO: 0 10*3/MM3 (ref 0–0.3)
EOSINOPHIL NFR BLD AUTO: 0 % (ref 0–3)
ERYTHROCYTE [DISTWIDTH] IN BLOOD BY AUTOMATED COUNT: 15 % (ref 11.3–14.5)
GFR SERPL CREATININE-BSD FRML MDRD: 42 ML/MIN/1.73
GFR SERPL CREATININE-BSD FRML MDRD: 59 ML/MIN/1.73
GLUCOSE BLD-MCNC: 139 MG/DL (ref 70–100)
GLUCOSE BLD-MCNC: 141 MG/DL (ref 70–100)
GLUCOSE BLDC GLUCOMTR-MCNC: 136 MG/DL (ref 70–130)
GLUCOSE BLDC GLUCOMTR-MCNC: 137 MG/DL (ref 70–130)
GLUCOSE BLDC GLUCOMTR-MCNC: 147 MG/DL (ref 70–130)
GLUCOSE BLDC GLUCOMTR-MCNC: 169 MG/DL (ref 70–130)
HCT VFR BLD AUTO: 31.1 % (ref 34.5–44)
HGB BLD-MCNC: 9.6 G/DL (ref 11.5–15.5)
IMM GRANULOCYTES # BLD: 0.05 10*3/MM3 (ref 0–0.03)
IMM GRANULOCYTES NFR BLD: 0.4 % (ref 0–0.6)
LYMPHOCYTES # BLD AUTO: 1.15 10*3/MM3 (ref 0.6–4.8)
LYMPHOCYTES NFR BLD AUTO: 9.3 % (ref 24–44)
MAGNESIUM SERPL-MCNC: 2.4 MG/DL (ref 1.3–2.7)
MCH RBC QN AUTO: 30.4 PG (ref 27–31)
MCHC RBC AUTO-ENTMCNC: 30.9 G/DL (ref 32–36)
MCV RBC AUTO: 98.4 FL (ref 80–99)
MONOCYTES # BLD AUTO: 0.64 10*3/MM3 (ref 0–1)
MONOCYTES NFR BLD AUTO: 5.2 % (ref 0–12)
NEUTROPHILS # BLD AUTO: 10.54 10*3/MM3 (ref 1.5–8.3)
NEUTROPHILS NFR BLD AUTO: 85.1 % (ref 41–71)
PLATELET # BLD AUTO: 344 10*3/MM3 (ref 150–450)
PMV BLD AUTO: 10.3 FL (ref 6–12)
POTASSIUM BLD-SCNC: 5 MMOL/L (ref 3.5–5.5)
POTASSIUM BLD-SCNC: 5.1 MMOL/L (ref 3.5–5.5)
RBC # BLD AUTO: 3.16 10*6/MM3 (ref 3.89–5.14)
SODIUM BLD-SCNC: 135 MMOL/L (ref 132–146)
SODIUM BLD-SCNC: 136 MMOL/L (ref 132–146)
WBC NRBC COR # BLD: 12.38 10*3/MM3 (ref 3.5–10.8)

## 2017-08-08 PROCEDURE — 94640 AIRWAY INHALATION TREATMENT: CPT

## 2017-08-08 PROCEDURE — 94799 UNLISTED PULMONARY SVC/PX: CPT

## 2017-08-08 PROCEDURE — 99233 SBSQ HOSP IP/OBS HIGH 50: CPT | Performed by: INTERNAL MEDICINE

## 2017-08-08 PROCEDURE — 94760 N-INVAS EAR/PLS OXIMETRY 1: CPT

## 2017-08-08 PROCEDURE — 25010000002 MORPHINE SULFATE (PF) 2 MG/ML SOLUTION: Performed by: COLON & RECTAL SURGERY

## 2017-08-08 PROCEDURE — 71010 HC CHEST PA OR AP: CPT

## 2017-08-08 PROCEDURE — 83735 ASSAY OF MAGNESIUM: CPT | Performed by: COLON & RECTAL SURGERY

## 2017-08-08 PROCEDURE — 25810000003 DEXTROSE-NACL PER 500 ML: Performed by: COLON & RECTAL SURGERY

## 2017-08-08 PROCEDURE — 94660 CPAP INITIATION&MGMT: CPT

## 2017-08-08 PROCEDURE — 82962 GLUCOSE BLOOD TEST: CPT

## 2017-08-08 PROCEDURE — 80048 BASIC METABOLIC PNL TOTAL CA: CPT | Performed by: NURSE PRACTITIONER

## 2017-08-08 PROCEDURE — 25010000002 HYDROMORPHONE PER 4 MG: Performed by: COLON & RECTAL SURGERY

## 2017-08-08 PROCEDURE — 85025 COMPLETE CBC W/AUTO DIFF WBC: CPT | Performed by: COLON & RECTAL SURGERY

## 2017-08-08 PROCEDURE — 83880 ASSAY OF NATRIURETIC PEPTIDE: CPT | Performed by: NURSE PRACTITIONER

## 2017-08-08 PROCEDURE — 25010000002 HEPARIN (PORCINE) PER 1000 UNITS: Performed by: COLON & RECTAL SURGERY

## 2017-08-08 PROCEDURE — 80048 BASIC METABOLIC PNL TOTAL CA: CPT | Performed by: INTERNAL MEDICINE

## 2017-08-08 PROCEDURE — 25010000002 MEROPENEM: Performed by: PHYSICIAN ASSISTANT

## 2017-08-08 RX ORDER — IPRATROPIUM BROMIDE AND ALBUTEROL SULFATE 2.5; .5 MG/3ML; MG/3ML
3 SOLUTION RESPIRATORY (INHALATION)
Status: DISCONTINUED | OUTPATIENT
Start: 2017-08-08 | End: 2017-08-15 | Stop reason: HOSPADM

## 2017-08-08 RX ORDER — ALBUTEROL SULFATE 1.25 MG/3ML
2.5 SOLUTION RESPIRATORY (INHALATION) 4 TIMES DAILY PRN
Status: DISCONTINUED | OUTPATIENT
Start: 2017-08-08 | End: 2017-08-15 | Stop reason: HOSPADM

## 2017-08-08 RX ORDER — DEXTROSE AND SODIUM CHLORIDE 5; .9 G/100ML; G/100ML
100 INJECTION, SOLUTION INTRAVENOUS CONTINUOUS
Status: DISCONTINUED | OUTPATIENT
Start: 2017-08-08 | End: 2017-08-08

## 2017-08-08 RX ORDER — ALBUTEROL SULFATE 1.25 MG/3ML
2.5 SOLUTION RESPIRATORY (INHALATION) EVERY 6 HOURS PRN
Status: DISCONTINUED | OUTPATIENT
Start: 2017-08-08 | End: 2017-08-08

## 2017-08-08 RX ADMIN — MEROPENEM 1 G: 1 INJECTION, POWDER, FOR SOLUTION INTRAVENOUS at 01:28

## 2017-08-08 RX ADMIN — POTASSIUM CHLORIDE, SODIUM CHLORIDE, CALCIUM CHLORIDE, SODIUM LACTATE, AND DEXTROSE MONOHYDRATE 125 ML/HR: 1.79; 6; .2; 3.1; 5 INJECTION, SOLUTION INTRAVENOUS at 01:30

## 2017-08-08 RX ADMIN — MORPHINE SULFATE 1 MG: 2 INJECTION, SOLUTION INTRAMUSCULAR; INTRAVENOUS at 20:48

## 2017-08-08 RX ADMIN — IPRATROPIUM BROMIDE AND ALBUTEROL SULFATE 3 ML: .5; 3 SOLUTION RESPIRATORY (INHALATION) at 08:35

## 2017-08-08 RX ADMIN — BENZONATATE 100 MG: 100 CAPSULE, LIQUID FILLED ORAL at 13:00

## 2017-08-08 RX ADMIN — BUDESONIDE AND FORMOTEROL FUMARATE DIHYDRATE 2 PUFF: 80; 4.5 AEROSOL RESPIRATORY (INHALATION) at 19:50

## 2017-08-08 RX ADMIN — INSULIN LISPRO 2 UNITS: 100 INJECTION, SOLUTION INTRAVENOUS; SUBCUTANEOUS at 20:33

## 2017-08-08 RX ADMIN — HEPARIN SODIUM 5000 UNITS: 5000 INJECTION, SOLUTION INTRAVENOUS; SUBCUTANEOUS at 06:42

## 2017-08-08 RX ADMIN — AMLODIPINE BESYLATE 5 MG: 5 TABLET ORAL at 08:09

## 2017-08-08 RX ADMIN — BENZONATATE 100 MG: 100 CAPSULE, LIQUID FILLED ORAL at 20:33

## 2017-08-08 RX ADMIN — ALLOPURINOL 100 MG: 100 TABLET ORAL at 16:50

## 2017-08-08 RX ADMIN — HYDROMORPHONE HYDROCHLORIDE 0.5 MG: 1 INJECTION, SOLUTION INTRAMUSCULAR; INTRAVENOUS; SUBCUTANEOUS at 23:25

## 2017-08-08 RX ADMIN — MORPHINE SULFATE 1 MG: 2 INJECTION, SOLUTION INTRAMUSCULAR; INTRAVENOUS at 13:48

## 2017-08-08 RX ADMIN — MEROPENEM 1 G: 1 INJECTION, POWDER, FOR SOLUTION INTRAVENOUS at 14:17

## 2017-08-08 RX ADMIN — IPRATROPIUM BROMIDE AND ALBUTEROL SULFATE 3 ML: .5; 3 SOLUTION RESPIRATORY (INHALATION) at 19:49

## 2017-08-08 RX ADMIN — Medication 250 MG: at 16:50

## 2017-08-08 RX ADMIN — ALLOPURINOL 100 MG: 100 TABLET ORAL at 08:09

## 2017-08-08 RX ADMIN — BUDESONIDE AND FORMOTEROL FUMARATE DIHYDRATE 2 PUFF: 80; 4.5 AEROSOL RESPIRATORY (INHALATION) at 08:35

## 2017-08-08 RX ADMIN — Medication 250 MG: at 08:09

## 2017-08-08 RX ADMIN — LEVETIRACETAM 750 MG: 750 TABLET, FILM COATED ORAL at 08:09

## 2017-08-08 RX ADMIN — DEXTROSE AND SODIUM CHLORIDE 100 ML/HR: 5; 900 INJECTION, SOLUTION INTRAVENOUS at 14:17

## 2017-08-08 RX ADMIN — PANTOPRAZOLE SODIUM 40 MG: 40 TABLET, DELAYED RELEASE ORAL at 06:43

## 2017-08-08 RX ADMIN — IPRATROPIUM BROMIDE AND ALBUTEROL SULFATE 3 ML: .5; 3 SOLUTION RESPIRATORY (INHALATION) at 21:27

## 2017-08-08 NOTE — PLAN OF CARE
Problem: Patient Care Overview (Adult)  Goal: Plan of Care Review  Outcome: Ongoing (interventions implemented as appropriate)    08/08/17 1055   Coping/Psychosocial Response Interventions   Plan Of Care Reviewed With patient;family   Patient Care Overview   Progress progress toward functional goals is gradual   Outcome Evaluation   Outcome Summary/Follow up Plan Pt seen for ostomy education and care for colostomy. Stoma is red, moist and budded with bridge intact. Pt wearing a one piece Climax appliance that is intact and comfortable. Educational folder reviewed and questions answered. WOCN Will f/u and please call with any needs or concerns.          Problem: Colostomy (Adult)  Goal: Signs and Symptoms of Listed Potential Problems Will be Absent or Manageable (Colostomy)  Outcome: Ongoing (interventions implemented as appropriate)    08/08/17 1055   Colostomy   Problems Assessed (Colostomy) all   Problems Present (Colostomy) none

## 2017-08-08 NOTE — PROGRESS NOTES
Discharge Planning Assessment  [facility]     Patient Name: Avis Us  MRN: [id number]  Today's Date: 8/8/2017    Admit Date: 8/5/2017          Discharge Needs Assessment       08/08/17 1206    Living Environment    Lives With alone    Living Arrangements house    Transportation Available car;family or friend will provide    Living Environment Comment Pt. lives alone in a house in [place] [facility] [place] [facility].  Her son lives nearby and checks on her daily.  Her daughter is also available and helps.    Living Environment    Provides Primary Care For no one, unable/limited ability to care for self    Primary Care Provided By child(salty) (specify)    Caregiving Concerns Pt.'s son and daughter provide daily assistance to pt.    Quality Of Family Relationships supportive;involved;helpful    Able to Return to Prior Living Arrangements yes    Living Arrangement Comments Pt. ultimately plans to return to her home with family support.    Discharge Needs Assessment    Concerns To Be Addressed discharge planning concerns    Readmission Within The Last 30 Days previous discharge plan unsuccessful    Equipment Currently Used at Home bipap/ cpap;cane, quad;cane, straight;commode;walker, rolling;wheelchair;power chair, (recliner lift);grab bar    Equipment Needed After Discharge colostomy/ostomy supplies    Discharge Facility/Level Of Care Needs other (see comments)   to be determined    Current Discharge Risk lives alone    Discharge Disposition other (see comments)   to be determined    Discharge Contact Information if Applicable [phone] - daughterJacquie    Discharge Planning Comments Pt. ultimately plans to return home, but it is not yet determined if pt. will need rehab services.  Pt. receives a great deal of support from family.  She did not have home health services priop to admission.            Discharge Plan       08/08/17 1213    Case Management/Social Work Plan    Plan Initial Discharge Plan    Patient/Family In  Agreement With Plan yes    Additional Comments CM met with pt. at bedside; her daughter was present.  Pt.'s discharge disposition has not yet been determined.  Pt. had colostmy yesterday and will need supplies ongoing.  CM will continue to follow for discharge planning.        Discharge Placement     No information found                Demographic Summary       08/08/17 1205    Referral Information    Admission Type inpatient    Arrived From home or self-care    Referral Source admission list    Contact Information    Permission Granted to Share Information With family/designee    Comments Daughter/SHERYLTARIKFelipe Jacquie Contreras    Primary Care Physician Information    Name Carol Herzog            Functional Status       08/08/17 1206    Employment/Financial    Employment/Finance Comments Pt. has insurance and prescription coverage through Medicare A[employer]B and [employer].            Psychosocial     None            Abuse/Neglect     None            Legal     None            Substance Abuse     None            Patient Forms     None          MELECIO Henry

## 2017-08-08 NOTE — CONSULTS
Adult Nutrition  Assessment/PES    Patient Name:  Avis Us  YOB: 1929  MRN: [id number]  Admit Date:  8/5/2017    Assessment Date:  8/8/2017        Reason for Assessment       08/08/17 1522    Reason for Assessment    Reason For Assessment/Visit physician consult    Time Spent (min) 15    Diagnosis --   per notes this admission    Other diagnosis s/p lap.colostomy                  Labs/Tests/Procedures/Meds       08/08/17 1526    Labs/Tests/Procedures/Meds    Labs/Tests Review Reviewed                Nutrition Prescription Ordered       08/08/17 1526    Nutrition Prescription PO    Current PO Diet Regular    Common Modifiers GI Soft/Harmony            Evaluation of Received Nutrient/Fluid Intake       08/08/17 1526    PO Evaluation    Number of Meals 2    % PO Intake 38              Problem/Interventions:                  Intervention Goal       08/08/17 1530    Intervention Goal    General Nutrition support treatment            Nutrition Intervention       08/08/17 1530    Nutrition Intervention    RD/Tech Action Advise alternate selection;Interview for preference;Menu provided;Encourage intake;Follow Tx progress   Pt.'s son advised RD pt. received liquids for lunch. Asked pt. if she wanted solid food now. She declined stating she would wait until dinner.              Education/Evaluation       08/08/17 1532    Education    Education --   MNT discussed with pt. and her son.    Monitor/Evaluation    Monitor Per protocol            Electronically signed by:  Portia Mack RD  08/08/17 3:32 PM

## 2017-08-08 NOTE — PLAN OF CARE
Problem: Patient Care Overview (Adult)  Goal: Plan of Care Review  Outcome: Ongoing (interventions implemented as appropriate)    08/08/17 0345   Coping/Psychosocial Response Interventions   Plan Of Care Reviewed With patient;daughter;son   Patient Care Overview   Progress progress toward functional goals as expected       Goal: Adult Individualization and Mutuality  Outcome: Ongoing (interventions implemented as appropriate)  Goal: Discharge Needs Assessment  Outcome: Ongoing (interventions implemented as appropriate)    08/07/17 1557   Discharge Needs Assessment   Concerns To Be Addressed no discharge needs identified;denies needs/concerns at this time   Readmission Within The Last 30 Days no previous admission in last 30 days   Equipment Needed After Discharge none   Discharge Disposition home or self-care   Current Health   Anticipated Changes Related to Illness none   Living Environment   Transportation Available car;family or friend will provide   Self-Care   Equipment Currently Used at Home bipap/ cpap;cane, quad;cane, straight;grab bar;power chair, (recliner lift);wheelchair;walker, rolling;commode         Problem: Fall Risk (Adult)  Goal: Identify Related Risk Factors and Signs and Symptoms  Outcome: Outcome(s) achieved Date Met:  08/08/17 08/07/17 0422   Fall Risk   Fall Risk: Related Risk Factors age-related changes;fatigue/slow reaction;history of falls;environment unfamiliar   Fall Risk: Signs and Symptoms presence of risk factors       Goal: Absence of Falls  Outcome: Ongoing (interventions implemented as appropriate)    08/08/17 0345   Fall Risk (Adult)   Absence of Falls making progress toward outcome         Problem: Infection, Risk/Actual (Adult)  Goal: Identify Related Risk Factors and Signs and Symptoms  Outcome: Ongoing (interventions implemented as appropriate)    08/08/17 0345   Infection, Risk/Actual   Infection, Risk/Actual: Related Risk Factors age extremes;surgery/procedure   Signs and  Symptoms (Infection, Risk/Actual) blood glucose changes;edema;pain;weakness       Goal: Infection Prevention/Resolution  Outcome: Ongoing (interventions implemented as appropriate)    08/08/17 0345   Infection, Risk/Actual (Adult)   Infection Prevention/Resolution making progress toward outcome         Problem: Pressure Ulcer Risk (Mack Scale) (Adult,Obstetrics,Pediatric)  Goal: Identify Related Risk Factors and Signs and Symptoms  Outcome: Outcome(s) achieved Date Met:  08/08/17 08/08/17 0345   Pressure Ulcer Risk (Mack Scale)   Related Risk Factors (Pressure Ulcer Risk (Mack Scale)) age extremes;body weight extremes;infection;medical devices       Goal: Skin Integrity  Outcome: Ongoing (interventions implemented as appropriate)    08/08/17 0345   Pressure Ulcer Risk (Mack Scale) (Adult,Obstetrics,Pediatric)   Skin Integrity making progress toward outcome         Problem: Pain, Acute (Adult)  Goal: Identify Related Risk Factors and Signs and Symptoms  Outcome: Ongoing (interventions implemented as appropriate)    08/08/17 0345   Pain, Acute   Related Risk Factors (Acute Pain) infection;surgery   Signs and Symptoms (Acute Pain) fatigue/weakness;verbalization of pain descriptors       Goal: Acceptable Pain Control/Comfort Level  Outcome: Ongoing (interventions implemented as appropriate)    08/08/17 0345   Pain, Acute (Adult)   Acceptable Pain Control/Comfort Level making progress toward outcome         Problem: Colostomy (Adult)  Goal: Signs and Symptoms of Listed Potential Problems Will be Absent or Manageable (Colostomy)  Outcome: Ongoing (interventions implemented as appropriate)    08/08/17 0345   Colostomy   Problems Assessed (Colostomy) all   Problems Present (Colostomy) none

## 2017-08-08 NOTE — PROGRESS NOTES
Doing well post op  Urine is clearing in tafoya  Abd expected post op exam-stoma looks good with maddi in place  K 5.1-will d/c K from fluids  Hgb down to 9 from 10 yesterday-likely some acute blood loss, but will recheck in am and stop heparin for now-no signs or symptoms of bleeding    D/c planning  Stoma teaching  Advance diet  Continue tafoya to manage fistula for now  Pt should keep appt with Monnig for cysto  F/u with me 2-3 weeks post d/c

## 2017-08-08 NOTE — PROGRESS NOTES
"      HOSPITALIST DAILY PROGRESS NOTE    Chief Complaint: dysuria    Subjective   SUBJECTIVE/OVERNIGHT EVENTS     Doing well post-op today.  C/o pain in abd especially when she coughs. No other issues overnight.     Review of Systems:  Gen-as above  CV-no chest pain, no palpitations  Resp-no cough, no dyspnea  GI-no N/V/D, no abd pain    Objective   OBJECTIVE   I have reviewed the vital signs.  /77 (BP Location: Right arm, Patient Position: Lying)  Pulse 65  Temp 97.4 °F (36.3 °C) (Oral)   Resp 16  Ht 66\" (167.6 cm)  Wt 208 lb (94.3 kg)  SpO2 96%  BMI 33.57 kg/m2    Physical Exam:  Gen-no acute distress  CV-RRR, S1 S2 normal, no m/r/g  Resp-CTAB, no wheezes  Abd-soft, midline colostomy in place with liquid stool, wound site c/d/i  Ext-no edema  Neuro-A&Ox3, no focal deficits  Psych-appropriate mood    Results:  I have reviewed the labs, culture data, radiology results, and diagnostic studies.      Results from last 7 days  Lab Units 08/08/17  0435 08/07/17  1754 08/07/17  0634 08/05/17  2045   WBC 10*3/mm3 12.38*  --  13.42* 22.63*   HEMOGLOBIN g/dL 9.6* 10.0* 9.7* 12.3   HEMATOCRIT % 31.1* 33.9* 31.2* 39.3   PLATELETS 10*3/mm3 344  --  351 418       Results from last 7 days  Lab Units 08/08/17  0435   SODIUM mmol/L 136   POTASSIUM mmol/L 5.1   CHLORIDE mmol/L 100   CO2 mmol/L 30.0   BUN mg/dL 11   CREATININE mg/dL 0.90   GLUCOSE mg/dL 139*   CALCIUM mg/dL 8.9       Culture Data:  Cultures:  UCx NGTD         Radiology Results:  Imaging Results (last 24 hours)     ** No results found for the last 24 hours. **          I have reviewed the medications.      Assessment/Plan   ASSESSMENT/PLAN    Principal Problem:    Sepsis  Active Problems:    Chronic kidney disease, stage III (moderate)    Chronic obstructive pulmonary disease    Hyperlipidemia    Hypertension    Anxiety and depression    ANDRIY on CPAP    GERD (gastroesophageal reflux disease)    Diastolic dysfunction    Type 2 diabetes mellitus    Acute " "cystitis without hematuria    Colovesical fistula    Weakness    Recently admitted 7/29-8/1/17 for MDR E. Coli UTI and d/c'd on remainder of PO cefpodoxime based on sensitivities.  Has hx of recurring UTI's and prophylactic Cipro Rx, followed by urologist (Thao at [facility]) and was scheduled for cystoscopy this coming week on 8/8/17 as part of ongoing eval. Brought to ED by family for persistent dysuria and general fatigue despite ongoing PO antibiotics for UTI. Associated with mild suprapubic \"full feeling\" and urine frequency.  Denies fever, hematuria, chills/rigors, diarrhea.  Imaging in ED confirms new finding of colovesicular fistula.       Plan:  --continue IV ABX, repeat UCx NGTD.  Will treat for total of seven days given MDR E coli from previous culture  --IVFs  --blood cultures NGTD  --CRS and Urology both consulted for new findings of fistula, s/p diverting colostomy by Dr. Gordon.   --monitor closely and repeat labs in am      I expect patient to be discharged in: likely 1-2 days possibly to home.     Nyla Aguirre MD  08/08/17  12:21 PM          "

## 2017-08-09 PROBLEM — J18.9 HCAP (HEALTHCARE-ASSOCIATED PNEUMONIA): Status: ACTIVE | Noted: 2017-08-09

## 2017-08-09 LAB
ANION GAP SERPL CALCULATED.3IONS-SCNC: 4 MMOL/L (ref 3–11)
BUN BLD-MCNC: 13 MG/DL (ref 9–23)
BUN/CREAT SERPL: 11.8 (ref 7–25)
CALCIUM SPEC-SCNC: 9.7 MG/DL (ref 8.7–10.4)
CHLORIDE SERPL-SCNC: 99 MMOL/L (ref 99–109)
CO2 SERPL-SCNC: 34 MMOL/L (ref 20–31)
CREAT BLD-MCNC: 1.1 MG/DL (ref 0.6–1.3)
DEPRECATED RDW RBC AUTO: 56.7 FL (ref 37–54)
ERYTHROCYTE [DISTWIDTH] IN BLOOD BY AUTOMATED COUNT: 15.3 % (ref 11.3–14.5)
GFR SERPL CREATININE-BSD FRML MDRD: 47 ML/MIN/1.73
GLUCOSE BLD-MCNC: 112 MG/DL (ref 70–100)
GLUCOSE BLDC GLUCOMTR-MCNC: 104 MG/DL (ref 70–130)
GLUCOSE BLDC GLUCOMTR-MCNC: 116 MG/DL (ref 70–130)
GLUCOSE BLDC GLUCOMTR-MCNC: 125 MG/DL (ref 70–130)
GLUCOSE BLDC GLUCOMTR-MCNC: 135 MG/DL (ref 70–130)
HCT VFR BLD AUTO: 35.6 % (ref 34.5–44)
HGB BLD-MCNC: 10.7 G/DL (ref 11.5–15.5)
MCH RBC QN AUTO: 30.4 PG (ref 27–31)
MCHC RBC AUTO-ENTMCNC: 30.1 G/DL (ref 32–36)
MCV RBC AUTO: 101.1 FL (ref 80–99)
PLATELET # BLD AUTO: 401 10*3/MM3 (ref 150–450)
PMV BLD AUTO: 10.3 FL (ref 6–12)
POTASSIUM BLD-SCNC: 4.6 MMOL/L (ref 3.5–5.5)
RBC # BLD AUTO: 3.52 10*6/MM3 (ref 3.89–5.14)
SODIUM BLD-SCNC: 137 MMOL/L (ref 132–146)
WBC NRBC COR # BLD: 15.82 10*3/MM3 (ref 3.5–10.8)

## 2017-08-09 PROCEDURE — 25010000002 VANCOMYCIN HCL IN NACL 2-0.9 GM/500ML-% SOLUTION: Performed by: NURSE PRACTITIONER

## 2017-08-09 PROCEDURE — 92612 ENDOSCOPY SWALLOW (FEES) VID: CPT

## 2017-08-09 PROCEDURE — 82962 GLUCOSE BLOOD TEST: CPT

## 2017-08-09 PROCEDURE — 25010000002 MORPHINE SULFATE (PF) 2 MG/ML SOLUTION: Performed by: COLON & RECTAL SURGERY

## 2017-08-09 PROCEDURE — 80048 BASIC METABOLIC PNL TOTAL CA: CPT | Performed by: INTERNAL MEDICINE

## 2017-08-09 PROCEDURE — 99232 SBSQ HOSP IP/OBS MODERATE 35: CPT | Performed by: NURSE PRACTITIONER

## 2017-08-09 PROCEDURE — 94799 UNLISTED PULMONARY SVC/PX: CPT

## 2017-08-09 PROCEDURE — 85027 COMPLETE CBC AUTOMATED: CPT | Performed by: INTERNAL MEDICINE

## 2017-08-09 PROCEDURE — 94640 AIRWAY INHALATION TREATMENT: CPT

## 2017-08-09 PROCEDURE — 94660 CPAP INITIATION&MGMT: CPT

## 2017-08-09 PROCEDURE — 25010000002 MEROPENEM: Performed by: PHYSICIAN ASSISTANT

## 2017-08-09 PROCEDURE — 92610 EVALUATE SWALLOWING FUNCTION: CPT

## 2017-08-09 PROCEDURE — 94760 N-INVAS EAR/PLS OXIMETRY 1: CPT

## 2017-08-09 RX ORDER — VANCOMYCIN 2 GRAM/500 ML IN 0.9 % SODIUM CHLORIDE INTRAVENOUS
20 ONCE
Status: COMPLETED | OUTPATIENT
Start: 2017-08-09 | End: 2017-08-09

## 2017-08-09 RX ORDER — VANCOMYCIN HYDROCHLORIDE
1250 EVERY 24 HOURS
Status: COMPLETED | OUTPATIENT
Start: 2017-08-10 | End: 2017-08-11

## 2017-08-09 RX ADMIN — BENZONATATE 100 MG: 100 CAPSULE, LIQUID FILLED ORAL at 09:27

## 2017-08-09 RX ADMIN — IPRATROPIUM BROMIDE AND ALBUTEROL SULFATE 3 ML: .5; 3 SOLUTION RESPIRATORY (INHALATION) at 12:27

## 2017-08-09 RX ADMIN — Medication 250 MG: at 16:34

## 2017-08-09 RX ADMIN — MORPHINE SULFATE 1 MG: 2 INJECTION, SOLUTION INTRAMUSCULAR; INTRAVENOUS at 15:08

## 2017-08-09 RX ADMIN — ACETAMINOPHEN 650 MG: 325 TABLET, FILM COATED ORAL at 16:34

## 2017-08-09 RX ADMIN — IPRATROPIUM BROMIDE AND ALBUTEROL SULFATE 3 ML: .5; 3 SOLUTION RESPIRATORY (INHALATION) at 20:01

## 2017-08-09 RX ADMIN — MORPHINE SULFATE 1 MG: 2 INJECTION, SOLUTION INTRAMUSCULAR; INTRAVENOUS at 09:33

## 2017-08-09 RX ADMIN — BENZONATATE 100 MG: 100 CAPSULE, LIQUID FILLED ORAL at 01:26

## 2017-08-09 RX ADMIN — BUDESONIDE AND FORMOTEROL FUMARATE DIHYDRATE 2 PUFF: 80; 4.5 AEROSOL RESPIRATORY (INHALATION) at 20:01

## 2017-08-09 RX ADMIN — PANTOPRAZOLE SODIUM 40 MG: 40 TABLET, DELAYED RELEASE ORAL at 05:46

## 2017-08-09 RX ADMIN — IPRATROPIUM BROMIDE AND ALBUTEROL SULFATE 3 ML: .5; 3 SOLUTION RESPIRATORY (INHALATION) at 07:14

## 2017-08-09 RX ADMIN — BUDESONIDE AND FORMOTEROL FUMARATE DIHYDRATE 2 PUFF: 80; 4.5 AEROSOL RESPIRATORY (INHALATION) at 07:14

## 2017-08-09 RX ADMIN — AMLODIPINE BESYLATE 5 MG: 5 TABLET ORAL at 08:49

## 2017-08-09 RX ADMIN — MEROPENEM 1 G: 1 INJECTION, POWDER, FOR SOLUTION INTRAVENOUS at 01:54

## 2017-08-09 RX ADMIN — BENZONATATE 100 MG: 100 CAPSULE, LIQUID FILLED ORAL at 21:03

## 2017-08-09 RX ADMIN — MORPHINE SULFATE 1 MG: 2 INJECTION, SOLUTION INTRAMUSCULAR; INTRAVENOUS at 01:13

## 2017-08-09 RX ADMIN — Medication 250 MG: at 08:49

## 2017-08-09 RX ADMIN — ALLOPURINOL 100 MG: 100 TABLET ORAL at 08:49

## 2017-08-09 RX ADMIN — MORPHINE SULFATE 1 MG: 2 INJECTION, SOLUTION INTRAMUSCULAR; INTRAVENOUS at 21:03

## 2017-08-09 RX ADMIN — LEVETIRACETAM 750 MG: 750 TABLET, FILM COATED ORAL at 08:49

## 2017-08-09 RX ADMIN — ALLOPURINOL 100 MG: 100 TABLET ORAL at 16:34

## 2017-08-09 RX ADMIN — Medication 2000 MG: at 13:44

## 2017-08-09 RX ADMIN — MEROPENEM 1 G: 1 INJECTION, POWDER, FOR SOLUTION INTRAVENOUS at 13:44

## 2017-08-09 RX ADMIN — IPRATROPIUM BROMIDE AND ALBUTEROL SULFATE 3 ML: .5; 3 SOLUTION RESPIRATORY (INHALATION) at 15:33

## 2017-08-09 NOTE — PROGRESS NOTES
Seen and examined  Chart data reviewed  Less abd pain    Exam appropriate  Stoma looks good    Ok to d/c from surgical standpoint once she has stoma output-stool and gas  F/u with me 2 weeks  F/u with ostomy nurse 2 weeks post d/c

## 2017-08-09 NOTE — PLAN OF CARE
Problem: Patient Care Overview (Adult)  Goal: Plan of Care Review  Outcome: Ongoing (interventions implemented as appropriate)    Problem: Fall Risk (Adult)  Goal: Absence of Falls  Outcome: Ongoing (interventions implemented as appropriate)    Problem: Infection, Risk/Actual (Adult)  Goal: Identify Related Risk Factors and Signs and Symptoms  Outcome: Ongoing (interventions implemented as appropriate)  Goal: Infection Prevention/Resolution  Outcome: Ongoing (interventions implemented as appropriate)    Problem: Pressure Ulcer Risk (Mack Scale) (Adult,Obstetrics,Pediatric)  Goal: Skin Integrity  Outcome: Ongoing (interventions implemented as appropriate)    Problem: Perioperative Period (Adult)  Goal: Signs and Symptoms of Listed Potential Problems Will be Absent or Manageable (Perioperative Period)  Outcome: Ongoing (interventions implemented as appropriate)    Problem: Pain, Acute (Adult)  Goal: Identify Related Risk Factors and Signs and Symptoms  Outcome: Ongoing (interventions implemented as appropriate)  Goal: Acceptable Pain Control/Comfort Level  Outcome: Ongoing (interventions implemented as appropriate)

## 2017-08-09 NOTE — PROGRESS NOTES
"Pharmacy Consult-Vancomycin Dosing  Avis Us is a  87 y.o. female receiving vancomycin therapy.     Indication: PNA  Consulting Provider: Hospitalist  ID Consult: No    Current Regimen: Vancomycin 2000mg x 1 (LD)  DOT: Day 1  Goal Trough: 15-20 mcg/mL      Current Antimicrobial Therapy  IV Anti-Infectives       Ordered     Dose/Rate Route Frequency Start Stop      08/09/17 1204  vancomycin IVPB 2000 mg in 0.9% Sodium Chloride (premix) 500 mL     Ordering Provider:  COREY Lima    20 mg/kg × 102 kg  over 120 Minutes Intravenous Once 08/09/17 1400      08/09/17 1155  Pharmacy to dose vancomycin     Ordering Provider:  COREY Lima     Does not apply Continuous PRN 08/09/17 1153      08/06/17 0350  meropenem (MERREM) 1 g/100 mL 0.9% NS VTB (mbp)     Comments:  Start from time administered in ED   Ordering Provider:  Mejia Johnson PA-C    1 g  over 30 Minutes Intravenous Every 12 Hours 08/06/17 1400      08/06/17 0117  meropenem (MERREM) 1 g/100 mL 0.9% NS VTB (mbp)     Ordering Provider:  Bright Dooley MD    1 g  over 30 Minutes Intravenous Once 08/06/17 0119 08/06/17 0230    08/06/17 0022  cefTRIAXone (ROCEPHIN) IVPB 1 g     Ordering Provider:  Bright Dooley MD    1 g  over 30 Minutes Intravenous Once 08/06/17 0023 08/06/17 0142            Allergies  Clarithromycin; Macrobid [nitrofurantoin monohyd macro]; Vioxx [rofecoxib]; Metoprolol; Statins; and Tramadol    Labs    Results from last 7 days     Lab Units 08/09/17  0639 08/08/17  2111 08/08/17  0435   BUN mg/dL 13 9 11   CREATININE mg/dL 1.10 1.20 0.90       Results from last 7 days     Lab Units 08/09/17  0639 08/08/17  0435 08/07/17  0634   WBC 10*3/mm3 15.82* 12.38* 13.42*     Max Temperature in Last 24 Hours:    Evaluation of Dosing    Ht - 66\" (167.6 cm)  Wt - 224 lb 3.2 oz (102 kg)    Estimated Creatinine Clearance: 43.5 mL/min (by C-G formula based on Cr of 1.1).    I/O last 3 completed shifts:  In: 1580 [P.O.:380; I.V.:1000; " IV Piggyback:200]  Out: 2050 [Urine:2025; Stool:25]     Microbiology and Radiology  Blood Culture   Date Value Ref Range Status   08/05/2017 No growth at 3 days  Preliminary     Urine Culture   Date Value Ref Range Status   08/05/2017 No growth at 2 days  Final     Evaluation of Level    None this admission    Assessment/Plan:  Pharmacy consulted to dose vancomycin for presumed PNA. Ordered loading dose of vancomycin 2000mg to be given today. PT received vancomycin for urosepsis on last admission, but seems to have improved renal function at this time.    Will initiate vancomycin 1250mg q24 beginning tomorrow, 8/10. Will order a level prior to the third total dose on 8/11, and consider checking a level before then if renal function declines.    Pharmacy will monitor the plan of medicine team, renal function, and labs to make adjustments as appropriate.       Thank you for this consult,  Keara Jamison, PharmD  08/09/17  1:05 PM

## 2017-08-09 NOTE — PROGRESS NOTES
"      HOSPITALIST DAILY PROGRESS NOTE    Chief Complaint: dysuria    Subjective   SUBJECTIVE/OVERNIGHT EVENTS     Patient states that she is not feeling well today.  Reports cough is worse.  On oxygen 3 liters NC and doesn't wear oxygen at home.  Son is at bedside.  RN reports coughing when eating earlier but taking pills without difficulty.  Abdomen sore from surgery.      Review of Systems:  Gen-as above  CV-no chest pain, no palpitations  Resp-no cough, no dyspnea  GI-no N/V/D, no abd pain    Objective   OBJECTIVE   I have reviewed the vital signs.  /74  Pulse 86  Temp 97.7 °F (36.5 °C) (Oral)   Resp 16  Ht 66\" (167.6 cm)  Wt 224 lb 3.2 oz (102 kg)  SpO2 99%  BMI 36.19 kg/m2    Physical Exam:  Gen-no acute distress  CV-RRR, S1 S2 normal, no m/r/g  Resp-few scattered wheezes, course rhonchi upper lobes  Abd-soft, midline colostomy in place with liquid stool, wound site c/d/i  -tafoya cath in place draining clear yellow urine  Ext-no edema  Neuro-A&Ox3, no focal deficits  Psych-appropriate mood    Results:  I have reviewed the labs, culture data, radiology results, and diagnostic studies.      Results from last 7 days  Lab Units 08/09/17  0639 08/08/17  0435 08/07/17  1754 08/07/17  0634   WBC 10*3/mm3 15.82* 12.38*  --  13.42*   HEMOGLOBIN g/dL 10.7* 9.6* 10.0* 9.7*   HEMATOCRIT % 35.6 31.1* 33.9* 31.2*   PLATELETS 10*3/mm3 401 344  --  351       Results from last 7 days  Lab Units 08/09/17  0639   SODIUM mmol/L 137   POTASSIUM mmol/L 4.6   CHLORIDE mmol/L 99   CO2 mmol/L 34.0*   BUN mg/dL 13   CREATININE mg/dL 1.10   GLUCOSE mg/dL 112*   CALCIUM mg/dL 9.7       Culture Data:  Cultures:  UCx NGTD         Radiology Results:  Imaging Results (last 24 hours)     Procedure Component Value Units Date/Time    XR Chest 1 View [[id number]]  (Abnormal) Collected:  08/08/17 1359     Updated:  08/08/17 2124    Narrative:       EXAM:    XR Chest, 1 View    CLINICAL HISTORY:    87 years old, female; " "Vesicointestinal fistula; Urinary tract infection, site   not specified; Signs and symptoms; Other: Congestion; Additional info: Congested    TECHNIQUE:    Frontal view of the chest.    COMPARISON:    CR - XR CHEST 1 VW 8/5/2017 9:45:03 PM    FINDINGS:    Prominent lung markings/peribronchial thickening with mild haziness and   patchy airspace opacity in the RIGHT mid zone. Blunting of the LEFT   costophrenic angle suggestive of small pleural effusion/thickening.    Cardiomegaly with central pulmonary vascular congestion.  Unfolding of the   aortic arch with a tortuous descending thoracic aorta.  LEFT chest wall   pacemaker.       Impression:         Chronic cardiopulmonary changes with patchy consolidation in the RIGHT mid   zone and small LEFT pleural effusion. Recommend followup to complete   resolution.       THIS DOCUMENT HAS BEEN ELECTRONICALLY SIGNED BY MAYRA BROWNE MD          I have reviewed the medications.      Assessment/Plan   ASSESSMENT/PLAN    Principal Problem:    Sepsis  Active Problems:    Chronic kidney disease, stage III (moderate)    Chronic obstructive pulmonary disease    Hyperlipidemia    Hypertension    Anxiety and depression    ANDRIY on CPAP    GERD (gastroesophageal reflux disease)    Diastolic dysfunction    Type 2 diabetes mellitus    Acute cystitis without hematuria    Colovesical fistula    Weakness    HCAP (healthcare-associated pneumonia)    Recently admitted 7/29-8/1/17 for MDR E. Coli UTI and d/c'd on remainder of PO cefpodoxime based on sensitivities.  Has hx of recurring UTI's and prophylactic Cipro Rx, followed by urologist ([facility] at [facility]) and was scheduled for cystoscopy this coming week on 8/8/17 as part of ongoing eval. Brought to ED by family for persistent dysuria and general fatigue despite ongoing PO antibiotics for UTI. Associated with mild suprapubic \"full feeling\" and urine frequency.  Denies fever, hematuria, chills/rigors, diarrhea.  Imaging in ED confirms new " finding of colovesicular fistula.       Plan:  --continue IV ABX, repeat UCx NGTD.  Will treat for total of seven days given MDR E coli from previous culture  --CXR yesterday showed patchy consolidation in the right mid zone and small left pleural effusion.  --add vanco for gram positive coverage for presumed HCAP  --SLP consult  --blood cultures NGTD  --leukocytosis slightly worse today   --CRS and Urology both consulted for new findings of fistula  --discharge with tafoya and follow up with Dr. Osuna in 1 week  --s/p diverting colostomy by Dr. Gordon POD #2   --ok for discharge from surgical standpoint once she has stoma output-gas and stool, follow up Dr. Gordon in 2 weeks, follow up ostomy nurse in 2 weeks  --monitor closely and repeat labs in am  --PT/OT consult  --encourage incentive spirometer      I expect patient to be discharged in: likely 1-2 days possibly to home.     Violeta Choi, APRN  08/09/17  1:18 PM

## 2017-08-09 NOTE — MBS/VFSS/FEES
Acute Care - Speech Language Pathology   Swallow Initial Evaluation [facility]   Clinical Swallow Evaluation  Fiberoptic Endoscopic Evaluation of Swallowing (FEES)         Patient Name: Avis Us  : 9/3/1929  MRN: [id number]  Today's Date: 2017               Admit Date: 2017    Visit Dx:     ICD-10-CM ICD-9-CM   1. Acute urinary tract infection N39.0 599.0   2. Jackson-vesical fistula N32.1 596.1     Patient Active Problem List   Diagnosis   • Abnormal liver function tests   • Acute myocardial infarction   • Anxiety   • Knee pain   • Asthma   • Calf cramp   • Candidal intertrigo   • Cataract   • Chest pain   • Chronic kidney disease, stage III (moderate)   • Chronic obstructive pulmonary disease   • Complete atrioventricular block   • Congestive heart failure   • Atherosclerosis of coronary artery   • Cough   • Depression   • Diabetes mellitus   • Dizziness   • Dysuria   • Edema   • Gastroesophageal reflux disease   • Primary hypertriglyceridemia   • Fatigue   • Fracture of patella   • Gout   • Headache   • Hyperlipidemia   • Hypertension   • Influenza due to influenza A virus   • Insomnia   • Leukocytosis   • Macrocytosis   • Macular degeneration   • Menopause present   • Night sweats   • Obstructive sleep apnea syndrome   • Osteoporosis   • Peripheral neuropathy   • Pneumonia   • Seizure disorder   • Sick sinus syndrome   • Sinus bradycardia   • Thrombophlebitis   • Cobalamin deficiency   • Vitamin D deficiency   • Physical debility   • Lumbar strain   • History of MI (myocardial infarction)   • Midline low back pain without sciatica   • Arthralgia of lumbar spine   • Encounter for eye exam   • Parasites in stool   • Sepsis   • UTI (urinary tract infection)   • Acute on chronic Respiratory failure. Not requiring ventilatory support   • Acute on Chronic kidney disease (CKD), stage III (moderate)   • Exacerbation of COPD    • H/O SSS (sick sinus syndrome) s/p PPM    • CHF (congestive heart  failure)   • Anxiety and depression   • Macular degeneration   • ANDRIY on CPAP   • Seizure disorder on Keppra   • HTN and possible diastolic dysfunction   • HLD (hyperlipidemia)   • Obesity, BMI 33.7   • Pneumonia versus acute reaction to nitrofurantoin   • Complicated UTI (urinary tract infection)   • Senile atrophic vaginitis   • Acute cystitis with hematuria   • GERD (gastroesophageal reflux disease)   • Diastolic dysfunction   • Type 2 diabetes mellitus   • Acute cystitis without hematuria   • Colovesical fistula   • Weakness   • Sepsis   • HCAP (healthcare-associated pneumonia)     Past Medical History:   Diagnosis Date   • Abnormal liver function test    • Anxiety    • Arthralgia of lumbar spine    • Asthma    • CAD (coronary artery disease)    • Calf cramp    • Candidal intertrigo    • Cataract    • Chest pain    • CHF (congestive heart failure)    • Chronic kidney disease, stage III (moderate)    • Chronic UTI (urinary tract infection)     last infection1 1/2 years ago. Cipro prophalactically   • Complete heart block    • COPD (chronic obstructive pulmonary disease)    • Cough    • Depression    • Diabetes mellitus    • Dizziness    • DVT (deep venous thrombosis)     1950's, last one 5 years   • Dysuria    • Edema    • Fatigue    • Fracture of patella    • GERD (gastroesophageal reflux disease)    • Gout    • Headache    • Hyperlipidemia    • Hypertension    • Hypertriglyceridemia    • Influenza A    • Insomnia    • Kidney failure     early stages, due to Celebrex   • Knee pain    • Leukocytosis    • Macrocytosis    • Macular degeneration    • Menopause    • Myocardial infarction    • Night sweats    • ANDRIY (obstructive sleep apnea)    • Osteoporosis with fracture    • Parasites in stool    • Peripheral neuropathy    • Pneumonia    • Psoriasis    • Seizures     last one 5 years ago   • Sepsis    • Sinus bradycardia    • SSS (sick sinus syndrome)    • Thrombophlebitis    • UTI (urinary tract infection)    •  Vitamin B12 deficiency    • Vitamin D deficiency      Past Surgical History:   Procedure Laterality Date   • CHOLECYSTECTOMY     • COLON RESECTION N/A 8/7/2017    Procedure: LAPAROSCOPIC COLOSTOMY;  Surgeon: Chioma Gordon MD;  Location:  [facility];  Service:    • COLONOSCOPY      10 years ago   • HERNIA REPAIR     • KYPHOPLASTY N/A 9/23/2016    Procedure: KYPHOPLASTY T12;  Surgeon: Charles Nguyen MD;  Location:  [facility];  Service:    • PACEMAKER IMPLANTATION     • UMBILICAL HERNIA REPAIR            SWALLOW EVALUATION (last 72 hours)      Swallow Evaluation       08/09/17 1245                Rehab Evaluation    Document Type evaluation  -LS        Subjective Information no complaints;agree to therapy  -LS        General Information    Patient Profile Review yes  -LS        Subjective Patient Observations alert and cooperative  -LS        Pertinent History Of Current Problem admit w/ UIT, colonectomy, Wet cough at baseline  -LS        Current Diet Limitations thin liquids;regular solid  -LS        Precautions/Limitations, Vision WFL   for this eval  -LS        Precautions/Limitations, Hearing WFL   for this eval  -LS        Prior Level of Function- Communication functional in all spheres  -LS        Prior Level of Function- Swallowing no diet consistency restrictions  -LS        Plans/Goals Discussed With patient and family;agreed upon  -LS        Barriers to Rehab medically complex  -LS        Clinical Impression    Patient's Goals For Discharge patient did not state  -LS        Family Goals For Discharge patient able to return to PO diet;other (see comments)   increase PO intake  -LS        Rehab Potential/Prognosis, Swallowing good, to achieve stated therapy goals  -LS        Criteria for Skilled Therapeutic Interventions Met no problems identified which require skilled intervention  -LS        FCM, Swallowing 7-->Level 7  -LS        Therapy Frequency evaluation only  -LS        SLP Diet Recommendation regular  textures;thin liquids  -LS        Recommended Diagnostics FEES   completed 8/9/17  -LS        SLP Rec. for Method of Medication Administration meds whole with thin liquid  -LS        Oral Motor Structure and Function    Oral Motor Anatomy and Physiology patient demonstrates anatomy and physiology that is WNL  -LS        Dentition Assessment present and adequate  -LS        Secretion Management WNL/WFL;other (see comments)   coughing up secretions, self suction  -LS        Volitional Swallow no difficulties initiating volitional swallow  -LS        Clinical Swallow Exam    Mode of Presentation fed by clinician;self fed;spoon;cup;straw  -LS        Oral Phase Results intact oral phase without signs of dysfunction  -LS        Pharyngeal Phase Results sign/symptoms of pharyngeal impairment  -LS        Summary of Clinical Exam R/O pharyngeal dysphagia. Pt with baseline cough that SLP is unable to differienciate between baseline cough and possible asp at bedside. REC: NPO until FEES  -LS        Fiberoptic Endoscopic Swallowing Exam    Risks/Benefits Reviewed risks/benefits explained;agreed to eval;patient  -LS        Positioning Needs for Swallow Exam upright at 90 degrees  -LS        Anatomy and Physiology    Velopharyngeal WFL  -LS        Base of Tongue symmetrical  -LS        Epiglottis WFL  -LS        Laryngeal Function Breathing asymmetrical  -LS        Laryngeal Function Phonation asymmetrical  -LS        Laryngeal Function Breath Hold incomplete closure  -LS        Secretions 0- Normal, no visible secretions  -LS        Secretion Description thin;clear  -LS        Spontaneous Swallow frequency adequate  -LS        Oral-Phary Transit WFL  -LS        Anatomy Hypopharynx    Observation Anatomic Considerations no anatomic structural deviation via FEES  -LS        FEES    Mode of Presentation thin:;pudding:;mixed:;cohesive solid:  -LS        Post Swallow Residue thin:   trace lateral channel residue  -LS         Rosenbek's Scale thin:;pudding:;cohesive solid:;1-->Level 1  -LS        Pharyngeal Phase Results functional swallow  -LS        FEES Summary Functional swallow. Timely initiation of the swallow. No pen/asp during this exam even when pushed with thins, pureed, mixed, solid. Trace lateral channel residue with thins that cleared w/ spontaneous second swallow. REC: continue regular diet and thin liquids, meds whole in thins, No further SLP dysphagia needs at this time.   -          User Key  (r) = Recorded By, (t) = Taken By, (c) = Cosigned By    Initials Name Effective Dates     Shanell Chou, MS [facility]SLP 06/22/15 -         EDUCATION  The patient has been educated in the following areas:   Dysphagia (Swallowing Impairment) Oral Care/Hydration.    SLP Recommendation and Plan     SLP Diet Recommendation: regular textures, thin liquids     SLP Rec. for Method of Medication Administration: meds whole with thin liquid     Recommended Diagnostics: FEES (completed 8/9/17)  Criteria for Skilled Therapeutic Interventions Met: no problems identified which require skilled intervention     Rehab Potential/Prognosis, Swallowing: good, to achieve stated therapy goals  Therapy Frequency: evaluation only             Plan of Care Review  Plan Of Care Reviewed With: patient, son  Progress: progress toward functional goals as expected  Outcome Summary/Follow up Plan: Clinical swallow eval and FEES complete. Functional swallow. Timely initiation of the swallow. No pen/asp during this exam even when pushed with thins, pureed, mixed, solid. Trace lateral channel residue with thins that cleared w/ spontaneous second swallow. REC: continue regular diet and thin liquids, meds whole in thins, No further SLP dysphagia needs at this time.              Time Calculation:         Time Calculation- SLP       08/09/17 1351          Time Calculation- SLP    SLP Start Time 1245  -      SLP Received On 08/09/17  -        User Key  (r) = Recorded  By, (t) = Taken By, (c) = Cosigned By    Initials Name Provider Type     Shanell Chou, MS CCC-SLP Speech and Language Pathologist          Therapy Charges for Today     Code Description Service Date Service Provider Modifiers Qty    10162571485 HC ST EVAL ORAL PHARYNG SWALLOW 2 8/9/2017 Shanell Chou, MS CCC-SLP GN 1    [id number] HC ST FIBEROPTIC ENDO EVAL SWALL 6 8/9/2017 Shanell Chou, MS CCC-SLP GN 1               Shanell Chou MS GENEVIEVE-SLP  8/9/2017

## 2017-08-09 NOTE — SIGNIFICANT NOTE
RN reports pt sounds very wet and looks fluid overloaded. Stop IV fluids. Pending CXR. Add BMP and BNP. Schedule neb. COPD. Renal hx, elevated BNP in past but barely elevated on admit, last ECHO within months overall okay. CRS following. Sepsis, UTI.    May need dose of lasix. RN to call back with CXR done.      2200 - keep IV fluids off (kvo 5 ml/hr to keep iv patent as difficult stick). Continue BIPAP. Continue with nebs. Consider lasix dose in AM.

## 2017-08-09 NOTE — PLAN OF CARE
Problem: Patient Care Overview (Adult)  Goal: Plan of Care Review  Outcome: Ongoing (interventions implemented as appropriate)    08/09/17 1418   Coping/Psychosocial Response Interventions   Plan Of Care Reviewed With patient;son   Patient Care Overview   Progress no change   Outcome Evaluation   Outcome Summary/Follow up Plan Appliance is intact. Stoma is red and moist. Bridge in place. No flatus at this time. Talked with son and performed education. Stressed mobility. RN stated that PT to see today. Please contact [facility] if needs arise. Thanks         Problem: Colostomy (Adult)  Goal: Signs and Symptoms of Listed Potential Problems Will be Absent or Manageable (Colostomy)  Outcome: Ongoing (interventions implemented as appropriate)    08/09/17 1418   Colostomy   Problems Assessed (Colostomy) all   Problems Present (Colostomy) situational response

## 2017-08-09 NOTE — PLAN OF CARE
Problem: Patient Care Overview (Adult)  Goal: Plan of Care Review  Outcome: Ongoing (interventions implemented as appropriate)    08/09/17 1345   Coping/Psychosocial Response Interventions   Plan Of Care Reviewed With patient;son   Patient Care Overview   Progress progress toward functional goals as expected   Outcome Evaluation   Outcome Summary/Follow up Plan Clinical swallow eval and FEES complete. Functional swallow. Timely initiation of the swallow. No pen/asp during this exam even when pushed with thins, pureed, mixed, solid. Trace lateral channel residue with thins that cleared w/ spontaneous second swallow. REC: continue regular diet and thin liquids, meds whole in thins, No further SLP dysphagia needs at this time.

## 2017-08-10 LAB
ANION GAP SERPL CALCULATED.3IONS-SCNC: 7 MMOL/L (ref 3–11)
BASOPHILS # BLD AUTO: 0.02 10*3/MM3 (ref 0–0.2)
BASOPHILS NFR BLD AUTO: 0.1 % (ref 0–1)
BUN BLD-MCNC: 11 MG/DL (ref 9–23)
BUN/CREAT SERPL: 13.8 (ref 7–25)
CALCIUM SPEC-SCNC: 9.3 MG/DL (ref 8.7–10.4)
CHLORIDE SERPL-SCNC: 96 MMOL/L (ref 99–109)
CO2 SERPL-SCNC: 34 MMOL/L (ref 20–31)
CREAT BLD-MCNC: 0.8 MG/DL (ref 0.6–1.3)
DEPRECATED RDW RBC AUTO: 56.6 FL (ref 37–54)
EOSINOPHIL # BLD AUTO: 0.24 10*3/MM3 (ref 0–0.3)
EOSINOPHIL NFR BLD AUTO: 1.6 % (ref 0–3)
ERYTHROCYTE [DISTWIDTH] IN BLOOD BY AUTOMATED COUNT: 15.2 % (ref 11.3–14.5)
GFR SERPL CREATININE-BSD FRML MDRD: 68 ML/MIN/1.73
GLUCOSE BLD-MCNC: 117 MG/DL (ref 70–100)
GLUCOSE BLDC GLUCOMTR-MCNC: 121 MG/DL (ref 70–130)
GLUCOSE BLDC GLUCOMTR-MCNC: 123 MG/DL (ref 70–130)
GLUCOSE BLDC GLUCOMTR-MCNC: 126 MG/DL (ref 70–130)
GLUCOSE BLDC GLUCOMTR-MCNC: 145 MG/DL (ref 70–130)
HCT VFR BLD AUTO: 34.7 % (ref 34.5–44)
HGB BLD-MCNC: 10.6 G/DL (ref 11.5–15.5)
IMM GRANULOCYTES # BLD: 0.12 10*3/MM3 (ref 0–0.03)
IMM GRANULOCYTES NFR BLD: 0.8 % (ref 0–0.6)
LYMPHOCYTES # BLD AUTO: 1.88 10*3/MM3 (ref 0.6–4.8)
LYMPHOCYTES NFR BLD AUTO: 12.8 % (ref 24–44)
MCH RBC QN AUTO: 30.8 PG (ref 27–31)
MCHC RBC AUTO-ENTMCNC: 30.5 G/DL (ref 32–36)
MCV RBC AUTO: 100.9 FL (ref 80–99)
MONOCYTES # BLD AUTO: 1.47 10*3/MM3 (ref 0–1)
MONOCYTES NFR BLD AUTO: 10 % (ref 0–12)
NEUTROPHILS # BLD AUTO: 10.96 10*3/MM3 (ref 1.5–8.3)
NEUTROPHILS NFR BLD AUTO: 74.7 % (ref 41–71)
PLATELET # BLD AUTO: 378 10*3/MM3 (ref 150–450)
PMV BLD AUTO: 10.3 FL (ref 6–12)
POTASSIUM BLD-SCNC: 4.1 MMOL/L (ref 3.5–5.5)
RBC # BLD AUTO: 3.44 10*6/MM3 (ref 3.89–5.14)
SODIUM BLD-SCNC: 137 MMOL/L (ref 132–146)
WBC NRBC COR # BLD: 14.69 10*3/MM3 (ref 3.5–10.8)

## 2017-08-10 PROCEDURE — 94660 CPAP INITIATION&MGMT: CPT

## 2017-08-10 PROCEDURE — 97166 OT EVAL MOD COMPLEX 45 MIN: CPT

## 2017-08-10 PROCEDURE — 94799 UNLISTED PULMONARY SVC/PX: CPT

## 2017-08-10 PROCEDURE — 80048 BASIC METABOLIC PNL TOTAL CA: CPT | Performed by: NURSE PRACTITIONER

## 2017-08-10 PROCEDURE — 25010000002 MEROPENEM: Performed by: PHYSICIAN ASSISTANT

## 2017-08-10 PROCEDURE — 94640 AIRWAY INHALATION TREATMENT: CPT

## 2017-08-10 PROCEDURE — 97162 PT EVAL MOD COMPLEX 30 MIN: CPT

## 2017-08-10 PROCEDURE — 94760 N-INVAS EAR/PLS OXIMETRY 1: CPT

## 2017-08-10 PROCEDURE — 99233 SBSQ HOSP IP/OBS HIGH 50: CPT | Performed by: HOSPITALIST

## 2017-08-10 PROCEDURE — 25010000002 HEPARIN (PORCINE) PER 1000 UNITS: Performed by: COLON & RECTAL SURGERY

## 2017-08-10 PROCEDURE — 85025 COMPLETE CBC W/AUTO DIFF WBC: CPT | Performed by: NURSE PRACTITIONER

## 2017-08-10 PROCEDURE — 25010000002 VANCOMYCIN HCL IN NACL 1.25-0.9 GM/250ML-% SOLUTION

## 2017-08-10 PROCEDURE — 82962 GLUCOSE BLOOD TEST: CPT

## 2017-08-10 PROCEDURE — 25010000002 MEROPENEM

## 2017-08-10 RX ORDER — ENALAPRILAT 2.5 MG/2ML
1.25 INJECTION INTRAVENOUS ONCE
Status: COMPLETED | OUTPATIENT
Start: 2017-08-10 | End: 2017-08-10

## 2017-08-10 RX ORDER — DEXTROMETHORPHAN POLISTIREX 30 MG/5ML
30 SUSPENSION ORAL EVERY 12 HOURS PRN
Status: DISCONTINUED | OUTPATIENT
Start: 2017-08-10 | End: 2017-08-15 | Stop reason: HOSPADM

## 2017-08-10 RX ORDER — POLYETHYLENE GLYCOL 3350 17 G/17G
17 POWDER, FOR SOLUTION ORAL DAILY
Status: DISCONTINUED | OUTPATIENT
Start: 2017-08-10 | End: 2017-08-15 | Stop reason: HOSPADM

## 2017-08-10 RX ORDER — HEPARIN SODIUM 5000 [USP'U]/ML
5000 INJECTION, SOLUTION INTRAVENOUS; SUBCUTANEOUS EVERY 8 HOURS SCHEDULED
Status: DISCONTINUED | OUTPATIENT
Start: 2017-08-10 | End: 2017-08-15 | Stop reason: HOSPADM

## 2017-08-10 RX ADMIN — BUDESONIDE AND FORMOTEROL FUMARATE DIHYDRATE 2 PUFF: 80; 4.5 AEROSOL RESPIRATORY (INHALATION) at 08:15

## 2017-08-10 RX ADMIN — DEXTROMETHORPHAN 30 MG: 30 SUSPENSION, EXTENDED RELEASE ORAL at 02:58

## 2017-08-10 RX ADMIN — AMLODIPINE BESYLATE 5 MG: 5 TABLET ORAL at 09:22

## 2017-08-10 RX ADMIN — MEROPENEM 1 G: 1 INJECTION, POWDER, FOR SOLUTION INTRAVENOUS at 22:02

## 2017-08-10 RX ADMIN — ALLOPURINOL 100 MG: 100 TABLET ORAL at 09:22

## 2017-08-10 RX ADMIN — DEXTROMETHORPHAN 30 MG: 30 SUSPENSION, EXTENDED RELEASE ORAL at 22:42

## 2017-08-10 RX ADMIN — BENZONATATE 100 MG: 100 CAPSULE, LIQUID FILLED ORAL at 18:46

## 2017-08-10 RX ADMIN — IPRATROPIUM BROMIDE AND ALBUTEROL SULFATE 3 ML: .5; 3 SOLUTION RESPIRATORY (INHALATION) at 00:09

## 2017-08-10 RX ADMIN — LEVETIRACETAM 750 MG: 750 TABLET, FILM COATED ORAL at 09:22

## 2017-08-10 RX ADMIN — MEROPENEM 1 G: 1 INJECTION, POWDER, FOR SOLUTION INTRAVENOUS at 13:11

## 2017-08-10 RX ADMIN — BUDESONIDE AND FORMOTEROL FUMARATE DIHYDRATE 2 PUFF: 80; 4.5 AEROSOL RESPIRATORY (INHALATION) at 19:14

## 2017-08-10 RX ADMIN — HEPARIN SODIUM 5000 UNITS: 5000 INJECTION, SOLUTION INTRAVENOUS; SUBCUTANEOUS at 16:14

## 2017-08-10 RX ADMIN — IPRATROPIUM BROMIDE AND ALBUTEROL SULFATE 3 ML: .5; 3 SOLUTION RESPIRATORY (INHALATION) at 16:46

## 2017-08-10 RX ADMIN — ENALAPRILAT 1.25 MG: 1.25 INJECTION INTRAVENOUS at 21:58

## 2017-08-10 RX ADMIN — PANTOPRAZOLE SODIUM 40 MG: 40 TABLET, DELAYED RELEASE ORAL at 06:05

## 2017-08-10 RX ADMIN — ALBUTEROL SULFATE 1.25 MG: 1.25 SOLUTION RESPIRATORY (INHALATION) at 23:06

## 2017-08-10 RX ADMIN — ALLOPURINOL 100 MG: 100 TABLET ORAL at 18:46

## 2017-08-10 RX ADMIN — VANCOMYCIN HYDROCHLORIDE 1250 MG: 1 INJECTION, POWDER, LYOPHILIZED, FOR SOLUTION INTRAVENOUS at 16:14

## 2017-08-10 RX ADMIN — BENZONATATE 100 MG: 100 CAPSULE, LIQUID FILLED ORAL at 10:33

## 2017-08-10 RX ADMIN — HEPARIN SODIUM 5000 UNITS: 5000 INJECTION, SOLUTION INTRAVENOUS; SUBCUTANEOUS at 21:58

## 2017-08-10 RX ADMIN — MEROPENEM 1 G: 1 INJECTION, POWDER, FOR SOLUTION INTRAVENOUS at 03:36

## 2017-08-10 RX ADMIN — ACETAMINOPHEN 650 MG: 325 TABLET, FILM COATED ORAL at 10:32

## 2017-08-10 RX ADMIN — Medication 250 MG: at 18:46

## 2017-08-10 RX ADMIN — POLYETHYLENE GLYCOL 3350 17 G: 17 POWDER, FOR SOLUTION ORAL at 16:15

## 2017-08-10 RX ADMIN — IPRATROPIUM BROMIDE AND ALBUTEROL SULFATE 3 ML: .5; 3 SOLUTION RESPIRATORY (INHALATION) at 08:14

## 2017-08-10 RX ADMIN — ESTRADIOL 2 G: 0.1 CREAM VAGINAL at 09:22

## 2017-08-10 RX ADMIN — IPRATROPIUM BROMIDE AND ALBUTEROL SULFATE 3 ML: .5; 3 SOLUTION RESPIRATORY (INHALATION) at 19:14

## 2017-08-10 RX ADMIN — Medication 250 MG: at 09:22

## 2017-08-10 NOTE — PLAN OF CARE
Problem: Patient Care Overview (Adult)  Goal: Plan of Care Review  Outcome: Ongoing (interventions implemented as appropriate)    08/10/17 1034   Coping/Psychosocial Response Interventions   Plan Of Care Reviewed With patient   Outcome Evaluation   Outcome Summary/Follow up Plan OT kathrny complete. Pt with limitations in cognition, strength, balance and ADL performance. Pt will benefit from OT to advance pt toward PLOF with ADLs. Recommend SNF upon d/c.          Problem: Inpatient Occupational Therapy  Goal: Bed Mobility Goal LTG- OT  Outcome: Ongoing (interventions implemented as appropriate)    08/10/17 1034   Bed Mobility OT LTG   Bed Mobility OT LTG, Date Established 08/10/17   Bed Mobility OT LTG, Time to Achieve by discharge   Bed Mobility OT LTG, Activity Type roll left/roll right;supine to sit/sit to supine   Bed Mobility OT LTG, [place] Level minimum assist (75% patient effort)       Goal: Transfer Training Goal 1 LTG- OT  Outcome: Ongoing (interventions implemented as appropriate)    08/10/17 1034   Transfer Training OT LTG   Transfer Training OT LTG, Date Established 08/10/17   Transfer Training OT LTG, Time to Achieve by discharge   Transfer Training OT LTG, Activity Type bed to chair /chair to bed;sit to stand/stand to sit;toilet   Transfer Training OT LTG, [place] Level contact guard assist   Transfer Training OT LTG, Assist Device walker, rolling       Goal: Strength Goal LTG- OT  Outcome: Ongoing (interventions implemented as appropriate)    08/10/17 1034   Strength OT LTG   Strength Goal OT LTG, Date Established 08/10/17   Strength Goal OT LTG, Time to Achieve by discharge   Strength Goal OT LTG, Measure to Achieve Pt will increase BUE strength 1 MMG as needed to support ADLs       Goal: Grooming Goal LTG- OT  Outcome: Ongoing (interventions implemented as appropriate)    08/10/17 1034   Grooming OT LTG   Grooming Goal OT LTG, Date Established 08/10/17   Grooming Goal OT LTG, Time to Achieve  by discharge   Grooming Goal OT LTG, [place] Level minimum assist (75% patient effort)

## 2017-08-10 NOTE — PROGRESS NOTES
Seen and examined  No signs of surgical complications  Stoma without much output yet    Add miralax  Ok for heparin as Hgb is stable  D/c once return of bowel function  Stoma bridge out prior to d/c

## 2017-08-10 NOTE — PLAN OF CARE
Problem: Patient Care Overview (Adult)  Goal: Plan of Care Review  Outcome: Ongoing (interventions implemented as appropriate)    08/10/17 0929   Coping/Psychosocial Response Interventions   Plan Of Care Reviewed With patient   Patient Care Overview   Progress progress toward functional goals as expected   Outcome Evaluation   Outcome Summary/Follow up Plan PT eval completed. Pt able to t/f bed --> recliner with Apryl x2, max cues and faciliation for weight shifting. Pt limited by decreased cog status, weakness. PT recommends d/c to SNF for further rehab.         Problem: Inpatient Physical Therapy  Goal: Bed Mobility Goal LTG- PT  Outcome: Ongoing (interventions implemented as appropriate)    08/10/17 0929   Bed Mobility PT LTG   Bed Mobility PT LTG, Date Established 08/10/17   Bed Mobility PT LTG, Time to Achieve 2 wks   Bed Mobility PT LTG, Activity Type roll left/roll right;supine to sit/sit to supine   Bed Mobility PT LTG, [place] Level minimum assist (75% patient effort)   Bed Mobility PT LTG, Outcome goal ongoing       Goal: Transfer Training Goal 1 LTG- PT  Outcome: Ongoing (interventions implemented as appropriate)    08/10/17 0929   Transfer Training PT LTG   Transfer Training PT LTG, Date Established 08/10/17   Transfer Training PT LTG, Time to Achieve 2 wks   Transfer Training PT LTG, Activity Type bed to chair /chair to bed;sit to stand/stand to sit   Transfer Training PT LTG, [place] Level contact guard assist   Transfer Training PT LTG, Assist Device walker, rolling   Transfer Training PT LTG, Outcome goal ongoing       Goal: Gait Training Goal LTG- PT  Outcome: Ongoing (interventions implemented as appropriate)    08/10/17 0929   Gait Training PT LTG   Gait Training Goal PT LTG, Date Established 08/10/17   Gait Training Goal PT LTG, Time to Achieve 2 wks   Gait Training Goal PT LTG, [place] Level contact guard assist   Gait Training Goal PT LTG, Assist Device walker, rolling   Gait  Training Goal PT LTG, Distance to Achieve 15   Gait Training Goal PT LTG, Outcome goal ongoing

## 2017-08-10 NOTE — PROGRESS NOTES
Hospitalist Daily Progress Note    Date of Admission: 8/5/2017   LOS: 4 days   PCP: Carol Herzog MD    Chief Complaint: dysuria    Subjective      No pain reported today.  Son in room today.  A little better today.  Eating more.  Hasn't eaten much for last 5 days, likely cause of generalized weakness.  No chest pain.  No shortness of breath.    History of Present Illness    Review of Systems  General: no fevers, chills  Respiratory: no cough, dyspnea  Cardiovascular: no chest pain, palpitations  Abdomen: No abdominal pain, nausea, vomiting, or diarrhea  Neurologic: No focal weakness    Objective   Physical Exam:  I have reviewed the vital signs.  Temp:  [98.2 °F (36.8 °C)-98.3 °F (36.8 °C)] 98.2 °F (36.8 °C)  Heart Rate:  [77-87] 85  Resp:  [16-20] 20  BP: (168-169)/(84-96) 169/84    Objective  General Appearance:    Alert, cooperative, no distress  Head:    Normocephalic, atraumatic  Eyes:    Sclerae anicteric  Neck:   Supple, no mass  Lungs: Clear to auscultation bilaterally, respirations unlabored  Heart: Regular rate and rhythm, S1 and S2 normal, no murmur, rub or gallop  Abdomen:  Soft, mild tenderness to palpation.  Extremities: No clubbing, cyanosis, or edema to lower extremities  Pulses:  2+ and symmetric in distal lower extremities  Skin: No rashes   Neurologic: Oriented x3, Normal strength to extremities    Results Review:    I have reviewed the labs, radiology results and diagnostic studies.      Results from last 7 days  Lab Units 08/10/17  0445   WBC 10*3/mm3 14.69*   HEMOGLOBIN g/dL 10.6*   PLATELETS 10*3/mm3 378       Results from last 7 days  Lab Units 08/10/17  0445   SODIUM mmol/L 137   POTASSIUM mmol/L 4.1   CO2 mmol/L 34.0*   CREATININE mg/dL 0.80   GLUCOSE mg/dL 117*       I have reviewed the medications.    ---------------------------------------------------------------------------------------------  Assessment/Plan   Assessment & Plan  Assessment/Problem List    Principal Problem:     Sepsis  Active Problems:    Chronic kidney disease, stage III (moderate)    Chronic obstructive pulmonary disease    Hyperlipidemia    Hypertension    Anxiety and depression    ANDRIY on CPAP    GERD (gastroesophageal reflux disease)    Diastolic dysfunction    Type 2 diabetes mellitus    Acute cystitis without hematuria    Colovesical fistula    Weakness    HCAP (healthcare-associated pneumonia)      Plan    Acute cystitis / Urinary Tract Infection  - complicated and multifactorial secondary to incontinence, previous multidrug resistance, and fistula  - failed out patient treatment on PO Vantin after recent inpatient hospital stay related to MDR E. Coli UTI  - IV abx for now     Colovesical Fistula  - demonstrated adherent to sigmoid colon  - s/p Lap colostomy per Dr. Saravia  - ureteral catheter 8/7  - had surgery on Monday 8/7     CKD III  - stable. Improved  - monitor and avoid additional nephrotoxic agents     COPD  - not on supplemental oxygen.   - continue symbicort as needed  - as needed pulmonary toilet; duo nebs, mucolytics, antitussives     DM2  - controlled and stable. Hold home insulin  - scheduled accu checks with [facility]     Hypertension  - stable and controlled    ANDRIY  - CPAP at night     Weakness  - patient recently sustained mechanical fall since discharge  - Pt/OT treat and eval    DVT prophylaxis: Heparin 5,000 Units SC every 8 hours  Discharge Planning: I expect patient to be discharged to be determined    Ted Emmanuel MD 08/10/17 3:25 PM

## 2017-08-10 NOTE — PROGRESS NOTES
Continued Stay Note  [facility]     Patient Name: Avis Us  MRN: [id number]  Today's Date: 8/10/2017    Admit Date: 8/5/2017          Discharge Plan       08/10/17 1313    Case Management/Social Work Plan    Plan discharge planning    Patient/Family In Agreement With Plan yes    Additional Comments OPAL spoke [place] pt. and son who was in the room, and daughter/POA was present via speakerphone.  The family has requested a SNF referral be made to the [facility], specifically the [place] location.  OPAL spoke with Chantale with the [facility] at [phone] and a referral was intiated.              Discharge Codes     None            MELECIO Henry

## 2017-08-10 NOTE — THERAPY TREATMENT NOTE
Acute Care - Physical Therapy Initial Evaluation  [facility]     Patient Name: Avis Us  : 9/3/1929  MRN: [id number]  Today's Date: 8/10/2017   Onset of Illness/Injury or Date of Surgery Date: 17  Date of Referral to PT: 17  Referring Physician: COREY Choi      Admit Date: 2017     Visit Dx:    ICD-10-CM ICD-9-CM   1. Acute urinary tract infection N39.0 599.0   2. [place]-vesical fistula N32.1 596.1   3. Impaired functional mobility, balance, gait, and endurance Z74.09 V49.89     Patient Active Problem List   Diagnosis   • Abnormal liver function tests   • Acute myocardial infarction   • Anxiety   • Knee pain   • Asthma   • Calf cramp   • Candidal intertrigo   • Cataract   • Chest pain   • Chronic kidney disease, stage III (moderate)   • Chronic obstructive pulmonary disease   • Complete atrioventricular block   • Congestive heart failure   • Atherosclerosis of coronary artery   • Cough   • Depression   • Diabetes mellitus   • Dizziness   • Dysuria   • Edema   • Gastroesophageal reflux disease   • Primary hypertriglyceridemia   • Fatigue   • Fracture of patella   • Gout   • Headache   • Hyperlipidemia   • Hypertension   • Influenza due to influenza A virus   • Insomnia   • Leukocytosis   • Macrocytosis   • Macular degeneration   • Menopause present   • Night sweats   • Obstructive sleep apnea syndrome   • Osteoporosis   • Peripheral neuropathy   • Pneumonia   • Seizure disorder   • Sick sinus syndrome   • Sinus bradycardia   • Thrombophlebitis   • Cobalamin deficiency   • Vitamin D deficiency   • Physical debility   • Lumbar strain   • History of MI (myocardial infarction)   • Midline low back pain without sciatica   • Arthralgia of lumbar spine   • Encounter for eye exam   • Parasites in stool   • Sepsis   • UTI (urinary tract infection)   • Acute on chronic Respiratory failure. Not requiring ventilatory support   • Acute on Chronic kidney disease (CKD), stage III (moderate)   •  Exacerbation of COPD    • H/O SSS (sick sinus syndrome) s/p PPM 2014   • CHF (congestive heart failure)   • Anxiety and depression   • Macular degeneration   • ANDRIY on CPAP   • Seizure disorder on Keppra   • HTN and possible diastolic dysfunction   • HLD (hyperlipidemia)   • Obesity, BMI 33.7   • Pneumonia versus acute reaction to nitrofurantoin   • Complicated UTI (urinary tract infection)   • Senile atrophic vaginitis   • Acute cystitis with hematuria   • GERD (gastroesophageal reflux disease)   • Diastolic dysfunction   • Type 2 diabetes mellitus   • Acute cystitis without hematuria   • Colovesical fistula   • Weakness   • Sepsis   • HCAP (healthcare-associated pneumonia)     Past Medical History:   Diagnosis Date   • Abnormal liver function test    • Anxiety    • Arthralgia of lumbar spine    • Asthma    • CAD (coronary artery disease)    • Calf cramp    • Candidal intertrigo    • Cataract    • Chest pain    • CHF (congestive heart failure)    • Chronic kidney disease, stage III (moderate)    • Chronic UTI (urinary tract infection)     last infection1 1/2 years ago. Cipro prophalactically   • Complete heart block    • COPD (chronic obstructive pulmonary disease)    • Cough    • Depression    • Diabetes mellitus    • Dizziness    • DVT (deep venous thrombosis)     1950's, last one 5 years   • Dysuria    • Edema    • Fatigue    • Fracture of patella    • GERD (gastroesophageal reflux disease)    • Gout    • Headache    • Hyperlipidemia    • Hypertension    • Hypertriglyceridemia    • Influenza A    • Insomnia    • Kidney failure     early stages, due to Celebrex   • Knee pain    • Leukocytosis    • Macrocytosis    • Macular degeneration    • Menopause    • Myocardial infarction    • Night sweats    • ANDRIY (obstructive sleep apnea)    • Osteoporosis with fracture    • Parasites in stool    • Peripheral neuropathy    • Pneumonia    • Psoriasis    • Seizures     last one 5 years ago   • Sepsis    • Sinus bradycardia     • SSS (sick sinus syndrome)    • Thrombophlebitis    • UTI (urinary tract infection)    • Vitamin B12 deficiency    • Vitamin D deficiency      Past Surgical History:   Procedure Laterality Date   • CHOLECYSTECTOMY     • COLON RESECTION N/A 8/7/2017    Procedure: LAPAROSCOPIC COLOSTOMY;  Surgeon: Chioma Gordon MD;  Location: [facility] OR;  Service:    • COLONOSCOPY      10 years ago   • HERNIA REPAIR     • KYPHOPLASTY N/A 9/23/2016    Procedure: KYPHOPLASTY T12;  Surgeon: Charles Nguyen MD;  Location:  [facility] OR;  Service:    • PACEMAKER IMPLANTATION     • UMBILICAL HERNIA REPAIR            PT ASSESSMENT (last 72 hours)      PT Evaluation       08/10/17 0835 08/09/17 1245    Rehab Evaluation    Document Type evaluation  -SJ evaluation  -LS    Subjective Information agree to therapy;complains of;pain  -SJ no complaints;agree to therapy  -LS    Patient Effort, Rehab Treatment adequate  -SJ     Symptoms Noted During/After Treatment none  -SJ     General Information    Patient Profile Review yes  -SJ     Onset of Illness/Injury or Date of Surgery Date 08/05/17  -     Referring Physician COREY Choi  -     General Observations Pt supine in bed, sleeping, difficult to arouse. has colostomy, tafoya, IV, tele, daughter present  -SJ     Pertinent History Of Current Problem 87 y.o.F presented with acute UTI; imaging in ED (+) colovesicular fistula, s/p colostomy  -SJ     Precautions/Limitations fall precautions;oxygen therapy device and L/min;other (see comments)   colostomy  -SJ     Prior Level of Function independent:;all household mobility;gait;transfer;bed mobility   prior to last hospital admission  -SJ     Equipment Currently Used at Home bipap/ cpap;cane, quad;cane, straight;commode;walker, rolling;wheelchair;power chair, (recliner lift);grab bar  -SJ     Plans/Goals Discussed With patient and family  -SJ     Risks Reviewed patient and family:;LOB;increased discomfort;lines disloged  -SJ     Benefits Reviewed  patient and family:;improve function;increase independence;increase strength;increase balance;increase knowledge  -SJ     Barriers to Rehab medically complex;previous functional deficit;cognitive status  -SJ     Living Environment    Lives With alone  -SJ     Living Arrangements house  -SJ     Home Accessibility stairs to enter home  -SJ     Number of Stairs to Enter Home 1  -SJ     Clinical Impression    Date of Referral to PT 08/09/17  -SJ     PT Diagnosis impaired mobility  -SJ     Patient/Family Goals Statement undecided  -SJ     Criteria for Skilled Therapeutic Interventions Met yes;treatment indicated  -SJ     Pathology/Pathophysiology Noted (Describe Specifically for Each System) musculoskeletal  -SJ     Impairments Found (describe specific impairments) arousal, attention, and cognition;gait, locomotion, and balance  -SJ     Functional Limitations in Following Categories (Describe Specific Limitations) self-care;home management  -SJ     Rehab Potential good, to achieve stated therapy goals  -SJ     Predicted Duration of Therapy Intervention (days/wks) 2wks  -SJ     Vital Signs    Pre Systolic BP Rehab 169  -SJ     Pre Treatment Diastolic BP 84  -SJ     Pretreatment Heart Rate (beats/min) 89  -SJ     Posttreatment Heart Rate (beats/min) 85  -SJ     Pre SpO2 (%) 96  -SJ     O2 Delivery Pre Treatment supplemental O2  -SJ     Post SpO2 (%) 93  -SJ     O2 Delivery Post Treatment supplemental O2  -SJ     Pre Patient Position Supine  -SJ     Intra Patient Position Standing  -SJ     Post Patient Position Sitting  -SJ     Pain Assessment    Pain Assessment Rivas-Reyes FACES  -SJ     Pain Score 3  -SJ     Post Pain Score 3  -SJ     Pain Type Acute pain;Surgical pain  -SJ     Pain Location Abdomen  -SJ     Pain Intervention(s) Repositioned;Ambulation/increased activity  -SJ     Response to Interventions jose  -SJ     Vision Assessment/Intervention    Visual Impairment visual impairment, left  -SJ     Cognitive  Assessment/Intervention    Current Cognitive/Communication Assessment impaired  -     Orientation Status oriented to;person;place  -     Follows Commands/Answers Questions 50% of the time;able to follow single-step instructions;needs cueing;needs increased time;needs repetition  -     Personal Safety moderate impairment;decreased awareness, need for assist;decreased awareness, need for safety;decreased insight to deficits  -     Personal Safety Interventions fall prevention program maintained;gait belt;muscle strengthening facilitated;nonskid shoes/slippers when out of bed  -SJ     ROM (Range of Motion)    General ROM no range of motion deficits identified  -     MMT (Manual Muscle Testing)    General MMT Assessment lower extremity strength deficits identified  -     General MMT Assessment Detail 4-/5  -SJ     Bed Mobility, Assessment/Treatment    Bed Mobility, Assistive Device bed rails;head of bed elevated;draw sheet  -     Bed Mobility, Roll Left, [place] maximum assist (25% patient effort);verbal cues required  -     Bed Mob, Supine to Sit, [place] moderate assist (50% patient effort);verbal cues required  -SJ     Bed Mobility, Safety Issues cognitive deficits limit understanding;decreased use of arms for pushing/pulling;decreased use of legs for bridging/pushing  -     Bed Mobility, Impairments strength decreased;impaired balance;coordination impaired;pain  -SJ     Bed Mobility, Comment max cues for sequencing and hand placement  -     Transfer Assessment/Treatment    Transfers, Bed-Chair [place] minimum assist (75% patient effort);2 person assist required;verbal cues required  -SJ     Transfers, Sit-Stand [place] minimum assist (75% patient effort);2 person assist required;verbal cues required  -SJ     Transfers, Stand-Sit [place] minimum assist (75% patient effort);2 person assist required;verbal cues required  -SJ     Transfers, Sit-Stand-Sit, Assist Device other  (see comments)   BUE support  -SJ     Transfer, Safety Issues sequencing ability decreased;weight-shifting ability decreased;step length decreased  -SJ     Transfer, Impairments strength decreased;impaired balance;coordination impaired;pain  -SJ     Transfer, Comment max cues for sequencing, facilitation for weightshifting  -SJ     Gait Assessment/Treatment    Gait, [place] Level not tested  -SJ     Motor Skills/Interventions    Additional Documentation Balance Skills Training (Group)  -SJ     Balance Skills Training    Sitting-Level of Assistance Close supervision  -SJ     Sitting-Balance Support Right upper extremity supported;Left upper extremity supported  -SJ     Standing-Level of Assistance Minimum assistance;x2  -SJ     Static Standing Balance Support Right upper extremity supported;Left upper extremity supported  -SJ     Positioning and Restraints    Pre-Treatment Position in bed  -SJ     Post Treatment Position chair  -SJ     In Chair notified nsg;reclined;call light within reach;encouraged to call for assist;exit alarm on;with family/caregiver;on mechanical lift sling;heels elevated  -SJ       08/08/17 1206 08/07/17 1557    General Information    Equipment Currently Used at Home bipap/ cpap;cane, quad;cane, straight;commode;walker, rolling;wheelchair;power chair, (recliner lift);grab bar  -EE bipap/ cpap;cane, quad;cane, straight;grab bar;power chair, (recliner lift);wheelchair;walker, rolling;commode  -AW    Living Environment    Lives With alone  -EE alone  -AW    Living Arrangements house  -EE house  -AW    Home Accessibility  no concerns  -AW    Stair Railings at Home  none  -AW    Type of Financial/Environmental Concern  none  -AW    Transportation Available car;family or friend will provide  -EE car;family or friend will provide  -AW    Living Environment Comment Pt. lives alone in a house in [facility].  Her son lives nearby and checks on her daily.  Her daughter is also available and  helps.  -EE Son lives very close and comes to help several times through out the day with cooking and household chores   -AW      08/07/17 1209       Rehab Evaluation    Evaluation Not Performed unable to evaluate, medical status change   Pt at OR today. Per rehab plocu, please reconsult when appropriate.   -AR       User Key  (r) = Recorded By, (t) = Taken By, (c) = Cosigned By    Initials Name Provider Type     Loan Rivera, PT Physical Therapist    ERON Bernardo, OT Occupational Therapist    KAYCEE Chou, MS CCC-SLP Speech and Language Pathologist    AW Leila Perez, RN Registered Nurse    EE Lida Lamar, MSW           Physical Therapy Education     Title: PT OT SLP Therapies (Active)     Topic: Physical Therapy (Active)     Point: Mobility training (Active)    Learning Progress Summary    Learner Readiness Method Response Comment Documented by Status   Patient Acceptance E NR   08/10/17 0931 Active               Point: Home exercise program (Active)    Learning Progress Summary    Learner Readiness Method Response Comment Documented by Status   Patient Acceptance E NR   08/10/17 0931 Active               Point: Body mechanics (Active)    Learning Progress Summary    Learner Readiness Method Response Comment Documented by Status   Patient Acceptance E NR   08/10/17 0931 Active               Point: Precautions (Active)    Learning Progress Summary    Learner Readiness Method Response Comment Documented by Status   Patient Acceptance E NR   08/10/17 0931 Active                      User Key     Initials Effective Dates Name Provider Type [facility] 06/19/15 -  Loan Rivera, PT Physical Therapist PT                PT Recommendation and Plan  Anticipated Discharge Disposition: skilled nursing facility  Planned Therapy Interventions: balance training, bed mobility training, gait training, home exercise program, patient/family education, strengthening, transfer  training  PT Frequency: daily  Plan of Care Review  Plan Of Care Reviewed With: patient  Progress: progress toward functional goals as expected  Outcome Summary/Follow up Plan: PT eval completed. Pt able to t/f bed --> recliner with Apryl x2, max cues and faciliation for weight shifting. Pt limited by decreased cog status, weakness. PT recommends d/c to SNF for further rehab.          IP PT Goals       08/10/17 0929          Bed Mobility PT LTG    Bed Mobility PT LTG, Date Established 08/10/17  -SJ      Bed Mobility PT LTG, Time to Achieve 2 wks  -SJ      Bed Mobility PT LTG, Activity Type roll left/roll right;supine to sit/sit to supine  -SJ      Bed Mobility PT LTG, [place] Level minimum assist (75% patient effort)  -SJ      Bed Mobility PT LTG, Outcome goal ongoing  -SJ      Transfer Training PT LTG    Transfer Training PT LTG, Date Established 08/10/17  -SJ      Transfer Training PT LTG, Time to Achieve 2 wks  -SJ      Transfer Training PT LTG, Activity Type bed to chair /chair to bed;sit to stand/stand to sit  -SJ      Transfer Training PT LTG, [place] Level contact guard assist  -SJ      Transfer Training PT LTG, Assist Device walker, rolling  -SJ      Transfer Training PT LTG, Outcome goal ongoing  -SJ      Gait Training PT LTG    Gait Training Goal PT LTG, Date Established 08/10/17  -SJ      Gait Training Goal PT LTG, Time to Achieve 2 wks  -SJ      Gait Training Goal PT LTG, [place] Level contact guard assist  -SJ      Gait Training Goal PT LTG, Assist Device walker, rolling  -SJ      Gait Training Goal PT LTG, Distance to Achieve 15  -SJ      Gait Training Goal PT LTG, Outcome goal ongoing  -SJ        User Key  (r) = Recorded By, (t) = Taken By, (c) = Cosigned By    Initials Name Provider Type    KENDALL Rivera PT Physical Therapist                Outcome Measures       08/10/17 0835          How much help from another person do you currently need...    Turning from your back to your  side while in flat bed without using bedrails? 2  -SJ      Moving from lying on back to sitting on the side of a flat bed without bedrails? 2  -SJ      Moving to and from a bed to a chair (including a wheelchair)? 2  -SJ      Standing up from a chair using your arms (e.g., wheelchair, bedside chair)? 2  -SJ      Climbing 3-5 steps with a railing? 1  -SJ      To walk in hospital room? 1  -SJ      AM-PAC 6 Clicks Score 10  -SJ      Functional Assessment    Outcome Measure Options AM-PAC 6 Clicks Basic Mobility (PT)  -        User Key  (r) = Recorded By, (t) = Taken By, (c) = Cosigned By    Initials Name Provider Type    KENDALL Rivera PT Physical Therapist           Time Calculation:         PT Charges       08/10/17 0932          Time Calculation    Start Time 0835  -SJ      PT Received On 08/10/17  -      PT Goal Re-Cert Due Date 08/20/17  -        User Key  (r) = Recorded By, (t) = Taken By, (c) = Cosigned By    Initials Name Provider Type    KENDALL Rivera PT Physical Therapist          Therapy Charges for Today     Code Description Service Date Service Provider Modifiers Qty    06098743504 HC PT EVAL MOD COMPLEXITY 4 8/10/2017 Loan Rivera, PT GP 1    [id number] HC PT THER SUPP EA 15 MIN 8/10/2017 Loan Rivera, PT GP 1          PT G-Codes  Outcome Measure Options: AM-PAC 6 Clicks Basic Mobility (PT)      Loan Rivera PT  8/10/2017

## 2017-08-10 NOTE — PLAN OF CARE
Problem: Patient Care Overview (Adult)  Goal: Plan of Care Review  Outcome: Ongoing (interventions implemented as appropriate)    08/10/17 1547   Coping/Psychosocial Response Interventions   Plan Of Care Reviewed With patient;son   Patient Care Overview   Progress no change   Outcome Evaluation   Outcome Summary/Follow up Plan Appliance is intact. Stoma is red and moist. Bridge in place. Plane for appliance change, bridge removal, and stoma teaching with son tomorrow morning. Stressed the importance of mobility to patient and son. Talked with CLAIRE Xiao about further mobility with the patient. Please contact [facility] if needs arise. Thanks          Problem: Colostomy (Adult)  Goal: Signs and Symptoms of Listed Potential Problems Will be Absent or Manageable (Colostomy)  Outcome: Ongoing (interventions implemented as appropriate)    08/10/17 1547   Colostomy   Problems Assessed (Colostomy) all   Problems Present (Colostomy) situational response

## 2017-08-10 NOTE — THERAPY EVALUATION
Acute Care - Occupational Therapy Initial Evaluation  [facility]     Patient Name: Avis Us  : 9/3/1929  MRN: [id number]  Today's Date: 8/10/2017  Onset of Illness/Injury or Date of Surgery Date: 17  Date of Referral to OT: 17  Referring Physician: COREY Choi    Admit Date: 2017       ICD-10-CM ICD-9-CM   1. Acute urinary tract infection N39.0 599.0   2. Edgar-vesical fistula N32.1 596.1   3. Impaired functional mobility, balance, gait, and endurance Z74.09 V49.89   4. Impaired mobility and ADLs Z74.09 799.89     Patient Active Problem List   Diagnosis   • Abnormal liver function tests   • Acute myocardial infarction   • Anxiety   • Knee pain   • Asthma   • Calf cramp   • Candidal intertrigo   • Cataract   • Chest pain   • Chronic kidney disease, stage III (moderate)   • Chronic obstructive pulmonary disease   • Complete atrioventricular block   • Congestive heart failure   • Atherosclerosis of coronary artery   • Cough   • Depression   • Diabetes mellitus   • Dizziness   • Dysuria   • Edema   • Gastroesophageal reflux disease   • Primary hypertriglyceridemia   • Fatigue   • Fracture of patella   • Gout   • Headache   • Hyperlipidemia   • Hypertension   • Influenza due to influenza A virus   • Insomnia   • Leukocytosis   • Macrocytosis   • Macular degeneration   • Menopause present   • Night sweats   • Obstructive sleep apnea syndrome   • Osteoporosis   • Peripheral neuropathy   • Pneumonia   • Seizure disorder   • Sick sinus syndrome   • Sinus bradycardia   • Thrombophlebitis   • Cobalamin deficiency   • Vitamin D deficiency   • Physical debility   • Lumbar strain   • History of MI (myocardial infarction)   • Midline low back pain without sciatica   • Arthralgia of lumbar spine   • Encounter for eye exam   • Parasites in stool   • Sepsis   • UTI (urinary tract infection)   • Acute on chronic Respiratory failure. Not requiring ventilatory support   • Acute on Chronic kidney disease (CKD),  stage III (moderate)   • Exacerbation of COPD    • H/O SSS (sick sinus syndrome) s/p PPM 2014   • CHF (congestive heart failure)   • Anxiety and depression   • Macular degeneration   • ANDRIY on CPAP   • Seizure disorder on Keppra   • HTN and possible diastolic dysfunction   • HLD (hyperlipidemia)   • Obesity, BMI 33.7   • Pneumonia versus acute reaction to nitrofurantoin   • Complicated UTI (urinary tract infection)   • Senile atrophic vaginitis   • Acute cystitis with hematuria   • GERD (gastroesophageal reflux disease)   • Diastolic dysfunction   • Type 2 diabetes mellitus   • Acute cystitis without hematuria   • Colovesical fistula   • Weakness   • Sepsis   • HCAP (healthcare-associated pneumonia)     Past Medical History:   Diagnosis Date   • Abnormal liver function test    • Anxiety    • Arthralgia of lumbar spine    • Asthma    • CAD (coronary artery disease)    • Calf cramp    • Candidal intertrigo    • Cataract    • Chest pain    • CHF (congestive heart failure)    • Chronic kidney disease, stage III (moderate)    • Chronic UTI (urinary tract infection)     last infection1 1/2 years ago. Cipro prophalactically   • Complete heart block    • COPD (chronic obstructive pulmonary disease)    • Cough    • Depression    • Diabetes mellitus    • Dizziness    • DVT (deep venous thrombosis)     1950's, last one 5 years   • Dysuria    • Edema    • Fatigue    • Fracture of patella    • GERD (gastroesophageal reflux disease)    • Gout    • Headache    • Hyperlipidemia    • Hypertension    • Hypertriglyceridemia    • Influenza A    • Insomnia    • Kidney failure     early stages, due to Celebrex   • Knee pain    • Leukocytosis    • Macrocytosis    • Macular degeneration    • Menopause    • Myocardial infarction    • Night sweats    • ANDRIY (obstructive sleep apnea)    • Osteoporosis with fracture    • Parasites in stool    • Peripheral neuropathy    • Pneumonia    • Psoriasis    • Seizures     last one 5 years ago   • Sepsis     • Sinus bradycardia    • SSS (sick sinus syndrome)    • Thrombophlebitis    • UTI (urinary tract infection)    • Vitamin B12 deficiency    • Vitamin D deficiency      Past Surgical History:   Procedure Laterality Date   • CHOLECYSTECTOMY     • COLON RESECTION N/A 8/7/2017    Procedure: LAPAROSCOPIC COLOSTOMY;  Surgeon: Chioma Gordon MD;  Location:  [facility];  Service:    • COLONOSCOPY      10 years ago   • HERNIA REPAIR     • KYPHOPLASTY N/A 9/23/2016    Procedure: KYPHOPLASTY T12;  Surgeon: Charles Nguyen MD;  Location:  [facility];  Service:    • PACEMAKER IMPLANTATION     • UMBILICAL HERNIA REPAIR            OT ASSESSMENT FLOWSHEET (last 72 hours)      OT Evaluation       08/10/17 0859 08/10/17 0835 08/09/17 1245 08/08/17 1206 08/07/17 1557    Rehab Evaluation    Document Type evaluation  -AC evaluation  -SJ evaluation  -LS      Subjective Information agree to therapy;complains of;weakness;pain  -AC agree to therapy;complains of;pain  -SJ no complaints;agree to therapy  -LS      Patient Effort, Rehab Treatment adequate  -AC adequate  -SJ       Symptoms Noted During/After Treatment  none  -SJ       General Information    Patient Profile Review yes  -AC yes  -SJ       Onset of Illness/Injury or Date of Surgery Date 08/05/17  -AC 08/05/17  -SJ       Referring Physician COREY Choi  -AC COREY Choi  -KENDALL       General Observations Pt supine in bed. IV intact. Dtr present in room  -AC Pt supine in bed, sleeping, difficult to arouse. has colostomy, tafoya, IV, tele, daughter present  -SJ       Pertinent History Of Current Problem Pt admit with UTI and found to have colovesicular fistula and now S/P colostomy  -AC 87 y.o.F presented with acute UTI; imaging in ED (+) colovesicular fistula, s/p colostomy  -SJ       Precautions/Limitations fall precautions;oxygen therapy device and L/min   colostomy  -AC fall precautions;oxygen therapy device and L/min;other (see comments)   colostomy  -SJ       Prior Level of Function  independent:;all household mobility;transfer;feeding;grooming;dressing   son assists in and out of surgery  - independent:;all household mobility;gait;transfer;bed mobility   prior to last hospital admission  -SJ       Equipment Currently Used at Home bipap/ cpap;cane, quad;cane, straight;commode;walker, rolling;wheelchair;power chair, (recliner lift);grab bar  -AC bipap/ cpap;cane, quad;cane, straight;commode;walker, rolling;wheelchair;power chair, (recliner lift);grab bar  -SJ  bipap/ cpap;cane, quad;cane, straight;commode;walker, rolling;wheelchair;power chair, (recliner lift);grab bar  -EE bipap/ cpap;cane, quad;cane, straight;grab bar;power chair, (recliner lift);wheelchair;walker, rolling;commode  -AW    Plans/Goals Discussed With patient and family;agreed upon  -AC patient and family  -SJ       Risks Reviewed patient and family:;LOB  -AC patient and family:;LOB;increased discomfort;lines disloged  -SJ       Benefits Reviewed patient and family:;improve function;increase independence;increase strength;increase balance;increase knowledge  -AC patient and family:;improve function;increase independence;increase strength;increase balance;increase knowledge  -SJ       Barriers to Rehab medically complex  -AC medically complex;previous functional deficit;cognitive status  -SJ       Living Environment    Lives With alone  -AC alone  -SJ  alone  -EE alone  -AW    Living Arrangements house  -AC house  -SJ  house  -EE house  -AW    Home Accessibility stairs to enter home  -AC stairs to enter home  -SJ   no concerns  -AW    Number of Stairs to Enter Home 1  -AC 1  -SJ       Stair Railings at Home     none  -AW    Type of Financial/Environmental Concern     none  -AW    Transportation Available    car;family or friend will provide  -EE car;family or friend will provide  -AW    Living Environment Comment son and dtr check on pt often  -AC   Pt. lives alone in a house in [facility].  Her son lives nearby and checks on  her daily.  Her daughter is also available and helps.  -EE Son lives very close and comes to help several times through out the day with cooking and household chores   -AW    Clinical Impression    Date of Referral to OT 08/09/17  -AC        OT Diagnosis ADL decline  -AC        Patient/Family Goals Statement Dtr would like pt to return to [facility] with self care and mobiltiy  -AC        Criteria for Skilled Therapeutic Interventions Met yes;treatment indicated  -AC        Rehab Potential good, to achieve stated therapy goals  -AC        Therapy Frequency daily  -AC        Anticipated Discharge Disposition inpatient rehabilitation facility  -AC        Vital Signs    Pre Systolic BP Rehab 169  -  -SJ       Pre Treatment Diastolic BP 84  -AC 84  -SJ       Pretreatment Heart Rate (beats/min) 89  -AC 89  -SJ       Posttreatment Heart Rate (beats/min)  85  -SJ       Pre SpO2 (%) 96  -AC 96  -SJ       O2 Delivery Pre Treatment supplemental O2  -AC supplemental O2  -SJ       Post SpO2 (%) 93  -AC 93  -SJ       O2 Delivery Post Treatment supplemental O2  -AC supplemental O2  -SJ       Pre Patient Position Supine  -AC Supine  -SJ       Intra Patient Position Standing  -AC Standing  -SJ       Post Patient Position Sitting  -AC Sitting  -SJ       Pain Assessment    Pain Assessment Rivas-Reyes FACES  -AC Rivas-Reyes FACES  -SJ       Rivas-Reyes FACES Pain Rating 4  -AC        Pain Score 4  -AC 3  -SJ       Post Pain Score 4  -AC 3  -SJ       Pain Type Acute pain;Surgical pain  -AC Acute pain;Surgical pain  -       Pain Location Abdomen  -AC Abdomen  -SJ       Pain Intervention(s) Repositioned  -AC Repositioned;Ambulation/increased activity  -SJ       Response to Interventions tolerated  -AC jose  -SJ       Vision Assessment/Intervention    Visual Impairment visual impairment, left   macular degeneration  -AC visual impairment, left  -SJ       Cognitive Assessment/Intervention    Current Cognitive/Communication Assessment  impaired  -AC impaired  -       Orientation Status oriented to;person;place  - oriented to;person;place  -       Follows Commands/Answers Questions 50% of the time  - 50% of the time;able to follow single-step instructions;needs cueing;needs increased time;needs repetition  -       Personal Safety moderate impairment;decreased awareness, need for assist  - moderate impairment;decreased awareness, need for assist;decreased awareness, need for safety;decreased insight to deficits  -       Personal Safety Interventions fall prevention program maintained;gait belt;nonskid shoes/slippers when out of bed  - fall prevention program maintained;gait belt;muscle strengthening facilitated;nonskid shoes/slippers when out of bed  -       ROM (Range of Motion)    General ROM upper extremity range of motion deficits identified  - no range of motion deficits identified  -       General ROM Detail shlds limited 50%  -        MMT (Manual Muscle Testing)    General MMT Assessment upper extremity strength deficits identified  - lower extremity strength deficits identified  -       General MMT Assessment Detail  grossly 3-3+/5  - 4-/5  -SJ       Bed Mobility, Assessment/Treatment    Bed Mobility, Assistive Device bed rails;head of bed elevated  - bed rails;head of bed elevated;draw sheet  -       Bed Mobility, Roll Left, [place] maximum assist (25% patient effort)  - maximum assist (25% patient effort);verbal cues required  -       Bed Mob, Supine to Sit, [place] moderate assist (50% patient effort)  - moderate assist (50% patient effort);verbal cues required  -       Bed Mobility, Safety Issues cognitive deficits limit understanding;decreased use of arms for pushing/pulling;decreased use of legs for bridging/pushing  - cognitive deficits limit understanding;decreased use of arms for pushing/pulling;decreased use of legs for bridging/pushing  -       Bed Mobility, Impairments  strength decreased;impaired balance  -AC strength decreased;impaired balance;coordination impaired;pain  -SJ       Bed Mobility, Comment max cues for sequencing  -AC max cues for sequencing and hand placement  -SJ       Transfer Assessment/Treatment    Transfers, Bed-Chair Bond verbal cues required;minimum assist (75% patient effort);2 person assist required  -AC minimum assist (75% patient effort);2 person assist required;verbal cues required  -SJ       Transfers, Sit-Stand Bond verbal cues required;minimum assist (75% patient effort);2 person assist required  -AC minimum assist (75% patient effort);2 person assist required;verbal cues required  -SJ       Transfers, Stand-Sit Bond verbal cues required;minimum assist (75% patient effort);2 person assist required  -AC minimum assist (75% patient effort);2 person assist required;verbal cues required  -SJ       Transfers, Sit-Stand-Sit, Assist Device --   UE support  -AC other (see comments)   BUE support  -SJ       Transfer, Safety Issues sequencing ability decreased;step length decreased;weight-shifting ability decreased  -AC sequencing ability decreased;weight-shifting ability decreased;step length decreased  -SJ       Transfer, Impairments strength decreased;impaired balance  -AC strength decreased;impaired balance;coordination impaired;pain  -SJ       Transfer, Comment max cues for sequencing  -AC max cues for sequencing, facilitation for weightshifting  -SJ       Functional Mobility    Functional Mobility- Comment sidestepped to chair with min a x 2 and multiple cues  -AC        Motor Skills/Interventions    Additional Documentation Balance Skills Training (Group)  -AC Balance Skills Training (Group)  -       Balance Skills Training    Sitting-Level of Assistance Close supervision  -AC Close supervision  -       Sitting-Balance Support Right upper extremity supported;Left upper extremity supported;Feet supported  - Right upper  extremity supported;Left upper extremity supported  -       Standing-Level of Assistance Minimum assistance;x2  -AC Minimum assistance;x2  -SJ       Static Standing Balance Support Right upper extremity supported;Left upper extremity supported  -AC Right upper extremity supported;Left upper extremity supported  -       General Therapy Interventions    Planned Therapy Interventions ADL retraining;balance training;bed mobility training;adaptive equipment training;strengthening;transfer training  -        Positioning and Restraints    Pre-Treatment Position in bed  -AC in bed  -       Post Treatment Position chair  -AC chair  -SJ       In Chair notified nsg;call light within reach;encouraged to call for assist;exit alarm on;on mechanical lift sling  -AC notified nsg;reclined;call light within reach;encouraged to call for assist;exit alarm on;with family/caregiver;on mechanical lift sling;heels elevated  -         08/07/17 1553 08/07/17 1209             Rehab Evaluation    Evaluation Not Performed  unable to evaluate, medical status change   Pt at OR today. Per rehab plocu, please reconsult when appropriate.   -AR       Functional Level Prior    Ambulation 1-->assistive equipment  -AW        Transferring 2-->assistive person  -AW        Toileting 1-->assistive equipment  -AW        Bathing 1-->assistive equipment  -AW        Dressing 0-->independent  -AW        Eating 0-->independent  -AW        Communication 0-->understands/communicates without difficulty  -AW        Swallowing 0-->swallows foods/liquids without difficulty  -AW          User Key  (r) = Recorded By, (t) = Taken By, (c) = Cosigned By    Initials Name Effective Dates    AC Albina Stock, OT 06/23/15 -     SJ Loan Rivera, PT 06/19/15 -     AR Almaz Bernardo, OT 06/22/15 -     LS Shanell Chou, MS CCC-SLP 06/22/15 -     AW Leila Perez, RN 06/16/16 -     KERI Lamar, MELECIO 03/14/16 -            Occupational Therapy Education      Title: PT OT SLP Therapies (Active)     Topic: Occupational Therapy (Active)     Point: ADL training (Done)    Description: Instruct learner(s) on proper safety adaptation and remediation techniques during self care or transfers.   Instruct in proper use of assistive devices.    Learning Progress Summary    Learner Readiness Method Response Comment Documented by Status   Patient Acceptance E VU benefits of activity, role of OT  08/10/17 1033 Done   Family Acceptance E VU benefits of activity, role of OT  08/10/17 1033 Done                      User Key     Initials Effective Dates Name Provider Type Discipline     06/23/15 -  Albina Stock OT Occupational Therapist OT                  OT Recommendation and Plan  Anticipated Discharge Disposition: inpatient rehabilitation facility  Planned Therapy Interventions: ADL retraining, balance training, bed mobility training, adaptive equipment training, strengthening, transfer training  Therapy Frequency: daily  Plan of Care Review  Plan Of Care Reviewed With: patient  Outcome Summary/Follow up Plan: OT eval complete. Pt with limitations in cognition, strength, balance and ADL performance. Pt will benefit from OT to advance pt toward PLOF with ADLs. Recommend SNF upon d/c.           OT Goals       08/10/17 1034          Bed Mobility OT LTG    Bed Mobility OT LTG, Date Established 08/10/17  -      Bed Mobility OT LTG, Time to Achieve by discharge  -      Bed Mobility OT LTG, Activity Type roll left/roll right;supine to sit/sit to supine  -      Bed Mobility OT LTG, [place] Level minimum assist (75% patient effort)  -      Transfer Training OT LTG    Transfer Training OT LTG, Date Established 08/10/17  -      Transfer Training OT LTG, Time to Achieve by discharge  -      Transfer Training OT LTG, Activity Type bed to chair /chair to bed;sit to stand/stand to sit;toilet  -      Transfer Training OT LTG, [place] Level contact guard assist  -       Transfer Training OT LTG, Assist Device walker, rolling  -AC      Strength OT LTG    Strength Goal OT LTG, Date Established 08/10/17  -AC      Strength Goal OT LTG, Time to Achieve by discharge  -AC      Strength Goal OT LTG, Measure to Achieve Pt will increase BUE strength 1 MMG as needed to support ADLs  -AC      Grooming OT LTG    Grooming Goal OT LTG, Date Established 08/10/17  -AC      Grooming Goal OT LTG, Time to Achieve by discharge  -AC      Grooming Goal OT LTG, [place] Level minimum assist (75% patient effort)  -AC        User Key  (r) = Recorded By, (t) = Taken By, (c) = Cosigned By    Initials Name Provider Type    AC Albina Stock, OT Occupational Therapist                Outcome Measures       08/10/17 0859 08/10/17 0835       How much help from another person do you currently need...    Turning from your back to your side while in flat bed without using bedrails?  2  -SJ     Moving from lying on back to sitting on the side of a flat bed without bedrails?  2  -SJ     Moving to and from a bed to a chair (including a wheelchair)?  2  -SJ     Standing up from a chair using your arms (e.g., wheelchair, bedside chair)?  2  -SJ     Climbing 3-5 steps with a railing?  1  -SJ     To walk in hospital room?  1  -SJ     AM-PAC 6 Clicks Score  10  -SJ     How much help from another is currently needed...    Putting on and taking off regular lower body clothing? 1  -AC      Bathing (including washing, rinsing, and drying) 2  -AC      Toileting (which includes using toilet bed pan or urinal) 1  -AC      Putting on and taking off regular upper body clothing 2  -AC      Taking care of personal grooming (such as brushing teeth) 2  -AC      Eating meals 2  -AC      Score 10  -AC      Functional Assessment    Outcome Measure Options AM-PAC 6 Clicks Daily Activity (OT)  -AC AM-PAC 6 Clicks Basic Mobility (PT)  -SJ       User Key  (r) = Recorded By, (t) = Taken By, (c) = Cosigned By    Initials Name Provider Type     AC Albina Stock, OT Occupational Therapist    SJ Loan Rivera, PT Physical Therapist          Time Calculation:   OT Start Time: 0859    Therapy Charges for Today     Code Description Service Date Service Provider Modifiers Qty    [id number]  OT EVAL MOD COMPLEXITY 4 8/10/2017 Albina Stock, OT GO 1    [id number]  OT THER SUPP EA 15 MIN 8/10/2017 Albina Stock OT GO 1               Albina Stock OT  8/10/2017

## 2017-08-11 LAB
ANION GAP SERPL CALCULATED.3IONS-SCNC: 2 MMOL/L (ref 3–11)
BACTERIA SPEC AEROBE CULT: NORMAL
BACTERIA SPEC AEROBE CULT: NORMAL
BASOPHILS # BLD AUTO: 0.03 10*3/MM3 (ref 0–0.2)
BASOPHILS NFR BLD AUTO: 0.2 % (ref 0–1)
BNP SERPL-MCNC: 633 PG/ML (ref 0–100)
BUN BLD-MCNC: 11 MG/DL (ref 9–23)
BUN/CREAT SERPL: 13.8 (ref 7–25)
CALCIUM SPEC-SCNC: 9.6 MG/DL (ref 8.7–10.4)
CHLORIDE SERPL-SCNC: 95 MMOL/L (ref 99–109)
CO2 SERPL-SCNC: 39 MMOL/L (ref 20–31)
CREAT BLD-MCNC: 0.8 MG/DL (ref 0.6–1.3)
DEPRECATED RDW RBC AUTO: 55 FL (ref 37–54)
EOSINOPHIL # BLD AUTO: 0.32 10*3/MM3 (ref 0–0.3)
EOSINOPHIL NFR BLD AUTO: 1.7 % (ref 0–3)
ERYTHROCYTE [DISTWIDTH] IN BLOOD BY AUTOMATED COUNT: 15.1 % (ref 11.3–14.5)
GFR SERPL CREATININE-BSD FRML MDRD: 68 ML/MIN/1.73
GLUCOSE BLD-MCNC: 139 MG/DL (ref 70–100)
GLUCOSE BLDC GLUCOMTR-MCNC: 125 MG/DL (ref 70–130)
GLUCOSE BLDC GLUCOMTR-MCNC: 127 MG/DL (ref 70–130)
GLUCOSE BLDC GLUCOMTR-MCNC: 135 MG/DL (ref 70–130)
GLUCOSE BLDC GLUCOMTR-MCNC: 146 MG/DL (ref 70–130)
HCT VFR BLD AUTO: 36.5 % (ref 34.5–44)
HGB BLD-MCNC: 11 G/DL (ref 11.5–15.5)
IMM GRANULOCYTES # BLD: 0.14 10*3/MM3 (ref 0–0.03)
IMM GRANULOCYTES NFR BLD: 0.8 % (ref 0–0.6)
LYMPHOCYTES # BLD AUTO: 2.39 10*3/MM3 (ref 0.6–4.8)
LYMPHOCYTES NFR BLD AUTO: 12.8 % (ref 24–44)
MAGNESIUM SERPL-MCNC: 2.1 MG/DL (ref 1.3–2.7)
MCH RBC QN AUTO: 30.1 PG (ref 27–31)
MCHC RBC AUTO-ENTMCNC: 30.1 G/DL (ref 32–36)
MCV RBC AUTO: 99.7 FL (ref 80–99)
MONOCYTES # BLD AUTO: 1.65 10*3/MM3 (ref 0–1)
MONOCYTES NFR BLD AUTO: 8.8 % (ref 0–12)
NEUTROPHILS # BLD AUTO: 14.13 10*3/MM3 (ref 1.5–8.3)
NEUTROPHILS NFR BLD AUTO: 75.7 % (ref 41–71)
PHOSPHATE SERPL-MCNC: 2.5 MG/DL (ref 2.4–5.1)
PLATELET # BLD AUTO: 395 10*3/MM3 (ref 150–450)
PMV BLD AUTO: 10 FL (ref 6–12)
POTASSIUM BLD-SCNC: 4.1 MMOL/L (ref 3.5–5.5)
RBC # BLD AUTO: 3.66 10*6/MM3 (ref 3.89–5.14)
SODIUM BLD-SCNC: 136 MMOL/L (ref 132–146)
VANCOMYCIN TROUGH SERPL-MCNC: 18.5 MCG/ML (ref 10–20)
WBC NRBC COR # BLD: 18.66 10*3/MM3 (ref 3.5–10.8)

## 2017-08-11 PROCEDURE — 99232 SBSQ HOSP IP/OBS MODERATE 35: CPT | Performed by: NURSE PRACTITIONER

## 2017-08-11 PROCEDURE — 83880 ASSAY OF NATRIURETIC PEPTIDE: CPT | Performed by: HOSPITALIST

## 2017-08-11 PROCEDURE — 94760 N-INVAS EAR/PLS OXIMETRY 1: CPT

## 2017-08-11 PROCEDURE — 85025 COMPLETE CBC W/AUTO DIFF WBC: CPT | Performed by: HOSPITALIST

## 2017-08-11 PROCEDURE — 80048 BASIC METABOLIC PNL TOTAL CA: CPT | Performed by: HOSPITALIST

## 2017-08-11 PROCEDURE — 94640 AIRWAY INHALATION TREATMENT: CPT

## 2017-08-11 PROCEDURE — 94799 UNLISTED PULMONARY SVC/PX: CPT

## 2017-08-11 PROCEDURE — 80202 ASSAY OF VANCOMYCIN: CPT

## 2017-08-11 PROCEDURE — 82962 GLUCOSE BLOOD TEST: CPT

## 2017-08-11 PROCEDURE — 84100 ASSAY OF PHOSPHORUS: CPT | Performed by: HOSPITALIST

## 2017-08-11 PROCEDURE — 97530 THERAPEUTIC ACTIVITIES: CPT

## 2017-08-11 PROCEDURE — 25010000002 HEPARIN (PORCINE) PER 1000 UNITS: Performed by: COLON & RECTAL SURGERY

## 2017-08-11 PROCEDURE — 25010000002 MEROPENEM

## 2017-08-11 PROCEDURE — 83735 ASSAY OF MAGNESIUM: CPT | Performed by: HOSPITALIST

## 2017-08-11 PROCEDURE — 94660 CPAP INITIATION&MGMT: CPT

## 2017-08-11 PROCEDURE — 25010000002 VANCOMYCIN HCL IN NACL 1.25-0.9 GM/250ML-% SOLUTION

## 2017-08-11 RX ORDER — VANCOMYCIN HYDROCHLORIDE 1 G/200ML
1000 INJECTION, SOLUTION INTRAVENOUS EVERY 24 HOURS
Status: DISCONTINUED | OUTPATIENT
Start: 2017-08-12 | End: 2017-08-14

## 2017-08-11 RX ADMIN — IPRATROPIUM BROMIDE AND ALBUTEROL SULFATE 3 ML: .5; 3 SOLUTION RESPIRATORY (INHALATION) at 15:42

## 2017-08-11 RX ADMIN — MEROPENEM 1 G: 1 INJECTION, POWDER, FOR SOLUTION INTRAVENOUS at 05:14

## 2017-08-11 RX ADMIN — IPRATROPIUM BROMIDE AND ALBUTEROL SULFATE 3 ML: .5; 3 SOLUTION RESPIRATORY (INHALATION) at 08:40

## 2017-08-11 RX ADMIN — LEVETIRACETAM 750 MG: 750 TABLET, FILM COATED ORAL at 09:28

## 2017-08-11 RX ADMIN — IPRATROPIUM BROMIDE AND ALBUTEROL SULFATE 3 ML: .5; 3 SOLUTION RESPIRATORY (INHALATION) at 12:19

## 2017-08-11 RX ADMIN — Medication 250 MG: at 09:28

## 2017-08-11 RX ADMIN — POLYETHYLENE GLYCOL 3350 17 G: 17 POWDER, FOR SOLUTION ORAL at 09:28

## 2017-08-11 RX ADMIN — HEPARIN SODIUM 5000 UNITS: 5000 INJECTION, SOLUTION INTRAVENOUS; SUBCUTANEOUS at 20:42

## 2017-08-11 RX ADMIN — ALLOPURINOL 100 MG: 100 TABLET ORAL at 09:28

## 2017-08-11 RX ADMIN — BUDESONIDE AND FORMOTEROL FUMARATE DIHYDRATE 2 PUFF: 80; 4.5 AEROSOL RESPIRATORY (INHALATION) at 08:40

## 2017-08-11 RX ADMIN — BENZONATATE 100 MG: 100 CAPSULE, LIQUID FILLED ORAL at 02:31

## 2017-08-11 RX ADMIN — HEPARIN SODIUM 5000 UNITS: 5000 INJECTION, SOLUTION INTRAVENOUS; SUBCUTANEOUS at 15:59

## 2017-08-11 RX ADMIN — Medication 5 MG: at 22:40

## 2017-08-11 RX ADMIN — BUDESONIDE AND FORMOTEROL FUMARATE DIHYDRATE 2 PUFF: 80; 4.5 AEROSOL RESPIRATORY (INHALATION) at 19:25

## 2017-08-11 RX ADMIN — HEPARIN SODIUM 5000 UNITS: 5000 INJECTION, SOLUTION INTRAVENOUS; SUBCUTANEOUS at 06:24

## 2017-08-11 RX ADMIN — Medication 250 MG: at 18:52

## 2017-08-11 RX ADMIN — IPRATROPIUM BROMIDE AND ALBUTEROL SULFATE 3 ML: .5; 3 SOLUTION RESPIRATORY (INHALATION) at 19:25

## 2017-08-11 RX ADMIN — AMLODIPINE BESYLATE 5 MG: 5 TABLET ORAL at 09:28

## 2017-08-11 RX ADMIN — VANCOMYCIN HYDROCHLORIDE 1250 MG: 1 INJECTION, POWDER, LYOPHILIZED, FOR SOLUTION INTRAVENOUS at 16:00

## 2017-08-11 RX ADMIN — PANTOPRAZOLE SODIUM 40 MG: 40 TABLET, DELAYED RELEASE ORAL at 05:15

## 2017-08-11 RX ADMIN — BENZONATATE 100 MG: 100 CAPSULE, LIQUID FILLED ORAL at 16:12

## 2017-08-11 RX ADMIN — CLONAZEPAM 1 MG: 1 TABLET ORAL at 12:03

## 2017-08-11 RX ADMIN — MEROPENEM 1 G: 1 INJECTION, POWDER, FOR SOLUTION INTRAVENOUS at 12:03

## 2017-08-11 RX ADMIN — MEROPENEM 1 G: 1 INJECTION, POWDER, FOR SOLUTION INTRAVENOUS at 20:42

## 2017-08-11 RX ADMIN — ALLOPURINOL 100 MG: 100 TABLET ORAL at 18:52

## 2017-08-11 NOTE — PROGRESS NOTES
"Hospitalist Daily Progress Note    Date of Admission: 8/5/2017   LOS: 5 days   PCP: Carol Herzog MD    Chief Complaint: dysuria    Subjective      Sitting up in chair, family in room. Remains on 3LNC currently. Daughter states patient is having confusion and agitation at night. Eating well.     Abdominal Pain         Review of Systems   Gastrointestinal: Positive for abdominal pain.     General: no fevers, chills  Respiratory: no cough, dyspnea  Cardiovascular: no chest pain, palpitations  Abdomen: No abdominal pain, nausea, vomiting, or diarrhea  Neurologic: No focal weakness    Objective   Physical Exam:  I have reviewed the vital signs.  Temp:  [98.3 °F (36.8 °C)-98.6 °F (37 °C)] 98.3 °F (36.8 °C)  Heart Rate:  [82-96] 84  Resp:  [17-20] 20  BP: (171-178)/(85) 178/85    Objective:  Vital signs: (most recent): Blood pressure 178/85, pulse 84, temperature 98.3 °F (36.8 °C), temperature source Oral, resp. rate 20, height 66\" (167.6 cm), weight 224 lb 1.6 oz (102 kg), SpO2 94 %, not currently breastfeeding.            General Appearance:    Alert, cooperative, no distress  Head:    Normocephalic, atraumatic  Eyes:    Sclerae anicteric  Neck:   Supple, no mass  Lungs: Diminished throughout with expiratory wheezing,  respirations unlabored  Heart: Regular rate and rhythm, S1 and S2 normal, no murmur, rub or gallop  Abdomen:  Soft, mild tenderness to palpation.  Extremities: No clubbing, cyanosis, or edema to lower extremities  Pulses:  2+ and symmetric in distal lower extremities  Skin: No rashes   Neurologic: Oriented x3, Normal strength to extremities    Results Review:    I have reviewed the labs, radiology results and diagnostic studies.      Results from last 7 days  Lab Units 08/11/17  0419   WBC 10*3/mm3 18.66*   HEMOGLOBIN g/dL 11.0*   PLATELETS 10*3/mm3 395       Results from last 7 days  Lab Units 08/11/17  0419   SODIUM mmol/L 136   POTASSIUM mmol/L 4.1   CO2 mmol/L 39.0*   CREATININE mg/dL 0.80 "   GLUCOSE mg/dL 139*       I have reviewed the medications.    ---------------------------------------------------------------------------------------------  Assessment/Plan   Assessment & Plan  Assessment/Problem List    Principal Problem:    Sepsis  Active Problems:    Chronic kidney disease, stage III (moderate)    Chronic obstructive pulmonary disease    Hyperlipidemia    Hypertension    Anxiety and depression    NADRIY on CPAP    GERD (gastroesophageal reflux disease)    Diastolic dysfunction    Type 2 diabetes mellitus    Acute cystitis without hematuria    Colovesical fistula    Weakness    HCAP (healthcare-associated pneumonia)      Plan    Sepsis  --WBC trending up- no fever or tachycardia  --BC NGTD  --CBC/ BMP in am     Acute cystitis / Urinary Tract Infection  - complicated and multifactorial secondary to incontinence, previous multidrug resistance, and fistula  - failed out patient treatment on PO Vantin after recent inpatient hospital stay related to MDR E. Coli UTI  - IV abx for total 7 days due to recent history of MDR ecoli     Colovesical Fistula  - demonstrated adherent to sigmoid colon  - s/p Lap colostomy per Dr. Saravia  - ureteral catheter 8/7  - had surgery on Monday 8/7---- f/u with Dr Gordon outpt in 2-3 weeks   --having good stoma output today, cont daily Miralax per CRS  --Needs to reschedule appt for cysto with Dr. Osuna for work-up of fistula    HCAP  --Vanc completed today total 3 doses     CKD III  - stable. Improved  - monitor and avoid additional nephrotoxic agents     COPD  - continue symbicort as needed  - as needed pulmonary toilet; duo nebs, mucolytics, antitussives     DM2  - controlled and stable. Hold home insulin  - scheduled accu checks with [facility]     Hypertension  - stable and controlled    ANDRIY  - CPAP at night     Weakness  - patient recently sustained mechanical fall since discharge  - Pt/OT treat and eval    DVT prophylaxis: Heparin 5,000 Units SC every 8 hours  Discharge  Planning: I expect patient to be discharged to SNF early next week     Fabi Veronica, COREY 08/11/17 4:11 PM

## 2017-08-11 NOTE — PLAN OF CARE
Problem: Patient Care Overview (Adult)  Goal: Plan of Care Review  Outcome: Ongoing (interventions implemented as appropriate)    08/11/17 1527   Coping/Psychosocial Response Interventions   Plan Of Care Reviewed With patient   Patient Care Overview   Progress progress toward functional goals as expected   Outcome Evaluation   Outcome Summary/Follow up Plan Pt demo increased function with BUE this session. Pt continues with confusion, but is more alert and requiring fewer cues to initiate activity.          Problem: Inpatient Occupational Therapy  Goal: Bed Mobility Goal LTG- OT  Outcome: Ongoing (interventions implemented as appropriate)    08/10/17 1034 08/11/17 1527   Bed Mobility OT LTG   Bed Mobility OT LTG, Date Established 08/10/17 --    Bed Mobility OT LTG, Time to Achieve by discharge --    Bed Mobility OT LTG, Activity Type roll left/roll right;supine to sit/sit to supine --    Bed Mobility OT LTG, [place] Level minimum assist (75% patient effort) --    Bed Mobility OT LTG, Outcome --  goal ongoing       Goal: Transfer Training Goal 1 LTG- OT  Outcome: Ongoing (interventions implemented as appropriate)    08/10/17 1034 08/11/17 1527   Transfer Training OT LTG   Transfer Training OT LTG, Date Established 08/10/17 --    Transfer Training OT LTG, Time to Achieve by discharge --    Transfer Training OT LTG, Activity Type bed to chair /chair to bed;sit to stand/stand to sit;toilet --    Transfer Training OT LTG, [place] Level contact guard assist --    Transfer Training OT LTG, Assist Device walker, rolling --    Transfer Training OT LTG, Outcome --  goal ongoing       Goal: Strength Goal LTG- OT  Outcome: Ongoing (interventions implemented as appropriate)    08/10/17 1034 08/11/17 1527   Strength OT LTG   Strength Goal OT LTG, Date Established 08/10/17 --    Strength Goal OT LTG, Time to Achieve by discharge --    Strength Goal OT LTG, Measure to Achieve Pt will increase BUE strength 1 MMG as needed  to support ADLs --    Strength Goal OT LTG, Outcome --  goal ongoing       Goal: Grooming Goal LTG- OT  Outcome: Ongoing (interventions implemented as appropriate)    08/11/17 1527   Grooming OT LTG   Grooming Goal OT LTG, Outcome goal ongoing

## 2017-08-11 NOTE — PROGRESS NOTES
Continued Stay Note  [facility]     Patient Name: Avis Us  MRN: [id number]  Today's Date: 8/11/2017    Admit Date: 8/5/2017          Discharge Plan     Consent obtained for the participation in the [facility] Transitions Program. Tabby Irvin RN                Discharge Codes     None            Tabby Irvin RN

## 2017-08-11 NOTE — PROGRESS NOTES
Continued Stay Note  [facility]     Patient Name: Avis Us  MRN: [id number]  Today's Date: 8/11/2017    Admit Date: 8/5/2017          Discharge Plan       08/11/17 1338    Case Management/Social Work Plan    Plan discharge planning    Patient/Family In Agreement With Plan yes    Additional Comments CM has been in [facility] with [facility].  There is bed availability at the [facility] location (family prefers this location) and Chantale met with pt. and son who was at bedside today.  Chantale stated she plans to see pt and family again on Monday to determine if pt. is more appropriate for transfer.  Chantale stated pt.'s son has expressed some concern for sundowning.  CM will continue to follow for discharge planning.  Pt. will hopefully transition to [facility] for skilled rehab early next week or when she is medically ready.              Discharge Codes     None            MELECIO Henry

## 2017-08-11 NOTE — PLAN OF CARE
Problem: Patient Care Overview (Adult)  Goal: Plan of Care Review  Outcome: Ongoing (interventions implemented as appropriate)    08/11/17 1144   Coping/Psychosocial Response Interventions   Plan Of Care Reviewed With patient   Patient Care Overview   Progress improving   Outcome Evaluation   Outcome Summary/Follow up Plan Appliance change performed and stoma education performed with patient and family. Stoma is red and moist. Bridge removed. Will continue to follow. Please contact [facility] if needs arise. Thanks

## 2017-08-11 NOTE — PROGRESS NOTES
Overall doing well from surgical standpoint  Hungry  Eating  Stoma working  Exam appropriate    May d/c [facility] tomorrow am  Ok to d/c when medically ready  Daily miralax  Needs to reschedule appt for cysto with Dr. Osuna for work-up of fistula  F/u with me 2-3 weeks   Dr. Pena on call this weekend for any issues that come up

## 2017-08-11 NOTE — PROGRESS NOTES
Adult Nutrition  Assessment/PES    Patient Name:  Avis Us  YOB: 1929  MRN: [id number]  Admit Date:  8/5/2017    Assessment Date:  8/11/2017        Reason for Assessment       08/11/17 1540    Reason for Assessment    Reason For Assessment/Visit follow up protocol    Time Spent (min) 30    Diagnosis --   per notes this admission              Nutrition/Diet History       08/11/17 1540    Nutrition/Diet History    Typical Food/Fluid Intake Patient's son stated she is not eating very well. He is receptive to p.o. supplements.    Food Preferences Per pt.'s son she dislikes chicken and applesauce.              Labs/Tests/Procedures/Meds       08/11/17 1541    Labs/Tests/Procedures/Meds    Labs/Tests Review Reviewed                Nutrition Prescription Ordered       08/11/17 1541    Nutrition Prescription PO    Current PO Diet Regular    Common Modifiers GI Soft/Clarkia            Evaluation of Received Nutrient/Fluid Intake       08/11/17 1541    PO Evaluation    Number of Meals 2    % PO Intake 38              Problem/Interventions:          Problem 2       08/11/17 1542    Nutrition Diagnoses Problem 2    Problem 2 Inadequate Intake/Infusion    Inadequate Intake Type Oral    Etiology (related to) Factors Affecting Nutrition    Appetite Poor    Signs/Symptoms (evidenced by) PO Intake    Percent (%) intake recorded 38 %    Over number of meals 2                  Intervention Goal       08/11/17 1542    Intervention Goal    General Nutrition support treatment    PO Tolerate PO;Increase intake            Nutrition Intervention       08/11/17 1543    Nutrition Intervention    RD/Tech Action Advise alternate selection;Interview for preference;Menu provided;Menu adjusted;Recommend/ordered   All discussed with pt's son and patient was asleep.    Recommended/Ordered Supplement            Nutrition Prescription       08/11/17 1543    Nutrition Prescription PO    PO Prescription Begin/change supplement     Supplement Boost HP   receptive to chocolate- no boost plus    Supplement Frequency 3 times a day            Education/Evaluation       08/11/17 0910    Education    Education --   MNT discussed with pt.'s son and pt. sleeping.    Monitor/Evaluation    Monitor Per protocol            Electronically signed by:  Portia Mack RD  08/11/17 3:47 PM

## 2017-08-11 NOTE — THERAPY TREATMENT NOTE
Acute Care - Occupational Therapy Treatment Note  [facility]     Patient Name: Avis Us  : 9/3/1929  MRN: [id number]  Today's Date: 2017  Onset of Illness/Injury or Date of Surgery Date: 17  Date of Referral to OT: 17  Referring Physician: COREY Choi      Admit Date: 2017    Visit Dx:     ICD-10-CM ICD-9-CM   1. Acute urinary tract infection N39.0 599.0   2. [place]-vesical fistula N32.1 596.1   3. Impaired functional mobility, balance, gait, and endurance Z74.09 V49.89   4. Impaired mobility and ADLs Z74.09 799.89     Patient Active Problem List   Diagnosis   • Abnormal liver function tests   • Acute myocardial infarction   • Anxiety   • Knee pain   • Asthma   • Calf cramp   • Candidal intertrigo   • Cataract   • Chest pain   • Chronic kidney disease, stage III (moderate)   • Chronic obstructive pulmonary disease   • Complete atrioventricular block   • Congestive heart failure   • Atherosclerosis of coronary artery   • Cough   • Depression   • Diabetes mellitus   • Dizziness   • Dysuria   • Edema   • Gastroesophageal reflux disease   • Primary hypertriglyceridemia   • Fatigue   • Fracture of patella   • Gout   • Headache   • Hyperlipidemia   • Hypertension   • Influenza due to influenza A virus   • Insomnia   • Leukocytosis   • Macrocytosis   • Macular degeneration   • Menopause present   • Night sweats   • Obstructive sleep apnea syndrome   • Osteoporosis   • Peripheral neuropathy   • Pneumonia   • Seizure disorder   • Sick sinus syndrome   • Sinus bradycardia   • Thrombophlebitis   • Cobalamin deficiency   • Vitamin D deficiency   • Physical debility   • Lumbar strain   • History of MI (myocardial infarction)   • Midline low back pain without sciatica   • Arthralgia of lumbar spine   • Encounter for eye exam   • Parasites in stool   • Sepsis   • UTI (urinary tract infection)   • Acute on chronic Respiratory failure. Not requiring ventilatory support   • Acute on Chronic kidney  disease (CKD), stage III (moderate)   • Exacerbation of COPD    • H/O SSS (sick sinus syndrome) s/p PPM 2014   • CHF (congestive heart failure)   • Anxiety and depression   • Macular degeneration   • ANDRIY on CPAP   • Seizure disorder on Keppra   • HTN and possible diastolic dysfunction   • HLD (hyperlipidemia)   • Obesity, BMI 33.7   • Pneumonia versus acute reaction to nitrofurantoin   • Complicated UTI (urinary tract infection)   • Senile atrophic vaginitis   • Acute cystitis with hematuria   • GERD (gastroesophageal reflux disease)   • Diastolic dysfunction   • Type 2 diabetes mellitus   • Acute cystitis without hematuria   • Colovesical fistula   • Weakness   • Sepsis   • HCAP (healthcare-associated pneumonia)             Adult Rehabilitation Note       08/11/17 4915          Rehab Assessment/Intervention    Discipline occupational therapist  -AC      Document Type therapy note (daily note)  -AC      Subjective Information agree to therapy  -AC      Patient Effort, Rehab Treatment adequate  -AC      Precautions/Limitations fall precautions;oxygen therapy device and L/min   colosotmy  -AC      Recorded by [AC] Albina Stock OT      Pain Assessment    Pain Assessment No/denies pain  -AC      Recorded by [AC] Albina Stock OT      Cognitive Assessment/Intervention    Current Cognitive/Communication Assessment impaired  -AC      Orientation Status oriented to;person;place  -AC      Follows Commands/Answers Questions 50% of the time;needs cueing;needs repetition  -AC      Personal Safety moderate impairment  -AC      Personal Safety Interventions fall prevention program maintained;gait belt;nonskid shoes/slippers when out of bed  -AC      Recorded by [AC] Albina Stock OT      Bed Mobility, Assessment/Treatment    Bed Mobility, Assistive Device bed rails;head of bed elevated  -AC      Bed Mobility, Roll Left, [place] moderate assist (50% patient effort)  -AC      Bed Mobility, Roll Right, [place] moderate  assist (50% patient effort)  -AC      Bed Mob, Supine to Sit, [place] verbal cues required;moderate assist (50% patient effort)  -AC      Bed Mobility, Safety Issues cognitive deficits limit understanding;decreased use of arms for pushing/pulling;decreased use of legs for bridging/pushing  -AC      Bed Mobility, Impairments strength decreased;impaired balance  -AC      Recorded by [AC] Albina Stock OT      Transfer Assessment/Treatment    Transfers, Sit-Stand [place] verbal cues required;minimum assist (75% patient effort);2 person assist required  -AC      Transfers, Stand-Sit [place] verbal cues required;minimum assist (75% patient effort);2 person assist required  -AC      Transfers, Sit-Stand-Sit, Assist Device --   initially used walker,but improved upright position with HHA  -AC      Transfer, Safety Issues sequencing ability decreased;weight-shifting ability decreased  -AC      Transfer, Impairments strength decreased;impaired balance  -AC      Recorded by [AC] Albina Stock OT      Functional Mobility    Functional Mobility- Comment attempted to sidestep toward HOB, but pt unable to weight shift and  feet   -AC      Recorded by [AC] Albina Stock OT      Motor Skills/Interventions    Additional Documentation Balance Skills Training (Group)  -AC      Recorded by [AC] Albina Stock, GIOVANNA      Balance Skills Training    Sitting-Level of Assistance Close supervision  -AC      Sitting-Balance Support Right upper extremity supported;Left upper extremity supported;Feet supported  -AC      Standing-Level of Assistance Minimum assistance;x2  -AC      Static Standing Balance Support Right upper extremity supported;Left upper extremity supported  -AC      Recorded by [AC] Albina Stock OT      Therapy Exercises    Bilateral Upper Extremity elbow flexion/extension;AAROM:;AROM:;hand pumps;pronation/supination;shoulder extension/flexion   AAROM shld f/e  -AC      Recorded by [AC] Albina Stock, OT       Positioning and Restraints    Pre-Treatment Position in bed  -AC      Post Treatment Position bed  -AC      In Bed supine;call light within reach;encouraged to call for assist;exit alarm on;with family/caregiver  -AC      Recorded by [AC] Albina Stock, OT        User Key  (r) = Recorded By, (t) = Taken By, (c) = Cosigned By    Initials Name Effective Dates    AC Albina Stock, OT 06/23/15 -                 OT Goals       08/11/17 1527 08/10/17 1034       Bed Mobility OT LTG    Bed Mobility OT LTG, Date Established  08/10/17  -AC     Bed Mobility OT LTG, Time to Achieve  by discharge  -AC     Bed Mobility OT LTG, Activity Type  roll left/roll right;supine to sit/sit to supine  -AC     Bed Mobility OT LTG, [place] Level  minimum assist (75% patient effort)  -AC     Bed Mobility OT LTG, Outcome goal ongoing  -AC      Transfer Training OT LTG    Transfer Training OT LTG, Date Established  08/10/17  -AC     Transfer Training OT LTG, Time to Achieve  by discharge  -AC     Transfer Training OT LTG, Activity Type  bed to chair /chair to bed;sit to stand/stand to sit;toilet  -AC     Transfer Training OT LTG, [place] Level  contact guard assist  -AC     Transfer Training OT LTG, Assist Device  walker, rolling  -AC     Transfer Training OT LTG, Outcome goal ongoing  -AC      Strength OT LTG    Strength Goal OT LTG, Date Established  08/10/17  -AC     Strength Goal OT LTG, Time to Achieve  by discharge  -AC     Strength Goal OT LTG, Measure to Achieve  Pt will increase BUE strength 1 MMG as needed to support ADLs  -AC     Strength Goal OT LTG, Outcome goal ongoing  -AC      Grooming OT LTG    Grooming Goal OT LTG, Date Established  08/10/17  -AC     Grooming Goal OT LTG, Time to Achieve  by discharge  -AC     Grooming Goal OT LTG, [place] Level  minimum assist (75% patient effort)  -AC     Grooming Goal OT LTG, Outcome goal ongoing  -AC        User Key  (r) = Recorded By, (t) = Taken By, (c) = Cosigned By     Initials Name Provider Type     Albina Stock OT Occupational Therapist          Occupational Therapy Education     Title: PT OT SLP Therapies (Active)     Topic: Occupational Therapy (Active)     Point: ADL training (Active)    Description: Instruct learner(s) on proper safety adaptation and remediation techniques during self care or transfers.   Instruct in proper use of assistive devices.    Learning Progress Summary    Learner Readiness Method Response Comment Documented by Status   Patient Acceptance E NR sequencing with bed mobility  08/11/17 1527 Active    Acceptance E VU benefits of activity, role of OT  08/10/17 1033 Done   Family Acceptance E VU benefits of activity, role of OT  08/10/17 1033 Done                      User Key     Initials Effective Dates Name Provider Type Discipline     06/23/15 -  Albina Stock OT Occupational Therapist OT                  OT Recommendation and Plan  Anticipated Discharge Disposition: inpatient rehabilitation facility  Planned Therapy Interventions: ADL retraining, balance training, bed mobility training, adaptive equipment training, strengthening, transfer training  Therapy Frequency: daily  Plan of Care Review  Plan Of Care Reviewed With: patient  Progress: progress toward functional goals as expected  Outcome Summary/Follow up Plan: Pt demo increased function with BUE this session.   Pt continues with confusion, but is more alert and requiring fewer cues to initiate activity.         Outcome Measures       08/11/17 1501 08/10/17 0859 08/10/17 0835    How much help from another person do you currently need...    Turning from your back to your side while in flat bed without using bedrails?   2  -SJ    Moving from lying on back to sitting on the side of a flat bed without bedrails?   2  -SJ    Moving to and from a bed to a chair (including a wheelchair)?   2  -SJ    Standing up from a chair using your arms (e.g., wheelchair, bedside chair)?   2  -SJ     Climbing 3-5 steps with a railing?   1  -SJ    To walk in hospital room?   1  -SJ    AM-PAC 6 Clicks Score   10  -SJ    How much help from another is currently needed...    Putting on and taking off regular lower body clothing? 1  -AC 1  -AC     Bathing (including washing, rinsing, and drying) 2  -AC 2  -AC     Toileting (which includes using toilet bed pan or urinal) 1  -AC 1  -AC     Putting on and taking off regular upper body clothing 2  -AC 2  -AC     Taking care of personal grooming (such as brushing teeth) 2  -AC 2  -AC     Eating meals 2  -AC 2  -AC     Score 10  -AC 10  -AC     Functional Assessment    Outcome Measure Options AM-PAC 6 Clicks Daily Activity (OT)  -AC AM-PAC 6 Clicks Daily Activity (OT)  -AC AM-PAC 6 Clicks Basic Mobility (PT)  -      User Key  (r) = Recorded By, (t) = Taken By, (c) = Cosigned By    Initials Name Provider Type     Albina Stock OT Occupational Therapist     Loan Rivera, PT Physical Therapist           Time Calculation:         Time Calculation- OT       08/11/17 1532          Time Calculation- OT    OT Start Time 1501  -      Total Timed Code Minutes- OT 30 minute(s)  -      OT Received On 08/11/17  -      OT Goal Re-Cert Due Date 08/21/17  -        User Key  (r) = Recorded By, (t) = Taken By, (c) = Cosigned By    Initials Name Provider Type     Albina Stock OT Occupational Therapist           Therapy Charges for Today     Code Description Service Date Service Provider Modifiers Qty    37795855314  OT EVAL MOD COMPLEXITY 4 8/10/2017 Albina Stock OT GO 1    49606569232 HC OT THER SUPP EA 15 MIN 8/10/2017 Albina Stock OT GO 1    36923373008 HC OT THER SUPP EA 15 MIN 8/11/2017 Albina Stock OT GO 2    [id number]  OT THERAPEUTIC ACT EA 15 MIN 8/11/2017 Albina Stock OT GO 2               Albina Stock OT  8/11/2017

## 2017-08-11 NOTE — PROGRESS NOTES
"Pharmacy Consult-Vancomycin Dosing  Avis CHING Us is a  87 y.o. female receiving vancomycin therapy.     Indication: sepsis  Consulting Provider: hospitalist    Goal Trough: ~15 mcg/ml      Current Antimicrobial Therapy  IV Anti-Infectives     Ordered     Dose/Rate Route Frequency Start Stop    08/09/17 1320  vancomycin IVPB 1250 mg in 250 mL NS (premix)     Ordering Provider:  CHASTITY Jamison, RPH    1,250 mg  over 90 Minutes Intravenous Every 24 Hours 08/10/17 1400      08/10/17 1013  meropenem (MERREM) 1 g/100 mL 0.9% NS VTB (mbp)     Comments:  Start from time administered in ED   Ordering Provider:  Charles Barrientos, RPH    1 g  over 30 Minutes Intravenous Every 8 Hours 08/10/17 1200      08/09/17 1204  vancomycin IVPB 2000 mg in 0.9% Sodium Chloride (premix) 500 mL     Ordering Provider:  COREY Lima    20 mg/kg × 102 kg  over 120 Minutes Intravenous Once 08/09/17 1400 08/09/17 1544    08/09/17 1155  Pharmacy to dose vancomycin     Ordering Provider:  COREY Lima     Does not apply Continuous PRN 08/09/17 1153      08/06/17 0117  meropenem (MERREM) 1 g/100 mL 0.9% NS VTB (mbp)     Ordering Provider:  Bright Dooley MD    1 g  over 30 Minutes Intravenous Once 08/06/17 0119 08/06/17 0230    08/06/17 0022  cefTRIAXone (ROCEPHIN) IVPB 1 g     Ordering Provider:  Bright Dooley MD    1 g  over 30 Minutes Intravenous Once 08/06/17 0023 08/06/17 0142          Allergies  Clarithromycin; Macrobid [nitrofurantoin monohyd macro]; Vioxx [rofecoxib]; Metoprolol; Statins; and Tramadol    Labs      Results from last 7 days  Lab Units 08/11/17  0419 08/10/17  0445 08/09/17  0639   BUN mg/dL 11 11 13   CREATININE mg/dL 0.80 0.80 1.10         Results from last 7 days  Lab Units 08/11/17  0419 08/10/17  0445 08/09/17  0639   WBC 10*3/mm3 18.66* 14.69* 15.82*       Max Temperature in Last 24 Hours:    Evaluation of Dosing     Ht - 66\" (167.6 cm)  Wt - 224 lb 1.6 oz (102 kg)    Estimated Creatinine Clearance: " 59.8 mL/min (by C-G formula based on Cr of 0.8).    I/O last 3 completed shifts:  In: 100 [IV Piggyback:100]  Out: 3150 [Urine:3100; Stool:50]     Microbiology and Radiology  Blood Culture   Date Value Ref Range Status   08/05/2017 No growth at 5 days  Final     Urine Culture   Date Value Ref Range Status   08/05/2017 No growth at 2 days  Final       Evaluation of Level    Lab Results   Component Value Date    [facility] 18.50 08/11/2017   This is ~22.5 hour level, drawn 1 hour early    Assessment/Plan:  Patient was loaded with vancomycin 2000 mg IV x 1 dose, then started on vancomycin 1250 mg IV q24h.  A trough was obtained today prior to 3rd total dose of vancomycin.     This trough is at goal for indication.  However, expect that vancomycin will continue to accumulate as reaches steady state.  Will give vancomycin 1250 mg IV x 1 more dose today, then decrease dose to vancomycin 1000 mg IV q24h starting on 8/12.  Pharmacy will continue to follow and adjust dose as necessary.    Thank you for this consult,  Nataly Bermudez, PharmD  08/11/17  3:25 PM

## 2017-08-12 LAB
ANION GAP SERPL CALCULATED.3IONS-SCNC: 5 MMOL/L (ref 3–11)
BASOPHILS # BLD AUTO: 0.05 10*3/MM3 (ref 0–0.2)
BASOPHILS NFR BLD AUTO: 0.4 % (ref 0–1)
BUN BLD-MCNC: 11 MG/DL (ref 9–23)
BUN/CREAT SERPL: 13.8 (ref 7–25)
CALCIUM SPEC-SCNC: 9.5 MG/DL (ref 8.7–10.4)
CHLORIDE SERPL-SCNC: 96 MMOL/L (ref 99–109)
CO2 SERPL-SCNC: 37 MMOL/L (ref 20–31)
CREAT BLD-MCNC: 0.8 MG/DL (ref 0.6–1.3)
DEPRECATED RDW RBC AUTO: 52.8 FL (ref 37–54)
EOSINOPHIL # BLD AUTO: 0.44 10*3/MM3 (ref 0–0.3)
EOSINOPHIL NFR BLD AUTO: 3.4 % (ref 0–3)
ERYTHROCYTE [DISTWIDTH] IN BLOOD BY AUTOMATED COUNT: 14.8 % (ref 11.3–14.5)
GFR SERPL CREATININE-BSD FRML MDRD: 68 ML/MIN/1.73
GLUCOSE BLD-MCNC: 114 MG/DL (ref 70–100)
GLUCOSE BLDC GLUCOMTR-MCNC: 106 MG/DL (ref 70–130)
GLUCOSE BLDC GLUCOMTR-MCNC: 109 MG/DL (ref 70–130)
GLUCOSE BLDC GLUCOMTR-MCNC: 120 MG/DL (ref 70–130)
GLUCOSE BLDC GLUCOMTR-MCNC: 161 MG/DL (ref 70–130)
HCT VFR BLD AUTO: 33.1 % (ref 34.5–44)
HGB BLD-MCNC: 10.2 G/DL (ref 11.5–15.5)
IMM GRANULOCYTES # BLD: 0.11 10*3/MM3 (ref 0–0.03)
IMM GRANULOCYTES NFR BLD: 0.9 % (ref 0–0.6)
LYMPHOCYTES # BLD AUTO: 2.41 10*3/MM3 (ref 0.6–4.8)
LYMPHOCYTES NFR BLD AUTO: 18.9 % (ref 24–44)
MCH RBC QN AUTO: 30.2 PG (ref 27–31)
MCHC RBC AUTO-ENTMCNC: 30.8 G/DL (ref 32–36)
MCV RBC AUTO: 97.9 FL (ref 80–99)
MONOCYTES # BLD AUTO: 1.41 10*3/MM3 (ref 0–1)
MONOCYTES NFR BLD AUTO: 11 % (ref 0–12)
NEUTROPHILS # BLD AUTO: 8.36 10*3/MM3 (ref 1.5–8.3)
NEUTROPHILS NFR BLD AUTO: 65.4 % (ref 41–71)
PLATELET # BLD AUTO: 399 10*3/MM3 (ref 150–450)
PMV BLD AUTO: 9.7 FL (ref 6–12)
POTASSIUM BLD-SCNC: 4.2 MMOL/L (ref 3.5–5.5)
RBC # BLD AUTO: 3.38 10*6/MM3 (ref 3.89–5.14)
SODIUM BLD-SCNC: 138 MMOL/L (ref 132–146)
WBC NRBC COR # BLD: 12.78 10*3/MM3 (ref 3.5–10.8)

## 2017-08-12 PROCEDURE — 99233 SBSQ HOSP IP/OBS HIGH 50: CPT | Performed by: HOSPITALIST

## 2017-08-12 PROCEDURE — 25010000002 HEPARIN (PORCINE) PER 1000 UNITS: Performed by: COLON & RECTAL SURGERY

## 2017-08-12 PROCEDURE — 94799 UNLISTED PULMONARY SVC/PX: CPT

## 2017-08-12 PROCEDURE — 94640 AIRWAY INHALATION TREATMENT: CPT

## 2017-08-12 PROCEDURE — 82962 GLUCOSE BLOOD TEST: CPT

## 2017-08-12 PROCEDURE — 80048 BASIC METABOLIC PNL TOTAL CA: CPT | Performed by: NURSE PRACTITIONER

## 2017-08-12 PROCEDURE — 85025 COMPLETE CBC W/AUTO DIFF WBC: CPT | Performed by: NURSE PRACTITIONER

## 2017-08-12 PROCEDURE — 25010000002 VANCOMYCIN PER 500 MG

## 2017-08-12 PROCEDURE — 25010000002 MEROPENEM

## 2017-08-12 RX ORDER — CLONAZEPAM 0.5 MG/1
0.5 TABLET ORAL 2 TIMES DAILY PRN
Status: DISCONTINUED | OUTPATIENT
Start: 2017-08-12 | End: 2017-08-15 | Stop reason: HOSPADM

## 2017-08-12 RX ORDER — CLONAZEPAM 0.5 MG/1
0.5 TABLET ORAL EVERY 12 HOURS SCHEDULED
Status: DISCONTINUED | OUTPATIENT
Start: 2017-08-12 | End: 2017-08-15 | Stop reason: HOSPADM

## 2017-08-12 RX ADMIN — AMLODIPINE BESYLATE 5 MG: 5 TABLET ORAL at 09:37

## 2017-08-12 RX ADMIN — HEPARIN SODIUM 5000 UNITS: 5000 INJECTION, SOLUTION INTRAVENOUS; SUBCUTANEOUS at 21:03

## 2017-08-12 RX ADMIN — IPRATROPIUM BROMIDE AND ALBUTEROL SULFATE 3 ML: .5; 3 SOLUTION RESPIRATORY (INHALATION) at 12:26

## 2017-08-12 RX ADMIN — BUDESONIDE AND FORMOTEROL FUMARATE DIHYDRATE 2 PUFF: 80; 4.5 AEROSOL RESPIRATORY (INHALATION) at 08:58

## 2017-08-12 RX ADMIN — HEPARIN SODIUM 5000 UNITS: 5000 INJECTION, SOLUTION INTRAVENOUS; SUBCUTANEOUS at 14:51

## 2017-08-12 RX ADMIN — POLYETHYLENE GLYCOL 3350 17 G: 17 POWDER, FOR SOLUTION ORAL at 11:17

## 2017-08-12 RX ADMIN — MEROPENEM 1 G: 1 INJECTION, POWDER, FOR SOLUTION INTRAVENOUS at 21:02

## 2017-08-12 RX ADMIN — IPRATROPIUM BROMIDE AND ALBUTEROL SULFATE 3 ML: .5; 3 SOLUTION RESPIRATORY (INHALATION) at 20:41

## 2017-08-12 RX ADMIN — MEROPENEM 1 G: 1 INJECTION, POWDER, FOR SOLUTION INTRAVENOUS at 04:53

## 2017-08-12 RX ADMIN — BUDESONIDE AND FORMOTEROL FUMARATE DIHYDRATE 2 PUFF: 80; 4.5 AEROSOL RESPIRATORY (INHALATION) at 20:41

## 2017-08-12 RX ADMIN — IPRATROPIUM BROMIDE AND ALBUTEROL SULFATE 3 ML: .5; 3 SOLUTION RESPIRATORY (INHALATION) at 08:58

## 2017-08-12 RX ADMIN — VANCOMYCIN HYDROCHLORIDE 1000 MG: 1 INJECTION, SOLUTION INTRAVENOUS at 14:51

## 2017-08-12 RX ADMIN — Medication 5 MG: at 21:02

## 2017-08-12 RX ADMIN — MEROPENEM 1 G: 1 INJECTION, POWDER, FOR SOLUTION INTRAVENOUS at 13:19

## 2017-08-12 RX ADMIN — ALLOPURINOL 100 MG: 100 TABLET ORAL at 17:51

## 2017-08-12 RX ADMIN — BENZONATATE 100 MG: 100 CAPSULE, LIQUID FILLED ORAL at 11:16

## 2017-08-12 RX ADMIN — Medication 250 MG: at 17:51

## 2017-08-12 RX ADMIN — BENZONATATE 100 MG: 100 CAPSULE, LIQUID FILLED ORAL at 21:05

## 2017-08-12 RX ADMIN — HEPARIN SODIUM 5000 UNITS: 5000 INJECTION, SOLUTION INTRAVENOUS; SUBCUTANEOUS at 06:46

## 2017-08-12 RX ADMIN — ALLOPURINOL 100 MG: 100 TABLET ORAL at 09:37

## 2017-08-12 RX ADMIN — LEVETIRACETAM 750 MG: 750 TABLET, FILM COATED ORAL at 09:37

## 2017-08-12 RX ADMIN — CLONAZEPAM 0.5 MG: 0.5 TABLET ORAL at 21:02

## 2017-08-12 RX ADMIN — INSULIN LISPRO 2 UNITS: 100 INJECTION, SOLUTION INTRAVENOUS; SUBCUTANEOUS at 17:44

## 2017-08-12 RX ADMIN — Medication 250 MG: at 09:37

## 2017-08-12 RX ADMIN — BENZONATATE 100 MG: 100 CAPSULE, LIQUID FILLED ORAL at 02:30

## 2017-08-12 RX ADMIN — PANTOPRAZOLE SODIUM 40 MG: 40 TABLET, DELAYED RELEASE ORAL at 06:46

## 2017-08-12 RX ADMIN — CLONAZEPAM 1 MG: 1 TABLET ORAL at 09:37

## 2017-08-12 NOTE — PLAN OF CARE
Problem: Patient Care Overview (Adult)  Goal: Plan of Care Review  Outcome: Ongoing (interventions implemented as appropriate)    Problem: Fall Risk (Adult)  Goal: Absence of Falls  Outcome: Ongoing (interventions implemented as appropriate)    Problem: Pressure Ulcer Risk (Mack Scale) (Adult,Obstetrics,Pediatric)  Goal: Skin Integrity  Outcome: Outcome(s) achieved Date Met:  08/12/17    Problem: Perioperative Period (Adult)  Goal: Signs and Symptoms of Listed Potential Problems Will be Absent or Manageable (Perioperative Period)  Outcome: Ongoing (interventions implemented as appropriate)    Problem: Pain, Acute (Adult)  Goal: Acceptable Pain Control/Comfort Level  Outcome: Ongoing (interventions implemented as appropriate)    Problem: Colostomy (Adult)  Goal: Signs and Symptoms of Listed Potential Problems Will be Absent or Manageable (Colostomy)  Outcome: Ongoing (interventions implemented as appropriate)

## 2017-08-12 NOTE — PROGRESS NOTES
Hospitalist Daily Progress Note    Date of Admission: 8/5/2017   LOS: 6 days   PCP: Carol Herzog MD    Chief Complaint: dysuria    Subjective      Son in room today.  Asking about taking out tafoya.  Patient was agitated or sundowning overnight.  Says she has been on Klonopin for years    Abdominal Pain         Review of Systems   Gastrointestinal: Positive for abdominal pain.     General: no fevers, chills  Respiratory: no cough, dyspnea  Cardiovascular: no chest pain, palpitations  Abdomen: No abdominal pain, nausea, vomiting, or diarrhea  Neurologic: No focal weakness    Objective   Physical Exam:  I have reviewed the vital signs.  Temp:  [97.2 °F (36.2 °C)-98.3 °F (36.8 °C)] 97.2 °F (36.2 °C)  Heart Rate:  [72-84] 75  Resp:  [18-22] 22  BP: (159-171)/(77-85) 159/77    Objective  General Appearance:    Alert, cooperative, no distress  Head:    Normocephalic, atraumatic  Eyes:    Sclerae anicteric  Neck:   Supple, no mass  Lungs: Diminished throughout with expiratory wheezing,  respirations unlabored  Heart: Regular rate and rhythm, S1 and S2 normal, no murmur, rub or gallop  Abdomen:  Soft, mild tenderness to palpation.  Extremities: No clubbing, cyanosis, or edema to lower extremities  Pulses:  2+ and symmetric in distal lower extremities  Skin: No rashes   Neurologic: Oriented x3, Normal strength to extremities    Results Review:    I have reviewed the labs, radiology results and diagnostic studies.      Results from last 7 days  Lab Units 08/12/17  0652   WBC 10*3/mm3 12.78*   HEMOGLOBIN g/dL 10.2*   PLATELETS 10*3/mm3 399       Results from last 7 days  Lab Units 08/12/17  0652   SODIUM mmol/L 138   POTASSIUM mmol/L 4.2   CO2 mmol/L 37.0*   CREATININE mg/dL 0.80   GLUCOSE mg/dL 114*       I have reviewed the medications.    ---------------------------------------------------------------------------------------------  Assessment/Plan   Assessment & Plan  Assessment/Problem List    Principal Problem:     Sepsis  Active Problems:    Chronic kidney disease, stage III (moderate)    Chronic obstructive pulmonary disease    Hyperlipidemia    Hypertension    Anxiety and depression    ANDRIY on CPAP    GERD (gastroesophageal reflux disease)    Diastolic dysfunction    Type 2 diabetes mellitus    Acute cystitis without hematuria    Colovesical fistula    Weakness    HCAP (healthcare-associated pneumonia)      Plan    Sepsis  --IV abx  --BC NGTD    Acute cystitis / Urinary Tract Infection  - complicated and multifactorial secondary to incontinence, previous multidrug resistance, and fistula  - failed out patient treatment on PO Vantin after recent inpatient hospital stay related to MDR E. Coli UTI  - IV abx for total 7 days due to recent history of MDR ecoli     Colovesical Fistula  - demonstrated adherent to sigmoid colon  - s/p Lap colostomy per Dr. Saravia  - ureteral catheter 8/7  - had surgery on Monday 8/7---- f/u with Dr Gordon outpt in 2-3 weeks   --having good stoma output today, cont daily Miralax per CRS  --Needs to reschedule appt for cysto with Dr. Osuna for work-up of fistula    HCAP  --Vanc completed total 3 doses     CKD III  - stable. Improved  - monitor and avoid additional nephrotoxic agents     COPD  - continue symbicort as needed  - as needed pulmonary toilet; duo nebs, mucolytics, antitussives     DM2  - controlled and stable. Hold home insulin  - scheduled accu checks with [facility]     Hypertension  - stable and controlled    ANDRIY  - CPAP at night     Weakness  - patient recently sustained mechanical fall since discharge  - Pt/OT treat and eval    Discontinue tafoya today  PT today if possible  Start standing Klonopin and PRN Klonopin as needed    DVT prophylaxis: Heparin 5,000 Units SC every 8 hours  Discharge Planning: I expect patient to be discharged to SNF early next week     Ted Emmanuel MD 08/12/17 4:58 PM

## 2017-08-13 LAB
GLUCOSE BLDC GLUCOMTR-MCNC: 113 MG/DL (ref 70–130)
GLUCOSE BLDC GLUCOMTR-MCNC: 119 MG/DL (ref 70–130)
GLUCOSE BLDC GLUCOMTR-MCNC: 127 MG/DL (ref 70–130)
GLUCOSE BLDC GLUCOMTR-MCNC: 131 MG/DL (ref 70–130)

## 2017-08-13 PROCEDURE — 25010000002 MEROPENEM

## 2017-08-13 PROCEDURE — 99233 SBSQ HOSP IP/OBS HIGH 50: CPT | Performed by: HOSPITALIST

## 2017-08-13 PROCEDURE — 94640 AIRWAY INHALATION TREATMENT: CPT

## 2017-08-13 PROCEDURE — 94660 CPAP INITIATION&MGMT: CPT

## 2017-08-13 PROCEDURE — 97116 GAIT TRAINING THERAPY: CPT

## 2017-08-13 PROCEDURE — 94799 UNLISTED PULMONARY SVC/PX: CPT

## 2017-08-13 PROCEDURE — 25010000002 HEPARIN (PORCINE) PER 1000 UNITS: Performed by: COLON & RECTAL SURGERY

## 2017-08-13 PROCEDURE — 82962 GLUCOSE BLOOD TEST: CPT

## 2017-08-13 PROCEDURE — 94760 N-INVAS EAR/PLS OXIMETRY 1: CPT

## 2017-08-13 PROCEDURE — 25010000002 VANCOMYCIN PER 500 MG

## 2017-08-13 PROCEDURE — 97110 THERAPEUTIC EXERCISES: CPT

## 2017-08-13 RX ORDER — MIRTAZAPINE 15 MG/1
15 TABLET, FILM COATED ORAL NIGHTLY
Status: DISCONTINUED | OUTPATIENT
Start: 2017-08-13 | End: 2017-08-15 | Stop reason: HOSPADM

## 2017-08-13 RX ADMIN — IPRATROPIUM BROMIDE AND ALBUTEROL SULFATE 3 ML: .5; 3 SOLUTION RESPIRATORY (INHALATION) at 12:39

## 2017-08-13 RX ADMIN — CLONAZEPAM 0.5 MG: 0.5 TABLET ORAL at 20:26

## 2017-08-13 RX ADMIN — AMLODIPINE BESYLATE 5 MG: 5 TABLET ORAL at 09:07

## 2017-08-13 RX ADMIN — CLONAZEPAM 0.5 MG: 0.5 TABLET ORAL at 09:07

## 2017-08-13 RX ADMIN — HEPARIN SODIUM 5000 UNITS: 5000 INJECTION, SOLUTION INTRAVENOUS; SUBCUTANEOUS at 14:22

## 2017-08-13 RX ADMIN — HEPARIN SODIUM 5000 UNITS: 5000 INJECTION, SOLUTION INTRAVENOUS; SUBCUTANEOUS at 20:27

## 2017-08-13 RX ADMIN — IPRATROPIUM BROMIDE AND ALBUTEROL SULFATE 3 ML: .5; 3 SOLUTION RESPIRATORY (INHALATION) at 20:53

## 2017-08-13 RX ADMIN — POLYETHYLENE GLYCOL 3350 17 G: 17 POWDER, FOR SOLUTION ORAL at 09:07

## 2017-08-13 RX ADMIN — BUDESONIDE AND FORMOTEROL FUMARATE DIHYDRATE 2 PUFF: 80; 4.5 AEROSOL RESPIRATORY (INHALATION) at 20:54

## 2017-08-13 RX ADMIN — Medication 5 MG: at 20:26

## 2017-08-13 RX ADMIN — MEROPENEM 1 G: 1 INJECTION, POWDER, FOR SOLUTION INTRAVENOUS at 12:39

## 2017-08-13 RX ADMIN — LEVETIRACETAM 750 MG: 750 TABLET, FILM COATED ORAL at 09:07

## 2017-08-13 RX ADMIN — IPRATROPIUM BROMIDE AND ALBUTEROL SULFATE 3 ML: .5; 3 SOLUTION RESPIRATORY (INHALATION) at 08:27

## 2017-08-13 RX ADMIN — Medication 250 MG: at 09:07

## 2017-08-13 RX ADMIN — MEROPENEM 1 G: 1 INJECTION, POWDER, FOR SOLUTION INTRAVENOUS at 03:42

## 2017-08-13 RX ADMIN — ALLOPURINOL 100 MG: 100 TABLET ORAL at 18:16

## 2017-08-13 RX ADMIN — HEPARIN SODIUM 5000 UNITS: 5000 INJECTION, SOLUTION INTRAVENOUS; SUBCUTANEOUS at 05:50

## 2017-08-13 RX ADMIN — BENZONATATE 100 MG: 100 CAPSULE, LIQUID FILLED ORAL at 20:26

## 2017-08-13 RX ADMIN — BENZONATATE 100 MG: 100 CAPSULE, LIQUID FILLED ORAL at 10:52

## 2017-08-13 RX ADMIN — Medication 250 MG: at 18:16

## 2017-08-13 RX ADMIN — MIRTAZAPINE 15 MG: 15 TABLET, FILM COATED ORAL at 20:26

## 2017-08-13 RX ADMIN — DEXTROMETHORPHAN 30 MG: 30 SUSPENSION, EXTENDED RELEASE ORAL at 22:05

## 2017-08-13 RX ADMIN — VANCOMYCIN HYDROCHLORIDE 1000 MG: 1 INJECTION, SOLUTION INTRAVENOUS at 14:22

## 2017-08-13 RX ADMIN — MEROPENEM 1 G: 1 INJECTION, POWDER, FOR SOLUTION INTRAVENOUS at 20:26

## 2017-08-13 RX ADMIN — PANTOPRAZOLE SODIUM 40 MG: 40 TABLET, DELAYED RELEASE ORAL at 05:50

## 2017-08-13 RX ADMIN — IPRATROPIUM BROMIDE AND ALBUTEROL SULFATE 3 ML: .5; 3 SOLUTION RESPIRATORY (INHALATION) at 15:31

## 2017-08-13 RX ADMIN — ALLOPURINOL 100 MG: 100 TABLET ORAL at 09:07

## 2017-08-13 RX ADMIN — BUDESONIDE AND FORMOTEROL FUMARATE DIHYDRATE 2 PUFF: 80; 4.5 AEROSOL RESPIRATORY (INHALATION) at 08:35

## 2017-08-13 NOTE — PROGRESS NOTES
Hospitalist Daily Progress Note    Date of Admission: 8/5/2017   LOS: 7 days   PCP: Carol Herzog MD    Chief Complaint: dysuria    Subjective      Cough likely due to poor inspiratory capacity.  Unable to cough up secretions.  Son in room today.  Asking about an appetite stimulant.  No overnight events reported.  Ostomy with good output today.      Abdominal Pain         Review of Systems   Gastrointestinal: Positive for abdominal pain.     General: no fevers, chills  Respiratory: no cough, dyspnea  Cardiovascular: no chest pain, palpitations  Abdomen: No abdominal pain, nausea, vomiting, or diarrhea  Neurologic: No focal weakness    Objective   Physical Exam:  I have reviewed the vital signs.  Temp:  [97.7 °F (36.5 °C)-98.7 °F (37.1 °C)] 97.7 °F (36.5 °C)  Heart Rate:  [67-82] 74  Resp:  [16-20] 20  BP: (115-155)/(74-78) 155/74    Objective  General Appearance:    Flat, generalized weakness, cooperative, no distress  Head:    Normocephalic, atraumatic  Eyes:    Sclerae anicteric  Neck:   Supple, no mass  Lungs: Diminished throughout with expiratory wheezing,  respirations unlabored  Heart: Regular rate and rhythm, S1 and S2 normal, no murmur, rub or gallop  Abdomen:  Soft, mild tenderness to palpation, + ostomy with good output  Extremities: No clubbing, cyanosis, or edema to lower extremities  Pulses:  2+ and symmetric in distal lower extremities  Skin: No rashes       Results Review:    I have reviewed the labs, radiology results and diagnostic studies.      Results from last 7 days  Lab Units 08/12/17  0652   WBC 10*3/mm3 12.78*   HEMOGLOBIN g/dL 10.2*   PLATELETS 10*3/mm3 399       Results from last 7 days  Lab Units 08/12/17  0652   SODIUM mmol/L 138   POTASSIUM mmol/L 4.2   CO2 mmol/L 37.0*   CREATININE mg/dL 0.80   GLUCOSE mg/dL 114*       I have reviewed the medications.    ---------------------------------------------------------------------------------------------  Assessment/Plan   Assessment &  Plan  Assessment/Problem List    Principal Problem:    Sepsis  Active Problems:    Chronic kidney disease, stage III (moderate)    Chronic obstructive pulmonary disease    Hyperlipidemia    Hypertension    Anxiety and depression    ANDRIY on CPAP    GERD (gastroesophageal reflux disease)    Diastolic dysfunction    Type 2 diabetes mellitus    Acute cystitis without hematuria    Colovesical fistula    Weakness    HCAP (healthcare-associated pneumonia)      Plan    Sepsis  - IV abx, consider stopping Abx tomorrow  - BC No growth at 5 days    Acute cystitis / Urinary Tract Infection  - complicated and multifactorial secondary to incontinence, previous multidrug resistance, and fistula  - failed out patient treatment on PO Vantin after recent inpatient hospital stay related to MDR E. Coli UTI  - IV abx for at least 7 days due to recent history of MDR ecoli     Colovesical Fistula  - demonstrated adherent to sigmoid colon  - s/p Lap colostomy per Dr. Saravia  - ureteral catheter 8/7  - had surgery on Monday 8/7---- f/u with Dr Gordon outpt in 2-3 weeks   --having good stoma output today, cont daily Miralax per CRS  --Needs to reschedule appt for cysto with Dr. Osuna for work-up of fistula    HCAP  - Vanc     CKD III  - stable. Improved  - monitor and avoid additional nephrotoxic agents     COPD  - continue symbicort as needed  - as needed pulmonary toilet; duo nebs, mucolytics, antitussives     DM2  - controlled and stable. Hold home insulin  - scheduled accu checks with [facility]     Hypertension  - stable and controlled    ANDRIY  - CPAP at night     Weakness  - patient recently sustained mechanical fall since discharge  - Pt/OT treat and eval    Trial of mirtazapine at bedtime - poor oral intake  Labs in AM  Blood Cultures - no growth final  Consider discontinuing abx tomorrow and observing  Start standing Klonopin and PRN Klonopin as needed    DVT prophylaxis: Heparin 5,000 Units SC every 8 hours  Discharge Planning: I expect patient  to be discharged to SNF early next week     Ted Emmanuel MD 08/13/17 4:54 PM

## 2017-08-13 NOTE — PLAN OF CARE
Problem: Pain, Acute (Adult)  Goal: Acceptable Pain Control/Comfort Level  Outcome: Ongoing (interventions implemented as appropriate)    Problem: Colostomy (Adult)  Goal: Signs and Symptoms of Listed Potential Problems Will be Absent or Manageable (Colostomy)  Outcome: Ongoing (interventions implemented as appropriate)

## 2017-08-13 NOTE — PLAN OF CARE
Problem: Patient Care Overview (Adult)  Goal: Plan of Care Review  Outcome: Ongoing (interventions implemented as appropriate)    08/13/17 1205   Coping/Psychosocial Response Interventions   Plan Of Care Reviewed With patient;family   Patient Care Overview   Progress improving   Outcome Evaluation   Outcome Summary/Follow up Plan good participation w/ P.T., consistently followed cues, increased ambulation         Problem: Inpatient Physical Therapy  Goal: Bed Mobility Goal LTG- PT  Outcome: Ongoing (interventions implemented as appropriate)    08/10/17 0929   Bed Mobility PT LTG   Bed Mobility PT LTG, Date Established 08/10/17   Bed Mobility PT LTG, Time to Achieve 2 wks   Bed Mobility PT LTG, Activity Type roll left/roll right;supine to sit/sit to supine   Bed Mobility PT LTG, [place] minimum assist (75% patient effort)       Goal: Transfer Training Goal 1 LTG- PT  Outcome: Ongoing (interventions implemented as appropriate)    08/10/17 0929 08/13/17 1205   Transfer Training PT LTG   Transfer Training PT LTG, Date Established 08/10/17 --    Transfer Training PT LTG, Time to Achieve 2 wks --    Transfer Training PT LTG, Activity Type bed to chair /chair to bed;sit to stand/stand to sit --    Transfer Training PT LTG, [place] contact guard assist --    Transfer Training PT LTG, Assist Device walker, rolling --    Transfer Training PT LTG, Outcome --  goal ongoing       Goal: Gait Training Goal LTG- PT  Outcome: Ongoing (interventions implemented as appropriate)    08/10/17 0929 08/13/17 1205   Gait Training PT LTG   Gait Training Goal PT LTG, Date Established 08/10/17 --    Gait Training Goal PT LTG, Time to Achieve 2 wks --    Gait Training Goal PT LTG, [place] contact guard assist --    Gait Training Goal PT LTG, Assist Device walker, rolling --    Gait Training Goal PT LTG, Distance to Achieve 15 --    Gait Training Goal PT LTG, Outcome --  goal ongoing

## 2017-08-13 NOTE — THERAPY TREATMENT NOTE
Acute Care - Physical Therapy Treatment Note  [facility]     Patient Name: Avis Us  : 9/3/1929  MRN: [id number]  Today's Date: 2017  Onset of Illness/Injury or Date of Surgery Date: 17  Date of Referral to PT: 17  Referring Physician: COREY Choi    Admit Date: 2017    Visit Dx:    ICD-10-CM ICD-9-CM   1. Acute urinary tract infection N39.0 599.0   2. [place]-vesical fistula N32.1 596.1   3. Impaired functional mobility, balance, gait, and endurance Z74.09 V49.89   4. Impaired mobility and ADLs Z74.09 799.89     Patient Active Problem List   Diagnosis   • Abnormal liver function tests   • Acute myocardial infarction   • Anxiety   • Knee pain   • Asthma   • Calf cramp   • Candidal intertrigo   • Cataract   • Chest pain   • Chronic kidney disease, stage III (moderate)   • Chronic obstructive pulmonary disease   • Complete atrioventricular block   • Congestive heart failure   • Atherosclerosis of coronary artery   • Cough   • Depression   • Diabetes mellitus   • Dizziness   • Dysuria   • Edema   • Gastroesophageal reflux disease   • Primary hypertriglyceridemia   • Fatigue   • Fracture of patella   • Gout   • Headache   • Hyperlipidemia   • Hypertension   • Influenza due to influenza A virus   • Insomnia   • Leukocytosis   • Macrocytosis   • Macular degeneration   • Menopause present   • Night sweats   • Obstructive sleep apnea syndrome   • Osteoporosis   • Peripheral neuropathy   • Pneumonia   • Seizure disorder   • Sick sinus syndrome   • Sinus bradycardia   • Thrombophlebitis   • Cobalamin deficiency   • Vitamin D deficiency   • Physical debility   • Lumbar strain   • History of MI (myocardial infarction)   • Midline low back pain without sciatica   • Arthralgia of lumbar spine   • Encounter for eye exam   • Parasites in stool   • Sepsis   • UTI (urinary tract infection)   • Acute on chronic Respiratory failure. Not requiring ventilatory support   • Acute on Chronic kidney disease  (CKD), stage III (moderate)   • Exacerbation of COPD    • H/O SSS (sick sinus syndrome) s/p PPM 2014   • CHF (congestive heart failure)   • Anxiety and depression   • Macular degeneration   • ANDRIY on CPAP   • Seizure disorder on Keppra   • HTN and possible diastolic dysfunction   • HLD (hyperlipidemia)   • Obesity, BMI 33.7   • Pneumonia versus acute reaction to nitrofurantoin   • Complicated UTI (urinary tract infection)   • Senile atrophic vaginitis   • Acute cystitis with hematuria   • GERD (gastroesophageal reflux disease)   • Diastolic dysfunction   • Type 2 diabetes mellitus   • Acute cystitis without hematuria   • Colovesical fistula   • Weakness   • Sepsis   • HCAP (healthcare-associated pneumonia)               Adult Rehabilitation Note       08/13/17 1050 08/11/17 1445       Rehab Assessment/Intervention    Discipline physical therapist  -LF occupational therapist  -AC     Document Type therapy note (daily note)  -LF therapy note (daily note)  -AC     Subjective Information agree to therapy  -LF agree to therapy  -AC     Patient Effort, Rehab Treatment good  -LF adequate  -AC     Precautions/Limitations fall precautions;oxygen therapy device and L/min   colostomy  -LF fall precautions;oxygen therapy device and L/min   colosotmy  -AC     Recorded by [LF] Sophie Park, PT [AC] Albina Stock, OT     Pain Assessment    Pain Assessment No/denies pain  -LF No/denies pain  -AC     Recorded by [LF] Sophie Park, PT [AC] Albina Stock, OT     Cognitive Assessment/Intervention    Current Cognitive/Communication Assessment impaired  -LF impaired  -AC     Orientation Status oriented to;person;place;time   month  -LF oriented to;person;place  -AC     Follows Commands/Answers Questions able to follow single-step instructions;needs increased time;100% of the time  -LF 50% of the time;needs cueing;needs repetition  -AC     Personal Safety mild impairment  -LF moderate impairment  -AC     Personal Safety  Interventions fall prevention program maintained  -LF fall prevention program maintained;gait belt;nonskid shoes/slippers when out of bed  -AC     Recorded by [LF] Sophie Park, PT [AC] Albina Stock, GIOVANNA     Bed Mobility, Assessment/Treatment    Bed Mobility, Assistive Device  bed rails;head of bed elevated  -AC     Bed Mobility, Roll Left, [place]  moderate assist (50% patient effort)  -AC     Bed Mobility, Roll Right, [place]  moderate assist (50% patient effort)  -AC     Bed Mob, Supine to Sit, [place]  verbal cues required;moderate assist (50% patient effort)  -AC     Bed Mobility, Safety Issues  cognitive deficits limit understanding;decreased use of arms for pushing/pulling;decreased use of legs for bridging/pushing  -AC     Bed Mobility, Impairments  strength decreased;impaired balance  -AC     Bed Mobility, Comment UIC  -LF      Recorded by [LF] Sophie Park, PT [AC] Albina Stock, OT     Transfer Assessment/Treatment    Transfers, Sit-Stand [place] minimum assist (75% patient effort);2 person assist required;verbal cues required  -LF verbal cues required;minimum assist (75% patient effort);2 person assist required  -AC     Transfers, Stand-Sit [place] minimum assist (75% patient effort);2 person assist required;verbal cues required  -LF verbal cues required;minimum assist (75% patient effort);2 person assist required  -AC     Transfers, Sit-Stand-Sit, Assist Device  --   initially used walker,but improved upright position with HHA  -AC     Transfer, Safety Issues step length decreased  -LF sequencing ability decreased;weight-shifting ability decreased  -AC     Transfer, Impairments strength decreased;impaired balance  -LF strength decreased;impaired balance  -AC     Recorded by [LF] Sophie Park, PT [AC] Albina Stock, OT     Gait Assessment/Treatment    Gait, [place] Level minimum assist (75% patient effort);2 person assist required  -LF      Gait, Assistive Device  rolling walker  -LF      Gait, Distance (Feet) 40  -LF      Gait, Gait Deviations best decreased;forward flexed posture;step length decreased  -LF      Gait, Safety Issues step length decreased  -LF      Gait, Impairments strength decreased;impaired balance  -LF      Recorded by [LF] Sophie Park, PT      Functional Mobility    Functional Mobility- Comment  attempted to sidestep toward HOB, but pt unable to weight shift and  feet   -AC     Recorded by  [AC] Albina Stock OT     Motor Skills/Interventions    Additional Documentation  Balance Skills Training (Group)  -AC     Recorded by  [AC] Albina Stock OT     Balance Skills Training    Sitting-Level of Assistance  Close supervision  -AC     Sitting-Balance Support  Right upper extremity supported;Left upper extremity supported;Feet supported  -AC     Standing-Level of Assistance  Minimum assistance;x2  -AC     Static Standing Balance Support  Right upper extremity supported;Left upper extremity supported  -AC     Recorded by  [AC] Albina Stock OT     Therapy Exercises    Bilateral Lower Extremities AROM:;10 reps;sitting;ankle pumps/circles;hip flexion;LAQ  -LF      Bilateral Upper Extremity  elbow flexion/extension;AAROM:;AROM:;hand pumps;pronation/supination;shoulder extension/flexion   AAROM shld f/e  -AC     Recorded by [LF] Sophie Park, PT [AC] Albina Stock OT     Positioning and Restraints    Pre-Treatment Position sitting in chair/recliner  -LF in bed  -AC     Post Treatment Position chair  -LF bed  -AC     In Bed  supine;call light within reach;encouraged to call for assist;exit alarm on;with family/caregiver  -AC     In Chair reclined;call light within reach;encouraged to call for assist;exit alarm on;with family/caregiver;notified nsg  -LF      Recorded by [LF] Sophie Park, PT [AC] Albina Stock OT       User Key  (r) = Recorded By, (t) = Taken By, (c) = Cosigned By    Initials Name Effective Dates    AC Albina Stock, OT  06/23/15 -     LF Sophie Park, PT 06/19/15 -                 IP PT Goals       08/13/17 1205 08/10/17 0929       Bed Mobility PT LTG    Bed Mobility PT LTG, Date Established  08/10/17  -SJ     Bed Mobility PT LTG, Time to Achieve  2 wks  -SJ     Bed Mobility PT LTG, Activity Type  roll left/roll right;supine to sit/sit to supine  -SJ     Bed Mobility PT LTG, [place] Level  minimum assist (75% patient effort)  -SJ     Bed Mobility PT LTG, Outcome  goal ongoing  -SJ     Transfer Training PT LTG    Transfer Training PT LTG, Date Established  08/10/17  -SJ     Transfer Training PT LTG, Time to Achieve  2 wks  -SJ     Transfer Training PT LTG, Activity Type  bed to chair /chair to bed;sit to stand/stand to sit  -SJ     Transfer Training PT LTG, [place] Level  contact guard assist  -SJ     Transfer Training PT LTG, Assist Device  walker, rolling  -SJ     Transfer Training PT LTG, Outcome goal ongoing  -LF goal ongoing  -SJ     Gait Training PT LTG    Gait Training Goal PT LTG, Date Established  08/10/17  -SJ     Gait Training Goal PT LTG, Time to Achieve  2 wks  -SJ     Gait Training Goal PT LTG, [place] Level  contact guard assist  -SJ     Gait Training Goal PT LTG, Assist Device  walker, rolling  -SJ     Gait Training Goal PT LTG, Distance to Achieve  15  -SJ     Gait Training Goal PT LTG, Outcome goal ongoing  -LF goal ongoing  -SJ       User Key  (r) = Recorded By, (t) = Taken By, (c) = Cosigned By    Initials Name Provider Type     Sophie Park, PT Physical Therapist     Loan Rivera, PT Physical Therapist          Physical Therapy Education     Title: PT OT SLP Therapies (Active)     Topic: Physical Therapy (Done)     Point: Mobility training (Done)    Learning Progress Summary    Learner Readiness Method Response Comment Documented by Status   Patient Acceptance E VU,NR  LF 08/13/17 1205 Done    Acceptance E VU  ML 08/13/17 0301 Done    Acceptance E NR  SJ 08/10/17 0931 Active    Family Acceptance E VU,NR   08/13/17 1205 Done    Acceptance E VU  ML 08/13/17 0301 Done               Point: Home exercise program (Done)    Learning Progress Summary    Learner Readiness Method Response Comment Documented by Status   Patient Acceptance E VU,NR   08/13/17 1205 Done    Acceptance E NR   08/10/17 0931 Active   Family Acceptance E VU,NR   08/13/17 1205 Done               Point: Body mechanics (Done)    Learning Progress Summary    Learner Readiness Method Response Comment Documented by Status   Patient Acceptance E VU,NR   08/13/17 1205 Done    Acceptance E NR   08/10/17 0931 Active   Family Acceptance E VU,NR   08/13/17 1205 Done               Point: Precautions (Done)    Learning Progress Summary    Learner Readiness Method Response Comment Documented by Status   Patient Acceptance E VU,NR   08/13/17 1205 Done    Acceptance E VU  ML 08/13/17 0301 Done    Acceptance E NR   08/10/17 0931 Active   Family Acceptance E VU,NR   08/13/17 1205 Done    Acceptance E VU   08/13/17 0301 Done                      User Key     Initials Effective Dates Name Provider Type Discipline     06/19/15 -  Sophie Park, PT Physical Therapist PT     06/19/15 -  Loan Rivera, PT Physical Therapist PT     06/16/16 -  Bri Emmanuel, RN Registered Nurse Nurse                    PT Recommendation and Plan  Anticipated Discharge Disposition: skilled nursing facility  Planned Therapy Interventions: balance training, bed mobility training, gait training, home exercise program, patient/family education, strengthening, transfer training  PT Frequency: daily  Plan of Care Review  Plan Of Care Reviewed With: patient, family  Progress: improving  Outcome Summary/Follow up Plan: good participation w/ P.T., consistently followed cues,  increased ambulation          Outcome Measures       08/13/17 1050 08/11/17 1501       How much help from another person do you currently need...    Turning from your back  to your side while in flat bed without using bedrails? 3  -LF      Moving from lying on back to sitting on the side of a flat bed without bedrails? 2  -LF      Moving to and from a bed to a chair (including a wheelchair)? 3  -LF      Standing up from a chair using your arms (e.g., wheelchair, bedside chair)? 3  -LF      Climbing 3-5 steps with a railing? 2  -LF      To walk in hospital room? 3  -LF      AM-PAC 6 Clicks Score 16  -LF      How much help from another is currently needed...    Putting on and taking off regular lower body clothing?  1  -AC     Bathing (including washing, rinsing, and drying)  2  -AC     Toileting (which includes using toilet bed pan or urinal)  1  -AC     Putting on and taking off regular upper body clothing  2  -AC     Taking care of personal grooming (such as brushing teeth)  2  -AC     Eating meals  2  -AC     Score  10  -AC     Functional Assessment    Outcome Measure Options  AM-PAC 6 Clicks Daily Activity (OT)  -AC       User Key  (r) = Recorded By, (t) = Taken By, (c) = Cosigned By    Initials Name Provider Type    AC Albina Stock, OT Occupational Therapist     Sophie Park, PT Physical Therapist           Time Calculation:         PT Charges       08/13/17 1207          Time Calculation    Start Time 1050  -LF      PT Received On 08/13/17  -      PT Goal Re-Cert Due Date 08/20/17  -      Time Calculation- PT    Total Timed Code Minutes- PT 24 minute(s)  -LF        User Key  (r) = Recorded By, (t) = Taken By, (c) = Cosigned By    Initials Name Provider Type     Sophie Park PT Physical Therapist          Therapy Charges for Today     Code Description Service Date Service Provider Modifiers Qty    61645862361 HC PT THER PROC EA 15 MIN 8/13/2017 oSphie Park, PT GP 1    20653989852 HC GAIT TRAINING EA 15 MIN 8/13/2017 Sophie Park, PT GP 1    03459697515 HC PT THER SUPP EA 15 MIN 8/13/2017 Sophie Park, PT GP 2          PT G-Codes  Outcome Measure  Options: AM-PAC 6 Clicks Daily Activity (OT)    Sophie Park, PT  8/13/2017

## 2017-08-13 NOTE — PLAN OF CARE
Problem: Patient Care Overview (Adult)  Goal: Plan of Care Review  Outcome: Ongoing (interventions implemented as appropriate)    Problem: Fall Risk (Adult)  Goal: Absence of Falls  Outcome: Ongoing (interventions implemented as appropriate)    Problem: Infection, Risk/Actual (Adult)  Goal: Identify Related Risk Factors and Signs and Symptoms  Outcome: Outcome(s) achieved Date Met:  08/13/17  Goal: Infection Prevention/Resolution  Outcome: Ongoing (interventions implemented as appropriate)    Problem: Perioperative Period (Adult)  Goal: Signs and Symptoms of Listed Potential Problems Will be Absent or Manageable (Perioperative Period)  Outcome: Ongoing (interventions implemented as appropriate)    Problem: Pain, Acute (Adult)  Goal: Identify Related Risk Factors and Signs and Symptoms  Outcome: Outcome(s) achieved Date Met:  08/13/17

## 2017-08-14 LAB
ALBUMIN SERPL-MCNC: 3 G/DL (ref 3.2–4.8)
ALBUMIN/GLOB SERPL: 1.4 G/DL (ref 1.5–2.5)
ALP SERPL-CCNC: 157 U/L (ref 25–100)
ALT SERPL W P-5'-P-CCNC: 42 U/L (ref 7–40)
ANION GAP SERPL CALCULATED.3IONS-SCNC: 3 MMOL/L (ref 3–11)
AST SERPL-CCNC: 37 U/L (ref 0–33)
BASOPHILS # BLD AUTO: 0.06 10*3/MM3 (ref 0–0.2)
BASOPHILS NFR BLD AUTO: 0.7 % (ref 0–1)
BILIRUB SERPL-MCNC: 0.3 MG/DL (ref 0.3–1.2)
BUN BLD-MCNC: 13 MG/DL (ref 9–23)
BUN/CREAT SERPL: 14.4 (ref 7–25)
CALCIUM SPEC-SCNC: 9.5 MG/DL (ref 8.7–10.4)
CHLORIDE SERPL-SCNC: 99 MMOL/L (ref 99–109)
CO2 SERPL-SCNC: 38 MMOL/L (ref 20–31)
CREAT BLD-MCNC: 0.9 MG/DL (ref 0.6–1.3)
DEPRECATED RDW RBC AUTO: 54.1 FL (ref 37–54)
EOSINOPHIL # BLD AUTO: 0.57 10*3/MM3 (ref 0–0.3)
EOSINOPHIL NFR BLD AUTO: 6.5 % (ref 0–3)
ERYTHROCYTE [DISTWIDTH] IN BLOOD BY AUTOMATED COUNT: 15.1 % (ref 11.3–14.5)
GFR SERPL CREATININE-BSD FRML MDRD: 59 ML/MIN/1.73
GLOBULIN UR ELPH-MCNC: 2.2 GM/DL
GLUCOSE BLD-MCNC: 108 MG/DL (ref 70–100)
GLUCOSE BLDC GLUCOMTR-MCNC: 104 MG/DL (ref 70–130)
GLUCOSE BLDC GLUCOMTR-MCNC: 115 MG/DL (ref 70–130)
GLUCOSE BLDC GLUCOMTR-MCNC: 122 MG/DL (ref 70–130)
GLUCOSE BLDC GLUCOMTR-MCNC: 99 MG/DL (ref 70–130)
HCT VFR BLD AUTO: 32.9 % (ref 34.5–44)
HGB BLD-MCNC: 10 G/DL (ref 11.5–15.5)
IMM GRANULOCYTES # BLD: 0.06 10*3/MM3 (ref 0–0.03)
IMM GRANULOCYTES NFR BLD: 0.7 % (ref 0–0.6)
LYMPHOCYTES # BLD AUTO: 2.7 10*3/MM3 (ref 0.6–4.8)
LYMPHOCYTES NFR BLD AUTO: 30.6 % (ref 24–44)
MAGNESIUM SERPL-MCNC: 2.2 MG/DL (ref 1.3–2.7)
MCH RBC QN AUTO: 29.8 PG (ref 27–31)
MCHC RBC AUTO-ENTMCNC: 30.4 G/DL (ref 32–36)
MCV RBC AUTO: 97.9 FL (ref 80–99)
MONOCYTES # BLD AUTO: 1 10*3/MM3 (ref 0–1)
MONOCYTES NFR BLD AUTO: 11.3 % (ref 0–12)
NEUTROPHILS # BLD AUTO: 4.44 10*3/MM3 (ref 1.5–8.3)
NEUTROPHILS NFR BLD AUTO: 50.2 % (ref 41–71)
PLATELET # BLD AUTO: 402 10*3/MM3 (ref 150–450)
PMV BLD AUTO: 9.7 FL (ref 6–12)
POTASSIUM BLD-SCNC: 4 MMOL/L (ref 3.5–5.5)
PROCALCITONIN SERPL-MCNC: 0.06 NG/ML
PROT SERPL-MCNC: 5.2 G/DL (ref 5.7–8.2)
RBC # BLD AUTO: 3.36 10*6/MM3 (ref 3.89–5.14)
SODIUM BLD-SCNC: 140 MMOL/L (ref 132–146)
WBC NRBC COR # BLD: 8.83 10*3/MM3 (ref 3.5–10.8)

## 2017-08-14 PROCEDURE — 25010000002 HEPARIN (PORCINE) PER 1000 UNITS: Performed by: COLON & RECTAL SURGERY

## 2017-08-14 PROCEDURE — 99233 SBSQ HOSP IP/OBS HIGH 50: CPT | Performed by: INTERNAL MEDICINE

## 2017-08-14 PROCEDURE — 80053 COMPREHEN METABOLIC PANEL: CPT | Performed by: HOSPITALIST

## 2017-08-14 PROCEDURE — 97110 THERAPEUTIC EXERCISES: CPT

## 2017-08-14 PROCEDURE — 97530 THERAPEUTIC ACTIVITIES: CPT

## 2017-08-14 PROCEDURE — 94640 AIRWAY INHALATION TREATMENT: CPT

## 2017-08-14 PROCEDURE — 84145 PROCALCITONIN (PCT): CPT | Performed by: HOSPITALIST

## 2017-08-14 PROCEDURE — 83735 ASSAY OF MAGNESIUM: CPT | Performed by: HOSPITALIST

## 2017-08-14 PROCEDURE — 82962 GLUCOSE BLOOD TEST: CPT

## 2017-08-14 PROCEDURE — 85025 COMPLETE CBC W/AUTO DIFF WBC: CPT | Performed by: HOSPITALIST

## 2017-08-14 PROCEDURE — 97116 GAIT TRAINING THERAPY: CPT

## 2017-08-14 PROCEDURE — 25010000002 VANCOMYCIN PER 500 MG

## 2017-08-14 PROCEDURE — 25010000002 MEROPENEM

## 2017-08-14 PROCEDURE — 94799 UNLISTED PULMONARY SVC/PX: CPT

## 2017-08-14 RX ORDER — AMLODIPINE BESYLATE 10 MG/1
10 TABLET ORAL DAILY
Status: DISCONTINUED | OUTPATIENT
Start: 2017-08-15 | End: 2017-08-14

## 2017-08-14 RX ORDER — LISINOPRIL 10 MG/1
10 TABLET ORAL NIGHTLY
Status: DISCONTINUED | OUTPATIENT
Start: 2017-08-14 | End: 2017-08-15 | Stop reason: HOSPADM

## 2017-08-14 RX ORDER — AMLODIPINE BESYLATE 5 MG/1
5 TABLET ORAL DAILY
Status: DISCONTINUED | OUTPATIENT
Start: 2017-08-15 | End: 2017-08-15 | Stop reason: HOSPADM

## 2017-08-14 RX ADMIN — POLYETHYLENE GLYCOL 3350 17 G: 17 POWDER, FOR SOLUTION ORAL at 09:29

## 2017-08-14 RX ADMIN — AMLODIPINE BESYLATE 5 MG: 5 TABLET ORAL at 09:30

## 2017-08-14 RX ADMIN — DEXTROMETHORPHAN 30 MG: 30 SUSPENSION, EXTENDED RELEASE ORAL at 23:18

## 2017-08-14 RX ADMIN — MEROPENEM 1 G: 1 INJECTION, POWDER, FOR SOLUTION INTRAVENOUS at 12:57

## 2017-08-14 RX ADMIN — BENZONATATE 100 MG: 100 CAPSULE, LIQUID FILLED ORAL at 13:13

## 2017-08-14 RX ADMIN — IPRATROPIUM BROMIDE AND ALBUTEROL SULFATE 3 ML: .5; 3 SOLUTION RESPIRATORY (INHALATION) at 16:11

## 2017-08-14 RX ADMIN — BENZONATATE 100 MG: 100 CAPSULE, LIQUID FILLED ORAL at 06:10

## 2017-08-14 RX ADMIN — PANTOPRAZOLE SODIUM 40 MG: 40 TABLET, DELAYED RELEASE ORAL at 06:08

## 2017-08-14 RX ADMIN — Medication 250 MG: at 19:09

## 2017-08-14 RX ADMIN — LISINOPRIL 10 MG: 10 TABLET ORAL at 21:47

## 2017-08-14 RX ADMIN — IPRATROPIUM BROMIDE AND ALBUTEROL SULFATE 3 ML: .5; 3 SOLUTION RESPIRATORY (INHALATION) at 08:46

## 2017-08-14 RX ADMIN — VANCOMYCIN HYDROCHLORIDE 1000 MG: 1 INJECTION, SOLUTION INTRAVENOUS at 14:44

## 2017-08-14 RX ADMIN — BUDESONIDE AND FORMOTEROL FUMARATE DIHYDRATE 2 PUFF: 80; 4.5 AEROSOL RESPIRATORY (INHALATION) at 08:47

## 2017-08-14 RX ADMIN — ALLOPURINOL 100 MG: 100 TABLET ORAL at 09:31

## 2017-08-14 RX ADMIN — ALLOPURINOL 100 MG: 100 TABLET ORAL at 19:09

## 2017-08-14 RX ADMIN — IPRATROPIUM BROMIDE AND ALBUTEROL SULFATE 3 ML: .5; 3 SOLUTION RESPIRATORY (INHALATION) at 12:48

## 2017-08-14 RX ADMIN — HEPARIN SODIUM 5000 UNITS: 5000 INJECTION, SOLUTION INTRAVENOUS; SUBCUTANEOUS at 21:48

## 2017-08-14 RX ADMIN — CLONAZEPAM 0.5 MG: 0.5 TABLET ORAL at 09:30

## 2017-08-14 RX ADMIN — HEPARIN SODIUM 5000 UNITS: 5000 INJECTION, SOLUTION INTRAVENOUS; SUBCUTANEOUS at 14:44

## 2017-08-14 RX ADMIN — Medication 250 MG: at 09:31

## 2017-08-14 RX ADMIN — PSYLLIUM HUSK 1 PACKET: 3.5 GRANULE ORAL at 19:10

## 2017-08-14 RX ADMIN — MIRTAZAPINE 15 MG: 15 TABLET, FILM COATED ORAL at 21:47

## 2017-08-14 RX ADMIN — CLONAZEPAM 0.5 MG: 0.5 TABLET ORAL at 21:47

## 2017-08-14 RX ADMIN — HYDROCODONE POLISTIREX AND CHLORPHENIRAMINE POLISTIREX 5 ML: 10; 8 SUSPENSION, EXTENDED RELEASE ORAL at 21:48

## 2017-08-14 RX ADMIN — LEVETIRACETAM 750 MG: 750 TABLET, FILM COATED ORAL at 09:30

## 2017-08-14 RX ADMIN — MEROPENEM 1 G: 1 INJECTION, POWDER, FOR SOLUTION INTRAVENOUS at 03:53

## 2017-08-14 RX ADMIN — HEPARIN SODIUM 5000 UNITS: 5000 INJECTION, SOLUTION INTRAVENOUS; SUBCUTANEOUS at 06:08

## 2017-08-14 NOTE — PROGRESS NOTES
Adult Nutrition  Assessment/PES    Patient Name:  Avis Us  YOB: 1929  MRN: [id number]  Admit Date:  8/5/2017    Assessment Date:  8/14/2017        Reason for Assessment       08/14/17 1430    Reason for Assessment    Reason For Assessment/Visit follow up protocol    Time Spent (min) 15    Diagnosis --   per notes this admission    Gastrointestinal --   Stoma looks good, stools loose, okay for regular diet.              Nutrition/Diet History       08/14/17 1431    Nutrition/Diet History    Typical Food/Fluid Intake Pt.'s son advised she started eating well since yesterday.    Supplemental Drinks/Foods/Additives Pt.'s son advised she is not drinking the supplements. He stated he might try boost with ice cream later this afternoon. RD observed any abundance  of unopened boost plus on her shelf.              Labs/Tests/Procedures/Meds       08/14/17 1435    Labs/Tests/Procedures/Meds    Labs/Tests Review Reviewed                Nutrition Prescription Ordered       08/14/17 1435    Nutrition Prescription PO    Current PO Diet Regular    Supplement Boost HP    Supplement Frequency 3 times a day            Evaluation of Received Nutrient/Fluid Intake       08/14/17 1435    PO Evaluation    Number of Meals 9    % PO Intake 33              Problem/Interventions:          Problem 2       08/14/17 1437    Nutrition Diagnoses Problem 2    Problem 2 Inadequate Intake/Infusion    Inadequate Intake Type Oral    Appetite Improved    Signs/Symptoms (evidenced by) Report/Observation    Reported/Observed By Family   per pt's son                  Intervention Goal       08/14/17 1437    Intervention Goal    General Nutrition support treatment    PO Continue positive trend            Nutrition Intervention       08/14/17 1437    Nutrition Intervention    RD/Tech Action Follow Tx progress;Advise alternate selection;Interview for preference;Menu provided;Menu adjusted;Adjusted supplement            Nutrition  Prescription       08/14/17 1438    Nutrition Prescription PO    PO Prescription Discontinue supplement            Education/Evaluation       08/14/17 1438    Monitor/Evaluation    Monitor Per protocol          Electronically signed by:  Portia Mack RD  08/14/17 2:42 PM

## 2017-08-14 NOTE — PLAN OF CARE
Problem: Patient Care Overview (Adult)  Goal: Plan of Care Review  Outcome: Ongoing (interventions implemented as appropriate)    08/14/17 1428   Coping/Psychosocial Response Interventions   Plan Of Care Reviewed With patient   Patient Care Overview   Progress improving   Outcome Evaluation   Outcome Summary/Follow up Plan Pt with excellent progress demo improved orientation, command following, increased UE strength and progress toward bed mobility and transfers goals.          Problem: Inpatient Occupational Therapy  Goal: Bed Mobility Goal LTG- OT  Outcome: Ongoing (interventions implemented as appropriate)    08/10/17 1034 08/14/17 1428   Bed Mobility OT LTG   Bed Mobility OT LTG, Date Established 08/10/17 --    Bed Mobility OT LTG, Time to Achieve by discharge --    Bed Mobility OT LTG, Activity Type roll left/roll right;supine to sit/sit to supine --    Bed Mobility OT LTG, [place] Level minimum assist (75% patient effort) --    Bed Mobility OT LTG, Outcome --  goal partially met  (met supine to sit)       Goal: Transfer Training Goal 1 LTG- OT  Outcome: Ongoing (interventions implemented as appropriate)    08/10/17 1034 08/14/17 1428   Transfer Training OT LTG   Transfer Training OT LTG, Date Established 08/10/17 --    Transfer Training OT LTG, Time to Achieve by discharge --    Transfer Training OT LTG, Activity Type bed to chair /chair to bed;sit to stand/stand to sit;toilet --    Transfer Training OT LTG, [place] Level contact guard assist --    Transfer Training OT LTG, Assist Device walker, rolling --    Transfer Training OT LTG, Outcome --  goal partially met  (met sit to stand)       Goal: Strength Goal LTG- OT  Outcome: Ongoing (interventions implemented as appropriate)    08/10/17 1034 08/14/17 1428   Strength OT LTG   Strength Goal OT LTG, Date Established 08/10/17 --    Strength Goal OT LTG, Time to Achieve by discharge --    Strength Goal OT LTG, Measure to Achieve Pt will increase BUE  strength 1 MMG as needed to support ADLs --    Strength Goal OT LTG, Outcome --  goal ongoing       Goal: Grooming Goal LTG- OT  Outcome: Ongoing (interventions implemented as appropriate)    08/10/17 1034 08/14/17 1428   Grooming OT LTG   Grooming Goal OT LTG, Date Established 08/10/17 --    Grooming Goal OT LTG, Time to Achieve by discharge --    Grooming Goal OT LTG, [place] Level minimum assist (75% patient effort) --    Grooming Goal OT LTG, Outcome --  goal ongoing

## 2017-08-14 NOTE — PROGRESS NOTES
Continued Stay Note  [facility]     Patient Name: Avis Us  MRN: [id number]  Today's Date: 8/14/2017    Admit Date: 8/5/2017          Discharge Plan       08/14/17 1337    Case Management/Social Work Plan    Plan discharge planning    Patient/Family In Agreement With Plan yes    Additional Comments CM spoke with Nito Lantigua with the [facility] ([phone]).  There is a bed available at the [facility] location. Rhina will review pt's information and contact CM again in the morning to determine if/when pt. can transfer for skilled rehabilitation.  If pt. is accepted to the [facility], she will be able to transfer as soon as pt. is medcially ready.              Discharge Codes     None            MELECIO Henry

## 2017-08-14 NOTE — PLAN OF CARE
Problem: Patient Care Overview (Adult)  Goal: Plan of Care Review  Outcome: Ongoing (interventions implemented as appropriate)    08/14/17 0334   Coping/Psychosocial Response Interventions   Plan Of Care Reviewed With patient   Patient Care Overview   Progress progress toward functional goals as expected       Goal: Adult Individualization and Mutuality  Outcome: Ongoing (interventions implemented as appropriate)    Problem: Fall Risk (Adult)  Goal: Absence of Falls  Outcome: Ongoing (interventions implemented as appropriate)    08/14/17 0334   Fall Risk (Adult)   Absence of Falls making progress toward outcome         Problem: Infection, Risk/Actual (Adult)  Goal: Infection Prevention/Resolution  Outcome: Ongoing (interventions implemented as appropriate)    08/14/17 0334   Infection, Risk/Actual (Adult)   Infection Prevention/Resolution making progress toward outcome         Problem: Pain, Acute (Adult)  Goal: Acceptable Pain Control/Comfort Level  Outcome: Ongoing (interventions implemented as appropriate)    08/14/17 0334   Pain, Acute (Adult)   Acceptable Pain Control/Comfort Level making progress toward outcome         Problem: Colostomy (Adult)  Goal: Signs and Symptoms of Listed Potential Problems Will be Absent or Manageable (Colostomy)  Outcome: Ongoing (interventions implemented as appropriate)    08/14/17 0334   Colostomy   Problems Assessed (Colostomy) all   Problems Present (Colostomy) none

## 2017-08-14 NOTE — THERAPY TREATMENT NOTE
Acute Care - Physical Therapy Treatment Note  [facility]     Patient Name: Avis Us  : 9/3/1929  MRN: [id number]  Today's Date: 2017  Onset of Illness/Injury or Date of Surgery Date: 17  Date of Referral to PT: 17  Referring Physician: COREY Choi    Admit Date: 2017    Visit Dx:    ICD-10-CM ICD-9-CM   1. Acute urinary tract infection N39.0 599.0   2. [place]-vesical fistula N32.1 596.1   3. Impaired functional mobility, balance, gait, and endurance Z74.09 V49.89   4. Impaired mobility and ADLs Z74.09 799.89     Patient Active Problem List   Diagnosis   • Abnormal liver function tests   • Acute myocardial infarction   • Anxiety   • Knee pain   • Asthma   • Calf cramp   • Candidal intertrigo   • Cataract   • Chest pain   • Chronic kidney disease, stage III (moderate)   • Chronic obstructive pulmonary disease   • Complete atrioventricular block   • Congestive heart failure   • Atherosclerosis of coronary artery   • Cough   • Depression   • Diabetes mellitus   • Dizziness   • Dysuria   • Edema   • Gastroesophageal reflux disease   • Primary hypertriglyceridemia   • Fatigue   • Fracture of patella   • Gout   • Headache   • Hyperlipidemia   • Hypertension   • Influenza due to influenza A virus   • Insomnia   • Leukocytosis   • Macrocytosis   • Macular degeneration   • Menopause present   • Night sweats   • Obstructive sleep apnea syndrome   • Osteoporosis   • Peripheral neuropathy   • Pneumonia   • Seizure disorder   • Sick sinus syndrome   • Sinus bradycardia   • Thrombophlebitis   • Cobalamin deficiency   • Vitamin D deficiency   • Physical debility   • Lumbar strain   • History of MI (myocardial infarction)   • Midline low back pain without sciatica   • Arthralgia of lumbar spine   • Encounter for eye exam   • Parasites in stool   • Sepsis   • UTI (urinary tract infection)   • Acute on chronic Respiratory failure. Not requiring ventilatory support   • Acute on Chronic kidney disease  (CKD), stage III (moderate)   • Exacerbation of COPD    • H/O SSS (sick sinus syndrome) s/p PPM 2014   • CHF (congestive heart failure)   • Anxiety and depression   • Macular degeneration   • ANDRIY on CPAP   • Seizure disorder on Keppra   • HTN and possible diastolic dysfunction   • HLD (hyperlipidemia)   • Obesity, BMI 33.7   • Pneumonia versus acute reaction to nitrofurantoin   • Complicated UTI (urinary tract infection)   • Senile atrophic vaginitis   • Acute cystitis with hematuria   • GERD (gastroesophageal reflux disease)   • Diastolic dysfunction   • Type 2 diabetes mellitus   • Acute cystitis without hematuria   • Colovesical fistula   • Weakness   • Sepsis   • HCAP (healthcare-associated pneumonia)               Adult Rehabilitation Note       08/14/17 1120 08/13/17 1050 08/11/17 1445    Rehab Assessment/Intervention    Discipline physical therapist  -DM physical therapist  -LF occupational therapist  -AC    Document Type therapy note (daily note)  -DM therapy note (daily note)  -LF therapy note (daily note)  -AC    Subjective Information agree to therapy;complains of;weakness   not wearing H.aids (in window ledge)  -DM agree to therapy  -LF agree to therapy  -AC    Patient Effort, Rehab Treatment good  -DM good  -LF adequate  -AC    Symptoms Noted During/After Treatment fatigue;significant change in vital signs  -DM      Precautions/Limitations fall precautions;oxygen therapy device and L/min;seizure precautions  -DM fall precautions;oxygen therapy device and L/min   colostomy  -LF fall precautions;oxygen therapy device and L/min   colosotmy  -AC    Recorded by [DM] Portia Smith, PT [LF] Sophie Park, PT [AC] Albina Stock OT    Vital Signs    Pre Systolic BP Rehab 178  -DM      Pre Treatment Diastolic BP 72  -DM      Post Systolic BP Rehab 173  -DM      Post Treatment Diastolic BP 94  -DM      Pretreatment Heart Rate (beats/min) 75  -DM      Intratreatment Heart Rate (beats/min) 79  -DM       Posttreatment Heart Rate (beats/min) 78  -DM      Pre SpO2 (%) 96  -DM      O2 Delivery Pre Treatment supplemental O2   3 L  -DM      Post SpO2 (%) 94  -DM      O2 Delivery Post Treatment supplemental O2  -DM      Pre Patient Position Supine  -DM      Intra Patient Position Standing  -DM      Post Patient Position Sitting  -DM      Recorded by [DM] Portia Smith, PT      Pain Assessment    Pain Assessment No/denies pain  -DM No/denies pain  -LF No/denies pain  -AC    Recorded by [DM] Portia Smith, PT [LF] Sophie Park, PT [AC] Albina Stock, OT    Vision Assessment/Intervention    Visual Impairment visual impairment, left   MAC.DEGEN.  -DM      Recorded by [DM] Portia Smith, PT      Cognitive Assessment/Intervention    Current Cognitive/Communication Assessment impaired  -DM impaired  -LF impaired  -AC    Orientation Status oriented to;person;place  -DM oriented to;person;place;time   month  -LF oriented to;person;place  -AC    Follows Commands/Answers Questions 100% of the time;able to follow single-step instructions;needs cueing;needs increased time;needs repetition  -DM able to follow single-step instructions;needs increased time;100% of the time  -LF 50% of the time;needs cueing;needs repetition  -AC    Personal Safety mild impairment;decreased awareness, need for assist;decreased awareness, need for safety;decreased insight to deficits  -DM mild impairment  -LF moderate impairment  -AC    Personal Safety Interventions fall prevention program maintained;gait belt;nonskid shoes/slippers when out of bed  -DM fall prevention program maintained  -LF fall prevention program maintained;gait belt;nonskid shoes/slippers when out of bed  -AC    Recorded by [DM] Portia Smith, PT [LF] Sophie Park, PT [AC] Albina Stock, OT    Bed Mobility, Assessment/Treatment    Bed Mobility, Assistive Device bed rails;head of bed elevated  -DM  bed rails;head of bed elevated  -AC    Bed Mobility, Roll Left,  Lucinda   moderate assist (50% patient effort)  -AC    Bed Mobility, Roll Right, Lucinda   moderate assist (50% patient effort)  -AC    Bed Mob, Supine to Sit, Lucinda moderate assist (50% patient effort);verbal cues required  -DM  verbal cues required;moderate assist (50% patient effort)  -AC    Bed Mobility, Safety Issues cognitive deficits limit understanding;decreased use of arms for pushing/pulling;decreased use of legs for bridging/pushing  -DM  cognitive deficits limit understanding;decreased use of arms for pushing/pulling;decreased use of legs for bridging/pushing  -AC    Bed Mobility, Impairments strength decreased  -DM  strength decreased;impaired balance  -AC    Bed Mobility, Comment  UIC  -LF     Recorded by [DM] Portia Smith, PT [LF] Sophie Park, PT [AC] Albina Stock, OT    Transfer Assessment/Treatment    Transfers, Sit-Stand Lucinda minimum assist (75% patient effort);2 person assist required;verbal cues required  -DM minimum assist (75% patient effort);2 person assist required;verbal cues required  -LF verbal cues required;minimum assist (75% patient effort);2 person assist required  -AC    Transfers, Stand-Sit Lucinda minimum assist (75% patient effort);2 person assist required;verbal cues required  -DM minimum assist (75% patient effort);2 person assist required;verbal cues required  -LF verbal cues required;minimum assist (75% patient effort);2 person assist required  -AC    Transfers, Sit-Stand-Sit, Assist Device rolling walker  -DM  --   initially used walker,but improved upright position with HHA  -AC    Transfer, Safety Issues weight-shifting ability decreased  -DM step length decreased  -LF sequencing ability decreased;weight-shifting ability decreased  -AC    Transfer, Impairments strength decreased;impaired balance  -DM strength decreased;impaired balance  -LF strength decreased;impaired balance  -AC    Recorded by [DM] Portia Smith, PT [LF] Sophie Martinez  Xavier, PT [AC] Albina Stock, OT    Gait Assessment/Treatment    Gait, [place] Level contact guard assist;2 person assist required;verbal cues required  -DM minimum assist (75% patient effort);2 person assist required  -LF     Gait, Assistive Device rolling walker  -DM rolling walker  -LF     Gait, Distance (Feet) 70  -DM 40  -LF     Gait, Gait Pattern Analysis swing-to gait  -DM      Gait, Gait Deviations best decreased;decreased heel strike;forward flexed posture;narrow base;step length decreased;weight-shifting ability decreased  -DM best decreased;forward flexed posture;step length decreased  -LF     Gait, Safety Issues step length decreased;weight-shifting ability decreased;supplemental O2  -DM step length decreased  -LF     Gait, Impairments strength decreased;impaired balance  -DM strength decreased;impaired balance  -LF     Gait, Comment CUES to avoid objects (chair leg,door frame), incr.step length  -DM      Recorded by [DM] Portia Smith, PT [LF] Sophie Park, PT     Functional Mobility    Functional Mobility- Comment   attempted to sidestep toward HOB, but pt unable to weight shift and  feet   -AC    Recorded by   [AC] Albina Stock, OT    Motor Skills/Interventions    Additional Documentation   Balance Skills Training (Group)  -AC    Recorded by   [AC] Albina Stock OT    Balance Skills Training    Sitting-Level of Assistance   Close supervision  -AC    Sitting-Balance Support   Right upper extremity supported;Left upper extremity supported;Feet supported  -AC    Standing-Level of Assistance   Minimum assistance;x2  -AC    Static Standing Balance Support   Right upper extremity supported;Left upper extremity supported  -AC    Recorded by   [AC] Albina Stock, GIOVANNA    Therapy Exercises    Bilateral Lower Extremities AROM:;10 reps;sitting;ankle pumps/circles;glut sets;heel slides;hip abduction/adduction;hip ER;hip flexion;hip IR;LAQ;quad sets;SAQ   c/o Fatigue; def. HS sets  -DM  AROM:;10 reps;sitting;ankle pumps/circles;hip flexion;LAQ  -LF     Bilateral Upper Extremity   elbow flexion/extension;AAROM:;AROM:;hand pumps;pronation/supination;shoulder extension/flexion   AAROM shld f/e  -AC    Recorded by [DM] Portia Smith, PT [LF] Sophie Park, PT [AC] Albina Stock OT    Positioning and Restraints    Pre-Treatment Position in bed  -DM sitting in chair/recliner  -LF in bed  -AC    Post Treatment Position chair   respirex to 1500 cc  -DM chair  -LF bed  -AC    In Bed   supine;call light within reach;encouraged to call for assist;exit alarm on;with family/caregiver  -AC    In Chair notified nsg;reclined;call light within reach;encouraged to call for assist;exit alarm on;with family/caregiver;RUE elevated;LUE elevated;waffle cushion;legs elevated   BGcheck by PCT(104);will leave SCUDS off for awhile;son ret.  -DM reclined;call light within reach;encouraged to call for assist;exit alarm on;with family/caregiver;notified nsg  -LF     Recorded by [DM] Portia Smith, PT [LF] Sophie Park, PT [AC] Albina Stock OT      User Key  (r) = Recorded By, (t) = Taken By, (c) = Cosigned By    Initials Name Effective Dates    AC Albina Stock, OT 06/23/15 -     LF Sophie Park, PT 06/19/15 -     DM Portia Smith, PT 06/19/15 -                 IP PT Goals       08/14/17 1206 08/13/17 1205 08/10/17 0929    Bed Mobility PT LTG    Bed Mobility PT LTG, Date Established   08/10/17  -SJ    Bed Mobility PT LTG, Time to Achieve   2 wks  -SJ    Bed Mobility PT LTG, Activity Type   roll left/roll right;supine to sit/sit to supine  -SJ    Bed Mobility PT LTG, [place] Level   minimum assist (75% patient effort)  -SJ    Bed Mobility PT LTG, Outcome goal ongoing  -DM  goal ongoing  -SJ    Transfer Training PT LTG    Transfer Training PT LTG, Date Established   08/10/17  -SJ    Transfer Training PT LTG, Time to Achieve   2 wks  -SJ    Transfer Training PT LTG, Activity Type   bed to chair /chair to  bed;sit to stand/stand to sit  -SJ    Transfer Training PT LTG, [place] Level   contact guard assist  -SJ    Transfer Training PT LTG, Assist Device   walker, rolling  -SJ    Transfer Training PT LTG, Outcome goal ongoing  -DM goal ongoing  -LF goal ongoing  -SJ    Gait Training PT LTG    Gait Training Goal PT LTG, Date Established   08/10/17  -SJ    Gait Training Goal PT LTG, Time to Achieve   2 wks  -SJ    Gait Training Goal PT LTG, [place] Level   contact guard assist  -SJ    Gait Training Goal PT LTG, Assist Device   walker, rolling  -SJ    Gait Training Goal PT LTG, Distance to Achieve 100  -DM  15  -SJ    Gait Training Goal PT LTG, Date Goal Reviewed 08/14/17  -DM      Gait Training Goal PT LTG, Outcome goal revised   met dist. goal of 15 ft; new goal 8-14  -DM goal ongoing  -LF goal ongoing  -SJ      User Key  (r) = Recorded By, (t) = Taken By, (c) = Cosigned By    Initials Name Provider Type     Sophie Park, PT Physical Therapist     Loan Rivera, PT Physical Therapist    OLY Smith, PT Physical Therapist          Physical Therapy Education     Title: PT OT SLP Therapies (Active)     Topic: Physical Therapy (Active)     Point: Mobility training (Active)    Learning Progress Summary    Learner Readiness Method Response Comment Documented by Status   Patient Eager E,D NR  DM 08/14/17 1206 Active    Acceptance E VU,NR  LF 08/13/17 1205 Done    Acceptance E VU  ML 08/13/17 0301 Done    Acceptance E NR  SJ 08/10/17 0931 Active   Family Eager E,D NR  DM 08/14/17 1206 Active    Acceptance E VU,NR  LF 08/13/17 1205 Done    Acceptance E VU  ML 08/13/17 0301 Done               Point: Home exercise program (Active)    Learning Progress Summary    Learner Readiness Method Response Comment Documented by Status   Patient Eager E,D NR  DM 08/14/17 1206 Active    Acceptance E VU,NR  LF 08/13/17 1205 Done    Acceptance E NR   08/10/17 0931 Active   Family Eager E,D NR  DM 08/14/17 1206 Active     Acceptance E VU,NR   08/13/17 1205 Done               Point: Body mechanics (Active)    Learning Progress Summary    Learner Readiness Method Response Comment Documented by Status   Patient Eager E,D NR   08/14/17 1206 Active    Acceptance E VU,NR   08/13/17 1205 Done    Acceptance E NR   08/10/17 0931 Active   Family Eager E,D NR   08/14/17 1206 Active    Acceptance E VU,NR   08/13/17 1205 Done               Point: Precautions (Active)    Learning Progress Summary    Learner Readiness Method Response Comment Documented by Status   Patient Eager E,D NR   08/14/17 1206 Active    Acceptance E VU,NR   08/13/17 1205 Done    Acceptance E VU  ML 08/13/17 0301 Done    Acceptance E NR   08/10/17 0931 Active   Family Eager E,D NR   08/14/17 1206 Active    Acceptance E VU,NR   08/13/17 1205 Done    Acceptance E VU  ML 08/13/17 0301 Done                      User Key     Initials Effective Dates Name Provider Type Discipline     06/19/15 -  Sophie Park, PT Physical Therapist PT     06/19/15 -  Loan Rivera, PT Physical Therapist PT     06/19/15 -  Portia Smith, PT Physical Therapist PT     06/16/16 -  Bri Emmanuel, RN Registered Nurse Nurse                    PT Recommendation and Plan  Anticipated Discharge Disposition: skilled nursing facility  Planned Therapy Interventions: balance training, bed mobility training, gait training, home exercise program, patient/family education, strengthening, transfer training  PT Frequency: daily  Plan of Care Review  Plan Of Care Reviewed With: patient, son  Progress: progress toward functional goals as expected  Outcome Summary/Follow up Plan: Able to amb 70 ft w/R wx (a signif incr from 40 ft yest.) & met GT.goal, which was revised; noted elev BP /94 w/  mobil, & c/o fatigue; will cont. to prog gt as jose.           Outcome Measures       08/14/17 1120 08/13/17 1050 08/11/17 1501    How much help from another person do you currently need...     Turning from your back to your side while in flat bed without using bedrails? 3  -DM 3  -LF     Moving from lying on back to sitting on the side of a flat bed without bedrails? 2  -DM 2  -LF     Moving to and from a bed to a chair (including a wheelchair)? 3  -DM 3  -LF     Standing up from a chair using your arms (e.g., wheelchair, bedside chair)? 3  -DM 3  -LF     Climbing 3-5 steps with a railing? 2  -DM 2  -LF     To walk in hospital room? 3  -DM 3  -LF     AM-PAC 6 Clicks Score 16  -DM 16  -LF     How much help from another is currently needed...    Putting on and taking off regular lower body clothing?   1  -AC    Bathing (including washing, rinsing, and drying)   2  -AC    Toileting (which includes using toilet bed pan or urinal)   1  -AC    Putting on and taking off regular upper body clothing   2  -AC    Taking care of personal grooming (such as brushing teeth)   2  -AC    Eating meals   2  -AC    Score   10  -AC    Functional Assessment    Outcome Measure Options AM-PAC 6 Clicks Basic Mobility (PT)  -DM  AM-PAC 6 Clicks Daily Activity (OT)  -AC      User Key  (r) = Recorded By, (t) = Taken By, (c) = Cosigned By    Initials Name Provider Type    AC Albina Stock, OT Occupational Therapist     Sophie Park, PT Physical Therapist    OLY Smith, PT Physical Therapist           Time Calculation:         PT Charges       08/14/17 1206          Time Calculation    Start Time 1120  -DM      PT Received On 08/14/17  -DM      PT Goal Re-Cert Due Date 08/20/17  -DM      Time Calculation- PT    Total Timed Code Minutes- PT 23 minute(s)  -DM        User Key  (r) = Recorded By, (t) = Taken By, (c) = Cosigned By    Initials Name Provider Type    OLY Smith, PT Physical Therapist          Therapy Charges for Today     Code Description Service Date Service Provider Modifiers Qty    84205803227 HC PT THER PROC EA 15 MIN 8/14/2017 Portia Smith, PT GP 1    03028750416 HC GAIT TRAINING EA 15 MIN  8/14/2017 Portia Smith, PT GP 1          PT G-Codes  Outcome Measure Options: AM-PAC 6 Clicks Basic Mobility (PT)    Portia Smith, PT  8/14/2017

## 2017-08-14 NOTE — PROGRESS NOTES
Continued Stay Note  [facility]     Patient Name: Avis Us  MRN: [id number]  Today's Date: 8/14/2017    Admit Date: 8/5/2017          Discharge Plan       08/14/17 1556    Case Management/Social Work Plan    Plan discharge planning    Patient/Family In Agreement With Plan yes    Additional Comments CM spoke with [facility] liaison, Hospitalist, pt. and pt's son.  Pt. is ready to transfer to [facility] tomorrow morning if pt. remains stable overnight.  Pt. and family stated they feel they are able to transfer pt. in personal vehicle. RN was also updated regarding plan and will chart pt's O2 sats.  If pt. needs a portable tank for transport, pt. already has a working relationship with [facility].      08/14/17 1337    Case Management/Social Work Plan    Plan discharge planning    Patient/Family In Agreement With Plan yes    Additional Comments CM spoke with Nito Lantigua with [facility] ([phone]).  There is a bed available at the [facility] location. Rhina will review pt's information and contact CM again in the morning to determine if/when pt. can transfer for skilled rehabilitation.  If pt. is accepted to [facility], she will be able to transfer as soon as pt. is medcially ready.              Discharge Codes     None            MELECIO Henry

## 2017-08-14 NOTE — THERAPY TREATMENT NOTE
Acute Care - Occupational Therapy Treatment Note  [facility]     Patient Name: Avis Us  : 9/3/1929  MRN: [id number]  Today's Date: 2017  Onset of Illness/Injury or Date of Surgery Date: 17  Date of Referral to OT: 17  Referring Physician: CORYE Choi      Admit Date: 2017    Visit Dx:     ICD-10-CM ICD-9-CM   1. Acute urinary tract infection N39.0 599.0   2. [place]-vesical fistula N32.1 596.1   3. Impaired functional mobility, balance, gait, and endurance Z74.09 V49.89   4. Impaired mobility and ADLs Z74.09 799.89     Patient Active Problem List   Diagnosis   • Abnormal liver function tests   • Acute myocardial infarction   • Anxiety   • Knee pain   • Asthma   • Calf cramp   • Candidal intertrigo   • Cataract   • Chest pain   • Chronic kidney disease, stage III (moderate)   • Chronic obstructive pulmonary disease   • Complete atrioventricular block   • Congestive heart failure   • Atherosclerosis of coronary artery   • Cough   • Depression   • Diabetes mellitus   • Dizziness   • Dysuria   • Edema   • Gastroesophageal reflux disease   • Primary hypertriglyceridemia   • Fatigue   • Fracture of patella   • Gout   • Headache   • Hyperlipidemia   • Hypertension   • Influenza due to influenza A virus   • Insomnia   • Leukocytosis   • Macrocytosis   • Macular degeneration   • Menopause present   • Night sweats   • Obstructive sleep apnea syndrome   • Osteoporosis   • Peripheral neuropathy   • Pneumonia   • Seizure disorder   • Sick sinus syndrome   • Sinus bradycardia   • Thrombophlebitis   • Cobalamin deficiency   • Vitamin D deficiency   • Physical debility   • Lumbar strain   • History of MI (myocardial infarction)   • Midline low back pain without sciatica   • Arthralgia of lumbar spine   • Encounter for eye exam   • Parasites in stool   • Sepsis   • UTI (urinary tract infection)   • Acute on chronic Respiratory failure. Not requiring ventilatory support   • Acute on Chronic kidney  disease (CKD), stage III (moderate)   • Exacerbation of COPD    • H/O SSS (sick sinus syndrome) s/p PPM 2014   • CHF (congestive heart failure)   • Anxiety and depression   • Macular degeneration   • ANDRIY on CPAP   • Seizure disorder on Keppra   • HTN and possible diastolic dysfunction   • HLD (hyperlipidemia)   • Obesity, BMI 33.7   • Pneumonia versus acute reaction to nitrofurantoin   • Complicated UTI (urinary tract infection)   • Senile atrophic vaginitis   • Acute cystitis with hematuria   • GERD (gastroesophageal reflux disease)   • Diastolic dysfunction   • Type 2 diabetes mellitus   • Acute cystitis without hematuria   • Colovesical fistula   • Weakness   • Sepsis   • HCAP (healthcare-associated pneumonia)             Adult Rehabilitation Note       08/14/17 1120 08/14/17 1050 08/13/17 1050    Rehab Assessment/Intervention    Discipline physical therapist  -DM occupational therapist  -AC physical therapist  -LF    Document Type therapy note (daily note)  -DM therapy note (daily note)  -AC therapy note (daily note)  -LF    Subjective Information agree to therapy;complains of;weakness   not wearing H.aids (in window ledge)  -DM agree to therapy  -AC agree to therapy  -LF    Patient Effort, Rehab Treatment good  -DM good  -AC good  -LF    Symptoms Noted During/After Treatment fatigue;significant change in vital signs  -DM fatigue  -AC     Precautions/Limitations fall precautions;oxygen therapy device and L/min;seizure precautions  -DM fall precautions;oxygen therapy device and L/min   colostomy  -AC fall precautions;oxygen therapy device and L/min   colostomy  -LF    Precautions/Limitations, Vision  vision impairment, left  -AC     Recorded by [DM] Portia Smith, PT [AC] Albina Stock OT [LF] Sophie Park PT    Vital Signs    Pre Systolic BP Rehab 178  -  -AC     Pre Treatment Diastolic BP 72  -DM 72  -AC     Post Systolic BP Rehab 173  -  -AC     Post Treatment Diastolic BP 94  -DM 94  -AC      Pretreatment Heart Rate (beats/min) 75  -DM 75  -AC     Intratreatment Heart Rate (beats/min) 79  -DM 79  -AC     Posttreatment Heart Rate (beats/min) 78  -DM 78  -AC     Pre SpO2 (%) 96  -DM 96  -AC     O2 Delivery Pre Treatment supplemental O2   3 L  -DM supplemental O2  -AC     Post SpO2 (%) 94  -DM 94  -AC     O2 Delivery Post Treatment supplemental O2  -DM supplemental O2  -AC     Pre Patient Position Supine  -DM Supine  -AC     Intra Patient Position Standing  -DM Standing  -AC     Post Patient Position Sitting  -DM Sitting  -AC     Recorded by [DM] Portia Smith, PT [AC] Albina Stock, OT     Pain Assessment    Pain Assessment No/denies pain  -DM No/denies pain  -AC No/denies pain  -LF    Recorded by [DM] Portia Smith, PT [AC] Albina Stock, OT [LF] Sophie Park, HEIDI    Vision Assessment/Intervention    Visual Impairment visual impairment, left   MAC.DEGEN.  -DM      Recorded by [DM] Portia Smith, PT      Cognitive Assessment/Intervention    Current Cognitive/Communication Assessment impaired  -DM functional   improved from prior session  -AC impaired  -LF    Orientation Status oriented to;person;place  -DM oriented x 4  -AC oriented to;person;place;time   month  -LF    Follows Commands/Answers Questions 100% of the time;able to follow single-step instructions;needs cueing;needs increased time;needs repetition  -% of the time;needs cueing  -AC able to follow single-step instructions;needs increased time;100% of the time  -LF    Personal Safety mild impairment;decreased awareness, need for assist;decreased awareness, need for safety;decreased insight to deficits  -DM mild impairment  -AC mild impairment  -LF    Personal Safety Interventions fall prevention program maintained;gait belt;nonskid shoes/slippers when out of bed  -DM fall prevention program maintained;gait belt;nonskid shoes/slippers when out of bed  -AC fall prevention program maintained  -LF    Recorded by [DM] Portia Smith,  PT [AC] Albina Stock, OT [LF] Sophie Park, PT    Bed Mobility, Assessment/Treatment    Bed Mobility, Assistive Device bed rails;head of bed elevated  -DM bed rails;head of bed elevated  -AC     Bed Mob, Supine to Sit, [place] moderate assist (50% patient effort);verbal cues required  -DM contact guard assist  -AC     Bed Mobility, Safety Issues cognitive deficits limit understanding;decreased use of arms for pushing/pulling;decreased use of legs for bridging/pushing  -DM decreased use of arms for pushing/pulling;decreased use of legs for bridging/pushing  -AC     Bed Mobility, Impairments strength decreased  -DM strength decreased  -AC     Bed Mobility, Comment   UIC  -LF    Recorded by [DM] Portia Smith, PT [AC] Albina Stock, OT [LF] Sophie Park, PT    Transfer Assessment/Treatment    Transfers, Sit-Stand [place] minimum assist (75% patient effort);2 person assist required;verbal cues required  -DM verbal cues required;contact guard assist  -AC minimum assist (75% patient effort);2 person assist required;verbal cues required  -LF    Transfers, Stand-Sit [place] minimum assist (75% patient effort);2 person assist required;verbal cues required  -DM verbal cues required;minimum assist (75% patient effort)  -AC minimum assist (75% patient effort);2 person assist required;verbal cues required  -LF    Transfers, Sit-Stand-Sit, Assist Device rolling walker  -DM rolling walker  -AC     Transfer, Safety Issues weight-shifting ability decreased  -DM weight-shifting ability decreased  -AC step length decreased  -LF    Transfer, Impairments strength decreased;impaired balance  -DM strength decreased;impaired balance  -AC strength decreased;impaired balance  -LF    Transfer, Comment  Vcs for hand placement, and cues to keep walker with her when backing up to chair  -AC     Recorded by [DM] Portia Smith, PT [AC] Albina Stock, OT [LF] Sophie Park, PT    Gait Assessment/Treatment    Gait,  [place] Level contact guard assist;2 person assist required;verbal cues required  -DM  minimum assist (75% patient effort);2 person assist required  -LF    Gait, Assistive Device rolling walker  -DM  rolling walker  -LF    Gait, Distance (Feet) 70  -DM  40  -LF    Gait, Gait Pattern Analysis swing-to gait  -DM      Gait, Gait Deviations best decreased;decreased heel strike;forward flexed posture;narrow base;step length decreased;weight-shifting ability decreased  -DM  best decreased;forward flexed posture;step length decreased  -LF    Gait, Safety Issues step length decreased;weight-shifting ability decreased;supplemental O2  -DM  step length decreased  -LF    Gait, Impairments strength decreased;impaired balance  -DM  strength decreased;impaired balance  -LF    Gait, Comment CUES to avoid objects (chair leg,door frame), incr.step length  -DM      Recorded by [DM] Portia Smith, PT  [LF] Sophie Park, PT    Functional Mobility    Functional Mobility- Ind. Level  verbal cues required;contact guard assist  -AC     Functional Mobility- Device  rolling walker  -AC     Functional Mobility-Distance (Feet)  70  -AC     Functional Mobility- Safety Issues  step length decreased;weight-shifting ability decreased  -AC     Recorded by  [AC] Albina Stock OT     Balance Skills Training    Sitting-Level of Assistance  Distant supervision  -AC     Sitting-Balance Support  Right upper extremity supported;Left upper extremity supported;Feet supported  -AC     Standing-Level of Assistance  Contact guard  -AC     Static Standing Balance Support  assistive device  -AC     Gait Balance-Level of Assistance  Contact guard;x2  -AC     Gait Balance Support  assistive device  -AC     Recorded by  [AC] Albina Stock OT     Therapy Exercises    Bilateral Lower Extremities AROM:;10 reps;sitting;ankle pumps/circles;glut sets;heel slides;hip abduction/adduction;hip ER;hip flexion;hip IR;LAQ;quad sets;SAQ   c/o Fatigue; def. HS  sets  -DM  AROM:;10 reps;sitting;ankle pumps/circles;hip flexion;LAQ  -LF    Bilateral Upper Extremity  AROM:;10 reps;elbow flexion/extension;shoulder extension/flexion;shoulder horizontal abd/add  -AC     Recorded by [DM] Portia Smith, PT [AC] Albina Stock, OT [LF] Sophie Park, PT    Positioning and Restraints    Pre-Treatment Position in bed  -DM in bed  -AC sitting in chair/recliner  -LF    Post Treatment Position chair   respirex to 1500 cc  -DM chair  -AC chair  -LF    In Chair notified nsg;reclined;call light within reach;encouraged to call for assist;exit alarm on;with family/caregiver;RUE elevated;LUE elevated;waffle cushion;legs elevated   BGcheck by PCT(104);will leave SCUDS off for awhile;son ret.  -DM with PT  -AC reclined;call light within reach;encouraged to call for assist;exit alarm on;with family/caregiver;notified nsg  -LF    Recorded by [DM] Portia Smith, PT [AC] Albina Stock, OT [LF] Sophie Park, PT      08/11/17 1445          Rehab Assessment/Intervention    Discipline occupational therapist  -AC      Document Type therapy note (daily note)  -AC      Subjective Information agree to therapy  -AC      Patient Effort, Rehab Treatment adequate  -AC      Precautions/Limitations fall precautions;oxygen therapy device and L/min   colosotmy  -AC      Recorded by [AC] Albina Stock OT      Pain Assessment    Pain Assessment No/denies pain  -AC      Recorded by [AC] Albina Stock OT      Cognitive Assessment/Intervention    Current Cognitive/Communication Assessment impaired  -AC      Orientation Status oriented to;person;place  -AC      Follows Commands/Answers Questions 50% of the time;needs cueing;needs repetition  -AC      Personal Safety moderate impairment  -AC      Personal Safety Interventions fall prevention program maintained;gait belt;nonskid shoes/slippers when out of bed  -AC      Recorded by [AC] Albina Stock OT      Bed Mobility, Assessment/Treatment    Bed Mobility,  Assistive Device bed rails;head of bed elevated  -AC      Bed Mobility, Roll Left, [place] moderate assist (50% patient effort)  -AC      Bed Mobility, Roll Right, [place] moderate assist (50% patient effort)  -AC      Bed Mob, Supine to Sit, [place] verbal cues required;moderate assist (50% patient effort)  -AC      Bed Mobility, Safety Issues cognitive deficits limit understanding;decreased use of arms for pushing/pulling;decreased use of legs for bridging/pushing  -AC      Bed Mobility, Impairments strength decreased;impaired balance  -AC      Recorded by [AC] Albina Stock OT      Transfer Assessment/Treatment    Transfers, Sit-Stand [place] verbal cues required;minimum assist (75% patient effort);2 person assist required  -AC      Transfers, Stand-Sit [place] verbal cues required;minimum assist (75% patient effort);2 person assist required  -AC      Transfers, Sit-Stand-Sit, Assist Device --   initially used walker,but improved upright position with HHA  -AC      Transfer, Safety Issues sequencing ability decreased;weight-shifting ability decreased  -AC      Transfer, Impairments strength decreased;impaired balance  -AC      Recorded by [AC] Albina Stock OT      Functional Mobility    Functional Mobility- Comment attempted to sidestep toward HOB, but pt unable to weight shift and  feet   -AC      Recorded by [AC] Albina Stock OT      Motor Skills/Interventions    Additional Documentation Balance Skills Training (Group)  -AC      Recorded by [AC] Albina Stock OT      Balance Skills Training    Sitting-Level of Assistance Close supervision  -AC      Sitting-Balance Support Right upper extremity supported;Left upper extremity supported;Feet supported  -AC      Standing-Level of Assistance Minimum assistance;x2  -AC      Static Standing Balance Support Right upper extremity supported;Left upper extremity supported  -AC      Recorded by [AC] Albina Stock, GIOVANNA      Therapy  Exercises    Bilateral Upper Extremity elbow flexion/extension;AAROM:;AROM:;hand pumps;pronation/supination;shoulder extension/flexion   AAROM shld f/e  -AC      Recorded by [AC] Albina Stock OT      Positioning and Restraints    Pre-Treatment Position in bed  -AC      Post Treatment Position bed  -AC      In Bed supine;call light within reach;encouraged to call for assist;exit alarm on;with family/caregiver  -AC      Recorded by [AC] Albina Stock OT        User Key  (r) = Recorded By, (t) = Taken By, (c) = Cosigned By    Initials Name Effective Dates    AC lAbina Stock, OT 06/23/15 -     LF Sophie Martinez Xavier, PT 06/19/15 -     DM Portia Smith, PT 06/19/15 -                 OT Goals       08/14/17 1428 08/11/17 1527 08/10/17 1034    Bed Mobility OT LTG    Bed Mobility OT LTG, Date Established   08/10/17  -AC    Bed Mobility OT LTG, Time to Achieve   by discharge  -AC    Bed Mobility OT LTG, Activity Type   roll left/roll right;supine to sit/sit to supine  -AC    Bed Mobility OT LTG, [place] Level   minimum assist (75% patient effort)  -AC    Bed Mobility OT LTG, Outcome goal partially met   met supine to sit  -AC goal ongoing  -AC     Transfer Training OT LTG    Transfer Training OT LTG, Date Established   08/10/17  -AC    Transfer Training OT LTG, Time to Achieve   by discharge  -AC    Transfer Training OT LTG, Activity Type   bed to chair /chair to bed;sit to stand/stand to sit;toilet  -AC    Transfer Training OT LTG, [place] Level   contact guard assist  -AC    Transfer Training OT LTG, Assist Device   walker, rolling  -AC    Transfer Training OT LTG, Outcome goal partially met   met sit to stand  -AC goal ongoing  -AC     Strength OT LTG    Strength Goal OT LTG, Date Established   08/10/17  -AC    Strength Goal OT LTG, Time to Achieve   by discharge  -AC    Strength Goal OT LTG, Measure to Achieve   Pt will increase BUE strength 1 MMG as needed to support ADLs  -AC    Strength Goal OT LTG,  Outcome goal ongoing  -AC goal ongoing  -AC     Grooming OT LTG    Grooming Goal OT LTG, Date Established   08/10/17  -AC    Grooming Goal OT LTG, Time to Achieve   by discharge  -AC    Grooming Goal OT LTG, [place] Level   minimum assist (75% patient effort)  -AC    Grooming Goal OT LTG, Outcome goal ongoing  -AC goal ongoing  -AC       User Key  (r) = Recorded By, (t) = Taken By, (c) = Cosigned By    Initials Name Provider Type    AC Albina Stock, OT Occupational Therapist          Occupational Therapy Education     Title: PT OT SLP Therapies (Active)     Topic: Occupational Therapy (Active)     Point: ADL training (Done)    Description: Instruct learner(s) on proper safety adaptation and remediation techniques during self care or transfers.   Instruct in proper use of assistive devices.    Learning Progress Summary    Learner Readiness Method Response Comment Documented by Status   Patient Acceptance E VU safety with use of walker/transfers  08/14/17 1426 Done    Acceptance E VU  ML 08/13/17 0301 Done    Acceptance E NR sequencing with bed mobility  08/11/17 1527 Active    Acceptance E VU benefits of activity, role of OT  08/10/17 1033 Done   Family Acceptance E VU   08/13/17 0301 Done    Acceptance E VU benefits of activity, role of OT  08/10/17 1033 Done                      User Key     Initials Effective Dates Name Provider Type Discipline     06/23/15 -  Albina Stock, OT Occupational Therapist OT     06/16/16 -  Bri Emmanuel RN Registered Nurse Nurse                  OT Recommendation and Plan  Anticipated Discharge Disposition: inpatient rehabilitation facility  Planned Therapy Interventions: ADL retraining, balance training, bed mobility training, adaptive equipment training, strengthening, transfer training  Therapy Frequency: daily  Plan of Care Review  Plan Of Care Reviewed With: patient  Progress: improving  Outcome Summary/Follow up Plan: Pt with excellent progress demo improved  orientation, command following, increased UE strength and progress toward  bed mobility and transfers goals.          Outcome Measures       08/14/17 1120 08/14/17 1050 08/13/17 1050    How much help from another person do you currently need...    Turning from your back to your side while in flat bed without using bedrails? 3  -DM  3  -LF    Moving from lying on back to sitting on the side of a flat bed without bedrails? 2  -DM  2  -LF    Moving to and from a bed to a chair (including a wheelchair)? 3  -DM  3  -LF    Standing up from a chair using your arms (e.g., wheelchair, bedside chair)? 3  -DM  3  -LF    Climbing 3-5 steps with a railing? 2  -DM  2  -LF    To walk in hospital room? 3  -DM  3  -LF    AM-PAC 6 Clicks Score 16  -DM  16  -LF    How much help from another is currently needed...    Putting on and taking off regular lower body clothing?  2  -AC     Bathing (including washing, rinsing, and drying)  2  -AC     Toileting (which includes using toilet bed pan or urinal)  2  -AC     Putting on and taking off regular upper body clothing  3  -AC     Taking care of personal grooming (such as brushing teeth)  3  -AC     Eating meals  3  -AC     Score  15  -AC     Functional Assessment    Outcome Measure Options AM-PAC 6 Clicks Basic Mobility (PT)  -DM AM-PAC 6 Clicks Daily Activity (OT)  -AC       08/11/17 1501          How much help from another is currently needed...    Putting on and taking off regular lower body clothing? 1  -AC      Bathing (including washing, rinsing, and drying) 2  -AC      Toileting (which includes using toilet bed pan or urinal) 1  -AC      Putting on and taking off regular upper body clothing 2  -AC      Taking care of personal grooming (such as brushing teeth) 2  -AC      Eating meals 2  -AC      Score 10  -AC      Functional Assessment    Outcome Measure Options AM-PAC 6 Clicks Daily Activity (OT)  -AC        User Key  (r) = Recorded By, (t) = Taken By, (c) = Cosigned By    Initials  Name Provider Type    AC Albina Stock OT Occupational Therapist    FLORI Park, PT Physical Therapist    OLY Smith, PT Physical Therapist           Time Calculation:         Time Calculation- OT       08/14/17 1433          Time Calculation- OT    OT Start Time 1050  -      Total Timed Code Minutes- OT 15 minute(s)  -      OT Received On 08/14/17  -      OT Goal Re-Cert Due Date 08/21/17  -        User Key  (r) = Recorded By, (t) = Taken By, (c) = Cosigned By    Initials Name Provider Type     Albina Stock OT Occupational Therapist           Therapy Charges for Today     Code Description Service Date Service Provider Modifiers Qty    [id number] HC OT THERAPEUTIC ACT EA 15 MIN 8/14/2017 Albina Stock OT GO 1               Albina Stock OT  8/14/2017

## 2017-08-14 NOTE — PLAN OF CARE
Problem: Patient Care Overview (Adult)  Goal: Plan of Care Review  Outcome: Ongoing (interventions implemented as appropriate)    08/14/17 1436   Coping/Psychosocial Response Interventions   Plan Of Care Reviewed With patient   Patient Care Overview   Progress improving   Outcome Evaluation   Outcome Summary/Follow up Plan Appliance change performed r/t leaking appliance. Placed Esequiel 2-piece with convexity. Stoma is red and moist. Good output. Patient will need convexity r/t peristoma creases. WOCN will continue to follow. Please contact WOCN if needs arise. Thanks          Problem: Colostomy (Adult)  Goal: Signs and Symptoms of Listed Potential Problems Will be Absent or Manageable (Colostomy)  Outcome: Ongoing (interventions implemented as appropriate)    08/14/17 1436   Colostomy   Problems Assessed (Colostomy) all   Problems Present (Colostomy) none

## 2017-08-14 NOTE — PLAN OF CARE
Problem: Patient Care Overview (Adult)  Goal: Plan of Care Review  Outcome: Ongoing (interventions implemented as appropriate)    08/14/17 1206   Coping/Psychosocial Response Interventions   Plan Of Care Reviewed With patient;manpreet   Patient Care Overview   Progress progress toward functional goals as expected   Outcome Evaluation   Outcome Summary/Follow up Plan Able to amb 70 ft w/R wx (a signif incr from 40 ft yest.) & met GT.goal, which was revised; noted elev BP /94 w/ mobil, & c/o fatigue; will cont. to prog gt as jose.          Problem: Inpatient Physical Therapy  Goal: Bed Mobility Goal LTG- PT  Outcome: Ongoing (interventions implemented as appropriate)    08/10/17 0929 08/14/17 1206   Bed Mobility PT LTG   Bed Mobility PT LTG, Date Established 08/10/17 --    Bed Mobility PT LTG, Time to Achieve 2 wks --    Bed Mobility PT LTG, Activity Type roll left/roll right;supine to sit/sit to supine --    Bed Mobility PT LTG, [place] Level minimum assist (75% patient effort) --    Bed Mobility PT LTG, Outcome --  goal ongoing       Goal: Transfer Training Goal 1 LTG- PT  Outcome: Ongoing (interventions implemented as appropriate)    08/10/17 0929 08/14/17 1206   Transfer Training PT LTG   Transfer Training PT LTG, Date Established 08/10/17 --    Transfer Training PT LTG, Time to Achieve 2 wks --    Transfer Training PT LTG, Activity Type bed to chair /chair to bed;sit to stand/stand to sit --    Transfer Training PT LTG, [place] Level contact guard assist --    Transfer Training PT LTG, Assist Device walker, rolling --    Transfer Training PT LTG, Outcome --  goal ongoing       Goal: Gait Training Goal LTG- PT  Outcome: Revised    08/10/17 0929 08/14/17 1206   Gait Training PT LTG   Gait Training Goal PT LTG, Date Established 08/10/17 --    Gait Training Goal PT LTG, Time to Achieve 2 wks --    Gait Training Goal PT LTG, [place] Level contact guard assist --    Gait Training Goal PT LTG, Assist  Device walker, rolling --    Gait Training Goal PT LTG, Distance to Achieve --  100   Gait Training Goal PT LTG, Date Goal Reviewed --  08/14/17   Gait Training Goal PT LTG, Outcome --  goal revised  (met dist. goal of 15 ft; new goal 8-14)

## 2017-08-14 NOTE — PROGRESS NOTES
"Hospitalist Daily Progress Note    Date of Admission: 8/5/2017   LOS: 8 days   PCP: Carol Herzog MD    Chief Complaint: dysuria    Subjective      History of Present Illness  Persistent cough kept her up all night  Chronic - takes tussionex at home \"makes me relaxed\"  Walked slwoly out to nursing station  Lots of output from ostomy - miralax being stopped  Good appetite on remeron.     Review of Systems     General: no fevers, chills  Respiratory: no cough, dyspnea  Cardiovascular: no chest pain, palpitations  Abdomen: No abdominal pain, nausea, vomiting, or diarrhea  Neurologic: No focal weakness    Objective   Physical Exam:  I have reviewed the vital signs.  Temp:  [97.6 °F (36.4 °C)-98.7 °F (37.1 °C)] 97.6 °F (36.4 °C)  Heart Rate:  [72-82] 82  Resp:  [18-20] 18  BP: (144-178)/(67-72) 178/72    Objective:  Vital signs: (most recent): Blood pressure 178/72, pulse 82, temperature 97.6 °F (36.4 °C), temperature source Oral, resp. rate 18, height 66\" (167.6 cm), weight 210 lb 4.8 oz (95.4 kg), SpO2 90 %, not currently breastfeeding.          General Appearance:  Alert, generalized weakness, cooperative, no distress, dry cough  Head:    Normocephalic, atraumatic  Eyes:    Sclerae anicteric  Neck:   Supple, no mass  Lungs: Diminished throughout with rare expiratory wheezing,  respirations unlabored  Heart: Regular rate and rhythm, S1 and S2 normal, no murmur, rub or gallop  Abdomen:  Soft, mild tenderness to palpation, + ostomy with good output, soft brown liq  Extremities: No clubbing, cyanosis, or edema to lower extremities  Pulses:  2+ and symmetric in distal lower extremities  Skin: No rashes       Results Review:    I have reviewed the labs, radiology results and diagnostic studies.      Results from last 7 days  Lab Units 08/14/17  0405   WBC 10*3/mm3 8.83   HEMOGLOBIN g/dL 10.0*   PLATELETS 10*3/mm3 402       Results from last 7 days  Lab Units 08/14/17  0405   SODIUM mmol/L 140   POTASSIUM mmol/L 4.0 "   CO2 mmol/L 38.0*   CREATININE mg/dL 0.90   GLUCOSE mg/dL 108*       I have reviewed the medications.    ---------------------------------------------------------------------------------------------  Assessment/Plan   Assessment & Plan  Assessment/Problem List    Principal Problem:    Sepsis  Active Problems:    Chronic kidney disease, stage III (moderate)    Chronic obstructive pulmonary disease    Hyperlipidemia    Hypertension    Anxiety and depression    ANDRIY on CPAP    GERD (gastroesophageal reflux disease)    Diastolic dysfunction    Type 2 diabetes mellitus    Acute cystitis without hematuria    Colovesical fistula    Weakness    HCAP (healthcare-associated pneumonia)      Plan    Sepsis  -  Stop IV abx  - BC No growth at 5 days    Acute cystitis / Urinary Tract Infection  - complicated and multifactorial secondary to incontinence, previous multidrug resistance, and fistula  - got 9 days merrerm  -- recent history of MDR ecoli     Colovesical Fistula  - demonstrated adherent to sigmoid colon  - s/p Lap colostomy per Dr. Saravia  - ureteral catheter 8/7  - f/u with Dr Gordon outena in 2-3 weeks   -- Needs cysto with Dr. Osuna for work-up of fistula    HCAP  - Vanc:  DC (day 6)     CKD III  - stable. Improved  - monitor and avoid additional nephrotoxic agents     COPD  - continue symbicort as needed  - as needed pulmonary toilet; duo nebs, mucolytics, antitussives     DM2  - controlled and stable. Hold home insulin  - scheduled accu checks with [facility]     Hypertension  - stable and controlled    ANDRIY  - CPAP at night     Weakness  - patient recently sustained mechanical fall since discharge  - Pt/OT treat and eval    Plan is watch over night and go to [facility] in AM if stable.  Add qhs tussionex (home med)    DVT prophylaxis: Heparin 5,000 Units SC every 8 hours  Discharge Planning: I expect patient to be discharged to SNF early next week     Roseann Purcell MD 08/14/17 3:18 PM

## 2017-08-15 VITALS
TEMPERATURE: 98 F | HEIGHT: 66 IN | OXYGEN SATURATION: 94 % | HEART RATE: 65 BPM | BODY MASS INDEX: 34.01 KG/M2 | RESPIRATION RATE: 18 BRPM | DIASTOLIC BLOOD PRESSURE: 55 MMHG | SYSTOLIC BLOOD PRESSURE: 145 MMHG | WEIGHT: 211.6 LBS

## 2017-08-15 PROBLEM — A41.9 SEPSIS (HCC): Status: RESOLVED | Noted: 2017-08-06 | Resolved: 2017-08-15

## 2017-08-15 PROBLEM — N30.00 ACUTE CYSTITIS WITHOUT HEMATURIA: Status: RESOLVED | Noted: 2017-08-06 | Resolved: 2017-08-15

## 2017-08-15 LAB
GLUCOSE BLDC GLUCOMTR-MCNC: 102 MG/DL (ref 70–130)
GLUCOSE BLDC GLUCOMTR-MCNC: 98 MG/DL (ref 70–130)

## 2017-08-15 PROCEDURE — 94799 UNLISTED PULMONARY SVC/PX: CPT

## 2017-08-15 PROCEDURE — 94640 AIRWAY INHALATION TREATMENT: CPT

## 2017-08-15 PROCEDURE — 99239 HOSP IP/OBS DSCHRG MGMT >30: CPT | Performed by: NURSE PRACTITIONER

## 2017-08-15 PROCEDURE — 82962 GLUCOSE BLOOD TEST: CPT

## 2017-08-15 PROCEDURE — 94660 CPAP INITIATION&MGMT: CPT

## 2017-08-15 PROCEDURE — 94760 N-INVAS EAR/PLS OXIMETRY 1: CPT

## 2017-08-15 PROCEDURE — 25010000002 HEPARIN (PORCINE) PER 1000 UNITS: Performed by: COLON & RECTAL SURGERY

## 2017-08-15 RX ORDER — MIRTAZAPINE 15 MG/1
15 TABLET, FILM COATED ORAL NIGHTLY
Start: 2017-08-15 | End: 2017-01-01

## 2017-08-15 RX ORDER — DEXTROMETHORPHAN POLISTIREX 30 MG/5ML
30 SUSPENSION ORAL EVERY 12 HOURS PRN
Qty: 280 ML
Start: 2017-08-15 | End: 2017-01-01

## 2017-08-15 RX ORDER — POLYETHYLENE GLYCOL 3350 17 G/17G
17 POWDER, FOR SOLUTION ORAL DAILY
Start: 2017-08-15 | End: 2017-01-01

## 2017-08-15 RX ORDER — BENZONATATE 100 MG/1
100 CAPSULE ORAL 3 TIMES DAILY PRN
Refills: 0
Start: 2017-08-15 | End: 2017-01-01

## 2017-08-15 RX ADMIN — ALLOPURINOL 100 MG: 100 TABLET ORAL at 08:12

## 2017-08-15 RX ADMIN — HEPARIN SODIUM 5000 UNITS: 5000 INJECTION, SOLUTION INTRAVENOUS; SUBCUTANEOUS at 06:05

## 2017-08-15 RX ADMIN — Medication 250 MG: at 08:12

## 2017-08-15 RX ADMIN — PSYLLIUM HUSK 1 PACKET: 3.5 GRANULE ORAL at 08:12

## 2017-08-15 RX ADMIN — BUDESONIDE AND FORMOTEROL FUMARATE DIHYDRATE 2 PUFF: 80; 4.5 AEROSOL RESPIRATORY (INHALATION) at 00:08

## 2017-08-15 RX ADMIN — IPRATROPIUM BROMIDE AND ALBUTEROL SULFATE 3 ML: .5; 3 SOLUTION RESPIRATORY (INHALATION) at 07:36

## 2017-08-15 RX ADMIN — HEPARIN SODIUM 5000 UNITS: 5000 INJECTION, SOLUTION INTRAVENOUS; SUBCUTANEOUS at 13:20

## 2017-08-15 RX ADMIN — PANTOPRAZOLE SODIUM 40 MG: 40 TABLET, DELAYED RELEASE ORAL at 06:05

## 2017-08-15 RX ADMIN — AMLODIPINE BESYLATE 5 MG: 5 TABLET ORAL at 08:12

## 2017-08-15 RX ADMIN — CLONAZEPAM 0.5 MG: 0.5 TABLET ORAL at 08:12

## 2017-08-15 RX ADMIN — BUDESONIDE AND FORMOTEROL FUMARATE DIHYDRATE 2 PUFF: 80; 4.5 AEROSOL RESPIRATORY (INHALATION) at 07:36

## 2017-08-15 RX ADMIN — LEVETIRACETAM 750 MG: 750 TABLET, FILM COATED ORAL at 08:12

## 2017-08-15 RX ADMIN — IPRATROPIUM BROMIDE AND ALBUTEROL SULFATE 3 ML: .5; 3 SOLUTION RESPIRATORY (INHALATION) at 12:27

## 2017-08-15 RX ADMIN — IPRATROPIUM BROMIDE AND ALBUTEROL SULFATE 3 ML: .5; 3 SOLUTION RESPIRATORY (INHALATION) at 00:08

## 2017-08-15 NOTE — DISCHARGE SUMMARY
[facility] Medicine Services  DISCHARGE SUMMARY       Date of Admission: 8/5/2017  Date of Discharge:  8/15/2017  Primary Care Physician: Carol Herzog MD  Consulting Physician(s)     Provider Relationship    Chioma Gordon MD Consulting Physician    Micah Ramos MD Consulting Physician        Discharge Diagnoses:  Active Hospital Problems (** Indicates Principal Problem)    Diagnosis Date Noted   • HCAP (healthcare-associated pneumonia) [J18.9] 08/09/2017   • GERD (gastroesophageal reflux disease) [K21.9] 08/06/2017   • Diastolic dysfunction [I51.9] 08/06/2017   • Type 2 diabetes mellitus [E11.9] 08/06/2017   • Colovesical fistula [N32.1] 08/06/2017   • Weakness [R53.1] 08/06/2017   • Anxiety and depression [F41.9, F32.9] 06/15/2017   • ANDRIY on CPAP [G47.33] 06/15/2017   • Chronic kidney disease, stage III (moderate) [N18.3] 05/02/2016     Impression: 03/03/2016 - reassess 3-4 months- get lab hospitalization  Impression: 10/12/2015 - check cmp  Impression: 11/03/2014 - check cmp  Impression: 08/11/2014 - having lab work to f/u this on Thursday  Impression: 07/22/2014 - check cmp  Impression: 04/08/2014 - update creat.; Description: Nafisa naidu 11/5  Impression: 03/03/2016 - reassess 3-4 months- get lab hospitalization  Impression: 10/12/2015 - check cmp  Impression: 11/03/2014 - check cmp  Impression: 08/11/2014 - having lab work to f/u this on Thursday  Impression: 07/22/2014 - check cmp  Impression: 04/08/2014 - update creat.; Description: Nafisa naidu 11/5     • Chronic obstructive pulmonary disease [J44.9] 05/02/2016     Impression: 01/13/2016 - Sent in order for lifetime supply of albuterol for nebulizer.; Description: severe  Impression: 01/13/2016 - Sent in order for lifetime supply of albuterol for nebulizer.; Description: severe     • Hyperlipidemia [E78.5] 05/02/2016     Impression: 10/12/2015 - check flp  Impression: 03/11/2015 - check flp  Impression: 11/03/2014 -  check flp  Impression: 08/11/2014 - refill atorvastatin- check lab from hosp.  Impression: 07/22/2014 - update flp  Impression: 04/08/2014 - check flp;   Impression: 10/12/2015 - check flp  Impression: 03/11/2015 - check flp  Impression: 11/03/2014 - check flp  Impression: 08/11/2014 - refill atorvastatin- check lab from hosp.  Impression: 07/22/2014 - update flp  Impression: 04/08/2014 - check flp;      • Hypertension [I10] 05/02/2016     Impression: 03/03/2016 - bp is good  Impression: 10/12/2015 - bp is better with recheck  Impression: 06/16/2015 - bp is good  Impression: 03/11/2015 - bp is good today  Impression: 11/03/2014 - bp is good today- recently reduced bp med due to low bp and reduced diuretic due to impairing kidney function  Impression: 08/11/2014 - bp is good  Impression: 07/22/2014 - bp is good  Impression: 04/08/2014 - bp is good;   Impression: 03/03/2016 - bp is good  Impression: 10/12/2015 - bp is better with recheck  Impression: 06/16/2015 - bp is good  Impression: 03/11/2015 - bp is good today  Impression: 11/03/2014 - bp is good today- recently reduced bp med due to low bp and reduced diuretic due to impairing kidney function  Impression: 08/11/2014 - bp is good  Impression: 07/22/2014 - bp is good  Impression: 04/08/2014 - bp is good;         Resolved Hospital Problems    Diagnosis Date Noted Date Resolved   • **Sepsis [A41.9] 08/06/2017 08/15/2017   • Acute cystitis without hematuria [N30.00] 08/06/2017 08/15/2017     Presenting Problem/History of Present Illness  Acute urinary tract infection [N39.0]  Colovesical fistula [N32.1]     Chief Complaint on Day of Discharge:   Cough with productive sputum    History of Present Illness on Day of Discharge:   Patient is lying in the bed using a Yankauer suction to help her with her secretions.  Patient states that she wishes she had one of those at home.  Patient is very positive and ready to go to rehabilitation get stronger.  Patient still  complaining of a productive cough, but feels much better.    Hospital Course  Mrs. Us is an 87-year-old female with past medical history significant for COPD (not on supplemental oxygen), CAD, T2 DM, HTN, CK D3, anxiety/depression and ANDRIY on CPAP who presented to [facility] ED on 8/5/17 with complaints of persistent dysuria.  Patient was also complaining of suprapubic pain which was relieved with urination, urinary frequency and incontinence.  Patient denied gross hematuria, chills, fever, nausea, vomiting or diarrhea.  Patient has a chronic nonproductive cough that gets worse at night.  Patient was recently discharged from [facility] for COPD exacerbation and UTI admission.  Patient was discharged on PO cefpodoxime and prednisone and states she is compliant with her medications.  Patient has a long-standing history of UTIs and is followed by Dr. Osuna with Urology.  In the ED patient's alkaline phosphatase 232, WBC 22.6 and UA demonstrated acute infection.  CT abdomen/pelvis demonstrates new collection of fluid and gas between the mid sigmoid colon and anterior bladder is small amount of gas in the bladder suggestive of a fistula.  Patient was admitted to the hospital medicine service for further evaluation and treatment.    On 8/8/17, patient was diagnosed with HCAP.  Patient was also given Tessalon Perles and Tussionex.  PTOT worked with patient and it was felt patient would do well with inpatient rehabilitation.  After rehabilitation patient will be going home and her son will be moving in with her to help her.  Patient is ready for discharge to the [place].    Procedures Performed  Procedure(s):  LAPAROSCOPIC COLOSTOMY     Consults:   Consults     Date and Time Order Name Status Description    8/6/2017 0351 Inpatient Consult to Colorectal Surgery Completed     8/6/2017 0345 Inpatient Consult to Urology Completed         Pertinent Test Results:  Imaging Results (most recent)     Procedure Component Value Units  Date/Time    XR Chest 1 View [[id number]]  (Abnormal) Collected:  08/05/17 2037     Updated:  08/05/17 2225    Narrative:       EXAM:    XR Chest, 1 View    CLINICAL HISTORY:    87 years old, female; Pain; Other: Upper abdominal pain; Additional info:   Upper abdominal pain triage protocol    TECHNIQUE:    Frontal view of the chest.    EXAM DATE/TIME:    8/5/2017 8:37 PM    COMPARISON:    CR - XR CHEST 1 VW 7/29/2017 7:09:10 PM    FINDINGS:    Patient is rotated and lung volumes are low, limiting assessment.      Otherwise no focal RIGHT pulmonary consolidation is demonstrated.      Probable mild atelectasis at LEFT lung base.      Otherwise no significant obscuration of lateral costophrenic angles is   demonstrated.      No significant vascular congestion is demonstrated.      There is scattered pulmonary scarring bilaterally.      Visualized cardiac silhouette size appears mild to moderately enlarged,   accentuated by low lung volumes. Pericardial effusion not excluded.      There are atherosclerotic calcifications in the thoracic aorta.      Stable position of pacer leads.      Impression:         Patient is rotated and lung volumes are low, limiting assessment.      Otherwise no definite acute pulmonary process demonstrated.      Visualized cardiac silhouette size appears mild to moderately enlarged,   accentuated by low lung volumes. Pericardial effusion not excluded.    THIS DOCUMENT HAS BEEN ELECTRONICALLY SIGNED BY FRANCISCO UMANZOR MD    CT Abdomen Pelvis Without Contrast [[id number]]  (Abnormal) Collected:  08/05/17 2156     Updated:  08/06/17 0040    Narrative:       EXAM:    CT Abdomen and Pelvis Without Intravenous Contrast    CLINICAL HISTORY:    87 years old, female; Pain; Abdominal pain; Localized; Left; Prior surgery    TECHNIQUE:    Axial computed tomography images of the abdomen and pelvis without   intravenous contrast.  This CT exam was performed using one or more of the   following dose reduction  techniques:  automated exposure control, adjustment of   the mA and/or kV according to patient size, and/or use of iterative   reconstruction technique.    Coronal reformatted images were created and reviewed.    COMPARISON:    CT ABD PELVIS WO CONTRAST 1/2/2016 9:44:05 AM    FINDINGS:    Mild atelectasis at bilateral lung bases.    Status post cholecystectomy. The liver, pancreas, adrenal glands, and kidneys   are unremarkable within the limits of a noncontrast study.    No renal collecting system stones identified. No hydronephrosis on either side.    A normal-appearing appendix is seen in the right lower quadrant. No evidence of   bowel obstruction. Diverticulosis of the distal transverse colon, descending   colon, and sigmoid colon. There is a 5.3 cm x 3.5 cm collection of fluid and   gas between the mid sigmoid colon and anterior bladder on series 3 image 68.   Small amount of gas in the bladder. These findings suggest a fistula, possibly   related to diverticulitis or inflammatory bowel disease. This finding is new   since the prior study.    The uterus is unremarkable.    The abdominal aorta is non-aneurysmal.    Moderate compression deformity of the T12 vertebral body (status post   vertebroplasty). Mild degenerative changes of the lower thoracic spine and the   lumbar spine.      Impression:       1. Collection of fluid and gas between the mid sigmoid colon and anterior   bladder. Small amount of gas in the bladder. These findings suggest a fistula,   possibly related to diverticulitis or inflammatory bowel disease. This finding   is new since the prior study.    The findings were discussed with Dr. Dooley on 8/6/2017 12:37 AM EDT.    THIS DOCUMENT HAS BEEN ELECTRONICALLY SIGNED BY GERMANIA LONDON MD    XR Chest 1 View [[id number]]  (Abnormal) Collected:  08/08/17 1359     Updated:  08/08/17 4856    Narrative:       EXAM:    XR Chest, 1 View    CLINICAL HISTORY:    87 years old, female; Vesicointestinal  fistula; Urinary tract infection, site   not specified; Signs and symptoms; Other: Congestion; Additional info: Congested    TECHNIQUE:    Frontal view of the chest.    COMPARISON:    CR - XR CHEST 1 VW 8/5/2017 9:45:03 PM    FINDINGS:    Prominent lung markings/peribronchial thickening with mild haziness and   patchy airspace opacity in the RIGHT mid zone. Blunting of the LEFT   costophrenic angle suggestive of small pleural effusion/thickening.    Cardiomegaly with central pulmonary vascular congestion.  Unfolding of the   aortic arch with a tortuous descending thoracic aorta.  LEFT chest wall   pacemaker.       Impression:         Chronic cardiopulmonary changes with patchy consolidation in the RIGHT mid   zone and small LEFT pleural effusion. Recommend followup to complete   resolution.       THIS DOCUMENT HAS BEEN ELECTRONICALLY SIGNED BY MAYRA BROWNE MD        Lab Results (most recent)     Procedure Component Value Units Date/Time    CBC Auto Differential [[id number]]  (Abnormal) Collected:  08/05/17 2045    Specimen:  Blood Updated:  08/05/17 2107     WBC 22.63 (H) 10*3/mm3      RBC 3.99 10*6/mm3      Hemoglobin 12.3 g/dL      Hematocrit 39.3 %      MCV 98.5 fL      MCH 30.8 pg      MCHC 31.3 (L) g/dL      RDW 15.5 (H) %      RDW-SD 55.9 (H) fl      MPV 9.8 fL      Platelets 418 10*3/mm3      Neutrophil % 74.0 (H) %      Lymphocyte % 16.0 (L) %      Monocyte % 7.9 %      Eosinophil % 1.1 %      Basophil % 0.1 %      Immature Grans % 0.9 (H) %      Neutrophils, Absolute 16.75 (H) 10*3/mm3      Lymphocytes, Absolute 3.63 10*3/mm3      Monocytes, Absolute 1.78 (H) 10*3/mm3      Eosinophils, Absolute 0.24 10*3/mm3      Basophils, Absolute 0.02 10*3/mm3      Immature Grans, Absolute 0.21 (H) 10*3/mm3     POC Troponin, Rapid [[id number]]  (Normal) Collected:  08/05/17 2054    Specimen:  Blood Updated:  08/05/17 2110     Troponin I 0.01 ng/mL       Serial Number: [id number]:  [id number]       Comprehensive Metabolic  Panel [[id number]]  (Abnormal) Collected:  08/05/17 2045    Specimen:  Blood Updated:  08/05/17 2120     Glucose 136 (H) mg/dL      BUN 20 mg/dL      Creatinine 1.30 mg/dL      Sodium 136 mmol/L      Potassium 4.4 mmol/L      Chloride 95 (L) mmol/L      CO2 34.0 (H) mmol/L      Calcium 9.9 mg/dL      Total Protein 7.1 g/dL      Albumin 4.00 g/dL      ALT (SGPT) 96 (H) U/L      AST (SGOT) 28 U/L      Alkaline Phosphatase 232 (H) U/L      Total Bilirubin 0.7 mg/dL      eGFR Non African Amer 39 (L) mL/min/1.73      Globulin 3.1 gm/dL      A/G Ratio 1.3 (L) g/dL      BUN/Creatinine Ratio 15.4     Anion Gap 7.0 mmol/L     Lipase [[id number]]  (Normal) Collected:  08/05/17 2045    Specimen:  Blood Updated:  08/05/17 2120     Lipase 30 U/L     Urinalysis With / Culture If Indicated [[id number]]  (Abnormal) Collected:  08/05/17 2052    Specimen:  Urine from Urine, Clean Catch Updated:  08/05/17 2122     Color, UA Dark Yellow (A)     Appearance, UA Cloudy (A)     pH, UA 6.0     Specific Gravity, UA 1.021     Glucose, UA Negative     Ketones, UA Trace (A)     Bilirubin, UA Negative     Blood, UA Small (1+) (A)     Protein, UA Trace (A)     Leuk Esterase, UA Large (3+) (A)     Nitrite, UA Negative     Urobilinogen, UA 0.2 E.U./dL    Urinalysis, Microscopic Only [[id number]]  (Abnormal) Collected:  08/05/17 2052    Specimen:  Urine from Urine, Clean Catch Updated:  08/05/17 2123     RBC, UA 0-2 /HPF      WBC, UA Too Numerous to Count (A) /HPF      Bacteria, UA None Seen /HPF      Squamous Epithelial Cells, UA None Seen /HPF      Hyaline Casts, UA 0-6 /LPF      Methodology Automated Microscopy    Lactic Acid, Plasma [[id number]]  (Normal) Collected:  08/05/17 2219    Specimen:  Blood Updated:  08/05/17 2255     Lactate 1.0 mmol/L     CBC (No Diff) [[id number]]  (Abnormal) Collected:  08/07/17 0634    Specimen:  Blood Updated:  08/07/17 0745     WBC 13.42 (H) 10*3/mm3      RBC 3.18 (L) 10*6/mm3      Hemoglobin 9.7 (L) g/dL       Hematocrit 31.2 (L) %      MCV 98.1 fL      MCH 30.5 pg      MCHC 31.1 (L) g/dL      RDW 15.4 (H) %      RDW-SD 55.2 (H) fl      MPV 10.2 fL      Platelets 351 10*3/mm3     Basic Metabolic Panel [[id number]]  (Abnormal) Collected:  08/07/17 0634    Specimen:  Blood Updated:  08/07/17 0748     Glucose 107 (H) mg/dL      BUN 12 mg/dL      Creatinine 1.10 mg/dL      Sodium 137 mmol/L      Potassium 4.0 mmol/L      Chloride 100 mmol/L      CO2 30.0 mmol/L      Calcium 8.9 mg/dL      eGFR Non African Amer 47 (L) mL/min/1.73      BUN/Creatinine Ratio 10.9     Anion Gap 7.0 mmol/L     POC Glucose Fingerstick [[id number]]  (Abnormal) Collected:  08/07/17 1154    Specimen:  Blood Updated:  08/07/17 1155     Glucose 132 (H) mg/dL     Narrative:       Meter: [id number] : [id number] Tita Lowe    Hemoglobin & Hematocrit, Blood [[id number]]  (Abnormal) Collected:  08/07/17 1754    Specimen:  Blood Updated:  08/07/17 1831     Hemoglobin 10.0 (L) g/dL      Hematocrit 33.9 (L) %     POC Glucose Fingerstick [[id number]]  (Abnormal) Collected:  08/07/17 2021    Specimen:  Blood Updated:  08/07/17 2030     Glucose 210 (H) mg/dL     Narrative:       Meter: [id number] : [id number] Watson De La O    CBC & Differential [[id number]] Collected:  08/08/17 0435    Specimen:  Blood Updated:  08/08/17 0631    Narrative:       The following orders were created for panel order CBC & Differential.  Procedure                               Abnormality         Status                     ---------                               -----------         ------                     CBC Auto Differential[[id number]]        Abnormal            Final result                 Please view results for these tests on the individual orders.    CBC Auto Differential [[id number]]  (Abnormal) Collected:  08/08/17 0435    Specimen:  Blood Updated:  08/08/17 0631     WBC 12.38 (H) 10*3/mm3      RBC 3.16 (L) 10*6/mm3      Hemoglobin 9.6 (L) g/dL      Hematocrit 31.1  (L) %      MCV 98.4 fL      MCH 30.4 pg      MCHC 30.9 (L) g/dL      RDW 15.0 (H) %      RDW-SD 54.2 (H) fl      MPV 10.3 fL      Platelets 344 10*3/mm3      Neutrophil % 85.1 (H) %      Lymphocyte % 9.3 (L) %      Monocyte % 5.2 %      Eosinophil % 0.0 %      Basophil % 0.0 %      Immature Grans % 0.4 %      Neutrophils, Absolute 10.54 (H) 10*3/mm3      Lymphocytes, Absolute 1.15 10*3/mm3      Monocytes, Absolute 0.64 10*3/mm3      Eosinophils, Absolute 0.00 10*3/mm3      Basophils, Absolute 0.00 10*3/mm3      Immature Grans, Absolute 0.05 (H) 10*3/mm3     Magnesium [[id number]]  (Normal) Collected:  08/08/17 0435    Specimen:  Blood Updated:  08/08/17 0711     Magnesium 2.4 mg/dL     Basic Metabolic Panel [[id number]]  (Abnormal) Collected:  08/08/17 0435    Specimen:  Blood Updated:  08/08/17 0716     Glucose 139 (H) mg/dL      BUN 11 mg/dL      Creatinine 0.90 mg/dL      Sodium 136 mmol/L      Potassium 5.1 mmol/L      Chloride 100 mmol/L      CO2 30.0 mmol/L      Calcium 8.9 mg/dL      eGFR Non African Amer 59 (L) mL/min/1.73      BUN/Creatinine Ratio 12.2     Anion Gap 6.0 mmol/L     POC Glucose Fingerstick [[id number]]  (Abnormal) Collected:  08/08/17 0740    Specimen:  Blood Updated:  08/08/17 0745     Glucose 136 (H) mg/dL     Narrative:       Meter: [id number] : [id number] [facility]    Urine Culture [[id number]]  (Normal) Collected:  08/05/17 2052    Specimen:  Urine from Urine, Clean Catch Updated:  08/08/17 0757     Urine Culture No growth at 2 days    Basic Metabolic Panel [[id number]]  (Abnormal) Collected:  08/08/17 2111    Specimen:  Blood Updated:  08/08/17 2131     Glucose 141 (H) mg/dL      BUN 9 mg/dL      Creatinine 1.20 mg/dL      Sodium 135 mmol/L      Potassium 5.0 mmol/L      Chloride 101 mmol/L      CO2 27.0 mmol/L      Calcium 9.7 mg/dL      eGFR Non African Amer 42 (L) mL/min/1.73      BUN/Creatinine Ratio 7.5     Anion Gap 7.0 mmol/L     BNP [[id number]]  (Abnormal) Collected:   08/08/17 2111    Specimen:  Blood Updated:  08/08/17 2141     .0 (H) pg/mL     CBC (No Diff) [[id number]]  (Abnormal) Collected:  08/09/17 0639    Specimen:  Blood Updated:  08/09/17 0658     WBC 15.82 (H) 10*3/mm3      RBC 3.52 (L) 10*6/mm3      Hemoglobin 10.7 (L) g/dL      Hematocrit 35.6 %      .1 (H) fL      MCH 30.4 pg      MCHC 30.1 (L) g/dL      RDW 15.3 (H) %      RDW-SD 56.7 (H) fl      MPV 10.3 fL      Platelets 401 10*3/mm3     Basic Metabolic Panel [[id number]]  (Abnormal) Collected:  08/09/17 0639    Specimen:  Blood Updated:  08/09/17 0731     Glucose 112 (H) mg/dL      BUN 13 mg/dL      Creatinine 1.10 mg/dL      Sodium 137 mmol/L      Potassium 4.6 mmol/L      Chloride 99 mmol/L      CO2 34.0 (H) mmol/L      Calcium 9.7 mg/dL      eGFR Non African Amer 47 (L) mL/min/1.73      BUN/Creatinine Ratio 11.8     Anion Gap 4.0 mmol/L     Narrative:       CBC Auto Differential [[id number]]  (Abnormal) Collected:  08/10/17 0445    Specimen:  Blood Updated:  08/10/17 0619     WBC 14.69 (H) 10*3/mm3      RBC 3.44 (L) 10*6/mm3      Hemoglobin 10.6 (L) g/dL      Hematocrit 34.7 %      .9 (H) fL      MCH 30.8 pg      MCHC 30.5 (L) g/dL      RDW 15.2 (H) %      RDW-SD 56.6 (H) fl      MPV 10.3 fL      Platelets 378 10*3/mm3      Neutrophil % 74.7 (H) %      Lymphocyte % 12.8 (L) %      Monocyte % 10.0 %      Eosinophil % 1.6 %      Basophil % 0.1 %      Immature Grans % 0.8 (H) %      Neutrophils, Absolute 10.96 (H) 10*3/mm3      Lymphocytes, Absolute 1.88 10*3/mm3      Monocytes, Absolute 1.47 (H) 10*3/mm3      Eosinophils, Absolute 0.24 10*3/mm3      Basophils, Absolute 0.02 10*3/mm3      Immature Grans, Absolute 0.12 (H) 10*3/mm3     Basic Metabolic Panel [[id number]]  (Abnormal) Collected:  08/10/17 0445    Specimen:  Blood Updated:  08/10/17 0639     Glucose 117 (H) mg/dL      BUN 11 mg/dL      Creatinine 0.80 mg/dL      Sodium 137 mmol/L      Potassium 4.1 mmol/L      Chloride 96 (L) mmol/L        CO2 34.0 (H) mmol/L      Calcium 9.3 mg/dL      eGFR Non African Amer 68 mL/min/1.73      BUN/Creatinine Ratio 13.8     Anion Gap 7.0 mmol/L     Blood Culture [[id number]]  (Normal) Collected:  08/05/17 2040    Specimen:  Blood from Arm, Right Updated:  08/11/17 0401     Blood Culture No growth at 5 days    Blood Culture [[id number]]  (Normal) Collected:  08/05/17 2045    Specimen:  Blood from Arm, Left Updated:  08/11/17 0401     Blood Culture No growth at 5 days    CBC Auto Differential [[id number]]  (Abnormal) Collected:  08/11/17 0419    Specimen:  Blood Updated:  08/11/17 0436     WBC 18.66 (H) 10*3/mm3      RBC 3.66 (L) 10*6/mm3      Hemoglobin 11.0 (L) g/dL      Hematocrit 36.5 %      MCV 99.7 (H) fL      MCH 30.1 pg      MCHC 30.1 (L) g/dL      RDW 15.1 (H) %      RDW-SD 55.0 (H) fl      MPV 10.0 fL      Platelets 395 10*3/mm3      Neutrophil % 75.7 (H) %      Lymphocyte % 12.8 (L) %      Monocyte % 8.8 %      Eosinophil % 1.7 %      Basophil % 0.2 %      Immature Grans % 0.8 (H) %      Neutrophils, Absolute 14.13 (H) 10*3/mm3      Lymphocytes, Absolute 2.39 10*3/mm3      Monocytes, Absolute 1.65 (H) 10*3/mm3      Eosinophils, Absolute 0.32 (H) 10*3/mm3      Basophils, Absolute 0.03 10*3/mm3      Immature Grans, Absolute 0.14 (H) 10*3/mm3     Magnesium [[id number]]  (Normal) Collected:  08/11/17 0419    Specimen:  Blood Updated:  08/11/17 0452     Magnesium 2.1 mg/dL     Basic Metabolic Panel [[id number]]  (Abnormal) Collected:  08/11/17 0419    Specimen:  Blood Updated:  08/11/17 0452     Glucose 139 (H) mg/dL      BUN 11 mg/dL      Creatinine 0.80 mg/dL      Sodium 136 mmol/L      Potassium 4.1 mmol/L      Chloride 95 (L) mmol/L      CO2 39.0 (H) mmol/L      Calcium 9.6 mg/dL      eGFR Non African Amer 68 mL/min/1.73      BUN/Creatinine Ratio 13.8     Anion Gap 2.0 (L) mmol/L     Phosphorus [[id number]]  (Normal) Collected:  08/11/17 0419    Specimen:  Blood Updated:  08/11/17 0452     Phosphorus 2.5 mg/dL      BNP [[id number]]  (Abnormal) Collected:  08/11/17 0419    Specimen:  Blood Updated:  08/11/17 0454     .0 (H) pg/mL     POC Glucose Fingerstick [[id number]]  (Abnormal) Collected:  08/11/17 0736    Specimen:  Blood Updated:  08/11/17 0739     Glucose 135 (H) mg/dL     Narrative:       Meter: [id number] : [id number] Roddy Mckinney    POC Glucose Fingerstick [[id number]]  (Abnormal) Collected:  08/11/17 1128    Specimen:  Blood Updated:  08/11/17 1131     Glucose 146 (H) mg/dL     Narrative:       Meter: [id number] : [id number] Roddy Mckinney    Vancomycin, Trough [[id number]]  (Normal) Collected:  08/11/17 1437    Specimen:  Blood Updated:  08/11/17 1518     Vancomycin Trough 18.50 mcg/mL     CBC Auto Differential [[id number]]  (Abnormal) Collected:  08/12/17 0652    Specimen:  Blood Updated:  08/12/17 0712     WBC 12.78 (H) 10*3/mm3      RBC 3.38 (L) 10*6/mm3      Hemoglobin 10.2 (L) g/dL      Hematocrit 33.1 (L) %      MCV 97.9 fL      MCH 30.2 pg      MCHC 30.8 (L) g/dL      RDW 14.8 (H) %      RDW-SD 52.8 fl      MPV 9.7 fL      Platelets 399 10*3/mm3      Neutrophil % 65.4 %      Lymphocyte % 18.9 (L) %      Monocyte % 11.0 %      Eosinophil % 3.4 (H) %      Basophil % 0.4 %      Immature Grans % 0.9 (H) %      Neutrophils, Absolute 8.36 (H) 10*3/mm3      Lymphocytes, Absolute 2.41 10*3/mm3      Monocytes, Absolute 1.41 (H) 10*3/mm3      Eosinophils, Absolute 0.44 (H) 10*3/mm3      Basophils, Absolute 0.05 10*3/mm3      Immature Grans, Absolute 0.11 (H) 10*3/mm3     Basic Metabolic Panel [[id number]]  (Abnormal) Collected:  08/12/17 0652    Specimen:  Blood Updated:  08/12/17 0737     Glucose 114 (H) mg/dL      BUN 11 mg/dL      Creatinine 0.80 mg/dL      Sodium 138 mmol/L      Potassium 4.2 mmol/L      Chloride 96 (L) mmol/L      CO2 37.0 (H) mmol/L      Calcium 9.5 mg/dL      eGFR Non African Amer 68 mL/min/1.73      BUN/Creatinine Ratio 13.8     Anion Gap 5.0 mmol/L     POC Glucose Fingerstick  [[id number]]  (Normal) Collected:  08/12/17 1129    Specimen:  Blood Updated:  08/12/17 1131     Glucose 120 mg/dL     Narrative:       Meter: [id number] : [id number] Montoya Naz    POC Glucose Fingerstick [[id number]]  (Abnormal) Collected:  08/12/17 1549    Specimen:  Blood Updated:  08/12/17 1550     Glucose 161 (H) mg/dL     Narrative:       Meter: [id number] : [id number] Montoya Naz    POC Glucose Fingerstick [[id number]]  (Normal) Collected:  08/12/17 2024    Specimen:  Blood Updated:  08/12/17 2025     Glucose 106 mg/dL     Narrative:       Meter: [id number] : [id number] Benigno Salesah    POC Glucose Fingerstick [[id number]]  (Normal) Collected:  08/13/17 0708    Specimen:  Blood Updated:  08/13/17 0709     Glucose 113 mg/dL     Narrative:       Meter: [id number] : [id number] Montoya Naz    POC Glucose Fingerstick [[id number]]  (Normal) Collected:  08/13/17 1137    Specimen:  Blood Updated:  08/13/17 1138     Glucose 119 mg/dL     Narrative:       Meter: [id number] : [id number] Belkis Piña    POC Glucose Fingerstick [[id number]]  (Abnormal) Collected:  08/13/17 1611    Specimen:  Blood Updated:  08/13/17 1613     Glucose 131 (H) mg/dL     Narrative:       Meter: [id number] : [id number] Montoya Naz    POC Glucose Fingerstick [[id number]]  (Normal) Collected:  08/13/17 2011    Specimen:  Blood Updated:  08/13/17 2012     Glucose 127 mg/dL     CBC Auto Differential [[id number]]  (Abnormal) Collected:  08/14/17 0405    Specimen:  Blood Updated:  08/14/17 0418     WBC 8.83 10*3/mm3      RBC 3.36 (L) 10*6/mm3      Hemoglobin 10.0 (L) g/dL      Hematocrit 32.9 (L) %      MCV 97.9 fL      MCH 29.8 pg      MCHC 30.4 (L) g/dL      RDW 15.1 (H) %      RDW-SD 54.1 (H) fl      MPV 9.7 fL      Platelets 402 10*3/mm3      Neutrophil % 50.2 %      Lymphocyte % 30.6 %      Monocyte % 11.3 %      Eosinophil % 6.5 (H) %      Basophil % 0.7 %      Immature Grans % 0.7 (H) %      Neutrophils,  Absolute 4.44 10*3/mm3      Lymphocytes, Absolute 2.70 10*3/mm3      Monocytes, Absolute 1.00 10*3/mm3      Eosinophils, Absolute 0.57 (H) 10*3/mm3      Basophils, Absolute 0.06 10*3/mm3      Immature Grans, Absolute 0.06 (H) 10*3/mm3     Magnesium [[id number]]  (Normal) Collected:  08/14/17 0405    Specimen:  Blood Updated:  08/14/17 0443     Magnesium 2.2 mg/dL     Comprehensive Metabolic Panel [[id number]]  (Abnormal) Collected:  08/14/17 0405    Specimen:  Blood Updated:  08/14/17 0443     Glucose 108 (H) mg/dL      BUN 13 mg/dL      Creatinine 0.90 mg/dL      Sodium 140 mmol/L      Potassium 4.0 mmol/L      Chloride 99 mmol/L      CO2 38.0 (H) mmol/L      Calcium 9.5 mg/dL      Total Protein 5.2 (L) g/dL      Albumin 3.00 (L) g/dL      ALT (SGPT) 42 (H) U/L      AST (SGOT) 37 (H) U/L      Alkaline Phosphatase 157 (H) U/L      Total Bilirubin 0.3 mg/dL      eGFR Non African Amer 59 (L) mL/min/1.73      Globulin 2.2 gm/dL      A/G Ratio 1.4 (L) g/dL      BUN/Creatinine Ratio 14.4     Anion Gap 3.0 mmol/L     Procalcitonin [[id number]] Collected:  08/14/17 0405    Specimen:  Blood Updated:  08/14/17 0527     Procalcitonin 0.06 ng/mL     POC Glucose Fingerstick [[id number]]  (Normal) Collected:  08/14/17 0725    Specimen:  Blood Updated:  08/14/17 0728     Glucose 115 mg/dL     Narrative:       Meter: [id number] : [id number] Burus Teressa    POC Glucose Fingerstick [[id number]]  (Normal) Collected:  08/14/17 1119    Specimen:  Blood Updated:  08/14/17 1120     Glucose 104 mg/dL     Narrative:       Meter: [id number] : [id number] Burus Teressa    POC Glucose Fingerstick [[id number]]  (Normal) Collected:  08/14/17 1613    Specimen:  Blood Updated:  08/14/17 1614     Glucose 122 mg/dL     Narrative:       Meter: [id number] : [id number] Francis Gannon    POC Glucose Fingerstick [[id number]]  (Normal) Collected:  08/14/17 2023    Specimen:  Blood Updated:  08/14/17 2026     Glucose 99 mg/dL     Narrative:       "   Meter: [id number] : [id number] Robert Rosen    POC Glucose Fingerstick [[id number]]  (Normal) Collected:  08/15/17 0714    Specimen:  Blood Updated:  08/15/17 0715     Glucose 98 mg/dL     Narrative:       Meter: [id number] : [id number] Ramon Jeffries    POC Glucose Fingerstick [[id number]]  (Normal) Collected:  08/15/17 1104    Specimen:  Blood Updated:  08/15/17 1105     Glucose 102 mg/dL     Narrative:       Meter: [id number] : [id number] Ramon Jeffries        Condition on Discharge:  Stable    Physical Exam on Discharge:/55 (BP Location: Right arm, Patient Position: Lying)  Pulse 65  Temp 98 °F (36.7 °C) (Oral)   Resp 18  Ht 66\" (167.6 cm)  Wt 211 lb 9.6 oz (96 kg)  SpO2 94%  BMI 34.15 kg/m2  Physical Exam  General: Well-developed well-nourished female in no acute distress    Head: Normocephalic atraumatic    Eyes: PERRLA, EOMI, nonicteric, conjunctiva normal    ENT: Pink, moist mucous membranes    Neck: Supple, nontender, trachea midline without lymphadenopathy, JVD, nuchal rigidity.      Cardiovascular: RRR  no M/R/G    Respiratory: Nonlabored, symmetrical chest expansion, clear to auscultation bilaterally    Abdomen: Soft, nontender, nondistended,  positive bowel sounds in all 4 quadrants     Extremities: FROM in upper and lower extremities bilaterally negative for edema/cyanosis/clubbing.  Negative calf pain    Skin: Pink/warm/dry.  No rash or lesions noted    Neuro: Alert and oriented to person place time and situation, speech is clear, follows all commands, recent and remote memory intact    Psych: Patient is pleasant and cooperative.  Normal affect.  Negative suicidal ideation or homicidal ideation.    Discharge Disposition  Rehab Facility or Unit (DC - External)    Discharge Medications   [facility]   Home Medication Instructions STEPHY:[id number]    Printed on:08/15/17 8000   Medication Information                      albuterol (PROVENTIL) (2.5 MG/3ML) 0.083% " nebulizer solution  Take 2.5 mg by nebulization Every 6 (Six) Hours As Needed for wheezing.             Albuterol Sulfate (VENTOLIN HFA IN)  Inhale as needed.             allopurinol (ZYLOPRIM) 100 MG tablet  Take 1 tablet by mouth Daily.             amLODIPine (NORVASC) 5 MG tablet  Take 1 tablet by mouth Daily.             aspirin 81 MG tablet  Take 81 mg by mouth Daily. In evening             benzonatate (TESSALON) 100 MG capsule  Take 1 capsule by mouth 3 (Three) Times a Day As Needed for Cough.             Cholecalciferol (VITAMIN D3) 5000 UNITS tablet  Take 1 tablet by mouth daily.             clonazePAM (KlonoPIN) 1 MG tablet  TAKE 1 TABLET BY MOUTH EVERY 12 HOURS AS NEEDED             colchicine 0.6 MG tablet  Take 1 tablet by mouth Daily.             colestipol (COLESTID) 1 G tablet  Take 1 g by mouth Daily As Needed (loose stools). As needed             dextromethorphan polistirex ER (DELSYM) 30 MG/5ML Suspension Extended Release oral suspension  Take 30 mg by mouth Every 12 (Twelve) Hours As Needed (cough).             diphenhydrAMINE-acetaminophen (TYLENOL PM)  MG tablet per tablet  Take 1 tablet by mouth At Night As Needed for Sleep.             estradiol (ESTRACE) 0.1 MG/GM vaginal cream  Insert 2 g into the vagina 2 (Two) Times a Week. Tuesday and Sunday             fluocinonide (LIDEX) 0.05 % external solution  Apply  topically 2 (two) times a day.             levETIRAcetam (KEPPRA) 750 MG tablet  Take 1 tablet by mouth two  times daily             lisinopril (PRINIVIL,ZESTRIL) 10 MG tablet  Take 1 tablet by mouth  daily             Loratadine (CLARITIN) 10 MG capsule  Take 10 mg by mouth Daily As Needed (asthma).             magnesium oxide (MAG-OX) 400 MG tablet  Take 400 mg by mouth Daily.             mirtazapine (REMERON) 15 MG tablet  Take 1 tablet by mouth Every Night.             Multiple Vitamins-Minerals (EYE HEALTH) capsule  Take 1 capsule by mouth Daily.             pantoprazole  (PROTONIX) 40 MG EC tablet  Take 1 tablet by mouth  daily             polyethylene glycol (MIRALAX) packet  Take 17 g by mouth Daily.             potassium chloride (MICRO-K) 10 MEQ CR capsule  Take 1 capsule by mouth 3 (Three) Times a Week. Only take potassium when a lasix dose is taken             saccharomyces boulardii (FLORASTOR) 250 MG capsule  Take 1 capsule by mouth 2 (Two) Times a Day.             SYMBICORT 80-4.5 MCG/ACT inhaler  Use 1 puff twice daily             vitamin B-12 (CYANOCOBALAMIN) 1000 MCG tablet  Take 1,000 mcg by mouth Daily.               Discharge Diet:   Diet Instructions     Diet: Regular; Thin       Discharge Diet:  Regular   Fluid Consistency:  Thin               Discharge Care Plan / Instructions:  Activity at Discharge:   Activity Instructions     Activity as Tolerated                   Follow-up Appointments  No future appointments.  Additional Instructions for the Follow-ups that You Need to Schedule     Discharge Follow-up with PCP    As directed    Follow Up Details:  Follow up with facility provider in the next 1-2 days               Test Results Pending at Discharge  None     COREY Lloyd 08/15/17 1:43 PM    Time: Discharge 45 min    Please note that portions of this note may have been completed with a voice recognition program. Efforts were made to edit the dictations, but occasionally words are mistranscribed.

## 2017-08-15 NOTE — PLAN OF CARE
Problem: Patient Care Overview (Adult)  Goal: Plan of Care Review  Outcome: Ongoing (interventions implemented as appropriate)  Goal: Adult Individualization and Mutuality  Outcome: Ongoing (interventions implemented as appropriate)  Goal: Discharge Needs Assessment  Outcome: Ongoing (interventions implemented as appropriate)    Problem: Fall Risk (Adult)  Goal: Absence of Falls  Outcome: Ongoing (interventions implemented as appropriate)    Problem: Infection, Risk/Actual (Adult)  Goal: Infection Prevention/Resolution  Outcome: Ongoing (interventions implemented as appropriate)    Problem: Perioperative Period (Adult)  Goal: Signs and Symptoms of Listed Potential Problems Will be Absent or Manageable (Perioperative Period)  Outcome: Ongoing (interventions implemented as appropriate)    Problem: Pain, Acute (Adult)  Goal: Acceptable Pain Control/Comfort Level  Outcome: Ongoing (interventions implemented as appropriate)    Problem: Colostomy (Adult)  Goal: Signs and Symptoms of Listed Potential Problems Will be Absent or Manageable (Colostomy)  Outcome: Ongoing (interventions implemented as appropriate)

## 2017-08-15 NOTE — PLAN OF CARE
Problem: Patient Care Overview (Adult)  Goal: Plan of Care Review  Outcome: Ongoing (interventions implemented as appropriate)    Problem: Colostomy (Adult)  Goal: Signs and Symptoms of Listed Potential Problems Will be Absent or Manageable (Colostomy)  Outcome: Ongoing (interventions implemented as appropriate)    08/15/17 0943   Colostomy   Problems Assessed (Colostomy) all   Problems Present (Colostomy) none

## 2017-08-15 NOTE — DISCHARGE PLACEMENT REQUEST
"Avis Us (87 y.o. Female)     To [place]    From Lida Lamar, [phone]        Date of Birth Social Security Number Address Home Phone MRN    09/03/1929  [address] [facility] [phone] [id number]    [place]os[place] Marital Status          [facility]        Admission Date Admission Type Admitting Provider Attending Provider Department, Room/Bed    8/5/17 Emergency Roseann Purcell MD Mini, Jocelyn, MD [facility] 5G, S563/1    Discharge Date Discharge Disposition Discharge Destination         Rehab Facility or Unit (DC - External)             Attending Provider: Roseann Purcell MD     Allergies:  Clarithromycin, Macrobid [Nitrofurantoin Monohyd Macro], Vioxx [Rofecoxib], Metoprolol, Statins, Tramadol    Isolation:  None   Infection:  None   Code Status:  Conditional    Ht:  66\" (167.6 cm)   Wt:  211 lb 9.6 oz (96 kg)    Admission Cmt:  None   Principal Problem:  Sepsis [A41.9]                 Active Insurance as of 8/5/2017     Primary Coverage     Payor Plan Insurance Group Employer/Plan Group    MEDICARE MEDICARE A & B      Payor Plan Address Payor Plan Phone Number Effective From Effective To    [address] [phone] 9/1/[address]     [place] [address]       Subscriber Name Subscriber Birth Date Member ID       AVIS US 9/3/1929 [id number]           Secondary Coverage     Payor Plan Insurance Group Employer/Plan Group    AAR MED SUPP AAR HEALTH CARE OPTIONS      Payor Plan Address Payor Plan Phone Number Effective From Effective To    [facility] [phone] 1/1/2016     [address]       [place] [address]       Subscriber Name Subscriber Birth Date Member ID       AVIS US 9/3/1929 [id number]                 Emergency Contacts      (Rel.) Home Phone Work Phone Mobile Phone    Jacquie Contreras (Power of ) [phone]               Discharge Summary      COREY Lloyd at 8/15/2017  " 1:42 PM              [facility] Medicine Services  DISCHARGE SUMMARY       Date of Admission: 8/5/2017  Date of Discharge:  8/15/2017  Primary Care Physician: Carol Herzog MD  Consulting Physician(s)     Provider Relationship    Chioma Gordon MD Consulting Physician    Micah Ramos MD Consulting Physician        Discharge Diagnoses:  Active Hospital Problems (** Indicates Principal Problem)    Diagnosis Date Noted   • HCAP (healthcare-associated pneumonia) [J18.9] 08/09/2017   • GERD (gastroesophageal reflux disease) [K21.9] 08/06/2017   • Diastolic dysfunction [I51.9] 08/06/2017   • Type 2 diabetes mellitus [E11.9] 08/06/2017   • Colovesical fistula [N32.1] 08/06/2017   • Weakness [R53.1] 08/06/2017   • Anxiety and depression [F41.9, F32.9] 06/15/2017   • ANDRIY on CPAP [G47.33] 06/15/2017   • Chronic kidney disease, stage III (moderate) [N18.3] 05/02/2016     Impression: 03/03/2016 - reassess 3-4 months- get lab hospitalization  Impression: 10/12/2015 - check cmp  Impression: 11/03/2014 - check cmp  Impression: 08/11/2014 - having lab work to f/u this on Thursday  Impression: 07/22/2014 - check cmp  Impression: 04/08/2014 - update creat.; Description: Nafisa naidu 11/5  Impression: 03/03/2016 - reassess 3-4 months- get lab hospitalization  Impression: 10/12/2015 - check cmp  Impression: 11/03/2014 - check cmp  Impression: 08/11/2014 - having lab work to f/u this on Thursday  Impression: 07/22/2014 - check cmp  Impression: 04/08/2014 - update creat.; Description: Nafisa naidu 11/5     • Chronic obstructive pulmonary disease [J44.9] 05/02/2016     Impression: 01/13/2016 - Sent in order for lifetime supply of albuterol for nebulizer.; Description: severe  Impression: 01/13/2016 - Sent in order for lifetime supply of albuterol for nebulizer.; Description: severe     • Hyperlipidemia [E78.5] 05/02/2016     Impression: 10/12/2015 - check flp  Impression: 03/11/2015 - check flp   Impression: 11/03/2014 - check flp  Impression: 08/11/2014 - refill atorvastatin- check lab from hosp.  Impression: 07/22/2014 - update flp  Impression: 04/08/2014 - check flp;   Impression: 10/12/2015 - check flp  Impression: 03/11/2015 - check flp  Impression: 11/03/2014 - check flp  Impression: 08/11/2014 - refill atorvastatin- check lab from hosp.  Impression: 07/22/2014 - update flp  Impression: 04/08/2014 - check flp;      • Hypertension [I10] 05/02/2016     Impression: 03/03/2016 - bp is good  Impression: 10/12/2015 - bp is better with recheck  Impression: 06/16/2015 - bp is good  Impression: 03/11/2015 - bp is good today  Impression: 11/03/2014 - bp is good today- recently reduced bp med due to low bp and reduced diuretic due to impairing kidney function  Impression: 08/11/2014 - bp is good  Impression: 07/22/2014 - bp is good  Impression: 04/08/2014 - bp is good;   Impression: 03/03/2016 - bp is good  Impression: 10/12/2015 - bp is better with recheck  Impression: 06/16/2015 - bp is good  Impression: 03/11/2015 - bp is good today  Impression: 11/03/2014 - bp is good today- recently reduced bp med due to low bp and reduced diuretic due to impairing kidney function  Impression: 08/11/2014 - bp is good  Impression: 07/22/2014 - bp is good  Impression: 04/08/2014 - bp is good;         Resolved Hospital Problems    Diagnosis Date Noted Date Resolved   • **Sepsis [A41.9] 08/06/2017 08/15/2017   • Acute cystitis without hematuria [N30.00] 08/06/2017 08/15/2017     Presenting Problem/History of Present Illness  Acute urinary tract infection [N39.0]  Colovesical fistula [N32.1]     Chief Complaint on Day of Discharge:   Cough with productive sputum    History of Present Illness on Day of Discharge:   Patient is lying in the bed using a Yankauer suction to help her with her secretions.  Patient states that she wishes she had one of those at home.  Patient is very positive and ready to go to rehabilitation get  stronger.  Patient still complaining of a productive cough, but feels much better.    Hospital Course  Mrs. Us is an 87-year-old female with past medical history significant for COPD (not on supplemental oxygen), CAD, T2 DM, HTN, CK D3, anxiety/depression and ANDRIY on CPAP who presented to [facility] ED on 8/5/17 with complaints of persistent dysuria.  Patient was also complaining of suprapubic pain which was relieved with urination, urinary frequency and incontinence.  Patient denied gross hematuria, chills, fever, nausea, vomiting or diarrhea.  Patient has a chronic nonproductive cough that gets worse at night.  Patient was recently discharged from [facility] for COPD exacerbation and UTI admission.  Patient was discharged on PO cefpodoxime and prednisone and states she is compliant with her medications.  Patient has a long-standing history of UTIs and is followed by Dr. Osuna with Urology.  In the ED patient's alkaline phosphatase 232, WBC 22.6 and UA demonstrated acute infection.  CT abdomen/pelvis demonstrates new collection of fluid and gas between the mid sigmoid colon and anterior bladder is small amount of gas in the bladder suggestive of a fistula.  Patient was admitted to the hospital medicine service for further evaluation and treatment.    On 8/8/17, patient was diagnosed with HCAP.  Patient was also given Tessalon Perles and Tussionex.  PTOT worked with patient and it was felt patient would do well with inpatient rehabilitation.  After rehabilitation patient will be going home and her son will be moving in with her to help her.  Patient is ready for discharge to the [facility].    Procedures Performed  Procedure(s):  LAPAROSCOPIC COLOSTOMY     Consults:   Consults     Date and Time Order Name Status Description    8/6/2017 0351 Inpatient Consult to Colorectal Surgery Completed     8/6/2017 0345 Inpatient Consult to Urology Completed         Pertinent Test Results:  Imaging Results (most recent)     Procedure  Component Value Units Date/Time    XR Chest 1 View [[id number]]  (Abnormal) Collected:  08/05/17 2037     Updated:  08/05/17 2225    Narrative:       EXAM:    XR Chest, 1 View    CLINICAL HISTORY:    87 years old, female; Pain; Other: Upper abdominal pain; Additional info:   Upper abdominal pain triage protocol    TECHNIQUE:    Frontal view of the chest.    EXAM DATE/TIME:    8/5/2017 8:37 PM    COMPARISON:    CR - XR CHEST 1 VW 7/29/2017 7:09:10 PM    FINDINGS:    Patient is rotated and lung volumes are low, limiting assessment.      Otherwise no focal RIGHT pulmonary consolidation is demonstrated.      Probable mild atelectasis at LEFT lung base.      Otherwise no significant obscuration of lateral costophrenic angles is   demonstrated.      No significant vascular congestion is demonstrated.      There is scattered pulmonary scarring bilaterally.      Visualized cardiac silhouette size appears mild to moderately enlarged,   accentuated by low lung volumes. Pericardial effusion not excluded.      There are atherosclerotic calcifications in the thoracic aorta.      Stable position of pacer leads.      Impression:         Patient is rotated and lung volumes are low, limiting assessment.      Otherwise no definite acute pulmonary process demonstrated.      Visualized cardiac silhouette size appears mild to moderately enlarged,   accentuated by low lung volumes. Pericardial effusion not excluded.    THIS DOCUMENT HAS BEEN ELECTRONICALLY SIGNED BY FRANCISCO UMANZOR MD    CT Abdomen Pelvis Without Contrast [[id number]]  (Abnormal) Collected:  08/05/17 2156     Updated:  08/06/17 0040    Narrative:       EXAM:    CT Abdomen and Pelvis Without Intravenous Contrast    CLINICAL HISTORY:    87 years old, female; Pain; Abdominal pain; Localized; Left; Prior surgery    TECHNIQUE:    Axial computed tomography images of the abdomen and pelvis without   intravenous contrast.  This CT exam was performed using one or more of the      following dose reduction techniques:  automated exposure control, adjustment of   the mA and/or kV according to patient size, and/or use of iterative   reconstruction technique.    Coronal reformatted images were created and reviewed.    COMPARISON:    CT ABD PELVIS WO CONTRAST 1/2/2016 9:44:05 AM    FINDINGS:    Mild atelectasis at bilateral lung bases.    Status post cholecystectomy. The liver, pancreas, adrenal glands, and kidneys   are unremarkable within the limits of a noncontrast study.    No renal collecting system stones identified. No hydronephrosis on either side.    A normal-appearing appendix is seen in the right lower quadrant. No evidence of   bowel obstruction. Diverticulosis of the distal transverse colon, descending   colon, and sigmoid colon. There is a 5.3 cm x 3.5 cm collection of fluid and   gas between the mid sigmoid colon and anterior bladder on series 3 image 68.   Small amount of gas in the bladder. These findings suggest a fistula, possibly   related to diverticulitis or inflammatory bowel disease. This finding is new   since the prior study.    The uterus is unremarkable.    The abdominal aorta is non-aneurysmal.    Moderate compression deformity of the T12 vertebral body (status post   vertebroplasty). Mild degenerative changes of the lower thoracic spine and the   lumbar spine.      Impression:       1. Collection of fluid and gas between the mid sigmoid colon and anterior   bladder. Small amount of gas in the bladder. These findings suggest a fistula,   possibly related to diverticulitis or inflammatory bowel disease. This finding   is new since the prior study.    The findings were discussed with Dr. Dooley on 8/6/2017 12:37 AM EDT.    THIS DOCUMENT HAS BEEN ELECTRONICALLY SIGNED BY GERMANIA LONDON MD    XR Chest 1 View [[id number]]  (Abnormal) Collected:  08/08/17 1359     Updated:  08/08/17 2124    Narrative:       EXAM:    XR Chest, 1 View    CLINICAL HISTORY:    87 years old,  female; Vesicointestinal fistula; Urinary tract infection, site   not specified; Signs and symptoms; Other: Congestion; Additional info: Congested    TECHNIQUE:    Frontal view of the chest.    COMPARISON:    CR - XR CHEST 1 VW 8/5/2017 9:45:03 PM    FINDINGS:    Prominent lung markings/peribronchial thickening with mild haziness and   patchy airspace opacity in the RIGHT mid zone. Blunting of the LEFT   costophrenic angle suggestive of small pleural effusion/thickening.    Cardiomegaly with central pulmonary vascular congestion.  Unfolding of the   aortic arch with a tortuous descending thoracic aorta.  LEFT chest wall   pacemaker.       Impression:         Chronic cardiopulmonary changes with patchy consolidation in the RIGHT mid   zone and small LEFT pleural effusion. Recommend followup to complete   resolution.       THIS DOCUMENT HAS BEEN ELECTRONICALLY SIGNED BY MAYRA BROWNE MD        Lab Results (most recent)     Procedure Component Value Units Date/Time    CBC Auto Differential [[id number]]  (Abnormal) Collected:  08/05/17 2045    Specimen:  Blood Updated:  08/05/17 2107     WBC 22.63 (H) 10*3/mm3      RBC 3.99 10*6/mm3      Hemoglobin 12.3 g/dL      Hematocrit 39.3 %      MCV 98.5 fL      MCH 30.8 pg      MCHC 31.3 (L) g/dL      RDW 15.5 (H) %      RDW-SD 55.9 (H) fl      MPV 9.8 fL      Platelets 418 10*3/mm3      Neutrophil % 74.0 (H) %      Lymphocyte % 16.0 (L) %      Monocyte % 7.9 %      Eosinophil % 1.1 %      Basophil % 0.1 %      Immature Grans % 0.9 (H) %      Neutrophils, Absolute 16.75 (H) 10*3/mm3      Lymphocytes, Absolute 3.63 10*3/mm3      Monocytes, Absolute 1.78 (H) 10*3/mm3      Eosinophils, Absolute 0.24 10*3/mm3      Basophils, Absolute 0.02 10*3/mm3      Immature Grans, Absolute 0.21 (H) 10*3/mm3     POC Troponin, Rapid [[id number]]  (Normal) Collected:  08/05/17 2054    Specimen:  Blood Updated:  08/05/17 2110     Troponin I 0.01 ng/mL       Serial Number: [id number]  [id number]          Comprehensive Metabolic Panel [[id number]]  (Abnormal) Collected:  08/05/17 2045    Specimen:  Blood Updated:  08/05/17 2120     Glucose 136 (H) mg/dL      BUN 20 mg/dL      Creatinine 1.30 mg/dL      Sodium 136 mmol/L      Potassium 4.4 mmol/L      Chloride 95 (L) mmol/L      CO2 34.0 (H) mmol/L      Calcium 9.9 mg/dL      Total Protein 7.1 g/dL      Albumin 4.00 g/dL      ALT (SGPT) 96 (H) U/L      AST (SGOT) 28 U/L      Alkaline Phosphatase 232 (H) U/L      Total Bilirubin 0.7 mg/dL      eGFR Non African Amer 39 (L) mL/min/1.73      Globulin 3.1 gm/dL      A/G Ratio 1.3 (L) g/dL      BUN/Creatinine Ratio 15.4     Anion Gap 7.0 mmol/L     Lipase [[id number]]  (Normal) Collected:  08/05/17 2045    Specimen:  Blood Updated:  08/05/17 2120     Lipase 30 U/L     Urinalysis With / Culture If Indicated [[id number]]  (Abnormal) Collected:  08/05/17 2052    Specimen:  Urine from Urine, Clean Catch Updated:  08/05/17 2122     Color, UA Dark Yellow (A)     Appearance, UA Cloudy (A)     pH, UA 6.0     Specific Gravity, UA 1.021     Glucose, UA Negative     Ketones, UA Trace (A)     Bilirubin, UA Negative     Blood, UA Small (1+) (A)     Protein, UA Trace (A)     Leuk Esterase, UA Large (3+) (A)     Nitrite, UA Negative     Urobilinogen, UA 0.2 E.U./dL    Urinalysis, Microscopic Only [[id number]]  (Abnormal) Collected:  08/05/17 2052    Specimen:  Urine from Urine, Clean Catch Updated:  08/05/17 2123     RBC, UA 0-2 /HPF      WBC, UA Too Numerous to Count (A) /HPF      Bacteria, UA None Seen /HPF      Squamous Epithelial Cells, UA None Seen /HPF      Hyaline Casts, UA 0-6 /LPF      Methodology Automated Microscopy    Lactic Acid, Plasma [[id number]]  (Normal) Collected:  08/05/17 2219    Specimen:  Blood Updated:  08/05/17 2255     Lactate 1.0 mmol/L     CBC (No Diff) [[id number]]  (Abnormal) Collected:  08/07/17 0634    Specimen:  Blood Updated:  08/07/17 0745     WBC 13.42 (H) 10*3/mm3      RBC 3.18 (L) 10*6/mm3       Hemoglobin 9.7 (L) g/dL      Hematocrit 31.2 (L) %      MCV 98.1 fL      MCH 30.5 pg      MCHC 31.1 (L) g/dL      RDW 15.4 (H) %      RDW-SD 55.2 (H) fl      MPV 10.2 fL      Platelets 351 10*3/mm3     Basic Metabolic Panel [[id number]]  (Abnormal) Collected:  08/07/17 0634    Specimen:  Blood Updated:  08/07/17 0748     Glucose 107 (H) mg/dL      BUN 12 mg/dL      Creatinine 1.10 mg/dL      Sodium 137 mmol/L      Potassium 4.0 mmol/L      Chloride 100 mmol/L      CO2 30.0 mmol/L      Calcium 8.9 mg/dL      eGFR Non African Amer 47 (L) mL/min/1.73      BUN/Creatinine Ratio 10.9     Anion Gap 7.0 mmol/L     POC Glucose Fingerstick [[id number]]  (Abnormal) Collected:  08/07/17 1154    Specimen:  Blood Updated:  08/07/17 1155     Glucose 132 (H) mg/dL     Narrative:       Meter: [id number] : [id number] Tita Lowe    Hemoglobin & Hematocrit, Blood [[id number]]  (Abnormal) Collected:  08/07/17 1754    Specimen:  Blood Updated:  08/07/17 1831     Hemoglobin 10.0 (L) g/dL      Hematocrit 33.9 (L) %     POC Glucose Fingerstick [[id number]]  (Abnormal) Collected:  08/07/17 2021    Specimen:  Blood Updated:  08/07/17 2030     Glucose 210 (H) mg/dL     Narrative:       Meter: [id number] : [id number] Watson De La O    CBC & Differential [[id number]] Collected:  08/08/17 0435    Specimen:  Blood Updated:  08/08/17 0631    Narrative:       The following orders were created for panel order CBC & Differential.  Procedure                               Abnormality         Status                     ---------                               -----------         ------                     CBC Auto Differential[[id number]]        Abnormal            Final result                 Please view results for these tests on the individual orders.    CBC Auto Differential [[id number]]  (Abnormal) Collected:  08/08/17 0435    Specimen:  Blood Updated:  08/08/17 0631     WBC 12.38 (H) 10*3/mm3      RBC 3.16 (L) 10*6/mm3      Hemoglobin 9.6  (L) g/dL      Hematocrit 31.1 (L) %      MCV 98.4 fL      MCH 30.4 pg      MCHC 30.9 (L) g/dL      RDW 15.0 (H) %      RDW-SD 54.2 (H) fl      MPV 10.3 fL      Platelets 344 10*3/mm3      Neutrophil % 85.1 (H) %      Lymphocyte % 9.3 (L) %      Monocyte % 5.2 %      Eosinophil % 0.0 %      Basophil % 0.0 %      Immature Grans % 0.4 %      Neutrophils, Absolute 10.54 (H) 10*3/mm3      Lymphocytes, Absolute 1.15 10*3/mm3      Monocytes, Absolute 0.64 10*3/mm3      Eosinophils, Absolute 0.00 10*3/mm3      Basophils, Absolute 0.00 10*3/mm3      Immature Grans, Absolute 0.05 (H) 10*3/mm3     Magnesium [[id number]]  (Normal) Collected:  08/08/17 0435    Specimen:  Blood Updated:  08/08/17 0711     Magnesium 2.4 mg/dL     Basic Metabolic Panel [[id number]]  (Abnormal) Collected:  08/08/17 0435    Specimen:  Blood Updated:  08/08/17 0716     Glucose 139 (H) mg/dL      BUN 11 mg/dL      Creatinine 0.90 mg/dL      Sodium 136 mmol/L      Potassium 5.1 mmol/L      Chloride 100 mmol/L      CO2 30.0 mmol/L      Calcium 8.9 mg/dL      eGFR Non African Amer 59 (L) mL/min/1.73      BUN/Creatinine Ratio 12.2     Anion Gap 6.0 mmol/L     POC Glucose Fingerstick [[id number]]  (Abnormal) Collected:  08/08/17 0740    Specimen:  Blood Updated:  08/08/17 0745     Glucose 136 (H) mg/dL     Narrative:       Meter: [id number] : [id number] Jaylene Veras    Urine Culture [[id number]]  (Normal) Collected:  08/05/17 2052    Specimen:  Urine from Urine, Clean Catch Updated:  08/08/17 0757     Urine Culture No growth at 2 days    Basic Metabolic Panel [[id number]]  (Abnormal) Collected:  08/08/17 2111    Specimen:  Blood Updated:  08/08/17 2131     Glucose 141 (H) mg/dL      BUN 9 mg/dL      Creatinine 1.20 mg/dL      Sodium 135 mmol/L      Potassium 5.0 mmol/L      Chloride 101 mmol/L      CO2 27.0 mmol/L      Calcium 9.7 mg/dL      eGFR Non African Amer 42 (L) mL/min/1.73      BUN/Creatinine Ratio 7.5     Anion Gap 7.0 mmol/L     BNP  [[id number]]  (Abnormal) Collected:  08/08/17 2111    Specimen:  Blood Updated:  08/08/17 2141     .0 (H) pg/mL     CBC (No Diff) [[id number]]  (Abnormal) Collected:  08/09/17 0639    Specimen:  Blood Updated:  08/09/17 0658     WBC 15.82 (H) 10*3/mm3      RBC 3.52 (L) 10*6/mm3      Hemoglobin 10.7 (L) g/dL      Hematocrit 35.6 %      .1 (H) fL      MCH 30.4 pg      MCHC 30.1 (L) g/dL      RDW 15.3 (H) %      RDW-SD 56.7 (H) fl      MPV 10.3 fL      Platelets 401 10*3/mm3     Basic Metabolic Panel [[id number]]  (Abnormal) Collected:  08/09/17 0639    Specimen:  Blood Updated:  08/09/17 0731     Glucose 112 (H) mg/dL      BUN 13 mg/dL      Creatinine 1.10 mg/dL      Sodium 137 mmol/L      Potassium 4.6 mmol/L      Chloride 99 mmol/L      CO2 34.0 (H) mmol/L      Calcium 9.7 mg/dL      eGFR Non African Amer 47 (L) mL/min/1.73      BUN/Creatinine Ratio 11.8     Anion Gap 4.0 mmol/L     Narrative:       CBC Auto Differential [[id number]]  (Abnormal) Collected:  08/10/17 0445    Specimen:  Blood Updated:  08/10/17 0619     WBC 14.69 (H) 10*3/mm3      RBC 3.44 (L) 10*6/mm3      Hemoglobin 10.6 (L) g/dL      Hematocrit 34.7 %      .9 (H) fL      MCH 30.8 pg      MCHC 30.5 (L) g/dL      RDW 15.2 (H) %      RDW-SD 56.6 (H) fl      MPV 10.3 fL      Platelets 378 10*3/mm3      Neutrophil % 74.7 (H) %      Lymphocyte % 12.8 (L) %      Monocyte % 10.0 %      Eosinophil % 1.6 %      Basophil % 0.1 %      Immature Grans % 0.8 (H) %      Neutrophils, Absolute 10.96 (H) 10*3/mm3      Lymphocytes, Absolute 1.88 10*3/mm3      Monocytes, Absolute 1.47 (H) 10*3/mm3      Eosinophils, Absolute 0.24 10*3/mm3      Basophils, Absolute 0.02 10*3/mm3      Immature Grans, Absolute 0.12 (H) 10*3/mm3     Basic Metabolic Panel [[id number]]  (Abnormal) Collected:  08/10/17 0445    Specimen:  Blood Updated:  08/10/17 0639     Glucose 117 (H) mg/dL      BUN 11 mg/dL      Creatinine 0.80 mg/dL      Sodium 137 mmol/L      Potassium 4.1  mmol/L      Chloride 96 (L) mmol/L      CO2 34.0 (H) mmol/L      Calcium 9.3 mg/dL      eGFR Non African Amer 68 mL/min/1.73      BUN/Creatinine Ratio 13.8     Anion Gap 7.0 mmol/L     Blood Culture [[id number]]  (Normal) Collected:  08/05/17 2040    Specimen:  Blood from Arm, Right Updated:  08/11/17 0401     Blood Culture No growth at 5 days    Blood Culture [[id number]]  (Normal) Collected:  08/05/17 2045    Specimen:  Blood from Arm, Left Updated:  08/11/17 0401     Blood Culture No growth at 5 days    CBC Auto Differential [[id number]]  (Abnormal) Collected:  08/11/17 0419    Specimen:  Blood Updated:  08/11/17 0436     WBC 18.66 (H) 10*3/mm3      RBC 3.66 (L) 10*6/mm3      Hemoglobin 11.0 (L) g/dL      Hematocrit 36.5 %      MCV 99.7 (H) fL      MCH 30.1 pg      MCHC 30.1 (L) g/dL      RDW 15.1 (H) %      RDW-SD 55.0 (H) fl      MPV 10.0 fL      Platelets 395 10*3/mm3      Neutrophil % 75.7 (H) %      Lymphocyte % 12.8 (L) %      Monocyte % 8.8 %      Eosinophil % 1.7 %      Basophil % 0.2 %      Immature Grans % 0.8 (H) %      Neutrophils, Absolute 14.13 (H) 10*3/mm3      Lymphocytes, Absolute 2.39 10*3/mm3      Monocytes, Absolute 1.65 (H) 10*3/mm3      Eosinophils, Absolute 0.32 (H) 10*3/mm3      Basophils, Absolute 0.03 10*3/mm3      Immature Grans, Absolute 0.14 (H) 10*3/mm3     Magnesium [[id number]]  (Normal) Collected:  08/11/17 0419    Specimen:  Blood Updated:  08/11/17 0452     Magnesium 2.1 mg/dL     Basic Metabolic Panel [[id number]]  (Abnormal) Collected:  08/11/17 0419    Specimen:  Blood Updated:  08/11/17 0452     Glucose 139 (H) mg/dL      BUN 11 mg/dL      Creatinine 0.80 mg/dL      Sodium 136 mmol/L      Potassium 4.1 mmol/L      Chloride 95 (L) mmol/L      CO2 39.0 (H) mmol/L      Calcium 9.6 mg/dL      eGFR Non African Amer 68 mL/min/1.73      BUN/Creatinine Ratio 13.8     Anion Gap 2.0 (L) mmol/L     Phosphorus [[id number]]  (Normal) Collected:  08/11/17 0419    Specimen:  Blood Updated:   08/11/17 0452     Phosphorus 2.5 mg/dL     BNP [[id number]]  (Abnormal) Collected:  08/11/17 0419    Specimen:  Blood Updated:  08/11/17 0454     .0 (H) pg/mL     POC Glucose Fingerstick [[id number]]  (Abnormal) Collected:  08/11/17 0736    Specimen:  Blood Updated:  08/11/17 0739     Glucose 135 (H) mg/dL     Narrative:       Meter: [id number] : [id number] Mckinney    POC Glucose Fingerstick [[id number]]  (Abnormal) Collected:  08/11/17 1128    Specimen:  Blood Updated:  08/11/17 1131     Glucose 146 (H) mg/dL     Narrative:       Meter: [id number] : [id number] Mckinney    Vancomycin, Trough [[id number]]  (Normal) Collected:  08/11/17 1437    Specimen:  Blood Updated:  08/11/17 1518     Vancomycin Trough 18.50 mcg/mL     CBC Auto Differential [[id number]]  (Abnormal) Collected:  08/12/17 0652    Specimen:  Blood Updated:  08/12/17 0712     WBC 12.78 (H) 10*3/mm3      RBC 3.38 (L) 10*6/mm3      Hemoglobin 10.2 (L) g/dL      Hematocrit 33.1 (L) %      MCV 97.9 fL      MCH 30.2 pg      MCHC 30.8 (L) g/dL      RDW 14.8 (H) %      RDW-SD 52.8 fl      MPV 9.7 fL      Platelets 399 10*3/mm3      Neutrophil % 65.4 %      Lymphocyte % 18.9 (L) %      Monocyte % 11.0 %      Eosinophil % 3.4 (H) %      Basophil % 0.4 %      Immature Grans % 0.9 (H) %      Neutrophils, Absolute 8.36 (H) 10*3/mm3      Lymphocytes, Absolute 2.41 10*3/mm3      Monocytes, Absolute 1.41 (H) 10*3/mm3      Eosinophils, Absolute 0.44 (H) 10*3/mm3      Basophils, Absolute 0.05 10*3/mm3      Immature Grans, Absolute 0.11 (H) 10*3/mm3     Basic Metabolic Panel [[id number]]  (Abnormal) Collected:  08/12/17 0652    Specimen:  Blood Updated:  08/12/17 0737     Glucose 114 (H) mg/dL      BUN 11 mg/dL      Creatinine 0.80 mg/dL      Sodium 138 mmol/L      Potassium 4.2 mmol/L      Chloride 96 (L) mmol/L      CO2 37.0 (H) mmol/L      Calcium 9.5 mg/dL      eGFR Non African Amer 68 mL/min/1.73      BUN/Creatinine Ratio 13.8     Anion Gap  5.0 mmol/L     POC Glucose Fingerstick [[id number]]  (Normal) Collected:  08/12/17 1129    Specimen:  Blood Updated:  08/12/17 1131     Glucose 120 mg/dL     Narrative:       Meter: [id number] : [id number] Jan Childs    POC Glucose Fingerstick [[id number]]  (Abnormal) Collected:  08/12/17 1549    Specimen:  Blood Updated:  08/12/17 1550     Glucose 161 (H) mg/dL     Narrative:       Meter: [id number] : [id number] Jan Hairelle    POC Glucose Fingerstick [[id number]]  (Normal) Collected:  08/12/17 2024    Specimen:  Blood Updated:  08/12/17 2025     Glucose 106 mg/dL     Narrative:       Meter: [id number] : [id number] Pelaez Rosaline    POC Glucose Fingerstick [[id number]]  (Normal) Collected:  08/13/17 0708    Specimen:  Blood Updated:  08/13/17 0709     Glucose 113 mg/dL     Narrative:       Meter: [id number] : [id number] Jan Hairelle    POC Glucose Fingerstick [[id number]]  (Normal) Collected:  08/13/17 1137    Specimen:  Blood Updated:  08/13/17 1138     Glucose 119 mg/dL     Narrative:       Meter: [id number] : [id number] Belkis Piña    POC Glucose Fingerstick [[id number]]  (Abnormal) Collected:  08/13/17 1611    Specimen:  Blood Updated:  08/13/17 1613     Glucose 131 (H) mg/dL     Narrative:       Meter: [id number] : [id number] Jan Hairelle    POC Glucose Fingerstick [[id number]]  (Normal) Collected:  08/13/17 2011    Specimen:  Blood Updated:  08/13/17 2012     Glucose 127 mg/dL     CBC Auto Differential [[id number]]  (Abnormal) Collected:  08/14/17 0405    Specimen:  Blood Updated:  08/14/17 0418     WBC 8.83 10*3/mm3      RBC 3.36 (L) 10*6/mm3      Hemoglobin 10.0 (L) g/dL      Hematocrit 32.9 (L) %      MCV 97.9 fL      MCH 29.8 pg      MCHC 30.4 (L) g/dL      RDW 15.1 (H) %      RDW-SD 54.1 (H) fl      MPV 9.7 fL      Platelets 402 10*3/mm3      Neutrophil % 50.2 %      Lymphocyte % 30.6 %      Monocyte % 11.3 %      Eosinophil % 6.5 (H) %      Basophil % 0.7 %      Immature  Grans % 0.7 (H) %      Neutrophils, Absolute 4.44 10*3/mm3      Lymphocytes, Absolute 2.70 10*3/mm3      Monocytes, Absolute 1.00 10*3/mm3      Eosinophils, Absolute 0.57 (H) 10*3/mm3      Basophils, Absolute 0.06 10*3/mm3      Immature Grans, Absolute 0.06 (H) 10*3/mm3     Magnesium [[id number]]  (Normal) Collected:  08/14/17 0405    Specimen:  Blood Updated:  08/14/17 0443     Magnesium 2.2 mg/dL     Comprehensive Metabolic Panel [[id number]]  (Abnormal) Collected:  08/14/17 0405    Specimen:  Blood Updated:  08/14/17 0443     Glucose 108 (H) mg/dL      BUN 13 mg/dL      Creatinine 0.90 mg/dL      Sodium 140 mmol/L      Potassium 4.0 mmol/L      Chloride 99 mmol/L      CO2 38.0 (H) mmol/L      Calcium 9.5 mg/dL      Total Protein 5.2 (L) g/dL      Albumin 3.00 (L) g/dL      ALT (SGPT) 42 (H) U/L      AST (SGOT) 37 (H) U/L      Alkaline Phosphatase 157 (H) U/L      Total Bilirubin 0.3 mg/dL      eGFR Non African Amer 59 (L) mL/min/1.73      Globulin 2.2 gm/dL      A/G Ratio 1.4 (L) g/dL      BUN/Creatinine Ratio 14.4     Anion Gap 3.0 mmol/L     Procalcitonin [[id number]] Collected:  08/14/17 0405    Specimen:  Blood Updated:  08/14/17 0527     Procalcitonin 0.06 ng/mL     POC Glucose Fingerstick [[id number]]  (Normal) Collected:  08/14/17 0725    Specimen:  Blood Updated:  08/14/17 0728     Glucose 115 mg/dL     Narrative:       Meter: [id number] : [id number] Burus Teressa    POC Glucose Fingerstick [[id number]]  (Normal) Collected:  08/14/17 1119    Specimen:  Blood Updated:  08/14/17 1120     Glucose 104 mg/dL     Narrative:       Meter: [id number] : [id number] Burus Teressa    POC Glucose Fingerstick [[id number]]  (Normal) Collected:  08/14/17 1613    Specimen:  Blood Updated:  08/14/17 1614     Glucose 122 mg/dL     Narrative:       Meter: [id number] : [id number] Francis Gannon    POC Glucose Fingerstick [[id number]]  (Normal) Collected:  08/14/17 2023    Specimen:  Blood Updated:  08/14/17 2026      "Glucose 99 mg/dL     Narrative:       Meter: [id number] : [id number] Robert Rosen    POC Glucose Fingerstick [[id number]]  (Normal) Collected:  08/15/17 0714    Specimen:  Blood Updated:  08/15/17 0715     Glucose 98 mg/dL     Narrative:       Meter: [id number] : [id number] Ramon Jeffries    POC Glucose Fingerstick [[id number]]  (Normal) Collected:  08/15/17 1104    Specimen:  Blood Updated:  08/15/17 1105     Glucose 102 mg/dL     Narrative:       Meter: [id number] : [id number] Ramon Jeffries        Condition on Discharge:  Stable    Physical Exam on Discharge:/55 (BP Location: Right arm, Patient Position: Lying)  Pulse 65  Temp 98 °F (36.7 °C) (Oral)   Resp 18  Ht 66\" (167.6 cm)  Wt 211 lb 9.6 oz (96 kg)  SpO2 94%  BMI 34.15 kg/m2  Physical Exam  General: Well-developed well-nourished female in no acute distress    Head: Normocephalic atraumatic    Eyes: PERRLA, EOMI, nonicteric, conjunctiva normal    ENT: Pink, moist mucous membranes    Neck: Supple, nontender, trachea midline without lymphadenopathy, JVD, nuchal rigidity.      Cardiovascular: RRR  no M/R/G    Respiratory: Nonlabored, symmetrical chest expansion, clear to auscultation bilaterally    Abdomen: Soft, nontender, nondistended,  positive bowel sounds in all 4 quadrants     Extremities: FROM in upper and lower extremities bilaterally negative for edema/cyanosis/clubbing.  Negative calf pain    Skin: Pink/warm/dry.  No rash or lesions noted    Neuro: Alert and oriented to person place time and situation, speech is clear, follows all commands, recent and remote memory intact    Psych: Patient is pleasant and cooperative.  Normal affect.  Negative suicidal ideation or homicidal ideation.    Discharge Disposition  Rehab Facility or Unit (DC - External)    Discharge Medications   Avis Us   Home Medication Instructions STEPHY:[id number]    Printed on:08/15/17 7547   Medication Information                      albuterol " (PROVENTIL) (2.5 MG/3ML) 0.083% nebulizer solution  Take 2.5 mg by nebulization Every 6 (Six) Hours As Needed for wheezing.             Albuterol Sulfate (VENTOLIN HFA IN)  Inhale as needed.             allopurinol (ZYLOPRIM) 100 MG tablet  Take 1 tablet by mouth Daily.             amLODIPine (NORVASC) 5 MG tablet  Take 1 tablet by mouth Daily.             aspirin 81 MG tablet  Take 81 mg by mouth Daily. In evening             benzonatate (TESSALON) 100 MG capsule  Take 1 capsule by mouth 3 (Three) Times a Day As Needed for Cough.             Cholecalciferol (VITAMIN D3) 5000 UNITS tablet  Take 1 tablet by mouth daily.             clonazePAM (KlonoPIN) 1 MG tablet  TAKE 1 TABLET BY MOUTH EVERY 12 HOURS AS NEEDED             colchicine 0.6 MG tablet  Take 1 tablet by mouth Daily.             colestipol (COLESTID) 1 G tablet  Take 1 g by mouth Daily As Needed (loose stools). As needed             dextromethorphan polistirex ER (DELSYM) 30 MG/5ML Suspension Extended Release oral suspension  Take 30 mg by mouth Every 12 (Twelve) Hours As Needed (cough).             diphenhydrAMINE-acetaminophen (TYLENOL PM)  MG tablet per tablet  Take 1 tablet by mouth At Night As Needed for Sleep.             estradiol (ESTRACE) 0.1 MG/GM vaginal cream  Insert 2 g into the vagina 2 (Two) Times a Week. Tuesday and Sunday             fluocinonide (LIDEX) 0.05 % external solution  Apply  topically 2 (two) times a day.             levETIRAcetam (KEPPRA) 750 MG tablet  Take 1 tablet by mouth two  times daily             lisinopril (PRINIVIL,ZESTRIL) 10 MG tablet  Take 1 tablet by mouth  daily             Loratadine (CLARITIN) 10 MG capsule  Take 10 mg by mouth Daily As Needed (asthma).             magnesium oxide (MAG-OX) 400 MG tablet  Take 400 mg by mouth Daily.             mirtazapine (REMERON) 15 MG tablet  Take 1 tablet by mouth Every Night.             Multiple Vitamins-Minerals (EYE HEALTH) capsule  Take 1 capsule by mouth  Daily.             pantoprazole (PROTONIX) 40 MG EC tablet  Take 1 tablet by mouth  daily             polyethylene glycol (MIRALAX) packet  Take 17 g by mouth Daily.             potassium chloride (MICRO-K) 10 MEQ CR capsule  Take 1 capsule by mouth 3 (Three) Times a Week. Only take potassium when a lasix dose is taken             saccharomyces boulardii (FLORASTOR) 250 MG capsule  Take 1 capsule by mouth 2 (Two) Times a Day.             SYMBICORT 80-4.5 MCG/ACT inhaler  Use 1 puff twice daily             vitamin B-12 (CYANOCOBALAMIN) 1000 MCG tablet  Take 1,000 mcg by mouth Daily.               Discharge Diet:   Diet Instructions     Diet: Regular; Thin       Discharge Diet:  Regular   Fluid Consistency:  Thin               Discharge Care Plan / Instructions:  Activity at Discharge:   Activity Instructions     Activity as Tolerated                   Follow-up Appointments  No future appointments.  Additional Instructions for the Follow-ups that You Need to Schedule     Discharge Follow-up with PCP    As directed    Follow Up Details:  Follow up with facility provider in the next 1-2 days               Test Results Pending at Discharge  None     COREY Lloyd 08/15/17 1:43 PM    Time: Discharge 45 min    Please note that portions of this note may have been completed with a voice recognition program. Efforts were made to edit the dictations, but occasionally words are mistranscribed.       Electronically signed by COREY Lloyd at 8/15/2017  2:11 PM

## 2017-08-15 NOTE — PLAN OF CARE
Problem: Patient Care Overview (Adult)  Goal: Plan of Care Review  Outcome: Ongoing (interventions implemented as appropriate)    08/15/17 0979   Coping/Psychosocial Response Interventions   Plan Of Care Reviewed With patient   Patient Care Overview   Progress improving   Outcome Evaluation   Outcome Summary/Follow up Plan Appliance is intact. Stoma is red and moist. No leakage. Patient needs convexity appliance. Discharge supplies provided. Education performed with daughter. Supply ordering discussed. Please contact [facility] if needs arise prior to discharge. Thanks

## 2017-08-17 NOTE — PROGRESS NOTES
Continued Stay Note  [facility]     Patient Name: Avis Us  MRN: [id number]  Today's Date: 8/17/2017    Admit Date: 8/5/2017          Discharge Plan     With the post acute care transition visit made 8/16/17 was requested to clarify if Ms. Us should be on Lasix.  Spoke with Natty Pettit RN who clarified with the NP pt's Lasix should be 20 mg prn daily for a weight gain of 3-4 pounds.  Spoke with Ms. Us's clinical nurse, Elysia.  Tabby Irvin RN                Discharge Codes     None        Expected Discharge Date and Time     Expected Discharge Date Expected Discharge Time    Aug 15, 2017             Tabby Irvin RN

## 2017-08-18 ENCOUNTER — PATIENT OUTREACH (OUTPATIENT)
Dept: CASE MANAGEMENT | Facility: OTHER | Age: 82
End: 2017-08-18

## 2017-08-18 NOTE — OUTREACH NOTE
Pt transferred from hospital to [facility] on 8/15/17. Pt meets qualifications for HRCM. Email sent to S.W. @ [facility]; will continue to follow.

## 2017-08-21 PROBLEM — J18.9 RECURRENT PNEUMONIA: Status: ACTIVE | Noted: 2017-01-01

## 2017-08-21 PROBLEM — J18.9 RECURRENT PNEUMONIA: Status: RESOLVED | Noted: 2017-01-01 | Resolved: 2017-01-01

## 2017-08-21 NOTE — ASSESSMENT & PLAN NOTE
Due to asthma- continue to take medications   Refer pulmonary  Using symbicort and nebulizer  tob exposure at home (she is non smoker)  Recurrent bouts of pneumonia   Recent discovery of colovesicular fistula  Has colostomy now   May have had recurrent subclinical sepsis related to this seeding lungs  Denies aspiration or assoc cough with food and has had neg swallowing study in past  Is getting therapy to help with strengthening

## 2017-08-21 NOTE — PROGRESS NOTES
"Avis FLOWER Magen 87 y.o.  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  Now at nursing home  Has had recurrent uti and recurrent pneumonia  Known chronic lung disease--acute on chronic lung disease- she does not see anyone for this currently  Is on symbicort (after Dr Barragan she stopped going to see anyone)  We have been filling symbicort  Discussed importance of seeing lung specialist and we will schedule this  Never a smoker- cough variant asthma  Manages flares at home- seasonal (May and August)  Has nebulizer and also uses symbicort  Has taken advair in past (not sure why she stopped this)  Recurrent pneumonia 2-3 times this year  tussionex chronically when she lays flat at night- coughing fits at night  Takes mucinex during the day so she can cough up phlegm  Discharged 1 week ago   Brother with fistulas as well    Allergies   Allergen Reactions   • Clarithromycin Anaphylaxis   • Macrobid [Nitrofurantoin Monohyd Macro] Swelling     Flash pulmonary edema   • Vioxx [Rofecoxib] Other (See Comments)     Affects kidney function   • Metoprolol Other (See Comments)     Severe hypotension   • Statins Myalgia     Weakness    • Tramadol Hallucinations     \"crazy out of her head\"       Current Outpatient Prescriptions:   •  albuterol (PROVENTIL) (2.5 MG/3ML) 0.083% nebulizer solution, Take 2.5 mg by nebulization Every 6 (Six) Hours As Needed for wheezing., Disp: 120 vial, Rfl: 4  •  Albuterol Sulfate (VENTOLIN HFA IN), Inhale as needed., Disp: , Rfl:   •  allopurinol (ZYLOPRIM) 100 MG tablet, Take 1 tablet by mouth Daily. (Patient taking differently: Take 100 mg by mouth 2 (Two) Times a Day.), Disp: 180 tablet, Rfl: 0  •  amLODIPine (NORVASC) 5 MG tablet, Take 1 tablet by mouth Daily., Disp: 30 tablet, Rfl: 0  •  aspirin 81 MG tablet, Take 81 mg by mouth Daily. In evening, Disp: , Rfl:   •  benzonatate " (TESSALON) 100 MG capsule, Take 1 capsule by mouth 3 (Three) Times a Day As Needed for Cough., Disp: , Rfl: 0  •  Cholecalciferol (VITAMIN D3) 5000 UNITS tablet, Take 1 tablet by mouth daily., Disp: , Rfl:   •  clonazePAM (KlonoPIN) 1 MG tablet, TAKE 1 TABLET BY MOUTH EVERY 12 HOURS AS NEEDED, Disp: 60 tablet, Rfl: 0  •  colchicine 0.6 MG tablet, Take 1 tablet by mouth Daily. (Patient taking differently: Take 0.6 mg by mouth Daily As Needed (gout flare).), Disp: 30 tablet, Rfl: 2  •  colestipol (COLESTID) 1 G tablet, Take 1 g by mouth Daily As Needed (loose stools). As needed, Disp: , Rfl:   •  dextromethorphan polistirex ER (DELSYM) 30 MG/5ML Suspension Extended Release oral suspension, Take 30 mg by mouth Every 12 (Twelve) Hours As Needed (cough)., Disp: 280 mL, Rfl:   •  diphenhydrAMINE-acetaminophen (TYLENOL PM)  MG tablet per tablet, Take 1 tablet by mouth At Night As Needed for Sleep., Disp: , Rfl:   •  estradiol (ESTRACE) 0.1 MG/GM vaginal cream, Insert 2 g into the vagina 2 (Two) Times a Week. Tuesday and Sunday, Disp: , Rfl:   •  fluocinonide (LIDEX) 0.05 % external solution, Apply  topically 2 (two) times a day. (Patient taking differently: Apply 1 application topically 2 (Two) Times a Day. Scalp - psoriasis), Disp: 60 mL, Rfl: 3  •  HYDROcod Polst-CPM Polst ER (TUSSIONEX PENNKINETIC) 10-8 MG/5ML ER suspension, TAKE 5ML (1 TEASPOON) BY MOUTH 2 TIMES A DAY AS NEEDED FOR SEVERE COUGH., Disp: , Rfl: 0  •  levETIRAcetam (KEPPRA) 750 MG tablet, Take 1 tablet by mouth two  times daily, Disp: 180 tablet, Rfl: 1  •  lisinopril (PRINIVIL,ZESTRIL) 10 MG tablet, Take 1 tablet by mouth  daily, Disp: 90 tablet, Rfl: 1  •  Loratadine (CLARITIN) 10 MG capsule, Take 10 mg by mouth Daily As Needed (asthma)., Disp: , Rfl:   •  magnesium oxide (MAG-OX) 400 MG tablet, Take 400 mg by mouth Daily., Disp: , Rfl:   •  mirtazapine (REMERON) 15 MG tablet, Take 1 tablet by mouth Every Night., Disp: , Rfl:   •  Multiple  Vitamins-Minerals (EYE HEALTH) capsule, Take 1 capsule by mouth Daily., Disp: , Rfl:   •  pantoprazole (PROTONIX) 40 MG EC tablet, Take 1 tablet by mouth  daily, Disp: 90 tablet, Rfl: 1  •  polyethylene glycol (MIRALAX) packet, Take 17 g by mouth Daily., Disp: , Rfl:   •  potassium chloride (MICRO-K) 10 MEQ CR capsule, Take 1 capsule by mouth 3 (Three) Times a Week. Only take potassium when a lasix dose is taken (Patient taking differently: Take 10 mEq by mouth 2 (Two) Times a Day. Only take potassium when a lasix dose is taken), Disp: 27 capsule, Rfl: 0  •  PREMARIN 0.625 MG/GM vaginal cream, APPLY 0.5 G WITH THE FINGERTIP TO THE URETHRAL AND VAGINAL OPENING TWICE WEEKLY., Disp: , Rfl: 3  •  saccharomyces boulardii (FLORASTOR) 250 MG capsule, Take 1 capsule by mouth 2 (Two) Times a Day., Disp: 20 capsule, Rfl: 0  •  SYMBICORT 80-4.5 MCG/ACT inhaler, Use 1 puff twice daily (Patient taking differently: 1 puff daily), Disp: 20.4 g, Rfl: 3  •  vitamin B-12 (CYANOCOBALAMIN) 1000 MCG tablet, Take 1,000 mcg by mouth Daily., Disp: , Rfl:   Patient Active Problem List    Diagnosis   • HCAP (healthcare-associated pneumonia) [J18.9]   • GERD (gastroesophageal reflux disease) [K21.9]   • Diastolic dysfunction [I51.9]   • Type 2 diabetes mellitus [E11.9]   • Colovesical fistula [N32.1]   • Weakness [R53.1]   • Complicated UTI (urinary tract infection) [N39.0]     Overview Note:     Chronic. Followed by Dr. Duque. Has been on low tabatha Cipro for suppression but last culture in June 2017 grew fluquinalone resistant E Coli and Enterococcus.      • Senile atrophic vaginitis [N95.2]     Overview Note:     Just started on topical premarin cream by Dr. Duque     • Acute cystitis with hematuria [N30.01]   • Pneumonia versus acute reaction to nitrofurantoin [J18.9]   • Acute on chronic Respiratory failure. Not requiring ventilatory support [J96.90]   • Acute on Chronic kidney disease (CKD), stage III (moderate) [N18.3]   • Exacerbation of  COPD  [J44.9]   • H/O SSS (sick sinus syndrome) s/p PPM 2014 [I49.5]   • CHF (congestive heart failure) [I50.9]     Overview Note:     Hx of mild diastolic dysfunction.     • Anxiety and depression [F41.9, F32.9]   • Macular degeneration [H35.30]   • ANDRIY on CPAP [G47.33]   • Seizure disorder on Keppra [G40.909]   • HTN and possible diastolic dysfunction [I10]   • HLD (hyperlipidemia) [E78.5]   • Obesity, BMI 33.7 [E66.9]   • Arthralgia of lumbar spine [M54.5]     Overview Note:     Impression: 03/03/2016 - using walker, needs bedside table (eating in lift chair)   continue therapy with exercises- has made alot of progress and cautioned her about need to continue  Impression: 10/12/2015 - see above, same;      • Encounter for eye exam [Z01.00]   • Parasites in stool [B82.9]   • Sepsis [A41.9]     Overview Note:     Description: 10/14- hosp 24 hours- sepsis due to sinus infection     • UTI (urinary tract infection) [N39.0]     Overview Note:     Impression: 06/16/2015 - dip ua;      • Midline low back pain without sciatica [M54.5]   • Physical debility [R53.81]   • Lumbar strain [S39.012A]   • History of MI (myocardial infarction) [I25.2]   • Abnormal liver function tests [R79.89]     Overview Note:     Impression: 03/03/2016 - mild elevation- will reassess next appt  Impression: 10/12/2015 - check cmp  Impression: 03/11/2015 - update lfts  Impression: 11/03/2014 - update lfts; Description: chronic  Impression: 03/03/2016 - mild elevation- will reassess next appt  Impression: 10/12/2015 - check cmp  Impression: 03/11/2015 - update lfts  Impression: 11/03/2014 - update lfts; Description: chronic     • Acute myocardial infarction [I21.3]     Overview Note:     Description: In Hospital for UTI- 7/14- high troponin  Description: In Hospital for UTI- 7/14- high troponin     • Anxiety [F41.9]   • Knee pain [M25.569]     Overview Note:     Impression: 10/12/2015 - s/p injection - shot 3 weeks ago  try ultram- rx provided;    Impression: 03/03/2016 - using walker, needs bedside table (eating in lift chair)   continue therapy with exercises- has made alot of progress and cautioned her about need to continue  Impression: 10/12/2015 - see above, same;      • Asthma [J45.909]     Overview Note:     Impression: 03/11/2015 - refill albuterol, symbicort  rx provided for tussinex for cough  Impression: 08/11/2014 - taper prednisone as tolerated;   Impression: 03/11/2015 - refill albuterol, symbicort  rx provided for tussinex for cough  Impression: 08/11/2014 - taper prednisone as tolerated;      • Calf cramp [R25.2]     Overview Note:     Description: reileved by magnesium     • Candidal intertrigo [B37.2]     Overview Note:     Impression: 06/16/2015 - rx lotrisone provided  no help with nystatin;   Impression: 06/16/2015 - rx lotrisone provided  no help with nystatin;      • Cataract [H26.9]     Overview Note:     Description: Post cataract opacification 7/14 - opinion Dr Fuentes, referred Dr Rosado for laser     • Chest pain [R07.9]     Overview Note:     Description: normal cath 1/2     • Chronic kidney disease, stage III (moderate) [N18.3]     Overview Note:     Impression: 03/03/2016 - reassess 3-4 months- get lab hospitalization  Impression: 10/12/2015 - check cmp  Impression: 11/03/2014 - check cmp  Impression: 08/11/2014 - having lab work to f/u this on Thursday  Impression: 07/22/2014 - check cmp  Impression: 04/08/2014 - update creat.; Description: Nafisa naidu 11/5  Impression: 03/03/2016 - reassess 3-4 months- get lab hospitalization  Impression: 10/12/2015 - check cmp  Impression: 11/03/2014 - check cmp  Impression: 08/11/2014 - having lab work to f/u this on Thursday  Impression: 07/22/2014 - check cmp  Impression: 04/08/2014 - update creat.; Description: Nafisa naidu 11/5     • Chronic obstructive pulmonary disease [J44.9]     Overview Note:     Impression: 01/13/2016 - Sent in order for lifetime supply of albuterol for nebulizer.;  Description: severe  Impression: 01/13/2016 - Sent in order for lifetime supply of albuterol for nebulizer.; Description: severe     • 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 Recheck chest xr to make sure resolved.  Impression: 08/11/2014 - s/p MI- f/u Dr Thompson- difficult due to kidney function; Description: Ronaldo- stress test 7/7  echo mod mr  Impression: 01/13/2016 - Mild CHF seen on cxr during admission. Recheck chest xr to make sure resolved.  Impression: 08/11/2014 - s/p MI- f/u Dr Thompson- difficult due to kidney function; Description: Ronaldo- stress test 7/7  echo mod mr     • Atherosclerosis of coronary artery [I25.10]   • Cough [R05]     Overview Note:     Impression: 03/03/2016 - refill tussionex, call if persistant - h/o recurrent pneumonia but currently clear - cough at night, discussed eating early, no late snacks, keep hob elevated, no productive cough, no fever or chills  Impression: 03/11/2015 - s/p influenza A with known chronic asthma  treated with tamiflu, ceftin and prednisone  oxygen level is low;   Impression: 03/03/2016 - refill tussionex, call if persistant - h/o recurrent pneumonia but currently clear - cough at night, discussed eating early, no late snacks, keep hob elevated, no productive cough, no fever or chills  Impression: 03/11/2015 - s/p influenza A with known chronic asthma  treated with tamiflu, ceftin and prednisone  oxygen level is low;      • Depression [F32.9]   • Diabetes mellitus [E11.9]     Overview Note:     Impression: 03/03/2016 - blood sugar 107 and hgn a1c 6.2%   is overdue for eye exam- macular degen getting injections   ur alb due 6/15   neuropathy and foot care stable, no changes- is wearing diabetic shoes  Impression: 10/12/2015 - check cmp, hgn a1c  discussed with her- good control - blood sugar 84, hgn a1c 6.3%  is due eye exam   no foot lesion   no callus or ulcer  ur alb due  3/16  Impression: 06/16/2015 - blood sugar is 113, hgn a1c 6.4%  check ur alb today  Impression: 03/11/2015 - check hgn a1c  Impression: 11/03/2014 - excellent blood sugar control - a1c 6.1  reviewed with patient, is up to date with eye exam  neuropathy stable  update ur alb next ov  Impression: 08/11/2014 - continue to monitor sugars, taper prednisone  Impression: 07/22/2014 - blood sugar 115, hgn a1c 6.3% (average 130 or so)  is up to date with eye exam, no neuropathy  ur alb due but has uti  Impression: 04/08/2014 - blood sugar 148, hgn a1c 6.4% (average 135)  is up to date with eye exam, no neuropathy, due ur alb.    discussed blood sugar and goals for a1c.  No severe low blood sugars.;   A1C 6.4 6/28/2016  Impression: 03/03/2016 - blood sugar 107 and hgn a1c 6.2%   is overdue for eye exam- macular degen getting injections   ur alb due 6/15   neuropathy and foot care stable, no changes- is wearing diabetic shoes  Impression: 10/12/2015 - check cmp, hgn a1c discussed with her- good control - blood sugar 84, hgn a1c 6.3%  is due eye exam   no foot lesion   no callus or ulcer  ur alb due  3/16  Impression: 06/16/2015 - blood sugar is 113, hgn a1c 6.4%  check ur alb today  Impression: 03/11/2015 - check hgn a1c  Impression: 11/03/2014 - excellent blood sugar control - a1c 6.1  reviewed with patient, is up to date with eye exam  neuropathy stable  update ur alb next ov  Impression: 08/11/2014 - continue to monitor sugars, taper prednisone  Impression: 07/22/2014 - blood sugar 115, hgn a1c 6.3% (average 130 or so)  is up to date with eye exam, no neuropathy  ur alb due but has uti  Impression: 04/08/2014 - blood sugar 148, hgn a1c 6.4% (average 135)  is up to date with eye exam, no neuropathy, due ur alb.    discussed blood sugar and goals for a1c.  No severe low blood sugars.;      •  Dizziness [R42]   • Dysuria [R30.0]   • Edema [R60.9]   • Gastroesophageal reflux disease [K21.9]   • Primary hypertriglyceridemia [E78.1]     Overview Note:     Impression: 03/03/2016 - check at next visit fasting  Impression: 10/12/2015 - check flp  Impression: 03/11/2015 - check flp  Impression: 04/08/2014 - check flp;   Impression: 03/03/2016 - check at next visit fasting  Impression: 10/12/2015 - check flp  Impression: 03/11/2015 - check flp  Impression: 04/08/2014 - check flp;      • Fatigue [R53.83]   • Fracture of patella [S82.009A]   • Gout [M10.9]   • Headache [R51]   • Hyperlipidemia [E78.5]     Overview Note:     Impression: 10/12/2015 - check flp  Impression: 03/11/2015 - check flp  Impression: 11/03/2014 - check flp  Impression: 08/11/2014 - refill atorvastatin- check lab from hosp.  Impression: 07/22/2014 - update flp  Impression: 04/08/2014 - check flp;   Impression: 10/12/2015 - check flp  Impression: 03/11/2015 - check flp  Impression: 11/03/2014 - check flp  Impression: 08/11/2014 - refill atorvastatin- check lab from hosp.  Impression: 07/22/2014 - update flp  Impression: 04/08/2014 - check flp;      • Hypertension [I10]     Overview Note:     Impression: 03/03/2016 - bp is good  Impression: 10/12/2015 - bp is better with recheck  Impression: 06/16/2015 - bp is good  Impression: 03/11/2015 - bp is good today  Impression: 11/03/2014 - bp is good today- recently reduced bp med due to low bp and reduced diuretic due to impairing kidney function  Impression: 08/11/2014 - bp is good  Impression: 07/22/2014 - bp is good  Impression: 04/08/2014 - bp is good;   Impression: 03/03/2016 - bp is good  Impression: 10/12/2015 - bp is better with recheck  Impression: 06/16/2015 - bp is good  Impression: 03/11/2015 - bp is good today  Impression: 11/03/2014 - bp is good today- recently reduced bp med due to low bp and reduced diuretic due to impairing kidney function  Impression: 08/11/2014 - bp is good   Impression: 07/22/2014 - bp is good  Impression: 04/08/2014 - bp is good;      • Influenza due to influenza A virus [J10.1]     Overview Note:     Impression: 03/02/2015 - rapid influenza screen pos for influenza a  given o2 sat 86% in office, prior h/o pneumonia and baseline asthma now associated with high fever and confusioin I would recommend she be referred to hospital for further treatment.  Dr Lambert on call- discussed with him and will refer to admission;   Impression: 03/02/2015 - rapid influenza screen pos for influenza a  given o2 sat 86% in office, prior h/o pneumonia and baseline asthma now associated with high fever and confusioin I would recommend she be referred to hospital for further treatment.  Dr Lambert on call- discussed with him and will refer to admission;      • Insomnia [G47.00]   • Leukocytosis [D72.829]   • Macrocytosis [D75.89]     Overview Note:     Impression: 11/03/2014 - check b12 levels; Description: on ppi and carafate     • Macular degeneration [H35.30]   • Menopause present [Z78.0]   • Night sweats [R61]     Overview Note:     Impression: 07/22/2014 - has uti (rx provided)  culture obtained;   Impression: 07/22/2014 - has uti (rx provided)  culture obtained;      • Obstructive sleep apnea syndrome [G47.33]     Overview Note:     Impression: 03/03/2016 - significant improvement with new mask, cpap    continue current therapy.  Impression: 01/13/2016 - Faxed respiratory therapy order to [facility]. They will send order with recommendations based on evaluation.;   Impression: 03/03/2016 - significant improvement with new mask, cpap    continue current therapy.  Impression: 01/13/2016 - Faxed respiratory therapy order to [facility]. They will send order with recommendations based on evaluation.;      • Osteoporosis [M81.0]     Overview Note:     Description: 8/5  Description: 8/5     • Peripheral neuropathy [G62.9]   • Pneumonia [J18.9]     Overview Note:     Impression: 03/03/2016 -  recurrent x 5, has had swallowing study and is employing techniques advised, using symbicort  Impression: 01/13/2016 - Recheck chest xray. Completed doxycycline and steroid taper.;   Impression: 03/03/2016 - recurrent x 5, has had swallowing study and is employing techniques advised, using symbicort  Impression: 01/13/2016 - Recheck chest xray. Completed doxycycline and steroid taper.;      • Seizure disorder [G40.909]   • Sick sinus syndrome [I49.5]     Overview Note:     Description: 1/15- [employer] pacemaker interrogation     • Sinus bradycardia [R00.1]     Overview Note:     Description: [facility] eval 7/10, s/p pacemaker 7/14 [facility]  Description: [facility] eval 7/10, s/p pacemaker 7/14 [facility]av[facility]     • Thrombophlebitis [I80.9]     Overview Note:     Description: superficial  Description: superficial     • Cobalamin deficiency [E53.8]     Overview Note:     Impression: 10/12/2015 - normal levels last check  Impression: 03/11/2015 - vitamin b12 levels;   Impression: 10/12/2015 - normal levels last check  Impression: 03/11/2015 - vitamin b12 levels;      • Vitamin D deficiency [E55.9]     Overview Note:     Impression: 03/11/2015 - check vitamin D levels  Impression: 11/03/2014 - update vitamin d levels- daughter just restarted supplement  Impression: 04/08/2014 - check vitamin D levels;   Impression: 03/11/2015 - check vitamin D levels  Impression: 11/03/2014 - update vitamin d levels- daughter just restarted supplement  Impression: 04/08/2014 - check vitamin D levels;        Review of Systems   Constitutional: Negative for activity change, appetite change, chills, diaphoresis, fatigue, fever and unexpected weight change.   HENT: Negative for congestion, dental problem, drooling, ear discharge, ear pain, facial swelling, hearing loss, mouth sores, nosebleeds, postnasal drip, rhinorrhea, sinus pressure, sneezing, sore throat, tinnitus, trouble swallowing and voice change.    Eyes: Negative for photophobia, pain,  discharge, redness, itching and visual disturbance.   Respiratory: Positive for cough. Negative for apnea, choking, chest tightness, shortness of breath, wheezing and stridor.    Cardiovascular: Negative for chest pain, palpitations and leg swelling.   Gastrointestinal: Negative for abdominal distention, abdominal pain, anal bleeding, blood in stool, constipation, diarrhea, nausea, rectal pain and vomiting.        Colostomy      Endocrine: Negative for cold intolerance, heat intolerance, polydipsia, polyphagia and polyuria.   Genitourinary: Negative for decreased urine volume, difficulty urinating, dysuria, enuresis, flank pain, frequency, genital sores, hematuria and urgency.   Musculoskeletal: Positive for arthralgias and myalgias. Negative for back pain, gait problem, joint swelling, neck pain and neck stiffness.   Skin: Negative for color change, pallor, rash and wound.   Allergic/Immunologic: Negative for environmental allergies, food allergies and immunocompromised state.   Neurological: Negative for dizziness, tremors, seizures, syncope, facial asymmetry, speech difficulty, weakness, light-headedness, numbness and headaches.   Hematological: Negative for adenopathy. Does not bruise/bleed easily.   Psychiatric/Behavioral: Negative for agitation, behavioral problems, confusion, decreased concentration, dysphoric mood, hallucinations, self-injury, sleep disturbance and suicidal ideas. The patient is nervous/anxious. The patient is not hyperactive.      Social History     Social History   • Marital status:      Spouse name: N/A   • Number of children: 2   • Years of education: N/A     Occupational History   • Not on file.     Social History Main Topics   • Smoking status: Never Smoker   • Smokeless tobacco: Never Used   • Alcohol use No   • Drug use: No   • Sexual activity: Defer     Other Topics Concern   • Not on file     Social History Narrative    The patient lives with her children.  Requires help  "with all ADL's and prepare meals, grocery shopping,  medications.     Family History   Problem Relation Age of Onset   • Heart disease Father    • Heart disease Paternal Grandmother    • Heart disease Paternal Grandfather    • Cancer Mother    • Stomach cancer Other      Family history     /64  Pulse 75  Ht 67\" (170.2 cm)  Wt 206 lb (93.4 kg)  SpO2 95%  BMI 32.26 kg/m2  Physical Exam   Constitutional: She is oriented to person, place, and time. She appears well-developed and well-nourished.   HENT:   Head: Normocephalic and atraumatic.   Nose: Nose normal.   Mouth/Throat: Oropharynx is clear and moist.   Eyes: Conjunctivae, EOM and lids are normal. Pupils are equal, round, and reactive to light.   Neck: Trachea normal and normal range of motion. Neck supple. Carotid bruit is not present. No tracheal deviation present. No thyroid mass and no thyromegaly present.   Cardiovascular: Normal rate, regular rhythm, normal heart sounds and intact distal pulses.  Exam reveals no gallop and no friction rub.    No murmur heard.  Pulmonary/Chest: Effort normal and breath sounds normal. No respiratory distress. She has no wheezes.   Musculoskeletal: Normal range of motion. She exhibits edema (2+ edema legs ). She exhibits no deformity.   Lymphadenopathy:     She has no cervical adenopathy.   Neurological: She is alert and oriented to person, place, and time. She has normal reflexes. She displays normal reflexes. No cranial nerve deficit.   Skin: Skin is warm and dry. No rash noted. No cyanosis or erythema. Nails show no clubbing.   Psychiatric: She has a normal mood and affect. Her speech is normal and behavior is normal. Judgment and thought content normal. Cognition and memory are normal.   Nursing note and vitals reviewed.    Results for orders placed or performed during the hospital encounter of 08/05/17   Urine Culture   Result Value Ref Range    Urine Culture No growth at 2 days    Blood Culture   Result Value " Ref Range    Blood Culture No growth at 5 days    Blood Culture   Result Value Ref Range    Blood Culture No growth at 5 days    Comprehensive Metabolic Panel   Result Value Ref Range    Glucose 136 (H) 70 - 100 mg/dL    BUN 20 9 - 23 mg/dL    Creatinine 1.30 0.60 - 1.30 mg/dL    Sodium 136 132 - 146 mmol/L    Potassium 4.4 3.5 - 5.5 mmol/L    Chloride 95 (L) 99 - 109 mmol/L    CO2 34.0 (H) 20.0 - 31.0 mmol/L    Calcium 9.9 8.7 - 10.4 mg/dL    Total Protein 7.1 5.7 - 8.2 g/dL    Albumin 4.00 3.20 - 4.80 g/dL    ALT (SGPT) 96 (H) 7 - 40 U/L    AST (SGOT) 28 0 - 33 U/L    Alkaline Phosphatase 232 (H) 25 - 100 U/L    Total Bilirubin 0.7 0.3 - 1.2 mg/dL    eGFR Non African Amer 39 (L) >60 mL/min/1.73    Globulin 3.1 gm/dL    A/G Ratio 1.3 (L) 1.5 - 2.5 g/dL    BUN/Creatinine Ratio 15.4 7.0 - 25.0    Anion Gap 7.0 3.0 - 11.0 mmol/L   Lipase   Result Value Ref Range    Lipase 30 6 - 51 U/L   Urinalysis With / Culture If Indicated   Result Value Ref Range    Color, UA Dark Yellow (A) Yellow, Straw    Appearance, UA Cloudy (A) Clear    pH, UA 6.0 5.0 - 8.0    Specific Gravity, UA 1.021 1.001 - 1.030    Glucose, UA Negative Negative    Ketones, UA Trace (A) Negative    Bilirubin, UA Negative Negative    Blood, UA Small (1+) (A) Negative    Protein, UA Trace (A) Negative    Leuk Esterase, UA Large (3+) (A) Negative    Nitrite, UA Negative Negative    Urobilinogen, UA 0.2 E.U./dL 0.2 - 1.0 E.U./dL   CBC Auto Differential   Result Value Ref Range    WBC 22.63 (H) 3.50 - 10.80 10*3/mm3    RBC 3.99 3.89 - 5.14 10*6/mm3    Hemoglobin 12.3 11.5 - 15.5 g/dL    Hematocrit 39.3 34.5 - 44.0 %    MCV 98.5 80.0 - 99.0 fL    MCH 30.8 27.0 - 31.0 pg    MCHC 31.3 (L) 32.0 - 36.0 g/dL    RDW 15.5 (H) 11.3 - 14.5 %    RDW-SD 55.9 (H) 37.0 - 54.0 fl    MPV 9.8 6.0 - 12.0 fL    Platelets 418 150 - 450 10*3/mm3    Neutrophil % 74.0 (H) 41.0 - 71.0 %    Lymphocyte % 16.0 (L) 24.0 - 44.0 %    Monocyte % 7.9 0.0 - 12.0 %    Eosinophil % 1.1 0.0 -  3.0 %    Basophil % 0.1 0.0 - 1.0 %    Immature Grans % 0.9 (H) 0.0 - 0.6 %    Neutrophils, Absolute 16.75 (H) 1.50 - 8.30 10*3/mm3    Lymphocytes, Absolute 3.63 0.60 - 4.80 10*3/mm3    Monocytes, Absolute 1.78 (H) 0.00 - 1.00 10*3/mm3    Eosinophils, Absolute 0.24 0.00 - 0.30 10*3/mm3    Basophils, Absolute 0.02 0.00 - 0.20 10*3/mm3    Immature Grans, Absolute 0.21 (H) 0.00 - 0.03 10*3/mm3   Urinalysis, Microscopic Only   Result Value Ref Range    RBC, UA 0-2 None Seen, 0-2 /HPF    WBC, UA Too Numerous to Count (A) None Seen /HPF    Bacteria, UA None Seen None Seen, Trace /HPF    Squamous Epithelial Cells, UA None Seen None Seen, 0-2 /HPF    Hyaline Casts, UA 0-6 0 - 6 /LPF    Methodology Automated Microscopy    Lactic Acid, Plasma   Result Value Ref Range    Lactate 1.0 0.5 - 2.0 mmol/L   Basic Metabolic Panel   Result Value Ref Range    Glucose 107 (H) 70 - 100 mg/dL    BUN 12 9 - 23 mg/dL    Creatinine 1.10 0.60 - 1.30 mg/dL    Sodium 137 132 - 146 mmol/L    Potassium 4.0 3.5 - 5.5 mmol/L    Chloride 100 99 - 109 mmol/L    CO2 30.0 20.0 - 31.0 mmol/L    Calcium 8.9 8.7 - 10.4 mg/dL    eGFR Non African Amer 47 (L) >60 mL/min/1.73    BUN/Creatinine Ratio 10.9 7.0 - 25.0    Anion Gap 7.0 3.0 - 11.0 mmol/L   CBC (No Diff)   Result Value Ref Range    WBC 13.42 (H) 3.50 - 10.80 10*3/mm3    RBC 3.18 (L) 3.89 - 5.14 10*6/mm3    Hemoglobin 9.7 (L) 11.5 - 15.5 g/dL    Hematocrit 31.2 (L) 34.5 - 44.0 %    MCV 98.1 80.0 - 99.0 fL    MCH 30.5 27.0 - 31.0 pg    MCHC 31.1 (L) 32.0 - 36.0 g/dL    RDW 15.4 (H) 11.3 - 14.5 %    RDW-SD 55.2 (H) 37.0 - 54.0 fl    MPV 10.2 6.0 - 12.0 fL    Platelets 351 150 - 450 10*3/mm3   Hemoglobin & Hematocrit, Blood   Result Value Ref Range    Hemoglobin 10.0 (L) 11.5 - 15.5 g/dL    Hematocrit 33.9 (L) 34.5 - 44.0 %   Basic Metabolic Panel   Result Value Ref Range    Glucose 139 (H) 70 - 100 mg/dL    BUN 11 9 - 23 mg/dL    Creatinine 0.90 0.60 - 1.30 mg/dL    Sodium 136 132 - 146 mmol/L     Potassium 5.1 3.5 - 5.5 mmol/L    Chloride 100 99 - 109 mmol/L    CO2 30.0 20.0 - 31.0 mmol/L    Calcium 8.9 8.7 - 10.4 mg/dL    eGFR Non African Amer 59 (L) >60 mL/min/1.73    BUN/Creatinine Ratio 12.2 7.0 - 25.0    Anion Gap 6.0 3.0 - 11.0 mmol/L   Magnesium   Result Value Ref Range    Magnesium 2.4 1.3 - 2.7 mg/dL   CBC Auto Differential   Result Value Ref Range    WBC 12.38 (H) 3.50 - 10.80 10*3/mm3    RBC 3.16 (L) 3.89 - 5.14 10*6/mm3    Hemoglobin 9.6 (L) 11.5 - 15.5 g/dL    Hematocrit 31.1 (L) 34.5 - 44.0 %    MCV 98.4 80.0 - 99.0 fL    MCH 30.4 27.0 - 31.0 pg    MCHC 30.9 (L) 32.0 - 36.0 g/dL    RDW 15.0 (H) 11.3 - 14.5 %    RDW-SD 54.2 (H) 37.0 - 54.0 fl    MPV 10.3 6.0 - 12.0 fL    Platelets 344 150 - 450 10*3/mm3    Neutrophil % 85.1 (H) 41.0 - 71.0 %    Lymphocyte % 9.3 (L) 24.0 - 44.0 %    Monocyte % 5.2 0.0 - 12.0 %    Eosinophil % 0.0 0.0 - 3.0 %    Basophil % 0.0 0.0 - 1.0 %    Immature Grans % 0.4 0.0 - 0.6 %    Neutrophils, Absolute 10.54 (H) 1.50 - 8.30 10*3/mm3    Lymphocytes, Absolute 1.15 0.60 - 4.80 10*3/mm3    Monocytes, Absolute 0.64 0.00 - 1.00 10*3/mm3    Eosinophils, Absolute 0.00 0.00 - 0.30 10*3/mm3    Basophils, Absolute 0.00 0.00 - 0.20 10*3/mm3    Immature Grans, Absolute 0.05 (H) 0.00 - 0.03 10*3/mm3   Basic Metabolic Panel   Result Value Ref Range    Glucose 141 (H) 70 - 100 mg/dL    BUN 9 9 - 23 mg/dL    Creatinine 1.20 0.60 - 1.30 mg/dL    Sodium 135 132 - 146 mmol/L    Potassium 5.0 3.5 - 5.5 mmol/L    Chloride 101 99 - 109 mmol/L    CO2 27.0 20.0 - 31.0 mmol/L    Calcium 9.7 8.7 - 10.4 mg/dL    eGFR Non African Amer 42 (L) >60 mL/min/1.73    BUN/Creatinine Ratio 7.5 7.0 - 25.0    Anion Gap 7.0 3.0 - 11.0 mmol/L   BNP   Result Value Ref Range    .0 (H) 0.0 - 100.0 pg/mL   CBC (No Diff)   Result Value Ref Range    WBC 15.82 (H) 3.50 - 10.80 10*3/mm3    RBC 3.52 (L) 3.89 - 5.14 10*6/mm3    Hemoglobin 10.7 (L) 11.5 - 15.5 g/dL    Hematocrit 35.6 34.5 - 44.0 %    .1  (H) 80.0 - 99.0 fL    MCH 30.4 27.0 - 31.0 pg    MCHC 30.1 (L) 32.0 - 36.0 g/dL    RDW 15.3 (H) 11.3 - 14.5 %    RDW-SD 56.7 (H) 37.0 - 54.0 fl    MPV 10.3 6.0 - 12.0 fL    Platelets 401 150 - 450 10*3/mm3   Basic Metabolic Panel   Result Value Ref Range    Glucose 112 (H) 70 - 100 mg/dL    BUN 13 9 - 23 mg/dL    Creatinine 1.10 0.60 - 1.30 mg/dL    Sodium 137 132 - 146 mmol/L    Potassium 4.6 3.5 - 5.5 mmol/L    Chloride 99 99 - 109 mmol/L    CO2 34.0 (H) 20.0 - 31.0 mmol/L    Calcium 9.7 8.7 - 10.4 mg/dL    eGFR Non African Amer 47 (L) >60 mL/min/1.73    BUN/Creatinine Ratio 11.8 7.0 - 25.0    Anion Gap 4.0 3.0 - 11.0 mmol/L   Basic Metabolic Panel   Result Value Ref Range    Glucose 117 (H) 70 - 100 mg/dL    BUN 11 9 - 23 mg/dL    Creatinine 0.80 0.60 - 1.30 mg/dL    Sodium 137 132 - 146 mmol/L    Potassium 4.1 3.5 - 5.5 mmol/L    Chloride 96 (L) 99 - 109 mmol/L    CO2 34.0 (H) 20.0 - 31.0 mmol/L    Calcium 9.3 8.7 - 10.4 mg/dL    eGFR Non African Amer 68 >60 mL/min/1.73    BUN/Creatinine Ratio 13.8 7.0 - 25.0    Anion Gap 7.0 3.0 - 11.0 mmol/L   CBC Auto Differential   Result Value Ref Range    WBC 14.69 (H) 3.50 - 10.80 10*3/mm3    RBC 3.44 (L) 3.89 - 5.14 10*6/mm3    Hemoglobin 10.6 (L) 11.5 - 15.5 g/dL    Hematocrit 34.7 34.5 - 44.0 %    .9 (H) 80.0 - 99.0 fL    MCH 30.8 27.0 - 31.0 pg    MCHC 30.5 (L) 32.0 - 36.0 g/dL    RDW 15.2 (H) 11.3 - 14.5 %    RDW-SD 56.6 (H) 37.0 - 54.0 fl    MPV 10.3 6.0 - 12.0 fL    Platelets 378 150 - 450 10*3/mm3    Neutrophil % 74.7 (H) 41.0 - 71.0 %    Lymphocyte % 12.8 (L) 24.0 - 44.0 %    Monocyte % 10.0 0.0 - 12.0 %    Eosinophil % 1.6 0.0 - 3.0 %    Basophil % 0.1 0.0 - 1.0 %    Immature Grans % 0.8 (H) 0.0 - 0.6 %    Neutrophils, Absolute 10.96 (H) 1.50 - 8.30 10*3/mm3    Lymphocytes, Absolute 1.88 0.60 - 4.80 10*3/mm3    Monocytes, Absolute 1.47 (H) 0.00 - 1.00 10*3/mm3    Eosinophils, Absolute 0.24 0.00 - 0.30 10*3/mm3    Basophils, Absolute 0.02 0.00 - 0.20  10*3/mm3    Immature Grans, Absolute 0.12 (H) 0.00 - 0.03 10*3/mm3   BNP   Result Value Ref Range    .0 (H) 0.0 - 100.0 pg/mL   Magnesium   Result Value Ref Range    Magnesium 2.1 1.3 - 2.7 mg/dL   Basic Metabolic Panel   Result Value Ref Range    Glucose 139 (H) 70 - 100 mg/dL    BUN 11 9 - 23 mg/dL    Creatinine 0.80 0.60 - 1.30 mg/dL    Sodium 136 132 - 146 mmol/L    Potassium 4.1 3.5 - 5.5 mmol/L    Chloride 95 (L) 99 - 109 mmol/L    CO2 39.0 (H) 20.0 - 31.0 mmol/L    Calcium 9.6 8.7 - 10.4 mg/dL    eGFR Non African Amer 68 >60 mL/min/1.73    BUN/Creatinine Ratio 13.8 7.0 - 25.0    Anion Gap 2.0 (L) 3.0 - 11.0 mmol/L   Phosphorus   Result Value Ref Range    Phosphorus 2.5 2.4 - 5.1 mg/dL   CBC Auto Differential   Result Value Ref Range    WBC 18.66 (H) 3.50 - 10.80 10*3/mm3    RBC 3.66 (L) 3.89 - 5.14 10*6/mm3    Hemoglobin 11.0 (L) 11.5 - 15.5 g/dL    Hematocrit 36.5 34.5 - 44.0 %    MCV 99.7 (H) 80.0 - 99.0 fL    MCH 30.1 27.0 - 31.0 pg    MCHC 30.1 (L) 32.0 - 36.0 g/dL    RDW 15.1 (H) 11.3 - 14.5 %    RDW-SD 55.0 (H) 37.0 - 54.0 fl    MPV 10.0 6.0 - 12.0 fL    Platelets 395 150 - 450 10*3/mm3    Neutrophil % 75.7 (H) 41.0 - 71.0 %    Lymphocyte % 12.8 (L) 24.0 - 44.0 %    Monocyte % 8.8 0.0 - 12.0 %    Eosinophil % 1.7 0.0 - 3.0 %    Basophil % 0.2 0.0 - 1.0 %    Immature Grans % 0.8 (H) 0.0 - 0.6 %    Neutrophils, Absolute 14.13 (H) 1.50 - 8.30 10*3/mm3    Lymphocytes, Absolute 2.39 0.60 - 4.80 10*3/mm3    Monocytes, Absolute 1.65 (H) 0.00 - 1.00 10*3/mm3    Eosinophils, Absolute 0.32 (H) 0.00 - 0.30 10*3/mm3    Basophils, Absolute 0.03 0.00 - 0.20 10*3/mm3    Immature Grans, Absolute 0.14 (H) 0.00 - 0.03 10*3/mm3   Vancomycin, Trough   Result Value Ref Range    Vancomycin Trough 18.50 10.00 - 20.00 mcg/mL   Basic Metabolic Panel   Result Value Ref Range    Glucose 114 (H) 70 - 100 mg/dL    BUN 11 9 - 23 mg/dL    Creatinine 0.80 0.60 - 1.30 mg/dL    Sodium 138 132 - 146 mmol/L    Potassium 4.2 3.5 -  5.5 mmol/L    Chloride 96 (L) 99 - 109 mmol/L    CO2 37.0 (H) 20.0 - 31.0 mmol/L    Calcium 9.5 8.7 - 10.4 mg/dL    eGFR Non African Amer 68 >60 mL/min/1.73    BUN/Creatinine Ratio 13.8 7.0 - 25.0    Anion Gap 5.0 3.0 - 11.0 mmol/L   CBC Auto Differential   Result Value Ref Range    WBC 12.78 (H) 3.50 - 10.80 10*3/mm3    RBC 3.38 (L) 3.89 - 5.14 10*6/mm3    Hemoglobin 10.2 (L) 11.5 - 15.5 g/dL    Hematocrit 33.1 (L) 34.5 - 44.0 %    MCV 97.9 80.0 - 99.0 fL    MCH 30.2 27.0 - 31.0 pg    MCHC 30.8 (L) 32.0 - 36.0 g/dL    RDW 14.8 (H) 11.3 - 14.5 %    RDW-SD 52.8 37.0 - 54.0 fl    MPV 9.7 6.0 - 12.0 fL    Platelets 399 150 - 450 10*3/mm3    Neutrophil % 65.4 41.0 - 71.0 %    Lymphocyte % 18.9 (L) 24.0 - 44.0 %    Monocyte % 11.0 0.0 - 12.0 %    Eosinophil % 3.4 (H) 0.0 - 3.0 %    Basophil % 0.4 0.0 - 1.0 %    Immature Grans % 0.9 (H) 0.0 - 0.6 %    Neutrophils, Absolute 8.36 (H) 1.50 - 8.30 10*3/mm3    Lymphocytes, Absolute 2.41 0.60 - 4.80 10*3/mm3    Monocytes, Absolute 1.41 (H) 0.00 - 1.00 10*3/mm3    Eosinophils, Absolute 0.44 (H) 0.00 - 0.30 10*3/mm3    Basophils, Absolute 0.05 0.00 - 0.20 10*3/mm3    Immature Grans, Absolute 0.11 (H) 0.00 - 0.03 10*3/mm3   Comprehensive Metabolic Panel   Result Value Ref Range    Glucose 108 (H) 70 - 100 mg/dL    BUN 13 9 - 23 mg/dL    Creatinine 0.90 0.60 - 1.30 mg/dL    Sodium 140 132 - 146 mmol/L    Potassium 4.0 3.5 - 5.5 mmol/L    Chloride 99 99 - 109 mmol/L    CO2 38.0 (H) 20.0 - 31.0 mmol/L    Calcium 9.5 8.7 - 10.4 mg/dL    Total Protein 5.2 (L) 5.7 - 8.2 g/dL    Albumin 3.00 (L) 3.20 - 4.80 g/dL    ALT (SGPT) 42 (H) 7 - 40 U/L    AST (SGOT) 37 (H) 0 - 33 U/L    Alkaline Phosphatase 157 (H) 25 - 100 U/L    Total Bilirubin 0.3 0.3 - 1.2 mg/dL    eGFR Non African Amer 59 (L) >60 mL/min/1.73    Globulin 2.2 gm/dL    A/G Ratio 1.4 (L) 1.5 - 2.5 g/dL    BUN/Creatinine Ratio 14.4 7.0 - 25.0    Anion Gap 3.0 3.0 - 11.0 mmol/L   Procalcitonin   Result Value Ref Range     Procalcitonin 0.06 ng/mL   Magnesium   Result Value Ref Range    Magnesium 2.2 1.3 - 2.7 mg/dL   CBC Auto Differential   Result Value Ref Range    WBC 8.83 3.50 - 10.80 10*3/mm3    RBC 3.36 (L) 3.89 - 5.14 10*6/mm3    Hemoglobin 10.0 (L) 11.5 - 15.5 g/dL    Hematocrit 32.9 (L) 34.5 - 44.0 %    MCV 97.9 80.0 - 99.0 fL    MCH 29.8 27.0 - 31.0 pg    MCHC 30.4 (L) 32.0 - 36.0 g/dL    RDW 15.1 (H) 11.3 - 14.5 %    RDW-SD 54.1 (H) 37.0 - 54.0 fl    MPV 9.7 6.0 - 12.0 fL    Platelets 402 150 - 450 10*3/mm3    Neutrophil % 50.2 41.0 - 71.0 %    Lymphocyte % 30.6 24.0 - 44.0 %    Monocyte % 11.3 0.0 - 12.0 %    Eosinophil % 6.5 (H) 0.0 - 3.0 %    Basophil % 0.7 0.0 - 1.0 %    Immature Grans % 0.7 (H) 0.0 - 0.6 %    Neutrophils, Absolute 4.44 1.50 - 8.30 10*3/mm3    Lymphocytes, Absolute 2.70 0.60 - 4.80 10*3/mm3    Monocytes, Absolute 1.00 0.00 - 1.00 10*3/mm3    Eosinophils, Absolute 0.57 (H) 0.00 - 0.30 10*3/mm3    Basophils, Absolute 0.06 0.00 - 0.20 10*3/mm3    Immature Grans, Absolute 0.06 (H) 0.00 - 0.03 10*3/mm3   POC Troponin, Rapid   Result Value Ref Range    Troponin I 0.01 0.00 - 0.07 ng/mL   POC Glucose Fingerstick   Result Value Ref Range    Glucose 123 70 - 130 mg/dL   POC Glucose Fingerstick   Result Value Ref Range    Glucose 130 70 - 130 mg/dL   POC Glucose Fingerstick   Result Value Ref Range    Glucose 111 70 - 130 mg/dL   POC Glucose Fingerstick   Result Value Ref Range    Glucose 152 (H) 70 - 130 mg/dL   POC Glucose Fingerstick   Result Value Ref Range    Glucose 113 70 - 130 mg/dL   POC Glucose Fingerstick   Result Value Ref Range    Glucose 132 (H) 70 - 130 mg/dL   POC Glucose Fingerstick   Result Value Ref Range    Glucose 210 (H) 70 - 130 mg/dL   POC Glucose Fingerstick   Result Value Ref Range    Glucose 136 (H) 70 - 130 mg/dL   POC Glucose Fingerstick   Result Value Ref Range    Glucose 147 (H) 70 - 130 mg/dL   POC Glucose Fingerstick   Result Value Ref Range    Glucose 137 (H) 70 - 130 mg/dL   POC  Glucose Fingerstick   Result Value Ref Range    Glucose 169 (H) 70 - 130 mg/dL   POC Glucose Fingerstick   Result Value Ref Range    Glucose 104 70 - 130 mg/dL   POC Glucose Fingerstick   Result Value Ref Range    Glucose 116 70 - 130 mg/dL   POC Glucose Fingerstick   Result Value Ref Range    Glucose 135 (H) 70 - 130 mg/dL   POC Glucose Fingerstick   Result Value Ref Range    Glucose 125 70 - 130 mg/dL   POC Glucose Fingerstick   Result Value Ref Range    Glucose 121 70 - 130 mg/dL   POC Glucose Fingerstick   Result Value Ref Range    Glucose 126 70 - 130 mg/dL   POC Glucose Fingerstick   Result Value Ref Range    Glucose 123 70 - 130 mg/dL   POC Glucose Fingerstick   Result Value Ref Range    Glucose 145 (H) 70 - 130 mg/dL   POC Glucose Fingerstick   Result Value Ref Range    Glucose 135 (H) 70 - 130 mg/dL   POC Glucose Fingerstick   Result Value Ref Range    Glucose 146 (H) 70 - 130 mg/dL   POC Glucose Fingerstick   Result Value Ref Range    Glucose 125 70 - 130 mg/dL   POC Glucose Fingerstick   Result Value Ref Range    Glucose 127 70 - 130 mg/dL   POC Glucose Fingerstick   Result Value Ref Range    Glucose 109 70 - 130 mg/dL   POC Glucose Fingerstick   Result Value Ref Range    Glucose 120 70 - 130 mg/dL   POC Glucose Fingerstick   Result Value Ref Range    Glucose 161 (H) 70 - 130 mg/dL   POC Glucose Fingerstick   Result Value Ref Range    Glucose 106 70 - 130 mg/dL   POC Glucose Fingerstick   Result Value Ref Range    Glucose 113 70 - 130 mg/dL   POC Glucose Fingerstick   Result Value Ref Range    Glucose 119 70 - 130 mg/dL   POC Glucose Fingerstick   Result Value Ref Range    Glucose 131 (H) 70 - 130 mg/dL   POC Glucose Fingerstick   Result Value Ref Range    Glucose 127 70 - 130 mg/dL   POC Glucose Fingerstick   Result Value Ref Range    Glucose 115 70 - 130 mg/dL   POC Glucose Fingerstick   Result Value Ref Range    Glucose 104 70 - 130 mg/dL   POC Glucose Fingerstick   Result Value Ref Range    Glucose  122 70 - 130 mg/dL   POC Glucose Fingerstick   Result Value Ref Range    Glucose 99 70 - 130 mg/dL   POC Glucose Fingerstick   Result Value Ref Range    Glucose 98 70 - 130 mg/dL   POC Glucose Fingerstick   Result Value Ref Range    Glucose 102 70 - 130 mg/dL   Light Blue Top   Result Value Ref Range    Extra Tube hold for add-on    Green Top (Gel)   Result Value Ref Range    Extra Tube Hold for add-ons.    Lavender Top   Result Value Ref Range    Extra Tube hold for add-on    Gold Top - SST   Result Value Ref Range    Extra Tube Hold for add-ons.      Problem List Items Addressed This Visit        Cardiovascular and Mediastinum    Hypertension     bp is good today             Respiratory    Chronic obstructive pulmonary disease - Primary     Due to asthma- continue to take medications   Refer pulmonary  Using symbicort and nebulizer  tob exposure at home (she is non smoker)  Recurrent bouts of pneumonia   Recent discovery of colovesicular fistula  Has colostomy now   May have had recurrent subclinical sepsis related to this seeding lungs  Denies aspiration or assoc cough with food and has had neg swallowing study in past  Is getting therapy to help with strengthening            Relevant Medications    HYDROcod Polst-CPM Polst ER (TUSSIONEX PENNKINETIC) 10-8 MG/5ML ER suspension    Other Relevant Orders    Ambulatory Referral to Pulmonology    Cough    Recurrent pneumonia     See above   Daughter helps manage at home  Uses tussionex prn for coughing bouts worse at night          Relevant Medications    HYDROcod Polst-CPM Polst ER (TUSSIONEX PENNKINETIC) 10-8 MG/5ML ER suspension       Nervous and Auditory    Seizure disorder     On keppra and clonazepam bid  No seizure on this regimen per daughter          Relevant Medications    clonazePAM (KlonoPIN) 1 MG tablet       Genitourinary    Complicated UTI (urinary tract infection)     With fistula  S/p colostomy               Other    Edema     Daughter planning to go  back to daily diuretic - has had a lot of swelling with every other day              Return in about 3 months (around 11/21/2017).    Kenia Addison MA  Signed Carol Herzog MD

## 2017-09-01 NOTE — OUTREACH NOTE
JAYSON call completed.  Please refer to TCM call flowsheet for call documentation.  Patient would like to know if there is a cheaper place to buy [employer] brand colostomy supplies.  10 bags cost 43 dollars.  Please call Mrs. Contreras with the information.  Thank you.

## 2017-09-07 NOTE — OUTREACH NOTE
"Current Barriers & Concerns:    Pt and son have limited knowledge of colostomy care; participated in teaching at [facility].   The main concerns and/or symptoms the patient would like to address are: Pt wants to \"build up strength\" by increasing physical activity as tolerated.     Education/instruction provided by Care Coordinator:  Supported pt & son to attend appointment with stoma nurse as ordered by Dr Gordon (pt declined home health service).    Follow Up Outreach Due:  3 weeks  "

## 2017-09-07 NOTE — PROGRESS NOTES
"Avis FLOWER Magen 88 y.o.  [facility]:Follow-up (colovaginal fistula postop ); Hyperlipidemia; Hypertension; and Urinary Tract Infection      [place]: Follow-up (colovaginal fistula postop ); Hyperlipidemia; Hypertension; and Urinary Tract Infection  bp is good   Was hospitalized for recurrent urinary tract infection with sepsis   Was found to have fistula s/p repair  Also is supposed to schedule appt with Dr Osuna- toro appt for cystoscopy 9/25/17  Reviewed CAT scan with her  bp is good   Appetite is good overall  Her son is changing colostomy site  Breathing is good overall- no congestion or productive cough  Oxygen saturation 93%  Had severe leg swelling- better on daily lasix   Has f/u appt 11/28/17 at 1:15 pm reviewed with her   she is following low fat diet   She is testing sugars and they have been not been severely elevated.      Allergies   Allergen Reactions   • Clarithromycin Anaphylaxis   • Macrobid [Nitrofurantoin Monohyd Macro] Swelling     Flash pulmonary edema   • Vioxx [Rofecoxib] Other (See Comments)     Affects kidney function   • Metoprolol Other (See Comments)     Severe hypotension   • Statins Myalgia     Weakness    • Tramadol Hallucinations     \"crazy out of her head\"       Current Outpatient Prescriptions:   •  furosemide (LASIX) 40 MG tablet, Take 40 mg by mouth Daily., Disp: , Rfl:   •  albuterol (PROVENTIL) (2.5 MG/3ML) 0.083% nebulizer solution, Take 2.5 mg by nebulization Every 6 (Six) Hours As Needed for wheezing., Disp: 120 vial, Rfl: 4  •  Albuterol Sulfate (VENTOLIN HFA IN), Inhale as needed., Disp: , Rfl:   •  allopurinol (ZYLOPRIM) 100 MG tablet, Take 1 tablet by mouth Daily. (Patient taking differently: Take 100 mg by mouth 2 (Two) Times a Day.), Disp: 180 tablet, Rfl: 0  •  amLODIPine (NORVASC) 5 MG tablet, Take 1 tablet by mouth Daily., Disp: 30 tablet, Rfl: 0  •  aspirin 81 MG tablet, Take 81 mg by mouth Daily. In evening, Disp: , Rfl:   •  benzonatate (TESSALON) 100 MG capsule, Take 1 " capsule by mouth 3 (Three) Times a Day As Needed for Cough., Disp: , Rfl: 0  •  Cholecalciferol (VITAMIN D3) 5000 UNITS tablet, Take 1 tablet by mouth daily., Disp: , Rfl:   •  clonazePAM (KlonoPIN) 1 MG tablet, Take 1 tablet by mouth 2 (Two) Times a Day. For seizure disorder, Disp: 60 tablet, Rfl: 3  •  colchicine 0.6 MG tablet, Take 1 tablet by mouth Daily. (Patient taking differently: Take 0.6 mg by mouth Daily As Needed (gout flare).), Disp: 30 tablet, Rfl: 2  •  colestipol (COLESTID) 1 G tablet, Take 1 g by mouth Daily As Needed (loose stools). As needed, Disp: , Rfl:   •  dextromethorphan polistirex ER (DELSYM) 30 MG/5ML Suspension Extended Release oral suspension, Take 30 mg by mouth Every 12 (Twelve) Hours As Needed (cough)., Disp: 280 mL, Rfl:   •  diphenhydrAMINE-acetaminophen (TYLENOL PM)  MG tablet per tablet, Take 1 tablet by mouth At Night As Needed for Sleep., Disp: , Rfl:   •  estradiol (ESTRACE) 0.1 MG/GM vaginal cream, Insert 2 g into the vagina 2 (Two) Times a Week. Tuesday and Sunday, Disp: , Rfl:   •  fluocinonide (LIDEX) 0.05 % external solution, Apply  topically 2 (two) times a day. (Patient taking differently: Apply 1 application topically 2 (Two) Times a Day. Scalp - psoriasis), Disp: 60 mL, Rfl: 3  •  FLUZONE HIGH-DOSE 0.5 ML suspension prefilled syringe injection, TO BE ADMINISTERED BY PHARMACIST FOR IMMUNIZATION, Disp: , Rfl: 0  •  HYDROcod Polst-CPM Polst ER (TUSSIONEX PENNKINETIC) 10-8 MG/5ML ER suspension, Take 5 mL by mouth Every Night., Disp: 115 mL, Rfl: 0  •  levETIRAcetam (KEPPRA) 750 MG tablet, Take 1 tablet by mouth two  times daily, Disp: 180 tablet, Rfl: 1  •  lisinopril (PRINIVIL,ZESTRIL) 10 MG tablet, Take 1 tablet by mouth  daily, Disp: 90 tablet, Rfl: 1  •  Loratadine (CLARITIN) 10 MG capsule, Take 10 mg by mouth Daily As Needed (asthma)., Disp: , Rfl:   •  magnesium oxide (MAG-OX) 400 MG tablet, Take 400 mg by mouth Daily., Disp: , Rfl:   •  mirtazapine (REMERON) 15  MG tablet, Take 1 tablet by mouth Every Night., Disp: , Rfl:   •  Multiple Vitamins-Minerals (EYE HEALTH) capsule, Take 1 capsule by mouth Daily., Disp: , Rfl:   •  pantoprazole (PROTONIX) 40 MG EC tablet, Take 1 tablet by mouth  daily, Disp: 90 tablet, Rfl: 0  •  polyethylene glycol (MIRALAX) packet, Take 17 g by mouth Daily., Disp: , Rfl:   •  potassium chloride (MICRO-K) 10 MEQ CR capsule, Take 2 capsules PO daily, Disp: 180 capsule, Rfl: 1  •  PREMARIN 0.625 MG/GM vaginal cream, APPLY 0.5 G WITH THE FINGERTIP TO THE URETHRAL AND VAGINAL OPENING TWICE WEEKLY., Disp: , Rfl: 3  •  saccharomyces boulardii (FLORASTOR) 250 MG capsule, Take 1 capsule by mouth 2 (Two) Times a Day., Disp: 20 capsule, Rfl: 0  •  SYMBICORT 80-4.5 MCG/ACT inhaler, Use 1 puff twice daily (Patient taking differently: 1 puff daily), Disp: 20.4 g, Rfl: 3  •  vitamin B-12 (CYANOCOBALAMIN) 1000 MCG tablet, Take 1,000 mcg by mouth Daily., Disp: , Rfl:   Patient Active Problem List    Diagnosis   • HCAP (healthcare-associated pneumonia) [J18.9]   • GERD (gastroesophageal reflux disease) [K21.9]   • Diastolic dysfunction [I51.9]   • Type 2 diabetes mellitus [E11.9]     Assessment & Plan Note:     Blood sugars are good   Continue to monitor- no change in medications      • 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 Followed by Dr. Duque. Has been on low tabatha Cipro for suppression but last culture in June 2017 grew fluquinalone resistant E Coli and Enterococcus.        Assessment & Plan Note:     Has had recurrent UTI, found to have colovesicular fistula  Has colostomy now (Dr Gordon)  Using ostomy bag 2 x a day - concerned about cost  Discussed healthy diet  Discussed continued activity  Has cystoscopy via Dr Duque 9/25/17  F/u as scheduled      • Senile  atrophic vaginitis [N95.2]     Overview Note:     Just started on topical premarin cream by Dr. Duque     • Acute cystitis with hematuria [N30.01]   • Recurrent pneumonia [J18.9]     Assessment & Plan Note:     May be due to weakness / hematogenous spread with bactiuria  Also chronic debilitation assoc with recurrent infections      • Acute on chronic Respiratory failure. Not requiring ventilatory support [J96.90]   • Acute on Chronic kidney disease (CKD), stage III (moderate) [N18.3]     Assessment & Plan Note:     Reviewed blood work from last ov      • Exacerbation of COPD  [J44.9]   • H/O SSS (sick sinus syndrome) s/p PPM 2014 [I49.5]   • CHF (congestive heart failure) [I50.9]     Overview Note:     Hx of mild diastolic dysfunction.     • Anxiety and depression [F41.9, F32.9]   • Macular degeneration [H35.30]   • ANDRIY on CPAP [G47.33]   • Seizure disorder on Keppra [G40.909]   • HTN and possible diastolic dysfunction [I10]   • HLD (hyperlipidemia) [E78.5]   • Obesity, BMI 33.7 [E66.9]   • Arthralgia of lumbar spine [M54.5]     Overview Note:     Impression: 03/03/2016 - using walker, needs bedside table (eating in lift chair)   continue therapy with exercises- has made alot of progress and cautioned her about need to continue  Impression: 10/12/2015 - see above, same;      • Encounter for eye exam [Z01.00]   • Parasites in stool [B82.9]   • Sepsis [A41.9]     Overview Note:     Description: 10/14- hosp 24 hours- sepsis due to sinus infection     • UTI (urinary tract infection) [N39.0]     Overview Note:     Impression: 06/16/2015 - dip ua;      • Midline low back pain without sciatica [M54.5]   • Physical debility [R53.81]   • Lumbar strain [S39.012A]   • History of MI (myocardial infarction) [I25.2]   • Abnormal liver function tests [R79.89]     Overview Note:     Impression: 03/03/2016 - mild elevation- will reassess next appt  Impression: 10/12/2015 - check cmp  Impression: 03/11/2015 - update lfts   Impression: 11/03/2014 - update lfts; Description: chronic  Impression: 03/03/2016 - mild elevation- will reassess next appt  Impression: 10/12/2015 - check cmp  Impression: 03/11/2015 - update lfts  Impression: 11/03/2014 - update lfts; Description: chronic     • Acute myocardial infarction [I21.3]     Overview Note:     Description: In Hospital for UTI- 7/14- high troponin  Description: In Hospital for UTI- 7/14- high troponin     • Anxiety [F41.9]   • Knee pain [M25.569]     Overview Note:     Impression: 10/12/2015 - s/p injection - shot 3 weeks ago  try ultram- rx provided;   Impression: 03/03/2016 - using walker, needs bedside table (eating in lift chair)   continue therapy with exercises- has made alot of progress and cautioned her about need to continue  Impression: 10/12/2015 - see above, same;      • Asthma [J45.909]     Overview Note:     Impression: 03/11/2015 - refill albuterol, symbicort  rx provided for tussinex for cough  Impression: 08/11/2014 - taper prednisone as tolerated;   Impression: 03/11/2015 - refill albuterol, symbicort  rx provided for tussinex for cough  Impression: 08/11/2014 - taper prednisone as tolerated;      • Calf cramp [R25.2]     Overview Note:     Description: reileved by magnesium     • Candidal intertrigo [B37.2]     Overview Note:     Impression: 06/16/2015 - rx lotrisone provided  no help with nystatin;   Impression: 06/16/2015 - rx lotrisone provided  no help with nystatin;      • Cataract [H26.9]     Overview Note:     Description: Post cataract opacification 7/14 - opinion Dr Fuentes, referred Dr Rosado for laser     • Chest pain [R07.9]     Overview Note:     Description: normal cath 1/2     • Chronic kidney disease, stage III (moderate) [N18.3]     Overview Note:     Impression: 03/03/2016 - reassess 3-4 months- get lab hospitalization  Impression: 10/12/2015 - check cmp  Impression: 11/03/2014 - check cmp  Impression: 08/11/2014 - having lab work to f/u this on Thursday   Impression: 07/22/2014 - check cmp  Impression: 04/08/2014 - update creat.; Description: Nafisa naidu 11/5  Impression: 03/03/2016 - reassess 3-4 months- get lab hospitalization  Impression: 10/12/2015 - check cmp  Impression: 11/03/2014 - check cmp  Impression: 08/11/2014 - having lab work to f/u this on Thursday  Impression: 07/22/2014 - check cmp  Impression: 04/08/2014 - update creat.; Description: Nafisa naidu 11/5     • Chronic obstructive pulmonary disease [J44.9]     Overview Note:     Impression: 01/13/2016 - Sent in order for lifetime supply of albuterol for nebulizer.; Description: severe  Impression: 01/13/2016 - Sent in order for lifetime supply of albuterol for nebulizer.; Description: severe     • 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 Recheck chest xr to make sure resolved.  Impression: 08/11/2014 - s/p MI- f/u Dr Thompson- difficult due to kidney function; Description: Ronaldo- stress test 7/7  echo mod mr  Impression: 01/13/2016 - Mild CHF seen on cxr during admission. Recheck chest xr to make sure resolved.  Impression: 08/11/2014 - s/p MI- f/u Dr Thompson- difficult due to kidney function; Description: Ronaldo- stress test 7/7  echo mod mr     • Atherosclerosis of coronary artery [I25.10]   • Cough [R05]     Overview Note:     Impression: 03/03/2016 - refill tussionex, call if persistant - h/o recurrent pneumonia but currently clear - cough at night, discussed eating early, no late snacks, keep hob elevated, no productive cough, no fever or chills  Impression: 03/11/2015 - s/p influenza A with known chronic asthma  treated with tamiflu, ceftin and prednisone  oxygen level is low;   Impression: 03/03/2016 - refill tussionex, call if persistant - h/o recurrent pneumonia but currently clear -  cough at night, discussed eating early, no late snacks, keep hob elevated, no productive cough, no fever or chills  Impression: 03/11/2015 - s/p influenza A with known chronic asthma  treated with tamiflu, ceftin and prednisone  oxygen level is low;      • Depression [F32.9]   • Diabetes mellitus [E11.9]     Overview Note:     Impression: 03/03/2016 - blood sugar 107 and hgn a1c 6.2%   is overdue for eye exam- macular degen getting injections   ur alb due 6/15   neuropathy and foot care stable, no changes- is wearing diabetic shoes  Impression: 10/12/2015 - check cmp, hgn a1c discussed with her- good control - blood sugar 84, hgn a1c 6.3%  is due eye exam   no foot lesion   no callus or ulcer  ur alb due  3/16  Impression: 06/16/2015 - blood sugar is 113, hgn a1c 6.4%  check ur alb today  Impression: 03/11/2015 - check hgn a1c  Impression: 11/03/2014 - excellent blood sugar control - a1c 6.1  reviewed with patient, is up to date with eye exam  neuropathy stable  update ur alb next ov  Impression: 08/11/2014 - continue to monitor sugars, taper prednisone  Impression: 07/22/2014 - blood sugar 115, hgn a1c 6.3% (average 130 or so)  is up to date with eye exam, no neuropathy  ur alb due but has uti  Impression: 04/08/2014 - blood sugar 148, hgn a1c 6.4% (average 135)  is up to date with eye exam, no neuropathy, due ur alb.    discussed blood sugar and goals for a1c.  No severe low blood sugars.;   A1C 6.4 6/28/2016  Impression: 03/03/2016 - blood sugar 107 and hgn a1c 6.2%   is overdue for eye exam- macular degen getting injections   ur alb due 6/15   neuropathy and foot care stable, no changes- is wearing diabetic shoes  Impression: 10/12/2015 - check cmp, hgn a1c discussed with her- good control - blood sugar 84, hgn a1c 6.3%  is due eye exam   no foot lesion   no callus or ulcer  ur alb due  3/16  Impression: 06/16/2015 - blood sugar is 113, hgn a1c 6.4%  check ur alb today  Impression: 03/11/2015 - check hgn a1c   Impression: 11/03/2014 - excellent blood sugar control - a1c 6.1  reviewed with patient, is up to date with eye exam  neuropathy stable  update ur alb next ov  Impression: 08/11/2014 - continue to monitor sugars, taper prednisone  Impression: 07/22/2014 - blood sugar 115, hgn a1c 6.3% (average 130 or so)  is up to date with eye exam, no neuropathy  ur alb due but has uti  Impression: 04/08/2014 - blood sugar 148, hgn a1c 6.4% (average 135)  is up to date with eye exam, no neuropathy, due ur alb.    discussed blood sugar and goals for a1c.  No severe low blood sugars.;      • Dizziness [R42]   • Dysuria [R30.0]   • Edema [R60.9]   • Gastroesophageal reflux disease [K21.9]   • Primary hypertriglyceridemia [E78.1]     Overview Note:     Impression: 03/03/2016 - check at next visit fasting  Impression: 10/12/2015 - check flp  Impression: 03/11/2015 - check flp  Impression: 04/08/2014 - check flp;   Impression: 03/03/2016 - check at next visit fasting  Impression: 10/12/2015 - check flp  Impression: 03/11/2015 - check flp  Impression: 04/08/2014 - check flp;      • Fatigue [R53.83]   • Fracture of patella [S82.009A]   • Gout [M10.9]   • Headache [R51]   • Hyperlipidemia [E78.5]     Overview Note:     Impression: 10/12/2015 - check flp  Impression: 03/11/2015 - check flp  Impression: 11/03/2014 - check flp  Impression: 08/11/2014 - refill atorvastatin- check lab from hosp.  Impression: 07/22/2014 - update flp  Impression: 04/08/2014 - check flp;   Impression: 10/12/2015 - check flp  Impression: 03/11/2015 - check flp  Impression: 11/03/2014 - check flp  Impression: 08/11/2014 - refill atorvastatin- check lab from hosp.  Impression: 07/22/2014 - update flp  Impression: 04/08/2014 - check flp;      • Hypertension [I10]     Overview Note:     Impression: 03/03/2016 - bp is good  Impression: 10/12/2015 - bp is better with recheck  Impression: 06/16/2015 - bp is good  Impression: 03/11/2015 - bp is good today  Impression:  11/03/2014 - bp is good today- recently reduced bp med due to low bp and reduced diuretic due to impairing kidney function  Impression: 08/11/2014 - bp is good  Impression: 07/22/2014 - bp is good  Impression: 04/08/2014 - bp is good;   Impression: 03/03/2016 - bp is good  Impression: 10/12/2015 - bp is better with recheck  Impression: 06/16/2015 - bp is good  Impression: 03/11/2015 - bp is good today  Impression: 11/03/2014 - bp is good today- recently reduced bp med due to low bp and reduced diuretic due to impairing kidney function  Impression: 08/11/2014 - bp is good  Impression: 07/22/2014 - bp is good  Impression: 04/08/2014 - bp is good;      • Influenza due to influenza A virus [J10.1]     Overview Note:     Impression: 03/02/2015 - rapid influenza screen pos for influenza a  given o2 sat 86% in office, prior h/o pneumonia and baseline asthma now associated with high fever and confusioin I would recommend she be referred to hospital for further treatment.  Dr Lambert on call- discussed with him and will refer to admission;   Impression: 03/02/2015 - rapid influenza screen pos for influenza a  given o2 sat 86% in office, prior h/o pneumonia and baseline asthma now associated with high fever and confusioin I would recommend she be referred to hospital for further treatment.  Dr Lambert on call- discussed with him and will refer to admission;      • Insomnia [G47.00]   • Leukocytosis [D72.829]   • Macrocytosis [D75.89]     Overview Note:     Impression: 11/03/2014 - check b12 levels; Description: on ppi and carafate     • Macular degeneration [H35.30]   • Menopause present [Z78.0]   • Night sweats [R61]     Overview Note:     Impression: 07/22/2014 - has uti (rx provided)  culture obtained;   Impression: 07/22/2014 - has uti (rx provided)  culture obtained;      • Obstructive sleep apnea syndrome [G47.33]     Overview Note:     Impression: 03/03/2016 - significant improvement with new mask, cpap    continue  current therapy.  Impression: 01/13/2016 - Faxed respiratory therapy order to [facility]. They will send order with recommendations based on evaluation.;   Impression: 03/03/2016 - significant improvement with new mask, cpap    continue current therapy.  Impression: 01/13/2016 - Faxed respiratory therapy order to [facility]. They will send order with recommendations based on evaluation.;      • Osteoporosis [M81.0]     Overview Note:     Description: 8/5  Description: 8/5     • Peripheral neuropathy [G62.9]   • Pneumonia [J18.9]     Overview Note:     Impression: 03/03/2016 - recurrent x 5, has had swallowing study and is employing techniques advised, using symbicort  Impression: 01/13/2016 - Recheck chest xray. Completed doxycycline and steroid taper.;   Impression: 03/03/2016 - recurrent x 5, has had swallowing study and is employing techniques advised, using symbicort  Impression: 01/13/2016 - Recheck chest xray. Completed doxycycline and steroid taper.;      • Seizure disorder [G40.909]   • Sick sinus syndrome [I49.5]     Overview Note:     Description: 1/15- [employer] pacemaker interrogation     • Sinus bradycardia [R00.1]     Overview Note:     Description: [facility] eval 7/10, s/p pacemaker 7/14 [facility]  Description: [facility] eval 7/10, s/p pacemaker 7/14 [facility]     • Thrombophlebitis [I80.9]     Overview Note:     Description: superficial  Description: superficial     • Cobalamin deficiency [E53.8]     Overview Note:     Impression: 10/12/2015 - normal levels last check  Impression: 03/11/2015 - vitamin b12 levels;   Impression: 10/12/2015 - normal levels last check  Impression: 03/11/2015 - vitamin b12 levels;      • Vitamin D deficiency [E55.9]     Overview Note:     Impression: 03/11/2015 - check vitamin D levels  Impression: 11/03/2014 - update vitamin d levels- daughter just restarted supplement  Impression: 04/08/2014 - check vitamin D levels;   Impression: 03/11/2015 - check vitamin D levels  Impression:  11/03/2014 - update vitamin d levels- daughter just restarted supplement  Impression: 04/08/2014 - check vitamin D levels;        Review of Systems   Constitutional: Negative for activity change, appetite change, chills, diaphoresis, fatigue, fever and unexpected weight change.   HENT: Negative for congestion, dental problem, drooling, ear discharge, ear pain, facial swelling, hearing loss, mouth sores, nosebleeds, postnasal drip, rhinorrhea, sinus pressure, sneezing, sore throat, tinnitus, trouble swallowing and voice change.    Eyes: Negative for photophobia, pain, discharge, redness, itching and visual disturbance.   Respiratory: Negative for apnea, cough, choking, chest tightness, shortness of breath, wheezing and stridor.    Cardiovascular: Positive for leg swelling. Negative for chest pain and palpitations.   Gastrointestinal: Negative for abdominal distention, abdominal pain, anal bleeding, blood in stool, constipation, diarrhea, nausea, rectal pain and vomiting.        Colostomy due to colovesical fistula   Endocrine: Negative for cold intolerance, heat intolerance, polydipsia, polyphagia and polyuria.   Genitourinary: Negative for decreased urine volume, difficulty urinating, dysuria, enuresis, flank pain, frequency, genital sores, hematuria and urgency.   Musculoskeletal: Negative for arthralgias, back pain, gait problem, joint swelling, myalgias, neck pain and neck stiffness.   Skin: Negative for color change, pallor, rash and wound.   Allergic/Immunologic: Negative for environmental allergies, food allergies and immunocompromised state.   Neurological: Negative for dizziness, tremors, seizures, syncope, facial asymmetry, speech difficulty, weakness, light-headedness, numbness and headaches.   Hematological: Negative for adenopathy. Does not bruise/bleed easily.   Psychiatric/Behavioral: Negative for agitation, behavioral problems, confusion, decreased concentration, dysphoric mood, hallucinations,  "self-injury, sleep disturbance and suicidal ideas. The patient is not nervous/anxious and is not hyperactive.      Social History     Social History   • Marital status:      Spouse name: N/A   • Number of children: 2   • Years of education: N/A     Occupational History   • Not on file.     Social History Main Topics   • Smoking status: Never Smoker   • Smokeless tobacco: Never Used   • Alcohol use No   • Drug use: No   • Sexual activity: Defer     Other Topics Concern   • Not on file     Social History Narrative    The patient lives with her children.  Requires help with all ADL's and prepare meals, grocery shopping,  medications.     Family History   Problem Relation Age of Onset   • Heart disease Father    • Heart disease Paternal Grandmother    • Heart disease Paternal Grandfather    • Cancer Mother    • Stomach cancer Other      Family history     /58  Pulse 61  Ht 66\" (167.6 cm)  Wt 202 lb (91.6 kg)  SpO2 93%  BMI 32.6 kg/m2  Physical Exam   Constitutional: She is oriented to person, place, and time. She appears well-developed and well-nourished.   HENT:   Head: Normocephalic and atraumatic.   Nose: Nose normal.   Mouth/Throat: Oropharynx is clear and moist.   Eyes: Conjunctivae, EOM and lids are normal. Pupils are equal, round, and reactive to light.   Neck: Trachea normal and normal range of motion. Neck supple. Carotid bruit is not present. No tracheal deviation present. No thyroid mass and no thyromegaly present.   Cardiovascular: Normal rate, regular rhythm, normal heart sounds and intact distal pulses.  Exam reveals no gallop and no friction rub.    No murmur heard.  Pulmonary/Chest: Effort normal. No respiratory distress. She has no wheezes.   Decreased breath sounds at bases    Abdominal: Soft. Bowel sounds are normal.   Musculoskeletal: Normal range of motion. She exhibits no edema or deformity.   Lymphadenopathy:     She has no cervical adenopathy.   Neurological: She is alert and " oriented to person, place, and time. She has normal reflexes. She displays normal reflexes. No cranial nerve deficit.   Skin: Skin is warm and dry. No rash noted. No cyanosis or erythema. Nails show no clubbing.   Psychiatric: She has a normal mood and affect. Her speech is normal and behavior is normal. Judgment and thought content normal. Cognition and memory are normal.   Nursing note and vitals reviewed.    Results for orders placed or performed during the hospital encounter of 08/05/17   Urine Culture   Result Value Ref Range    Urine Culture No growth at 2 days    Blood Culture   Result Value Ref Range    Blood Culture No growth at 5 days    Blood Culture   Result Value Ref Range    Blood Culture No growth at 5 days    Comprehensive Metabolic Panel   Result Value Ref Range    Glucose 136 (H) 70 - 100 mg/dL    BUN 20 9 - 23 mg/dL    Creatinine 1.30 0.60 - 1.30 mg/dL    Sodium 136 132 - 146 mmol/L    Potassium 4.4 3.5 - 5.5 mmol/L    Chloride 95 (L) 99 - 109 mmol/L    CO2 34.0 (H) 20.0 - 31.0 mmol/L    Calcium 9.9 8.7 - 10.4 mg/dL    Total Protein 7.1 5.7 - 8.2 g/dL    Albumin 4.00 3.20 - 4.80 g/dL    ALT (SGPT) 96 (H) 7 - 40 U/L    AST (SGOT) 28 0 - 33 U/L    Alkaline Phosphatase 232 (H) 25 - 100 U/L    Total Bilirubin 0.7 0.3 - 1.2 mg/dL    eGFR Non African Amer 39 (L) >60 mL/min/1.73    Globulin 3.1 gm/dL    A/G Ratio 1.3 (L) 1.5 - 2.5 g/dL    BUN/Creatinine Ratio 15.4 7.0 - 25.0    Anion Gap 7.0 3.0 - 11.0 mmol/L   Lipase   Result Value Ref Range    Lipase 30 6 - 51 U/L   Urinalysis With / Culture If Indicated   Result Value Ref Range    Color, UA Dark Yellow (A) Yellow, Straw    Appearance, UA Cloudy (A) Clear    pH, UA 6.0 5.0 - 8.0    Specific Gravity, UA 1.021 1.001 - 1.030    Glucose, UA Negative Negative    Ketones, UA Trace (A) Negative    Bilirubin, UA Negative Negative    Blood, UA Small (1+) (A) Negative    Protein, UA Trace (A) Negative    Leuk Esterase, UA Large (3+) (A) Negative    Nitrite, UA  Negative Negative    Urobilinogen, UA 0.2 E.U./dL 0.2 - 1.0 E.U./dL   CBC Auto Differential   Result Value Ref Range    WBC 22.63 (H) 3.50 - 10.80 10*3/mm3    RBC 3.99 3.89 - 5.14 10*6/mm3    Hemoglobin 12.3 11.5 - 15.5 g/dL    Hematocrit 39.3 34.5 - 44.0 %    MCV 98.5 80.0 - 99.0 fL    MCH 30.8 27.0 - 31.0 pg    MCHC 31.3 (L) 32.0 - 36.0 g/dL    RDW 15.5 (H) 11.3 - 14.5 %    RDW-SD 55.9 (H) 37.0 - 54.0 fl    MPV 9.8 6.0 - 12.0 fL    Platelets 418 150 - 450 10*3/mm3    Neutrophil % 74.0 (H) 41.0 - 71.0 %    Lymphocyte % 16.0 (L) 24.0 - 44.0 %    Monocyte % 7.9 0.0 - 12.0 %    Eosinophil % 1.1 0.0 - 3.0 %    Basophil % 0.1 0.0 - 1.0 %    Immature Grans % 0.9 (H) 0.0 - 0.6 %    Neutrophils, Absolute 16.75 (H) 1.50 - 8.30 10*3/mm3    Lymphocytes, Absolute 3.63 0.60 - 4.80 10*3/mm3    Monocytes, Absolute 1.78 (H) 0.00 - 1.00 10*3/mm3    Eosinophils, Absolute 0.24 0.00 - 0.30 10*3/mm3    Basophils, Absolute 0.02 0.00 - 0.20 10*3/mm3    Immature Grans, Absolute 0.21 (H) 0.00 - 0.03 10*3/mm3   Urinalysis, Microscopic Only   Result Value Ref Range    RBC, UA 0-2 None Seen, 0-2 /HPF    WBC, UA Too Numerous to Count (A) None Seen /HPF    Bacteria, UA None Seen None Seen, Trace /HPF    Squamous Epithelial Cells, UA None Seen None Seen, 0-2 /HPF    Hyaline Casts, UA 0-6 0 - 6 /LPF    Methodology Automated Microscopy    Lactic Acid, Plasma   Result Value Ref Range    Lactate 1.0 0.5 - 2.0 mmol/L   Basic Metabolic Panel   Result Value Ref Range    Glucose 107 (H) 70 - 100 mg/dL    BUN 12 9 - 23 mg/dL    Creatinine 1.10 0.60 - 1.30 mg/dL    Sodium 137 132 - 146 mmol/L    Potassium 4.0 3.5 - 5.5 mmol/L    Chloride 100 99 - 109 mmol/L    CO2 30.0 20.0 - 31.0 mmol/L    Calcium 8.9 8.7 - 10.4 mg/dL    eGFR Non African Amer 47 (L) >60 mL/min/1.73    BUN/Creatinine Ratio 10.9 7.0 - 25.0    Anion Gap 7.0 3.0 - 11.0 mmol/L   CBC (No Diff)   Result Value Ref Range    WBC 13.42 (H) 3.50 - 10.80 10*3/mm3    RBC 3.18 (L) 3.89 - 5.14 10*6/mm3     Hemoglobin 9.7 (L) 11.5 - 15.5 g/dL    Hematocrit 31.2 (L) 34.5 - 44.0 %    MCV 98.1 80.0 - 99.0 fL    MCH 30.5 27.0 - 31.0 pg    MCHC 31.1 (L) 32.0 - 36.0 g/dL    RDW 15.4 (H) 11.3 - 14.5 %    RDW-SD 55.2 (H) 37.0 - 54.0 fl    MPV 10.2 6.0 - 12.0 fL    Platelets 351 150 - 450 10*3/mm3   Hemoglobin & Hematocrit, Blood   Result Value Ref Range    Hemoglobin 10.0 (L) 11.5 - 15.5 g/dL    Hematocrit 33.9 (L) 34.5 - 44.0 %   Basic Metabolic Panel   Result Value Ref Range    Glucose 139 (H) 70 - 100 mg/dL    BUN 11 9 - 23 mg/dL    Creatinine 0.90 0.60 - 1.30 mg/dL    Sodium 136 132 - 146 mmol/L    Potassium 5.1 3.5 - 5.5 mmol/L    Chloride 100 99 - 109 mmol/L    CO2 30.0 20.0 - 31.0 mmol/L    Calcium 8.9 8.7 - 10.4 mg/dL    eGFR Non African Amer 59 (L) >60 mL/min/1.73    BUN/Creatinine Ratio 12.2 7.0 - 25.0    Anion Gap 6.0 3.0 - 11.0 mmol/L   Magnesium   Result Value Ref Range    Magnesium 2.4 1.3 - 2.7 mg/dL   CBC Auto Differential   Result Value Ref Range    WBC 12.38 (H) 3.50 - 10.80 10*3/mm3    RBC 3.16 (L) 3.89 - 5.14 10*6/mm3    Hemoglobin 9.6 (L) 11.5 - 15.5 g/dL    Hematocrit 31.1 (L) 34.5 - 44.0 %    MCV 98.4 80.0 - 99.0 fL    MCH 30.4 27.0 - 31.0 pg    MCHC 30.9 (L) 32.0 - 36.0 g/dL    RDW 15.0 (H) 11.3 - 14.5 %    RDW-SD 54.2 (H) 37.0 - 54.0 fl    MPV 10.3 6.0 - 12.0 fL    Platelets 344 150 - 450 10*3/mm3    Neutrophil % 85.1 (H) 41.0 - 71.0 %    Lymphocyte % 9.3 (L) 24.0 - 44.0 %    Monocyte % 5.2 0.0 - 12.0 %    Eosinophil % 0.0 0.0 - 3.0 %    Basophil % 0.0 0.0 - 1.0 %    Immature Grans % 0.4 0.0 - 0.6 %    Neutrophils, Absolute 10.54 (H) 1.50 - 8.30 10*3/mm3    Lymphocytes, Absolute 1.15 0.60 - 4.80 10*3/mm3    Monocytes, Absolute 0.64 0.00 - 1.00 10*3/mm3    Eosinophils, Absolute 0.00 0.00 - 0.30 10*3/mm3    Basophils, Absolute 0.00 0.00 - 0.20 10*3/mm3    Immature Grans, Absolute 0.05 (H) 0.00 - 0.03 10*3/mm3   Basic Metabolic Panel   Result Value Ref Range    Glucose 141 (H) 70 - 100 mg/dL    BUN 9 9  - 23 mg/dL    Creatinine 1.20 0.60 - 1.30 mg/dL    Sodium 135 132 - 146 mmol/L    Potassium 5.0 3.5 - 5.5 mmol/L    Chloride 101 99 - 109 mmol/L    CO2 27.0 20.0 - 31.0 mmol/L    Calcium 9.7 8.7 - 10.4 mg/dL    eGFR Non African Amer 42 (L) >60 mL/min/1.73    BUN/Creatinine Ratio 7.5 7.0 - 25.0    Anion Gap 7.0 3.0 - 11.0 mmol/L   BNP   Result Value Ref Range    .0 (H) 0.0 - 100.0 pg/mL   CBC (No Diff)   Result Value Ref Range    WBC 15.82 (H) 3.50 - 10.80 10*3/mm3    RBC 3.52 (L) 3.89 - 5.14 10*6/mm3    Hemoglobin 10.7 (L) 11.5 - 15.5 g/dL    Hematocrit 35.6 34.5 - 44.0 %    .1 (H) 80.0 - 99.0 fL    MCH 30.4 27.0 - 31.0 pg    MCHC 30.1 (L) 32.0 - 36.0 g/dL    RDW 15.3 (H) 11.3 - 14.5 %    RDW-SD 56.7 (H) 37.0 - 54.0 fl    MPV 10.3 6.0 - 12.0 fL    Platelets 401 150 - 450 10*3/mm3   Basic Metabolic Panel   Result Value Ref Range    Glucose 112 (H) 70 - 100 mg/dL    BUN 13 9 - 23 mg/dL    Creatinine 1.10 0.60 - 1.30 mg/dL    Sodium 137 132 - 146 mmol/L    Potassium 4.6 3.5 - 5.5 mmol/L    Chloride 99 99 - 109 mmol/L    CO2 34.0 (H) 20.0 - 31.0 mmol/L    Calcium 9.7 8.7 - 10.4 mg/dL    eGFR Non African Amer 47 (L) >60 mL/min/1.73    BUN/Creatinine Ratio 11.8 7.0 - 25.0    Anion Gap 4.0 3.0 - 11.0 mmol/L   Basic Metabolic Panel   Result Value Ref Range    Glucose 117 (H) 70 - 100 mg/dL    BUN 11 9 - 23 mg/dL    Creatinine 0.80 0.60 - 1.30 mg/dL    Sodium 137 132 - 146 mmol/L    Potassium 4.1 3.5 - 5.5 mmol/L    Chloride 96 (L) 99 - 109 mmol/L    CO2 34.0 (H) 20.0 - 31.0 mmol/L    Calcium 9.3 8.7 - 10.4 mg/dL    eGFR Non African Amer 68 >60 mL/min/1.73    BUN/Creatinine Ratio 13.8 7.0 - 25.0    Anion Gap 7.0 3.0 - 11.0 mmol/L   CBC Auto Differential   Result Value Ref Range    WBC 14.69 (H) 3.50 - 10.80 10*3/mm3    RBC 3.44 (L) 3.89 - 5.14 10*6/mm3    Hemoglobin 10.6 (L) 11.5 - 15.5 g/dL    Hematocrit 34.7 34.5 - 44.0 %    .9 (H) 80.0 - 99.0 fL    MCH 30.8 27.0 - 31.0 pg    MCHC 30.5 (L) 32.0 - 36.0  g/dL    RDW 15.2 (H) 11.3 - 14.5 %    RDW-SD 56.6 (H) 37.0 - 54.0 fl    MPV 10.3 6.0 - 12.0 fL    Platelets 378 150 - 450 10*3/mm3    Neutrophil % 74.7 (H) 41.0 - 71.0 %    Lymphocyte % 12.8 (L) 24.0 - 44.0 %    Monocyte % 10.0 0.0 - 12.0 %    Eosinophil % 1.6 0.0 - 3.0 %    Basophil % 0.1 0.0 - 1.0 %    Immature Grans % 0.8 (H) 0.0 - 0.6 %    Neutrophils, Absolute 10.96 (H) 1.50 - 8.30 10*3/mm3    Lymphocytes, Absolute 1.88 0.60 - 4.80 10*3/mm3    Monocytes, Absolute 1.47 (H) 0.00 - 1.00 10*3/mm3    Eosinophils, Absolute 0.24 0.00 - 0.30 10*3/mm3    Basophils, Absolute 0.02 0.00 - 0.20 10*3/mm3    Immature Grans, Absolute 0.12 (H) 0.00 - 0.03 10*3/mm3   BNP   Result Value Ref Range    .0 (H) 0.0 - 100.0 pg/mL   Magnesium   Result Value Ref Range    Magnesium 2.1 1.3 - 2.7 mg/dL   Basic Metabolic Panel   Result Value Ref Range    Glucose 139 (H) 70 - 100 mg/dL    BUN 11 9 - 23 mg/dL    Creatinine 0.80 0.60 - 1.30 mg/dL    Sodium 136 132 - 146 mmol/L    Potassium 4.1 3.5 - 5.5 mmol/L    Chloride 95 (L) 99 - 109 mmol/L    CO2 39.0 (H) 20.0 - 31.0 mmol/L    Calcium 9.6 8.7 - 10.4 mg/dL    eGFR Non African Amer 68 >60 mL/min/1.73    BUN/Creatinine Ratio 13.8 7.0 - 25.0    Anion Gap 2.0 (L) 3.0 - 11.0 mmol/L   Phosphorus   Result Value Ref Range    Phosphorus 2.5 2.4 - 5.1 mg/dL   CBC Auto Differential   Result Value Ref Range    WBC 18.66 (H) 3.50 - 10.80 10*3/mm3    RBC 3.66 (L) 3.89 - 5.14 10*6/mm3    Hemoglobin 11.0 (L) 11.5 - 15.5 g/dL    Hematocrit 36.5 34.5 - 44.0 %    MCV 99.7 (H) 80.0 - 99.0 fL    MCH 30.1 27.0 - 31.0 pg    MCHC 30.1 (L) 32.0 - 36.0 g/dL    RDW 15.1 (H) 11.3 - 14.5 %    RDW-SD 55.0 (H) 37.0 - 54.0 fl    MPV 10.0 6.0 - 12.0 fL    Platelets 395 150 - 450 10*3/mm3    Neutrophil % 75.7 (H) 41.0 - 71.0 %    Lymphocyte % 12.8 (L) 24.0 - 44.0 %    Monocyte % 8.8 0.0 - 12.0 %    Eosinophil % 1.7 0.0 - 3.0 %    Basophil % 0.2 0.0 - 1.0 %    Immature Grans % 0.8 (H) 0.0 - 0.6 %    Neutrophils,  Absolute 14.13 (H) 1.50 - 8.30 10*3/mm3    Lymphocytes, Absolute 2.39 0.60 - 4.80 10*3/mm3    Monocytes, Absolute 1.65 (H) 0.00 - 1.00 10*3/mm3    Eosinophils, Absolute 0.32 (H) 0.00 - 0.30 10*3/mm3    Basophils, Absolute 0.03 0.00 - 0.20 10*3/mm3    Immature Grans, Absolute 0.14 (H) 0.00 - 0.03 10*3/mm3   Vancomycin, Trough   Result Value Ref Range    Vancomycin Trough 18.50 10.00 - 20.00 mcg/mL   Basic Metabolic Panel   Result Value Ref Range    Glucose 114 (H) 70 - 100 mg/dL    BUN 11 9 - 23 mg/dL    Creatinine 0.80 0.60 - 1.30 mg/dL    Sodium 138 132 - 146 mmol/L    Potassium 4.2 3.5 - 5.5 mmol/L    Chloride 96 (L) 99 - 109 mmol/L    CO2 37.0 (H) 20.0 - 31.0 mmol/L    Calcium 9.5 8.7 - 10.4 mg/dL    eGFR Non African Amer 68 >60 mL/min/1.73    BUN/Creatinine Ratio 13.8 7.0 - 25.0    Anion Gap 5.0 3.0 - 11.0 mmol/L   CBC Auto Differential   Result Value Ref Range    WBC 12.78 (H) 3.50 - 10.80 10*3/mm3    RBC 3.38 (L) 3.89 - 5.14 10*6/mm3    Hemoglobin 10.2 (L) 11.5 - 15.5 g/dL    Hematocrit 33.1 (L) 34.5 - 44.0 %    MCV 97.9 80.0 - 99.0 fL    MCH 30.2 27.0 - 31.0 pg    MCHC 30.8 (L) 32.0 - 36.0 g/dL    RDW 14.8 (H) 11.3 - 14.5 %    RDW-SD 52.8 37.0 - 54.0 fl    MPV 9.7 6.0 - 12.0 fL    Platelets 399 150 - 450 10*3/mm3    Neutrophil % 65.4 41.0 - 71.0 %    Lymphocyte % 18.9 (L) 24.0 - 44.0 %    Monocyte % 11.0 0.0 - 12.0 %    Eosinophil % 3.4 (H) 0.0 - 3.0 %    Basophil % 0.4 0.0 - 1.0 %    Immature Grans % 0.9 (H) 0.0 - 0.6 %    Neutrophils, Absolute 8.36 (H) 1.50 - 8.30 10*3/mm3    Lymphocytes, Absolute 2.41 0.60 - 4.80 10*3/mm3    Monocytes, Absolute 1.41 (H) 0.00 - 1.00 10*3/mm3    Eosinophils, Absolute 0.44 (H) 0.00 - 0.30 10*3/mm3    Basophils, Absolute 0.05 0.00 - 0.20 10*3/mm3    Immature Grans, Absolute 0.11 (H) 0.00 - 0.03 10*3/mm3   Comprehensive Metabolic Panel   Result Value Ref Range    Glucose 108 (H) 70 - 100 mg/dL    BUN 13 9 - 23 mg/dL    Creatinine 0.90 0.60 - 1.30 mg/dL    Sodium 140 132 - 146  mmol/L    Potassium 4.0 3.5 - 5.5 mmol/L    Chloride 99 99 - 109 mmol/L    CO2 38.0 (H) 20.0 - 31.0 mmol/L    Calcium 9.5 8.7 - 10.4 mg/dL    Total Protein 5.2 (L) 5.7 - 8.2 g/dL    Albumin 3.00 (L) 3.20 - 4.80 g/dL    ALT (SGPT) 42 (H) 7 - 40 U/L    AST (SGOT) 37 (H) 0 - 33 U/L    Alkaline Phosphatase 157 (H) 25 - 100 U/L    Total Bilirubin 0.3 0.3 - 1.2 mg/dL    eGFR Non African Amer 59 (L) >60 mL/min/1.73    Globulin 2.2 gm/dL    A/G Ratio 1.4 (L) 1.5 - 2.5 g/dL    BUN/Creatinine Ratio 14.4 7.0 - 25.0    Anion Gap 3.0 3.0 - 11.0 mmol/L   Procalcitonin   Result Value Ref Range    Procalcitonin 0.06 ng/mL   Magnesium   Result Value Ref Range    Magnesium 2.2 1.3 - 2.7 mg/dL   CBC Auto Differential   Result Value Ref Range    WBC 8.83 3.50 - 10.80 10*3/mm3    RBC 3.36 (L) 3.89 - 5.14 10*6/mm3    Hemoglobin 10.0 (L) 11.5 - 15.5 g/dL    Hematocrit 32.9 (L) 34.5 - 44.0 %    MCV 97.9 80.0 - 99.0 fL    MCH 29.8 27.0 - 31.0 pg    MCHC 30.4 (L) 32.0 - 36.0 g/dL    RDW 15.1 (H) 11.3 - 14.5 %    RDW-SD 54.1 (H) 37.0 - 54.0 fl    MPV 9.7 6.0 - 12.0 fL    Platelets 402 150 - 450 10*3/mm3    Neutrophil % 50.2 41.0 - 71.0 %    Lymphocyte % 30.6 24.0 - 44.0 %    Monocyte % 11.3 0.0 - 12.0 %    Eosinophil % 6.5 (H) 0.0 - 3.0 %    Basophil % 0.7 0.0 - 1.0 %    Immature Grans % 0.7 (H) 0.0 - 0.6 %    Neutrophils, Absolute 4.44 1.50 - 8.30 10*3/mm3    Lymphocytes, Absolute 2.70 0.60 - 4.80 10*3/mm3    Monocytes, Absolute 1.00 0.00 - 1.00 10*3/mm3    Eosinophils, Absolute 0.57 (H) 0.00 - 0.30 10*3/mm3    Basophils, Absolute 0.06 0.00 - 0.20 10*3/mm3    Immature Grans, Absolute 0.06 (H) 0.00 - 0.03 10*3/mm3   POC Troponin, Rapid   Result Value Ref Range    Troponin I 0.01 0.00 - 0.07 ng/mL   POC Glucose Fingerstick   Result Value Ref Range    Glucose 123 70 - 130 mg/dL   POC Glucose Fingerstick   Result Value Ref Range    Glucose 130 70 - 130 mg/dL   POC Glucose Fingerstick   Result Value Ref Range    Glucose 111 70 - 130 mg/dL   POC  Glucose Fingerstick   Result Value Ref Range    Glucose 152 (H) 70 - 130 mg/dL   POC Glucose Fingerstick   Result Value Ref Range    Glucose 113 70 - 130 mg/dL   POC Glucose Fingerstick   Result Value Ref Range    Glucose 132 (H) 70 - 130 mg/dL   POC Glucose Fingerstick   Result Value Ref Range    Glucose 210 (H) 70 - 130 mg/dL   POC Glucose Fingerstick   Result Value Ref Range    Glucose 136 (H) 70 - 130 mg/dL   POC Glucose Fingerstick   Result Value Ref Range    Glucose 147 (H) 70 - 130 mg/dL   POC Glucose Fingerstick   Result Value Ref Range    Glucose 137 (H) 70 - 130 mg/dL   POC Glucose Fingerstick   Result Value Ref Range    Glucose 169 (H) 70 - 130 mg/dL   POC Glucose Fingerstick   Result Value Ref Range    Glucose 104 70 - 130 mg/dL   POC Glucose Fingerstick   Result Value Ref Range    Glucose 116 70 - 130 mg/dL   POC Glucose Fingerstick   Result Value Ref Range    Glucose 135 (H) 70 - 130 mg/dL   POC Glucose Fingerstick   Result Value Ref Range    Glucose 125 70 - 130 mg/dL   POC Glucose Fingerstick   Result Value Ref Range    Glucose 121 70 - 130 mg/dL   POC Glucose Fingerstick   Result Value Ref Range    Glucose 126 70 - 130 mg/dL   POC Glucose Fingerstick   Result Value Ref Range    Glucose 123 70 - 130 mg/dL   POC Glucose Fingerstick   Result Value Ref Range    Glucose 145 (H) 70 - 130 mg/dL   POC Glucose Fingerstick   Result Value Ref Range    Glucose 135 (H) 70 - 130 mg/dL   POC Glucose Fingerstick   Result Value Ref Range    Glucose 146 (H) 70 - 130 mg/dL   POC Glucose Fingerstick   Result Value Ref Range    Glucose 125 70 - 130 mg/dL   POC Glucose Fingerstick   Result Value Ref Range    Glucose 127 70 - 130 mg/dL   POC Glucose Fingerstick   Result Value Ref Range    Glucose 109 70 - 130 mg/dL   POC Glucose Fingerstick   Result Value Ref Range    Glucose 120 70 - 130 mg/dL   POC Glucose Fingerstick   Result Value Ref Range    Glucose 161 (H) 70 - 130 mg/dL   POC Glucose Fingerstick   Result Value Ref  Range    Glucose 106 70 - 130 mg/dL   POC Glucose Fingerstick   Result Value Ref Range    Glucose 113 70 - 130 mg/dL   POC Glucose Fingerstick   Result Value Ref Range    Glucose 119 70 - 130 mg/dL   POC Glucose Fingerstick   Result Value Ref Range    Glucose 131 (H) 70 - 130 mg/dL   POC Glucose Fingerstick   Result Value Ref Range    Glucose 127 70 - 130 mg/dL   POC Glucose Fingerstick   Result Value Ref Range    Glucose 115 70 - 130 mg/dL   POC Glucose Fingerstick   Result Value Ref Range    Glucose 104 70 - 130 mg/dL   POC Glucose Fingerstick   Result Value Ref Range    Glucose 122 70 - 130 mg/dL   POC Glucose Fingerstick   Result Value Ref Range    Glucose 99 70 - 130 mg/dL   POC Glucose Fingerstick   Result Value Ref Range    Glucose 98 70 - 130 mg/dL   POC Glucose Fingerstick   Result Value Ref Range    Glucose 102 70 - 130 mg/dL   Light Blue Top   Result Value Ref Range    Extra Tube hold for add-on    Green Top (Gel)   Result Value Ref Range    Extra Tube Hold for add-ons.    Lavender Top   Result Value Ref Range    Extra Tube hold for add-on    Gold Top - SST   Result Value Ref Range    Extra Tube Hold for add-ons.      Problem List Items Addressed This Visit        Respiratory    Recurrent pneumonia     May be due to weakness / hematogenous spread with bactiuria  Also chronic debilitation assoc with recurrent infections             Endocrine    Type 2 diabetes mellitus     Blood sugars are good   Continue to monitor- no change in medications             Genitourinary    Acute on Chronic kidney disease (CKD), stage III (moderate)     Reviewed blood work from last ov          Relevant Medications    furosemide (LASIX) 40 MG tablet    Complicated UTI (urinary tract infection) - Primary     Has had recurrent UTI, 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    Carol Herzog MD  Signed Carol Herzog MD

## 2017-09-07 NOTE — ASSESSMENT & PLAN NOTE
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

## 2017-09-07 NOTE — ASSESSMENT & PLAN NOTE
May be due to weakness / hematogenous spread with bactiuria  Also chronic debilitation assoc with recurrent infections

## 2017-11-01 PROBLEM — N17.9 ACUTE KIDNEY INJURY (HCC): Status: ACTIVE | Noted: 2017-01-01

## 2017-11-01 PROBLEM — G47.33 OSA (OBSTRUCTIVE SLEEP APNEA): Status: ACTIVE | Noted: 2017-01-01

## 2017-11-01 PROBLEM — J18.9 LEFT LOWER LOBE PNEUMONIA: Status: ACTIVE | Noted: 2017-01-01

## 2017-11-01 PROBLEM — I21.A1 NON-ST ELEVATION MYOCARDIAL INFARCTION (NSTEMI), TYPE 2: Status: ACTIVE | Noted: 2017-01-01

## 2017-11-01 PROBLEM — J96.01 ACUTE RESPIRATORY FAILURE WITH HYPOXIA (HCC): Status: ACTIVE | Noted: 2017-01-01

## 2017-11-01 PROBLEM — E11.9 DIABETES MELLITUS (HCC): Status: ACTIVE | Noted: 2017-01-01

## 2017-11-01 NOTE — PROGRESS NOTES
"Pharmacy Consult-Vancomycin Dosing  Avis Us is a  88 y.o. female receiving vancomycin therapy.     Indication: HCAP  Consulting Provider: Hospitalist  ID Consult: No    Goal Trough: 15-20 mcg/mL    Current Antimicrobial Therapy  Vancomycin (dosing per levels)  Zosyn 4.5g IV q8h       Allergies  Allergies as of 11/01/2017 - Micah as Reviewed 11/01/2017   Allergen Reaction Noted   • Clarithromycin Anaphylaxis 05/02/2016   • Macrobid [nitrofurantoin monohyd macro] Swelling 06/28/2017   • Vioxx [rofecoxib] Other (See Comments) 09/23/2016   • Metoprolol Other (See Comments) 05/02/2016   • Statins Myalgia 02/28/2017   • Tramadol Hallucinations 05/02/2016       Labs    Results from last 7 days   Lab Units 11/01/17  0651   BUN mg/dL 25*   CREATININE mg/dL 1.80*     Results from last 7 days   Lab Units 11/01/17  0651   WBC 10*3/mm3 23.68*       Evaluation of Dosing     Last Dose Received in the ED/Outside Facility:   11/1 @ 0856 vancomycin 2000 mg (21 mg/kg) IV x 1    Ht - 66\" (167.6 cm)  Wt - 208 lb (94.3 kg)    Estimated Creatinine Clearance: 25 mL/min (by C-G formula based on Cr of 1.8).    Intake & Output (last 3 days)         10/29 0701 - 10/30 0700 10/30 0701 - 10/31 0700 10/31 0701 - 11/01 0700 11/01 0701 - 11/02 0700      IV Piggyback    600    Total Intake(mL/kg)    600 (6.4)    Net       +600                    Microbiology and Radiology  Microbiology Results (last 10 days)       Procedure Component Value - Date/Time      Influenza Antigen, Rapid - Swab, Nasopharynx [[id number]]  (Normal) Collected:  11/01/17 0705    Lab Status:  Final result Specimen:  Swab from Nasopharynx Updated:  11/01/17 0757     Influenza A Ag, EIA Negative     Influenza B Ag, EIA Negative            Evaluation of Level    Patient has received vancomycin in a recent admission at [facility] (8/9-8/14). Vancomycin 2000 mg IV load, followed by vancomycin 1250 mg IV q24h resulted in a trough of 18.5 mcg/mL with SCr 0.8. Dose was decreased to " vancomycin 1000 mg IV q24h, but vanc was d/c'd before trough could be assessed.     Assessment/Plan:  1. Pharmacy to dose vancomycin for HCAP. Pt received vancomycin 2000 mg (21 mg/kg) IV x 1 in ED today. Due to advanced age and elevated SCr on admission, pharmacy will dose per levels. Baseline SCr ~0.8  2. Random vancomycin level tomorrow AM to determine further dosing.  3. Pharmacy will continue to monitor and adjust vancomycin dose as necessary based on renal function, cultures, labs, and clinical status.     Fabi Miranda, PharmD  Pharmacy Resident  11/1/2017  1:58 PM

## 2017-11-01 NOTE — H&P
"    [facility]  HISTORY AND PHYSICAL    Patient Name: Avis Us  : 9/3/1929  MRN: [id number]  Primary Care Physician: Carol Herzog MD    Subjective   Subjective     Chief Complaint: SOA, cough    HPI:  Avis Us is a 88 y.o. 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 She had been discharged from [facility] to the [facility] on 8/15/17 after treatment for UTI and colovesicular fistula with colostomy placement on 17 by Dr. Gordon. Has been home since the beginning of September and states she has been doing well: cooking a little bit, doing the dishes, and even some laundry. Last couple of days she started feeling bad. Had nausea and some right arm pain following her flu shot (however shot was given in her left arm, per daughter). Then had SOA and cough productive of thick sputum--but difficult to cough any of it up. Complains of fevers last night, was \"drenched\" with sweat. Brought to the ER for evaluation which revealed several abnormalities. CXR showed a LLL pneumonia along with pulmonary vascular congestion, WBC elevated at 24,000 and procalcitonin of 25. She also had ROBBIN and elevated troponin to 5.60 with no EKG changes noted. Was given Vanc/Zosyn, along with ASA/Plavix/Lovenox, and admitted for further management.     Review of Systems     Otherwise complete 10-system ROS is negative except as mentioned in the HPI.    Personal History     Past Medical History:   Diagnosis Date   • Abnormal liver function test    • Anxiety    • Arthralgia of lumbar spine    • Asthma    • CAD (coronary artery disease)    • Calf cramp    • Candidal intertrigo    • Cataract    • Chest pain    • CHF (congestive heart failure)    • Chronic kidney disease, stage III (moderate)    • Chronic UTI (urinary tract infection)     last " infection1 1/2 years ago. Cipro prophalactically   • Complete heart block    • COPD (chronic obstructive pulmonary disease)    • Cough    • Depression    • Diabetes mellitus    • Dizziness    • DVT (deep venous thrombosis)     1950's, last one 5 years   • Dysuria    • Edema    • Fatigue    • Fracture of patella    • GERD (gastroesophageal reflux disease)    • Gout    • Headache    • Hyperlipidemia    • Hypertension    • Hypertriglyceridemia    • Influenza A    • Insomnia    • Kidney failure     early stages, due to Celebrex   • Knee pain    • Leukocytosis    • Macrocytosis    • Macular degeneration    • Menopause    • Myocardial infarction    • Night sweats    • ANDRIY (obstructive sleep apnea)    • Osteoporosis with fracture    • Parasites in stool    • Peripheral neuropathy    • Pneumonia    • Psoriasis    • Seizures     last one 5 years ago   • Sepsis    • Sinus bradycardia    • SSS (sick sinus syndrome)    • Thrombophlebitis    • UTI (urinary tract infection)    • Vitamin B12 deficiency    • Vitamin D deficiency        Past Surgical History:   Procedure Laterality Date   • CHOLECYSTECTOMY     • COLON RESECTION N/A 8/7/2017    Procedure: LAPAROSCOPIC COLOSTOMY;  Surgeon: Chioma Gordon MD;  Location:  [facility];  Service:    • COLONOSCOPY      10 years ago   • HERNIA REPAIR     • KYPHOPLASTY N/A 9/23/2016    Procedure: KYPHOPLASTY T12;  Surgeon: Charles Nguyen MD;  Location:  [facility];  Service:    • PACEMAKER IMPLANTATION     • UMBILICAL HERNIA REPAIR         Family History: family history includes Cancer in her mother; Heart disease in her father, paternal grandfather, and paternal grandmother; Stomach cancer in her other.     Social History:  reports that she has never smoked. She has never used smokeless tobacco. She reports that she does not drink alcohol or use illicit drugs.    Medications:    (Not in a hospital admission)    Allergies   Allergen Reactions   • Clarithromycin Anaphylaxis   • Macrobid  "[Nitrofurantoin Monohyd Macro] Swelling     Flash pulmonary edema   • Vioxx [Rofecoxib] Other (See Comments)     Affects kidney function   • Metoprolol Other (See Comments)     Severe hypotension   • Statins Myalgia     Weakness    • Tramadol Hallucinations     \"crazy out of her head\"       Objective   Objective     Vital Signs:   Temp:  [97.5 °F (36.4 °C)] 97.5 °F (36.4 °C)  Heart Rate:  [61-82] 73  Resp:  [20] 20  BP: (102-126)/(48-73) 126/73        Physical Exam   Constitutional: No acute distress, awake, alert  Eyes: PERRLA, sclerae anicteric, no conjunctival injection  HENT: NCAT, mucous membranes moist  Neck: Supple, no thyromegaly, no lymphadenopathy, trachea midline  Respiratory: diminished breath sounds on the left greater than right, nonlabored respirations, no wheezes  Cardiovascular: RRR, no murmurs, rubs, or gallops, palpable pedal pulses bilaterally  Gastrointestinal: Positive bowel sounds, soft, nontender, nondistended  Musculoskeletal: No bilateral ankle edema, no clubbing or cyanosis to extremities  Psychiatric: Appropriate affect, cooperative  Neurologic: Oriented x 3, strength symmetric in all extremities, Cranial Nerves grossly intact to confrontation, speech clear  Skin: No rashes    Results Reviewed:  I have personally reviewed current lab, radiology, and data and agree.      Results from last 7 days  Lab Units 11/01/17  0651   WBC 10*3/mm3 23.68*   HEMOGLOBIN g/dL 11.9   HEMATOCRIT % 39.0   PLATELETS 10*3/mm3 462*       Results from last 7 days  Lab Units 11/01/17  0651   SODIUM mmol/L 139   POTASSIUM mmol/L 4.8   CHLORIDE mmol/L 100   CO2 mmol/L 30.0   BUN mg/dL 25*   CREATININE mg/dL 1.80*   GLUCOSE mg/dL 121*   CALCIUM mg/dL 10.0   ALT (SGPT) U/L 34   AST (SGOT) U/L 56*     BNP   Date Value Ref Range Status   11/01/2017 2368.0 (H) 0.0 - 100.0 pg/mL Final     No results found for: PHART  Imaging Results (last 24 hours)     Procedure Component Value Units Date/Time    XR Chest 1 View " [[id number]] Collected:  11/01/17 0610     Updated:  11/01/17 0717    Narrative:       EXAM:    XR Chest, 1 View    EXAM DATE/TIME:    11/1/2017 6:10 AM    CLINICAL HISTORY:    88 years old, female; Signs and symptoms; Dyspnea; Prior surgery; Surgery   date: 6+ months; Surgery type: Pacemaker; Patient HX: Dyspnea HX: History of mi   (myocardial infarction) senile atrophic vaginitis abnormal liver function tests   acute myocardial infarction anxiety knee pain asthma calf cramp candidal   intertrigo cataract chest pain chronic kidney disease, stage iii (moderate)   chronic obstructive pulmonary disease complete atrioventricular block   congestive heart failure atherosclerosis of coronary artery cough depression   diabetes mellitus dizziness dysuria edema gastroesophageal reflux disease   primary hypertriglyceridemia fatigue fracture of patella gout headache   hyperlipidemia hypertension influenza due to influenza a virus insomnia   leukocytosis macrocytosis macular degeneration menopause present night sweats   obstructive sleep apnea syndrome    TECHNIQUE:    Frontal view of the chest.    COMPARISON:    CR - XR CHEST 1 VW 2017-08-08 14:54    FINDINGS:    Lungs:  Increased pulmonary vascularity. Patchy opacities in left lung base.    Pleural space:  No large pleural effusion.  No pneumothorax.    Heart:  Borderline cardiomegaly with dual chamber pacemaker again noted.    Mediastinum:  Unremarkable.    Bones/joints:  Vertebroplasty T12.      Impression:         Pulmonary vascular congestion and left basilar atelectasis/infiltrate.    THIS DOCUMENT HAS BEEN ELECTRONICALLY SIGNED BY PREM ASHLEY MD        Results for orders placed during the hospital encounter of 06/15/17   Adult Transthoracic Echo Complete    Narrative · Left ventricular systolic function is normal. Estimated EF = 65%.  · Left atrial cavity size is dilated.  · Mild mitral valve regurgitation is present  · Insufficient TR velocity profile to estimate the right  ventricular   systolic pressure.          Assessment/Plan   Assessment / Plan     Principal Problem:    Sepsis  Active Problems:    Left lower lobe pneumonia    Acute respiratory failure with hypoxia    Non-ST elevation myocardial infarction (NSTEMI), type 2    Acute kidney injury    ANDRIY (obstructive sleep apnea)    Diabetes mellitus    87 yo F hx of HTN, DM2, COPD, CKD 3, ANDRIY on CPAP (wears O2 at night with it), and hx of colovesicular fistula s/p surgery with colostomy in place presents from home due to SOA and productive cough the last few days. Found to have hypoxia and LLL pneumonia.     Assessment & Plan:  --Continue Vanc and Zosyn for broad coverage as she does meet HCAP criteria with recent admission and rehab stay at the [facility]. Follow cultures.  --Consult Cardiology for elevated troponin and findings of pulmonary vascular congestion and elevated BNP. This likely reflects type 2 NSTEMI in setting of sepsis and pneumonia but will await further recommendations. Received ASA/Plavix/Lovenox in ER. Check Echo. Keep NPO for now.  --Hold nephrotoxic meds including Lasix. Check urine studies.     DVT prophylaxis: Lovenox    CODE STATUS:  DNR/DNI    Admission Status:  I believe this patient meets INPATIENT status due to the need for care which can only be reasonably provided in an hospital setting such as aggressive/expedited ancillary services and/or consultation services, the necessity for IV medications, close physician monitoring and/or the possible need for procedures.  In such, I feel patient’s risk for adverse outcomes and need for care warrant INPATIENT evaluation and predict the patient’s care encounter to likely last beyond 2 midnights.      Shannan Che MD   11/01/17   11:36 AM

## 2017-11-01 NOTE — ED PROVIDER NOTES
Subjective   Patient is a 88 y.o. female presenting with shortness of breath.   Shortness of Breath   Severity:  Moderate  Onset quality:  Gradual  Duration:  1 day  Timing:  Intermittent  Progression:  Waxing and waning  Chronicity:  New  Context: weather changes    Relieved by:  Inhaler  Worsened by:  Nothing  Ineffective treatments:  None tried  Associated symptoms: cough    Associated symptoms: no abdominal pain, no chest pain, no ear pain, no fever, no headaches, no neck pain, no rash, no sore throat and no vomiting    Risk factors: obesity    Risk factors: no family hx of DVT, no hx of cancer and no prolonged immobilization        Review of Systems   Constitutional: Negative for chills, fatigue and fever.   HENT: Negative for congestion, ear pain, postnasal drip, sinus pressure and sore throat.    Eyes: Negative for pain, redness and visual disturbance.   Respiratory: Positive for cough and shortness of breath. Negative for chest tightness.    Cardiovascular: Negative for chest pain, palpitations and leg swelling.   Gastrointestinal: Negative for abdominal pain, anal bleeding, blood in stool, diarrhea, nausea and vomiting.   Endocrine: Negative for polydipsia and polyuria.   Genitourinary: Negative for difficulty urinating, dysuria, frequency and urgency.   Musculoskeletal: Negative for arthralgias, back pain and neck pain.   Skin: Negative for pallor and rash.   Allergic/Immunologic: Negative for environmental allergies and immunocompromised state.   Neurological: Negative for dizziness, weakness and headaches.   Hematological: Negative for adenopathy.   Psychiatric/Behavioral: Negative for confusion, self-injury and suicidal ideas. The patient is not nervous/anxious.    All other systems reviewed and are negative.      Past Medical History:   Diagnosis Date   • Abnormal liver function test    • Anxiety    • Arthralgia of lumbar spine    • Asthma    • CAD (coronary artery disease)    • Calf cramp    •  "Candidal intertrigo    • Cataract    • Chest pain    • CHF (congestive heart failure)    • Chronic kidney disease, stage III (moderate)    • Chronic UTI (urinary tract infection)     last infection1 1/2 years ago. Cipro prophalactically   • Complete heart block    • COPD (chronic obstructive pulmonary disease)    • Cough    • Depression    • Diabetes mellitus    • Dizziness    • DVT (deep venous thrombosis)     1950's, last one 5 years   • Dysuria    • Edema    • Fatigue    • Fracture of patella    • GERD (gastroesophageal reflux disease)    • Gout    • Headache    • Hyperlipidemia    • Hypertension    • Hypertriglyceridemia    • Influenza A    • Insomnia    • Kidney failure     early stages, due to Celebrex   • Knee pain    • Leukocytosis    • Macrocytosis    • Macular degeneration    • Menopause    • Myocardial infarction    • Night sweats    • ANDRIY (obstructive sleep apnea)    • Osteoporosis with fracture    • Parasites in stool    • Peripheral neuropathy    • Pneumonia    • Psoriasis    • Seizures     last one 5 years ago   • Sepsis    • Sinus bradycardia    • SSS (sick sinus syndrome)    • Thrombophlebitis    • UTI (urinary tract infection)    • Vitamin B12 deficiency    • Vitamin D deficiency        Allergies   Allergen Reactions   • Clarithromycin Anaphylaxis   • Macrobid [Nitrofurantoin Monohyd Macro] Swelling     Flash pulmonary edema   • Vioxx [Rofecoxib] Other (See Comments)     Affects kidney function   • Metoprolol Other (See Comments)     Severe hypotension   • Statins Myalgia     Weakness    • Tramadol Hallucinations     \"crazy out of her head\"       Past Surgical History:   Procedure Laterality Date   • CHOLECYSTECTOMY     • COLON RESECTION N/A 8/7/2017    Procedure: LAPAROSCOPIC COLOSTOMY;  Surgeon: Chioma Gordon MD;  Location: [facility];  Service:    • COLONOSCOPY      10 years ago   • HERNIA REPAIR     • KYPHOPLASTY N/A 9/23/2016    Procedure: KYPHOPLASTY T12;  Surgeon: Charles Nguyen MD;  " Location: [facility] OR;  Service:    • PACEMAKER IMPLANTATION     • UMBILICAL HERNIA REPAIR         Family History   Problem Relation Age of Onset   • Heart disease Father    • Heart disease Paternal Grandmother    • Heart disease Paternal Grandfather    • Cancer Mother    • Stomach cancer Other      Family history       Social History     Social History   • Marital status:      Spouse name: N/A   • Number of children: 2   • Years of education: N/A     Social History Main Topics   • Smoking status: Never Smoker   • Smokeless tobacco: Never Used   • Alcohol use No   • Drug use: No   • Sexual activity: Defer     Other Topics Concern   • None     Social History Narrative    The patient lives with her children.  Requires help with all ADL's and prepare meals, grocery shopping,  medications.           Objective   Physical Exam   Constitutional: She is oriented to person, place, and time. She appears well-developed and well-nourished.  Non-toxic appearance. No distress.   HENT:   Head: Normocephalic and atraumatic.   Right Ear: External ear normal.   Left Ear: External ear normal.   Nose: Nose normal.   Eyes: EOM and lids are normal. Pupils are equal, round, and reactive to light.   Neck: Normal range of motion. Neck supple. No tracheal deviation present.   Cardiovascular: Normal rate, regular rhythm and normal heart sounds.  Exam reveals no gallop, no friction rub and no decreased pulses.    No murmur heard.  Pulmonary/Chest: Effort normal. No respiratory distress. She has no decreased breath sounds. She has no wheezes. She has no rhonchi. She has rales (mild in right base).   Abdominal: Soft. Normal appearance and bowel sounds are normal. There is no tenderness. There is no rebound and no guarding.   Musculoskeletal: Normal range of motion. She exhibits no deformity.   Lymphadenopathy:     She has no cervical adenopathy.   Neurological: She is alert and oriented to person, place, and time. She has normal strength.  No cranial nerve deficit or sensory deficit.   Skin: Skin is warm and dry. No rash noted. She is not diaphoretic.   Psychiatric: She has a normal mood and affect. Her speech is normal and behavior is normal. Judgment and thought content normal. Cognition and memory are normal.   Nursing note and vitals reviewed.      Critical Care  Performed by: ALFONSO CYR  Authorized by: BERNADETTE DIAZ   Total critical care time: 40 minutes  Critical care time was exclusive of separately billable procedures and treating other patients.  Critical care was necessary to treat or prevent imminent or life-threatening deterioration of the following conditions: circulatory failure and sepsis.  Critical care was time spent personally by me on the following activities: evaluation of patient's response to treatment, ordering and performing treatments and interventions, blood draw for specimens, pulse oximetry, examination of patient, development of treatment plan with patient or surrogate, ordering and review of laboratory studies, re-evaluation of patient's condition, discussions with consultants, obtaining history from patient or surrogate, ordering and review of radiographic studies and review of old charts.               ED Course  ED Course   Comment By Time   Paged the on call Cardiologist. Maria E Ewing 11/01 4097   Dr. Cyr is at the patient's bedside now for reevaluation. Updating the patient and her family on current findings and the current plan for admission. She notes she has been feeling nauseated for the last two days. She also developed right arm pain yesterday. She received her flu shot last week. They are understanding and agreeable with plan. Maria E Ewing 11/01 [id number]   Paged the hospitalist. Maria E Ewing 11/01 [id number]                  MDM  Number of Diagnoses or Management Options  Acute renal failure, unspecified acute renal failure type: new and requires workup  Hypoxia: new and requires  workup  Leukocytosis, unspecified type: new and requires workup  Non-STEMI (non-ST elevated myocardial infarction): new and requires workup  Pneumonia of left lower lobe due to infectious organism: new and requires workup  Sepsis, due to unspecified organism: new and requires workup  Diagnosis management comments: Troponin is elevated at 5.6, EKG shows ventricularly paced rhythm.  We'll diagnose as non-STEMI.  Patient was ordered aspirin, Plavix, and Lovenox.    Chest x-ray reports left lower lobe infiltrate, pro-calcitonin is elevated to greater than 25, and in combination with symptoms of dyspnea, and associated hypoxia, we'll diagnosis as pneumonia.    Laboratory evaluation also shows significant leukocytosis to greater than 23,000, and acute renal insufficiency with a creatinine of 1.8.    Patient with diagnosis septic, blood cultures have been drawn, and broad-spectrum antibiotics in the form of Zosyn and vancomycin have been ordered.    I discussed the patient with the on-call hospitalist, Dr. Che, who agrees with admission.       Amount and/or Complexity of Data Reviewed  Clinical lab tests: ordered and reviewed  Tests in the radiology section of CPT®: ordered and reviewed  Decide to obtain previous medical records or to obtain history from someone other than the patient: yes  Obtain history from someone other than the patient: yes  Review and summarize past medical records: yes  Discuss the patient with other providers: yes  Independent visualization of images, tracings, or specimens: yes    Risk of Complications, Morbidity, and/or Mortality  Presenting problems: high  Diagnostic procedures: high  Management options: high    Critical Care  Total time providing critical care: 30-74 minutes    Patient Progress  Patient progress: stable      Final diagnoses:   Pneumonia of left lower lobe due to infectious organism   Sepsis, due to unspecified organism   Hypoxia   Acute renal failure, unspecified acute renal  failure type   Non-STEMI (non-ST elevated myocardial infarction)   Leukocytosis, unspecified type            Abdirizak Cyr MD  11/01/17 [id number]

## 2017-11-01 NOTE — PROGRESS NOTES
Discharge Planning Assessment  [facility]     Patient Name: Avis Us  MRN: [id number]  Today's Date: 11/1/2017    Admit Date: 11/1/2017          Discharge Needs Assessment       11/01/17 0927    Living Environment    Lives With alone    Living Arrangements house    Home Accessibility stairs to enter home    Number of Stairs to Enter Home 1    Stair Railings at Home none    Type of Financial/Environmental Concern none    Transportation Available car;family or friend will provide    Living Environment Comment Son and daughter are CG    Living Environment    Provides Primary Care For no one    Primary Care Provided By child(salty) (specify)    Quality Of Family Relationships supportive    Able to Return to Prior Living Arrangements yes    Discharge Needs Assessment    Concerns To Be Addressed denies needs/concerns at this time    Readmission Within The Last 30 Days no previous admission in last 30 days    Equipment Currently Used at Home bipap/ cpap;cane, quad;commode;power chair, (recliner lift);grab bar;walker, rolling    Discharge Contact Information if Applicable Jacquie Contreras, daughter, PO[place], [phone]            Discharge Plan       11/01/17 0945    Case Management/Social Work Plan    Plan initial    Additional Comments Pt lives alone in [place]; son and daughter are there every day. PT is ambulatory in the home but has a walker, O2, 3L, CPAP at night provided by [facility]. She is currently recieving PT from  (?) and spent time in rehab at the [facility] after last hospital admission. PCP is Carol Herzog        Discharge Placement     No information found                Demographic Summary     None            Functional Status       11/01/17 0926    Functional Status Current    Ambulation 3-->assistive equipment and person    Transferring 2-->assistive person    Toileting 2-->assistive person    Eating 0-->independent    Communication 0-->understands/communicates without difficulty     Swallowing (if score 2 or more for any item, consult Rehab Services) 0-->swallows foods/liquids without difficulty    Change in Functional Status Since Onset of Current Illness/Injury yes    Functional Status Prior    Ambulation 1-->assistive equipment    Transferring 0-->independent    Toileting 0-->independent    Bathing 0-->independent    Dressing 0-->independent    Eating 0-->independent    Communication 0-->understands/communicates without difficulty    Swallowing 0-->swallows foods/liquids without difficulty    IADL    Medications assistive person    Meal Preparation assistive person    Housekeeping assistive person    Laundry assistive person    Shopping assistive person    Oral Care independent    Activity Tolerance    Current Activity Limitations none    Usual Activity Tolerance good    Current Activity Tolerance moderate    Cognitive/Perceptual/Developmental    Current Mental Status/Cognitive Functioning no deficits noted    Recent Changes in Mental Status/Cognitive Functioning no changes    Developmental Stage (Eriksson's Stages of Development) Stage 8 (65 years-death/Late Adulthood) Integrity vs. Despair    Employment/Financial    Source Of Income pension/detention    Financial Concerns none            Psychosocial     None            Abuse/Neglect     None            Legal     None            Substance Abuse     None            Patient Forms     None          Elysia Sarmiento

## 2017-11-01 NOTE — PLAN OF CARE
Problem: Patient Care Overview (Adult)  Goal: Plan of Care Review  Outcome: Ongoing (interventions implemented as appropriate)    11/01/17 1309   Coping/Psychosocial Response Interventions   Plan Of Care Reviewed With patient   Patient Care Overview   Progress no change   Outcome Evaluation   Outcome Summary/Follow up Plan [facility] nurse courtesy consult for established colostomy. Talked with patient and family and they have had no issues r/t stoma. Asked about remedy blue wipes. Provided for patient. Please contact [facility] nurse if needs arise. Thanks

## 2017-11-01 NOTE — CONSULTS
"[facility] at [facility]  CARDIOLOGY CONSULTATION NOTE    Avis Us  : 9/3/1929  MRN:[id number]  Home Phone:[phone]    Date of Admission:2017  Date of Consultation: 17    PCP: Carol Herzog MD    IDENTIFICATION: An 88 y.o. female resident of [place], [place]     Chief Complaint   Patient presents with   • Shortness of Breath     PROBLEM LIST:   1. Sepsis secondary to LLL pneumonia 2017  2. NSTEMI in setting of above  a. Left heart catheterization for NSTEMI, 2014, nonobstructive coronary artery disease. LVEF normal.   b. Echocardiogram, 2014: Mild diastolic dysfunction, LVEF 60-65%.  3. Sick sinus syndrome/intermittent complete heart block/prolonged QT interval:  a. St. Helder pacemaker implant, 2014, Dr. Zen Leal at [facility].   b. Complication of 12 mm left apical pneumothorax, resolved by discharge.   c. Dual-lead dislodgement:  i. Replacement of atrial and RV leads, 2014, Dr. Ted Gentile.  4. History of recurrent/chronic UTI's.   5. Recent admission for colovesical fistula, s/p lap colostomy 2017 per Dr. Gordon   6. Chronic asthma.    7. 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 Hypertension.   9. Hyperlipidemia.   10. Non-insulin dependent diabetes mellitus type 2.   11. Obstructive sleep apnea:   a. Currently on CPAP.   12. Obesity. BMI 37.   13. History of DVT.   14. History of TIA.   15. History of gout.   16. Surgeries:   a. Cholecystectomy.  b. Hernia repair.   c. Pacemaker implant.     ALLERGIES:   Allergies   Allergen Reactions   • Clarithromycin Anaphylaxis   • Macrobid [Nitrofurantoin Monohyd Macro] Swelling     Flash pulmonary edema   • Vioxx [Rofecoxib] Other (See Comments)     Affects kidney function   • Metoprolol Other (See Comments)     Severe hypotension   • Statins Myalgia     Weakness    • Tramadol Hallucinations     \"crazy out of her head\"       HPI: Mrs. Us is " "a pleasant 87 y/o WF with history of SSS, s/p PM, HTN, HLD, CKD, and ANDRIY on CPAP who is seen in consultation today for NSTEMI. She was recently admitted for a colovesical fistula and had a colostomy placed last month per Dr. Gordon. She also notes she has been admitted 6 times this past year for pneumonia. She states she woke up and was \"drenched\" last night and felt very hot. She did not measure her temperature but felt like she had a fever. She also was short of breath,and has been having a productive cough. She was admitted with pneumonia and started on IV Antibiotics. Her Procalcitonin is significantly elevated at 25. Cardiology was consulted due to elevated troponin at 5.6. She reports she has had some right arm pain and left shoulder pain the past few days, however denies any chest pain. Her EKG is non-diagnostic as she is ventricularly paced. Her BNP is elevated at greater than 2000.      ROS: All systems have been reviewed and are negative with the exception of those mentioned in the HPI and problem list above.    Surgical History:   Past Surgical History:   Procedure Laterality Date   • CHOLECYSTECTOMY     • COLON RESECTION N/A 8/7/2017    Procedure: LAPAROSCOPIC COLOSTOMY;  Surgeon: Chioma Gordon MD;  Location:  [facility];  Service:    • COLONOSCOPY      10 years ago   • HERNIA REPAIR     • KYPHOPLASTY N/A 9/23/2016    Procedure: KYPHOPLASTY T12;  Surgeon: Charles Nguyen MD;  Location:  [facility];  Service:    • PACEMAKER IMPLANTATION     • UMBILICAL HERNIA REPAIR         Social History:   Social History     Social History   • Marital status:      Spouse name: N/A   • Number of children: 2   • Years of education: N/A     Occupational History   • Not on file.     Social History Main Topics   • Smoking status: Never Smoker   • Smokeless tobacco: Never Used   • Alcohol use No   • Drug use: No   • Sexual activity: Defer     Other Topics Concern   • Not on file     Social History Narrative    The patient " "lives with her children.  Requires help with all ADL's and prepare meals, grocery shopping,  medications.       Family History:   Family History   Problem Relation Age of Onset   • Heart disease Father    • Heart disease Paternal Grandmother    • Heart disease Paternal Grandfather    • Cancer Mother    • Stomach cancer Other      Family history       Objective     /94 (BP Location: Right arm, Patient Position: Lying)  Pulse 79  Temp 97.4 °F (36.3 °C) (Oral)   Resp 18  Ht 66\" (167.6 cm)  Wt 204 lb (92.5 kg)  SpO2 96%  BMI 32.93 kg/m2    Intake/Output Summary (Last 24 hours) at 11/01/17 1413  Last data filed at 11/01/17 1121   Gross per 24 hour   Intake              600 ml   Output                0 ml   Net              600 ml       PHYSICAL EXAM:  Constitutional:  Well-nourished, cooperative, in no acute distress.   Head:  Normocephalic, without obvious abnormality, atraumatic.   Neck: No adenopathy, supple, trachea midline, no thyromegaly   Respiratory:   Diminished breath sounds, no wheezing, rales or ronchi. 98% on 2L NC    Cardiovascular:  Regular rhythm and normal rate, normal S1 and S2, no            murmur, no gallop, no rub, no click.   Pulses: Peripheral pulses are present and equal bilaterally.   GI:   Soft, non-distended. Bowel sounds heard throughout. . Non-tender to palpation, no guarding.   Extremities: No edema, clubbing or cyanosis.   Skin: Skin is warm and dry. No bleeding, bruising or rash.   Neurological: Alert, oriented to time, person and place. No focal deficits.     Labs/Diagnostic Data    Results from last 7 days  Lab Units 11/01/17  0651   SODIUM mmol/L 139   POTASSIUM mmol/L 4.8   CHLORIDE mmol/L 100   CO2 mmol/L 30.0   BUN mg/dL 25*   CREATININE mg/dL 1.80*   GLUCOSE mg/dL 121*   CALCIUM mg/dL 10.0           Results from last 7 days  Lab Units 11/01/17  0651   WBC 10*3/mm3 23.68*   HEMOGLOBIN g/dL 11.9   HEMATOCRIT % 39.0   PLATELETS 10*3/mm3 462*     Troponin: 5.6-->5.4--> " 14.6  BNP: 2368  Pro-calcitonin: 25    I personally reviewed the patient's EKG/Telemetry data    Radiology Data:   CXR 11/1/2017:  FINDINGS:    Lungs:  Increased pulmonary vascularity. Patchy opacities in left lung base.    Pleural space:  No large pleural effusion.  No pneumothorax.    Heart:  Borderline cardiomegaly with dual chamber pacemaker again noted.    Mediastinum:  Unremarkable.    Bones/joints:  Vertebroplasty T12.     IMPRESSION:    Pulmonary vascular congestion and left basilar atelectasis/infiltrate.    Current Medications:      albuterol 2.5 mg Nebulization Q6H - RT   allopurinol 200 mg Oral Daily   aspirin 81 mg Oral Daily   budesonide-formoterol 2 puff Inhalation BID - RT   cetirizine 10 mg Oral Daily   vitamin D3 5,000 Units Oral Daily   clonazePAM 1 mg Oral BID   levETIRAcetam 750 mg Oral Q12H   [START ON 11/2/2017] pantoprazole 40 mg Oral Q AM   piperacillin-tazobactam 4.5 g Intravenous Q8H   vancomycin (dosing per levels)  Does not apply Daily   vitamin B-12 1,000 mcg Oral Daily       Pharmacy to dose vancomycin        Assessment and Plan:     1. NSTEMI with acute CHF  - will treat medically with DAPT, and was given therapeutic LMWH (will change to LMWH)  - obtain echo today  - IV Lasix 80mg once, and re-start home Lisinopril at lower dose    2. CKD with acute renal insufficiency  - will monitor closely with diuresis    3. LLL Pneumonia (??)  - ABX per hospitalists    I, Zen Mcknight MD, personally performed the services described as documented by the above named individual. I have made any necessary edits and it is both accurate and complete 11/1/2017  3:02 PM        Scribed for Zen Mcknight MD by Mayda Prescott PA-C. 11/1/2017  2:13 PM      Thank you for allowing me to participate in the care of Avis Us. Feel free to contact me directly with any further questions or concerns.

## 2017-11-02 PROBLEM — I21.4 NSTEMI (NON-ST ELEVATED MYOCARDIAL INFARCTION) (HCC): Status: ACTIVE | Noted: 2017-01-01

## 2017-11-02 PROBLEM — I50.9 ACUTE CHF (HCC): Status: ACTIVE | Noted: 2017-01-01

## 2017-11-02 NOTE — PROGRESS NOTES
[facility] Medicine Services  PROGRESS NOTE    Patient Name: Avis Us  : 9/3/1929  MRN: [id number]    Date of Admission: 2017  Length of Stay: 1  Primary Care Physician: Carol Herzog MD    Subjective   Subjective     CC: F/U SOA, cough    HPI:  Patient seen this morning. She is feeling better today. Still with cough but dyspnea better.     Review of Systems  Gen-no fevers, no chills  CV-no chest pain, no palpitations  Resp-+cough, improved dyspnea  GI-no N/V/D, no abd pain    Otherwise ROS is negative except as mentioned in the HPI.    Objective   Objective     Vital Signs:   Temp:  [97.4 °F (36.3 °C)-98.1 °F (36.7 °C)] 98.1 °F (36.7 °C)  Heart Rate:  [61-85] 74  Resp:  [16-18] 18  BP: (107-130)/(56-94) 116/58        Physical Exam:  Gen-no acute distress  CV-RRR, S1 S2 normal, no m/r/g  Resp-diminished at the bases, mild expiratory wheezes, normal effort  Abd-soft, NT, ND, +BS, ostomy bag in place  Ext-no edema  Neuro-A&Ox3, no focal deficits  Psych-appropriate mood    Results Reviewed:  I have personally reviewed current lab, radiology, and data and agree.      Results from last 7 days  Lab Units 17  13417  0651   WBC 10*3/mm3 11.66* 17.94* 23.68*   HEMOGLOBIN g/dL 10.7* 10.4* 11.9   HEMATOCRIT % 35.2 34.4* 39.0   PLATELETS 10*3/mm3 363 410 462*   INR   --  1.15  --        Results from last 7 days  Lab Units 176 17  1341 17  0651   SODIUM mmol/L 141  --   --  139   POTASSIUM mmol/L 3.8  --   --  4.8   CHLORIDE mmol/L 102  --   --  100   CO2 mmol/L 29.0  --   --  30.0   BUN mg/dL 27*  --   --  25*   CREATININE mg/dL 1.80*  --   --  1.80*   GLUCOSE mg/dL 117*  --   --  121*   CALCIUM mg/dL 9.4  --   --  10.0   ALT (SGPT) U/L 22  --   --  34   AST (SGOT) U/L 36*  --   --  56*   TROPONIN I ng/mL 11.131* 10.824* 14.653*  --      BNP   Date Value Ref Range Status   2017 2368.0 (H) 0.0 - 100.0 pg/mL  Final     No results found for: PHART    Microbiology Results Abnormal     Procedure Component Value - Date/Time    Blood Culture - Blood, [[id number]]  (Normal) Collected:  11/01/17 0830    Lab Status:  Preliminary result Specimen:  Blood from Arm, Right Updated:  11/02/17 0931     Blood Culture No growth at 24 hours    Blood Culture - Blood, [[id number]]  (Normal) Collected:  11/01/17 0840    Lab Status:  Preliminary result Specimen:  Blood from Hand, Left Updated:  11/02/17 0931     Blood Culture No growth at 24 hours    Influenza Antigen, Rapid - Swab, Nasopharynx [[id number]]  (Normal) Collected:  11/01/17 0705    Lab Status:  Final result Specimen:  Swab from Nasopharynx Updated:  11/01/17 0757     Influenza A Ag, EIA Negative     Influenza B Ag, EIA Negative          Imaging Results (last 24 hours)     Procedure Component Value Units Date/Time    XR Chest 1 View [[id number]] Collected:  11/02/17 1003     Updated:  11/02/17 1003    Narrative:       EXAMINATION: XR CHEST 1 VW-      INDICATION: Followup pneumonia, CHF; J18.1-Lobar pneumonia, unspecified  organism; A41.9-Sepsis, unspecified organism; R09.02-Hypoxemia;  N17.9-Acute kidney failure, unspecified; I21.4-Non-ST elevation (NSTEMI)  myocardial infarction; D72.829-Elevated white blood cell count,  unspecified.      COMPARISON: None.     FINDINGS:   1. There is mild cardiomegaly which is stable.  2. Obstructive lung disease is noted. There is mild hazy density left  base with blunting of the costophrenic angles.           Impression:       Mild cardiomegaly with mild congestive changes in the chest,  all stable from yesterday. Progressive disease is not identified.     D:  11/02/2017  E:  11/02/2017           Results for orders placed during the hospital encounter of 06/15/17   Adult Transthoracic Echo Complete    Narrative · Left ventricular systolic function is normal. Estimated EF = 65%.  · Left atrial cavity size is dilated.  · Mild mitral valve  regurgitation is present  · Insufficient TR velocity profile to estimate the right ventricular   systolic pressure.          I have reviewed the medications.    Assessment/Plan   Assessment / Plan     Hospital Problem List     * (Principal)Sepsis    Overview Signed 2/28/2017 11:56 AM by Carol Herzog MD     Description: 10/14- hosp 24 hours- sepsis due to sinus infection         Left lower lobe pneumonia    Acute respiratory failure with hypoxia    NSTEMI (non-ST elevated myocardial infarction)    Acute kidney injury    ANDRIY (obstructive sleep apnea)    Diabetes mellitus    Acute CHF             Brief Hospital Course to date:  Avis Us is a 88 y.o. 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 Found to have hypoxia and LLL pneumonia.       Assessment & Plan:  --Continue Vanc and Zosyn for broad coverage as she does meet HCAP criteria with recent admission and rehab stay at the [facility]. Follow cultures.  --Cardiology following for NSTEMI. Troponin peaked at 15. Treating medically, continue heparin drip.  --Given IV Lasix yesterday for acute CHF. Follow up Echo results.  --Hold nephrotoxic meds including Lasix. Monitor Cr closely. Stable today. Repeat labs in AM.    Complex patient.     DVT Prophylaxis:  Heparin drip    CODE STATUS: Conditional Code    Disposition: I expect the patient to be discharged TBD    Shnanan Che MD  11/02/17  12:18 PM

## 2017-11-02 NOTE — PROGRESS NOTES
Continued Stay Note  [facility]     Patient Name: Avis Us  MRN: [id number]  Today's Date: 11/2/2017    Admit Date: 11/1/2017          Discharge Plan       11/02/17 1506    Case Management/Social Work Plan    Additional Comments Crystal with [facility] checked the computer and the pt is not current with any  agencies. She has had [facility] in the recent past.              Discharge Codes     None        Expected Discharge Date and Time     Expected Discharge Date Expected Discharge Time    Nov 5, 2017             Anjelica Moctezuma RN

## 2017-11-02 NOTE — PROGRESS NOTES
"  [facility] [facility] at [facility]  PROGRESS NOTE    Date of Admission: 11/1/2017  Length of Stay: 1  Primary Care Physician: Carol Herzog MD    Chief Complaint: f/u NSTEMI, CHF  Problem List:   1. Sepsis secondary to LLL pneumonia 11/2017  2. NSTEMI in setting of above  a. Left heart catheterization for NSTEMI, 07/24/2014, nonobstructive coronary artery disease. LVEF normal.   b. Echocardiogram, 07/23/2014: Mild diastolic dysfunction, LVEF 60-65%.  c. Admission for NSTEMI in setting of pneumonia and CHF 11/2017  3. Sick sinus syndrome/intermittent complete heart block/prolonged QT interval:  a. St. Helder pacemaker implant, 07/29/2014, Dr. Zen Leal at [facility].   b. Complication of 12 mm left apical pneumothorax, resolved by discharge.   c. Dual-lead dislodgement:  i. Replacement of atrial and RV leads, 12/19/2014, Dr. Ted Gentile.  4. History of recurrent/chronic UTI's.   5. Recent admission for colovesical fistula, s/p lap colostomy 9/7/2017 per Dr. Gordon   6. Chronic asthma.    7. 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 Hypertension.   9. Hyperlipidemia, intolerant to statins.   10. Non-insulin dependent diabetes mellitus type 2.   11. Obstructive sleep apnea:   a. Currently on CPAP.   12. Obesity. BMI 37.   13. History of DVT.   14. History of TIA.   15. History of gout.   16. Surgeries:   a. Cholecystectomy.  b. Hernia repair.   c. Pacemaker implant.        Subjective      She states she feels better today. Still coughing. Denies chest pain.       Objective   Vitals: /58 (BP Location: Right arm, Patient Position: Lying)  Pulse 74  Temp 98.1 °F (36.7 °C) (Oral)   Resp 18  Ht 66\" (167.6 cm)  Wt 202 lb 3.2 oz (91.7 kg)  SpO2 99%  BMI 32.64 kg/m2    Physical Exam:  GENERAL: Alert, cooperative, in no acute distress.   HEENT: Fundoscopic deferred, otherwise unremarkable.  NECK: No Jugular venous distention, adenopathy, or thyromegaly " noted.   HEART: No discrete PMI is noted. Regular rhythm, normal rate, and +murmur  LUNGS: Diminished breath sounds, no wheezing or ronchi.   ABDOMEN: Flat without evidence of organomegaly, masses, or tenderness.  NEUROLOGIC: No focal abnormalities involving strength or sensation are noted.   EXTREMITIES: No clubbing, cyanosis, or edema noted.     Results:    Results from last 7 days  Lab Units 11/02/17  0359 11/01/17  1341 11/01/17  0651   WBC 10*3/mm3 11.66* 17.94* 23.68*   HEMOGLOBIN g/dL 10.7* 10.4* 11.9   HEMATOCRIT % 35.2 34.4* 39.0   PLATELETS 10*3/mm3 363 410 462*       Results from last 7 days  Lab Units 11/02/17  0359 11/01/17  0651   SODIUM mmol/L 141 139   POTASSIUM mmol/L 3.8 4.8   CHLORIDE mmol/L 102 100   CO2 mmol/L 29.0 30.0   BUN mg/dL 27* 25*   CREATININE mg/dL 1.80* 1.80*   GLUCOSE mg/dL 117* 121*      Lab Results   Component Value Date    CHOL 214 (H) 02/28/2017    TRIG 173 (H) 02/28/2017    HDL 53 02/28/2017    LDLDIRECT 126 02/28/2017    AST 36 (H) 11/02/2017    ALT 22 11/02/2017       Results from last 7 days  Lab Units 11/01/17  0651   BNP pg/mL 2368.0*       Results from last 7 days  Lab Units 11/02/17  0359 11/01/17  1341   PROTIME Seconds  --  12.6*   INR   --  1.15   APTT seconds 48.8 28.4*       Results from last 7 days  Lab Units 11/02/17  0359 11/01/17  2206 11/01/17  1341   TROPONIN I ng/mL 11.131* 10.824* 14.653*       Intake/Output Summary (Last 24 hours) at 11/02/17 0852  Last data filed at 11/02/17 0811   Gross per 24 hour   Intake             1020 ml   Output             1400 ml   Net             -380 ml     I personally reviewed the patient's EKG/Telemetry data    Radiology Data:   CXR report pending, however looks improved compared to yesterday    Current Medications:    albuterol 2.5 mg Nebulization Q6H - RT   allopurinol 200 mg Oral Daily   aspirin 81 mg Oral Daily   budesonide-formoterol 2 puff Inhalation BID - RT   cetirizine 10 mg Oral Daily   vitamin D3 5,000 Units Oral  Daily   clonazePAM 1 mg Oral BID   clopidogrel 75 mg Oral Daily   levETIRAcetam 750 mg Oral Q12H   lisinopril 5 mg Oral Q24H   pantoprazole 40 mg Oral Q AM   piperacillin-tazobactam 4.5 g Intravenous Q8H   vancomycin (dosing per levels)  Does not apply Daily   vitamin B-12 1,000 mcg Oral Daily       heparin (porcine) 10.8 Units/kg/hr Last Rate: 10.8 Units/kg/hr (11/01/17 2053)   Pharmacy to Dose Heparin     Pharmacy to dose vancomycin         Assessment and Plan:     1. NSTEMI with acute CHF (DHF/MR)  - denies chest pain  - continue medical treatment with DAPT, Heparin gtt (intolerant to statins)  - echo pending   - improved after Lasix 80 mg IV yesterday - may need to continue daily Lasix - Cr is stable  - NORMAL LVEF WITH NEW MR (since 6/16/16) needs to be addressed (plan AM MICHEL)     2. CKD with acute renal insufficiency  - stable, continue to monitor      3. LLL Pneumonia (??)  - ABX per hospitalists    4. Plan FOR NOW is to treat heart failure, change full dose UFH to DVT prophylaxis dose, continue antibiotics and parenteral antibiotics, TRY low dose statin.    I, Zen Mcknight MD, personally performed the services described as documented by the above named individual. I have made any necessary edits and it is both accurate and complete 11/2/2017  7:01 PM        Scribed for Zen Mcknight MD by Mayda Prescott PA-C.

## 2017-11-03 NOTE — PROGRESS NOTES
Continued Stay Note  [facility]     Patient Name: Avis Us  MRN: [id number]  Today's Date: 11/3/2017    Admit Date: 11/1/2017          Discharge Plan       11/03/17 1453    Case Management/Social Work Plan    Plan Update    Patient/Family In Agreement With Plan yes    Additional Comments The pt is sleeping and no family in the room at this time. I did not wake her.  PT/OT to see the pt. Will await recomendations. She has been at [facility] and had [facility] in the past. CM will f/u Monday.              Discharge Codes     None        Expected Discharge Date and Time     Expected Discharge Date Expected Discharge Time    Nov 5, 2017             Anjelica Moctezuma RN

## 2017-11-03 NOTE — PLAN OF CARE
Problem: Fall Risk (Adult)  Goal: Identify Related Risk Factors and Signs and Symptoms  Outcome: Ongoing (interventions implemented as appropriate)  Goal: Absence of Falls  Outcome: Ongoing (interventions implemented as appropriate)    Problem: Pressure Ulcer Risk (Mack Scale) (Adult,Obstetrics,Pediatric)  Goal: Identify Related Risk Factors and Signs and Symptoms  Outcome: Ongoing (interventions implemented as appropriate)  Goal: Skin Integrity  Outcome: Ongoing (interventions implemented as appropriate)

## 2017-11-03 NOTE — PROGRESS NOTES
"  [facility] at [facility]  PROGRESS NOTE    Date of Admission: 11/1/2017  Length of Stay: 2  Primary Care Physician: Carol Herzog MD    Chief Complaint: f/u NSTEMI, CHF  Problem List:   1. Admission for acute CHF, NSTEMI and pneumonia November 2017  2. NSTEMI in setting of above  a. Left heart catheterization for NSTEMI, 07/24/2014, nonobstructive coronary artery disease. LVEF normal.   b. Echocardiogram, 07/23/2014: Mild diastolic dysfunction, LVEF 60-65%.  c. Admission for NSTEMI in setting of pneumonia and CHF 11/2017  d. Echo 11/2/2017: biatrial and RV enlargementl normal global and segmental LV motion, severe MR, mod-severe PAH  3. Sick sinus syndrome/intermittent complete heart block/prolonged QT interval:  a. [employer] pacemaker implant, 07/29/2014, Dr. Zen Leal at [facility].   b. Complication of 12 mm left apical pneumothorax, resolved by discharge.   c. Dual-lead dislodgement:  i. Replacement of atrial and RV leads, 12/19/2014, Dr. Ted Gentile.  4. History of recurrent/chronic UTI's.   5. Recent admission for colovesical fistula, s/p lap colostomy 9/7/2017 per Dr. Gordon   6. Chronic asthma.    7. 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 Hypertension.   9. Hyperlipidemia, intolerant to statins.   10. Non-insulin dependent diabetes mellitus type 2.   11. Obstructive sleep apnea:   a. Currently on CPAP.   12. Obesity. BMI 37.   13. History of DVT.   14. History of TIA.   15. History of gout.   16. Surgeries:   a. Cholecystectomy.  b. Hernia repair.   c. Pacemaker implant.     Subjective      Patient resting comfortably. Denies chest pain or shortness of breath. Still has cough. Had MICHEL earlier today       Objective   Vitals: /60 (BP Location: Left arm, Patient Position: Lying)  Pulse 74  Temp 98 °F (36.7 °C) (Oral)   Resp 24  Ht 66\" (167.6 cm)  Wt 204 lb 8 oz (92.8 kg)  SpO2 98%  BMI 33.01 kg/m2    Physical " Exam:  GENERAL: Alert, cooperative, in no acute distress.   HEENT: Fundoscopic deferred, otherwise unremarkable.  NECK: No Jugular venous distention, adenopathy, or thyromegaly noted.   HEART: No discrete PMI is noted. Regular rhythm, normal rate, and +murmur  LUNGS: Diminished breath sounds, no wheezing, rales or ronchi.   ABDOMEN: Flat without evidence of organomegaly, masses, or tenderness.  NEUROLOGIC: No focal abnormalities involving strength or sensation are noted.   EXTREMITIES: No clubbing, cyanosis, or edema noted.     Results:    Results from last 7 days  Lab Units 11/03/17  0504 11/02/17  0359 11/01/17  1341   WBC 10*3/mm3 9.89 11.66* 17.94*   HEMOGLOBIN g/dL 9.8* 10.7* 10.4*   HEMATOCRIT % 31.8* 35.2 34.4*   PLATELETS 10*3/mm3 381 363 410       Results from last 7 days  Lab Units 11/03/17  0504 11/02/17  0359 11/01/17  0651   SODIUM mmol/L 140 141 139   POTASSIUM mmol/L 3.4* 3.8 4.8   CHLORIDE mmol/L 100 102 100   CO2 mmol/L 32.0* 29.0 30.0   BUN mg/dL 29* 27* 25*   CREATININE mg/dL 1.70* 1.80* 1.80*   GLUCOSE mg/dL 125* 117* 121*      Lab Results   Component Value Date    CHOL 214 (H) 02/28/2017    TRIG 173 (H) 02/28/2017    HDL 53 02/28/2017    LDLDIRECT 126 02/28/2017    AST 36 (H) 11/02/2017    ALT 22 11/02/2017       Results from last 7 days  Lab Units 11/01/17  0651   BNP pg/mL 2368.0*       Results from last 7 days  Lab Units 11/02/17  1038 11/02/17  0359 11/01/17  1341   PROTIME Seconds  --   --  12.6*   INR   --   --  1.15   APTT seconds 34.1* 48.8 28.4*       Results from last 7 days  Lab Units 11/02/17  0359 11/01/17  2206 11/01/17  1341   TROPONIN I ng/mL 11.131* 10.824* 14.653*       Intake/Output Summary (Last 24 hours) at 11/03/17 1537  Last data filed at 11/03/17 1234   Gross per 24 hour   Intake              312 ml   Output             1050 ml   Net             -738 ml     I personally reviewed the patient's EKG/Telemetry data    Radiology Data:   Echo 11/2/2017:  Interpretation Summary       · Biatrial and right ventricular enlargement.  · Normal global and segmental LV wall motion. Mild LVH is seen.  · Aortic valve sclerosis with mild aortic insufficiency.  · Mitral annular calcification.  · Severe mitral regurgitation.  · Moderate-severe pulmonary hypertension.       Current Medications:    albuterol 2.5 mg Nebulization Q6H - RT   allopurinol 200 mg Oral Daily   aspirin 81 mg Oral Daily   budesonide-formoterol 2 puff Inhalation BID - RT   cetirizine 10 mg Oral Daily   vitamin D3 5,000 Units Oral Daily   clonazePAM 1 mg Oral BID   clopidogrel 75 mg Oral Daily   furosemide 40 mg Intravenous Q12H   heparin (porcine) 5,000 Units Subcutaneous Q8H   levETIRAcetam 750 mg Oral Q12H   lisinopril 5 mg Oral Q24H   pantoprazole 40 mg Oral Q AM   pharmacy consult - MTM  Does not apply Daily   piperacillin-tazobactam 4.5 g Intravenous Q8H   rosuvastatin 10 mg Oral Nightly   vancomycin (dosing per levels)  Does not apply Daily   vitamin B-12 1,000 mcg Oral Daily       Pharmacy to Dose Heparin    Pharmacy to dose vancomycin        Assessment and Plan:     1. NSTEMI with acute CHF (DHF/MR)  - denies chest pain  - continue medical treatment with DAPT, trial of low dose Crestor, and received Heparin gtt for 48 hours (now on DVT PPX dose)   - NORMAL LVEF WITH NEW MR (since 6/16/16) - MICHEL today for better evaluation of this valve  - diuresing with IV Lasix 40mg BID      2. CKD with acute renal insufficiency  - stable, continue to monitor       3. LLL Pneumonia (??)  - ABX per hospitalists    Echo reviewed with IVA Gregorio M.D.....there is no evidence of SBE but moderate-severe MR present. Rx continues. I do not think that she is a candidate for catheterization all considered (including code status).    I, Zen cMknight MD, personally performed the services described as documented by the above named individual. I have made any necessary edits and it is both accurate and complete 11/3/2017  9:36 PM      Scribed for  Zen Mcknight MD by Mayda Prescott PA-C.

## 2017-11-03 NOTE — PROGRESS NOTES
"    [facility]  PROGRESS NOTE    Patient Name: Avis Us  : 9/3/1929  MRN: [id number]    Date of Admission: 2017  Length of Stay: 2  Primary Care Physician: Carol Herzog MD    Subjective   Subjective     CC: F/U SOA, cough    HPI:  Patient seen this afternoon following MICHEL. She is still quite sedated, opens eyes briefly and says \"uh huh\" to some questions.     Review of Systems  Limited due to mental status  Denies pain or SOA    Otherwise ROS is negative except as mentioned in the HPI.    Objective   Objective     Vital Signs:   Temp:  [97.1 °F (36.2 °C)-98 °F (36.7 °C)] 98 °F (36.7 °C)  Heart Rate:  [67-79] 74  Resp:  [14-32] 24  BP: (101-144)/(52-80) 101/60        Physical Exam:  Gen-no acute distress  CV-RRR, S1 S2 normal, no m/r/g  Resp-diminished at the bases, normal effort  Abd-soft, NT, ND, +BS  Ext-no edema  Neuro-A&Ox3, no focal deficits  Psych-appropriate mood    Results Reviewed:  I have personally reviewed current lab, radiology, and data and agree.      Results from last 7 days  Lab Units 17  0504 17  1341   WBC 10*3/mm3 9.89 11.66* 17.94*   HEMOGLOBIN g/dL 9.8* 10.7* 10.4*   HEMATOCRIT % 31.8* 35.2 34.4*   PLATELETS 10*3/mm3 381 363 410   INR   --   --  1.15       Results from last 7 days  Lab Units 17  0504 17  0359 17  2206 17  1341 17  0651   SODIUM mmol/L 140 141  --   --  139   POTASSIUM mmol/L 3.4* 3.8  --   --  4.8   CHLORIDE mmol/L 100 102  --   --  100   CO2 mmol/L 32.0* 29.0  --   --  30.0   BUN mg/dL 29* 27*  --   --  25*   CREATININE mg/dL 1.70* 1.80*  --   --  1.80*   GLUCOSE mg/dL 125* 117*  --   --  121*   CALCIUM mg/dL 8.8 9.4  --   --  10.0   ALT (SGPT) U/L  --  22  --   --  34   AST (SGOT) U/L  --  36*  --   --  56*   TROPONIN I ng/mL  --  11.131* 10.824* 14.653*  --      BNP   Date Value Ref Range Status   2017 2368.0 (H) 0.0 - 100.0 pg/mL Final     No " results found for: PHART    Microbiology Results Abnormal     Procedure Component Value - Date/Time    Blood Culture - Blood, [[id number]]  (Normal) Collected:  11/01/17 0830    Lab Status:  Preliminary result Specimen:  Blood from Arm, Right Updated:  11/03/17 0931     Blood Culture No growth at 2 days    Blood Culture - Blood, [[id number]]  (Normal) Collected:  11/01/17 0840    Lab Status:  Preliminary result Specimen:  Blood from Hand, Left Updated:  11/03/17 0931     Blood Culture No growth at 2 days    Influenza Antigen, Rapid - Swab, Nasopharynx [[id number]]  (Normal) Collected:  11/01/17 0705    Lab Status:  Final result Specimen:  Swab from Nasopharynx Updated:  11/01/17 0757     Influenza A Ag, EIA Negative     Influenza B Ag, EIA Negative          Imaging Results (last 24 hours)     ** No results found for the last 24 hours. **        Results for orders placed during the hospital encounter of 11/01/17   Adult Transthoracic Echo Complete W/ Cont if Necessary Per Protocol    Narrative · Biatrial and right ventricular enlargement.  · Normal global and segmental LV wall motion. Mild LVH is seen.  · Aortic valve sclerosis with mild aortic insufficiency.  · Mitral annular calcification.  · Severe mitral regurgitation.  · Moderate-severe pulmonary hypertension.          I have reviewed the medications.    Assessment/Plan   Assessment / Plan     Hospital Problem List     * (Principal)Sepsis    Overview Signed 2/28/2017 11:56 AM by Carol Herzog MD     Description: 10/14- hosp 24 hours- sepsis due to sinus infection         Left lower lobe pneumonia    Acute respiratory failure with hypoxia    NSTEMI (non-ST elevated myocardial infarction)    Acute kidney injury    ANDRIY (obstructive sleep apnea)    Diabetes mellitus    Acute diastolic CHF              Brief Hospital Course to date:  Avis Us is a 88 y.o. female with hx of HTN, DM2, COPD, CKD 3, ANDRIY on CPAP (wears O2 at night with it), and hx of  colovesicular fistula s/p surgery with colostomy in place presents from home due to SOA and productive cough the last few days. Found to have hypoxia and LLL pneumonia.       Assessment & Plan:  --Continue Vanc and Zosyn for broad coverage as she does meet HCAP criteria with recent admission and rehab stay at the [facility]. Follow cultures.  --Cardiology following for NSTEMI. Troponin peaked at 15. Treating medically, continue heparin drip.  --Continue IV Lasix for acute CHF. Echo revealed severe MR and moderate to severe pulmonary HTN, EF 51%. MICHEL done today for further evaluation.   --Monitor Cr closely with diuresis. Stable to mildly improved today. Repeat labs in AM.    Complex patient.     DVT Prophylaxis:  Heparin drip    CODE STATUS: Conditional Code    Disposition: I expect the patient to be discharged TBD    Shannan Che MD  11/03/17  2:39 PM

## 2017-11-04 NOTE — PLAN OF CARE
Problem: Patient Care Overview (Adult)  Goal: Plan of Care Review  Outcome: Ongoing (interventions implemented as appropriate)    11/01/17 1451 11/04/17 0800   Coping/Psychosocial Response Interventions   Plan Of Care Reviewed With --  patient   Patient Care Overview   Progress progress toward functional goals is gradual --        Goal: Adult Individualization and Mutuality  Outcome: Outcome(s) achieved Date Met:  11/04/17  Goal: Discharge Needs Assessment  Outcome: Ongoing (interventions implemented as appropriate)    11/01/17 0927 11/01/17 1332 11/04/17 1503   Discharge Needs Assessment   Concerns To Be Addressed denies needs/concerns at this time --  --    Readmission Within The Last 30 Days no previous admission in last 30 days --  --    Discharge Disposition --  --  still a patient   Living Environment   Transportation Available car;family or friend will provide --  --    Self-Care   Equipment Currently Used at Home --  cane, straight;bath bench;bipap/ cpap;glucometer;respiratory supplies;oxygen --          Problem: Fall Risk (Adult)  Goal: Identify Related Risk Factors and Signs and Symptoms  Outcome: Outcome(s) achieved Date Met:  11/04/17 11/04/17 1503   Fall Risk   Fall Risk: Related Risk Factors age-related changes;fear of falling;gait/mobility problems;history of falls   Fall Risk: Signs and Symptoms presence of risk factors       Goal: Absence of Falls  Outcome: Ongoing (interventions implemented as appropriate)    11/04/17 1503   Fall Risk (Adult)   Absence of Falls making progress toward outcome         Problem: Pressure Ulcer Risk (Mack Scale) (Adult,Obstetrics,Pediatric)  Goal: Identify Related Risk Factors and Signs and Symptoms  Outcome: Ongoing (interventions implemented as appropriate)    11/04/17 1503   Pressure Ulcer Risk (Mack Scale)   Related Risk Factors (Pressure Ulcer Risk (Mack Scale)) age extremes;infection;medication       Goal: Skin Integrity  Outcome: Ongoing (interventions  implemented as appropriate)    11/04/17 4346   Pressure Ulcer Risk (Mack Scale) (Adult,Obstetrics,Pediatric)   Skin Integrity making progress toward outcome

## 2017-11-04 NOTE — PROGRESS NOTES
"[facility] at [facility] Progress Note     LOS: 3 days   Patient Care Team:  Carol Herzog MD as PCP - General  Carol Herzog MD as PCP - Family Medicine  Carol Herzog MD as PCP - Claims Attributed  PCP:  Carol Herzog MD    Chief Complaint:  cad    SUBJECTIVE:   Pt reclining in chair. Continues to deny CP. Denies dyspnea, palpitations, but does endorse a continued cough.      Review of Systems:   All systems have been reviewed and are negative with the exception of those mentioned above.      OBJECTIVE:    Vital Sign Min/Max for last 24 hours  Temp  Min: 96.9 °F (36.1 °C)  Max: 98 °F (36.7 °C)   BP  Min: 101/60  Max: 144/80   Pulse  Min: 68  Max: 84   Resp  Min: 17  Max: 32   SpO2  Min: 95 %  Max: 100 %   Flow (L/min)  Min: 1  Max: 2   Weight  Min: 202 lb 12.8 oz (92 kg)  Max: 202 lb 12.8 oz (92 kg)     Flowsheet Rows         First Filed Value    Admission Height  66\" (167.6 cm) Documented at 11/01/2017 0556    Admission Weight  208 lb (94.3 kg) Documented at 11/01/2017 0556          Telemetry: sr      Intake/Output Summary (Last 24 hours) at 11/04/17 1052  Last data filed at 11/04/17 0900   Gross per 24 hour   Intake              560 ml   Output             2070 ml   Net            -1510 ml     Intake & Output (last 3 days)       11/01 0701 - 11/02 0700 11/02 0701 - 11/03 0700 11/03 0701 - 11/04 0700 11/04 0701 - 11/05 0700    P.O. 120   360    I.V. (mL/kg)  112 (1.2)      IV Piggyback 900 300 200     Total Intake(mL/kg) 1020 (11.1) 412 (4.4) 200 (2.2) 360 (3.9)    Urine (mL/kg/hr) 1200 (0.5) 1100 (0.5) 2020 (0.9)     Stool  250 (0.1) 250 (0.1)     Total Output 1200 1350 2270      Net -180 -938 -2070 +360                   Physical Exam:  Physical Exam   Constitutional: She is oriented to person, place, and time. She appears well-developed and well-nourished. No distress.   obese   Cardiovascular: Normal rate, regular rhythm, normal heart sounds and intact " distal pulses.    No murmur heard.  Pulses:       Radial pulses are 2+ on the right side, and 2+ on the left side.        Dorsalis pedis pulses are 2+ on the right side, and 2+ on the left side.        Posterior tibial pulses are 2+ on the right side, and 2+ on the left side.   Pulmonary/Chest: Effort normal. She has decreased breath sounds. She has no wheezes. She has no rales.   Abdominal: Soft. Bowel sounds are normal. There is no tenderness. There is no guarding.   Musculoskeletal: She exhibits no edema or tenderness.   Neurological: She is alert and oriented to person, place, and time.   Skin: Skin is warm and dry. No rash noted.   Psychiatric: She has a normal mood and affect.   Nursing note and vitals reviewed.       LABS/DIAGNOSTIC DATA:    Results from last 7 days  Lab Units 11/04/17  0703 11/03/17  0504 11/02/17  0359   WBC 10*3/mm3 9.48 9.89 11.66*   HEMOGLOBIN g/dL 10.4* 9.8* 10.7*   HEMATOCRIT % 33.9* 31.8* 35.2   PLATELETS 10*3/mm3 379 381 363     No results found for: TROPONINT    Results from last 7 days  Lab Units 11/02/17  1038 11/02/17  0359 11/01/17  1341   INR   --   --  1.15   APTT seconds 34.1* 48.8 28.4*       Results from last 7 days  Lab Units 11/04/17  0703 11/03/17  0504 11/02/17  0359 11/01/17  0651   SODIUM mmol/L 143 140 141 139   POTASSIUM mmol/L 3.6 3.4* 3.8 4.8   CHLORIDE mmol/L 101 100 102 100   CO2 mmol/L 33.0* 32.0* 29.0 30.0   BUN mg/dL 25* 29* 27* 25*   CREATININE mg/dL 1.80* 1.70* 1.80* 1.80*   CALCIUM mg/dL 9.1 8.8 9.4 10.0   BILIRUBIN mg/dL  --   --  1.0 1.3*   ALK PHOS U/L  --   --  154* 199*   ALT (SGPT) U/L  --   --  22 34   AST (SGOT) U/L  --   --  36* 56*   GLUCOSE mg/dL 122* 125* 117* 121*                   Results from last 7 days  Lab Units 11/01/17  0651   BNP pg/mL 2368.0*       Medication Review:     albuterol 2.5 mg Nebulization Q6H - RT   allopurinol 200 mg Oral Daily   aspirin 81 mg Oral Daily   budesonide-formoterol 2 puff Inhalation BID - RT   cetirizine 10  mg Oral Daily   vitamin D3 5,000 Units Oral Daily   clonazePAM 1 mg Oral BID   clopidogrel 75 mg Oral Daily   furosemide 40 mg Intravenous Q12H   heparin (porcine) 5,000 Units Subcutaneous Q8H   levETIRAcetam 750 mg Oral Q12H   lisinopril 5 mg Oral Q24H   pantoprazole 40 mg Oral Q AM   pharmacy consult - MTM  Does not apply Daily   piperacillin-tazobactam 4.5 g Intravenous Q8H   rosuvastatin 10 mg Oral Nightly   vancomycin (dosing per levels)  Does not apply Daily   vitamin B-12 1,000 mcg Oral Daily        Pharmacy to dose vancomycin         Principal Problem:    Sepsis  Active Problems:    Left lower lobe pneumonia    Acute respiratory failure with hypoxia    NSTEMI (non-ST elevated myocardial infarction)    Acute kidney injury    ANDRIY (obstructive sleep apnea)    Diabetes mellitus    Acute diastolic CHF       ASSESSMENT/PLAN:  1. NSTEMI with acute CHF (DHF/MR)  Peak trop 14  - denies chest pain  - continue medical treatment with DAPT, trial of low dose Crestor, and received Heparin gtt for 48 hours (now on DVT PPX dose)   - Nl LVEF, NEW mod-severe MR(since 6/16/16)  - diuresing with IV Lasix 40mg BID transition to daily and hold acei w gfr issues      2. CKD with acute renal insufficiency  - stable, continue to monitor       3. LLL Pneumonia (??)  -IV  ABX per hospitalists      Nicole Fu PA-C as scribe for dr munoz  11/04/17  10:52 AM  Iselin md, personally performed the services described in this documentation as scribed by the above named individual in my presence, and it is both accurate and complete.  11/4/2017  12:15 PM

## 2017-11-04 NOTE — PROGRESS NOTES
[facility] Medicine Services  PROGRESS NOTE    Patient Name: Avis Us  : 9/3/1929  MRN: [id number]    Date of Admission: 2017  Length of Stay: 3  Primary Care Physician: Carol Herzog MD    Subjective   Subjective     CC: F/U SOA, cough    HPI:  Patient seen this morning. Sleeping. Easily wakes up. Has no complaints, says she feels better. Denies any SOA. +cough but this is also improving.     Review of Systems  Gen-no fevers, no chills  CV-no chest pain, no palpitations  Resp-+cough, no dyspnea  GI-no N/V/D, no abd pain      Otherwise ROS is negative except as mentioned in the HPI.    Objective   Objective     Vital Signs:   Temp:  [96.9 °F (36.1 °C)-97.8 °F (36.6 °C)] 96.9 °F (36.1 °C)  Heart Rate:  [74-84] 74  Resp:  [17-24] 17  BP: (104-116)/(62-64) 104/64        Physical Exam:  Gen-no acute distress  CV-RRR, S1 S2 normal, no m/r/g  Resp-diminished at the bases, normal effort  Abd-soft, NT, ND, +BS  Ext-no edema  Neuro-A&Ox3, no focal deficits  Psych-appropriate mood    Results Reviewed:  I have personally reviewed current lab, radiology, and data and agree.      Results from last 7 days  Lab Units 17  0703 17  0504 17  0359 17  1341   WBC 10*3/mm3 9.48 9.89 11.66* 17.94*   HEMOGLOBIN g/dL 10.4* 9.8* 10.7* 10.4*   HEMATOCRIT % 33.9* 31.8* 35.2 34.4*   PLATELETS 10*3/mm3 379 381 363 410   INR   --   --   --  1.15       Results from last 7 days  Lab Units 17  0703 17  0504 17  0359 17  2206 17  1341 17  0651   SODIUM mmol/L 143 140 141  --   --  139   POTASSIUM mmol/L 3.6 3.4* 3.8  --   --  4.8   CHLORIDE mmol/L 101 100 102  --   --  100   CO2 mmol/L 33.0* 32.0* 29.0  --   --  30.0   BUN mg/dL 25* 29* 27*  --   --  25*   CREATININE mg/dL 1.80* 1.70* 1.80*  --   --  1.80*   GLUCOSE mg/dL 122* 125* 117*  --   --  121*   CALCIUM mg/dL 9.1 8.8 9.4  --   --  10.0   ALT (SGPT) U/L  --   --  22  --   --  34    AST (SGOT) U/L  --   --  36*  --   --  56*   TROPONIN I ng/mL  --   --  11.131* 10.824* 14.653*  --      No results found for: BNP  No results found for: PHART    Microbiology Results Abnormal     Procedure Component Value - Date/Time    Blood Culture - Blood, [[id number]]  (Normal) Collected:  11/01/17 0830    Lab Status:  Preliminary result Specimen:  Blood from Arm, Right Updated:  11/04/17 0931     Blood Culture No growth at 3 days    Blood Culture - Blood, [[id number]]  (Normal) Collected:  11/01/17 0840    Lab Status:  Preliminary result Specimen:  Blood from Hand, Left Updated:  11/04/17 0931     Blood Culture No growth at 3 days    Influenza Antigen, Rapid - Swab, Nasopharynx [[id number]]  (Normal) Collected:  11/01/17 0705    Lab Status:  Final result Specimen:  Swab from Nasopharynx Updated:  11/01/17 0757     Influenza A Ag, EIA Negative     Influenza B Ag, EIA Negative          Imaging Results (last 24 hours)     ** No results found for the last 24 hours. **        Results for orders placed during the hospital encounter of 11/01/17   Adult Transesophageal Echo (MICHEL) W/ Cont if Necessary Per Protocol    Narrative · Left ventricular systolic function is normal. Estimated EF = 60%.  · Moderate-to-severe mitral valve regurgitation is present  · Mild to moderate tricuspid valve regurgitation is present.          I have reviewed the medications.    Assessment/Plan   Assessment / Plan     Hospital Problem List     * (Principal)Sepsis    Overview Signed 2/28/2017 11:56 AM by Carol Herzog MD     Description: 10/14- hosp 24 hours- sepsis due to sinus infection         Left lower lobe pneumonia    Acute respiratory failure with hypoxia    NSTEMI (non-ST elevated myocardial infarction)    Acute kidney injury    ANDRIY (obstructive sleep apnea)    Diabetes mellitus    Acute diastolic CHF              Brief Hospital Course to date:  Avis Us is a 88 y.o. female with hx of HTN, DM2, COPD, CKD 3, ANDRIY on CPAP  (wears O2 at night with it), and hx of colovesicular fistula s/p surgery with colostomy in place presents from home due to SOA and productive cough the last few days. Found to have hypoxia and LLL pneumonia.       Assessment & Plan:  --Continue Vanc and Zosyn Day 4 for broad coverage as she does meet HCAP criteria with recent admission and rehab stay at the [facility]. Follow cultures.  --Cardiology following for NSTEMI. Troponin peaked at 14.6. Treating medically. Stopped heparin drip after 48 hours. Continue ASA, Plavix, statin.  --Continue IV Lasix for acute CHF. Echo revealed severe MR and moderate to severe pulmonary HTN, EF 51%. Follow up MICHEL 11/3 with similar findings.   --Monitor Cr closely with diuresis. Stable today. Baseline in the past appears to be around 1.3-1.6, but more recently has been even lower. Repeat labs in AM.  --PT/OT evaluation pending. She may benefit from another rehab stay but will see how she does.     Complex patient.     DVT Prophylaxis: SQ heparin    CODE STATUS: Conditional Code    Disposition: I expect the patient to be discharged home vs. Rehab/SNF in 2-3 days    Shannan Che MD  11/04/17  1:02 PM

## 2017-11-05 NOTE — PLAN OF CARE
Problem: Patient Care Overview (Adult)  Goal: Plan of Care Review  Outcome: Ongoing (interventions implemented as appropriate)    11/05/17 1048   Coping/Psychosocial Response Interventions   Plan Of Care Reviewed With patient   Patient Care Overview   Progress progress toward functional goals as expected   Outcome Evaluation   Outcome Summary/Follow up Plan Patient presents with generalized weakness and decreased mobility. Patient ambulated 30' w/ RWx CGA. Will benefit from further P.T. to assist to PLOF         Problem: Inpatient Physical Therapy  Goal: Bed Mobility Goal LTG- PT  Outcome: Ongoing (interventions implemented as appropriate)    11/05/17 1048   Bed Mobility PT LTG   Bed Mobility PT LTG, Date Established 11/05/17   Bed Mobility PT LTG, Time to Achieve 5 - 7 days   Bed Mobility PT LTG, Activity Type all bed mobility   Bed Mobility PT LTG, [place] Level independent   Bed Mobility PT LTG, Outcome goal ongoing       Goal: Transfer Training Goal 1 LTG- PT  Outcome: Ongoing (interventions implemented as appropriate)    11/05/17 1048   Transfer Training PT LTG   Transfer Training PT LTG, Time to Achieve 5 - 7 days   Transfer Training PT LTG, Activity Type bed to chair /chair to bed;sit to stand/stand to sit   Transfer Training PT LTG, [place] Level independent   Transfer Training PT LTG, Assist Device walker, rolling   Transfer Training PT LTG, Outcome goal ongoing       Goal: Gait Training Goal LTG- PT  Outcome: Ongoing (interventions implemented as appropriate)    11/05/17 1048   Gait Training PT LTG   Gait Training Goal PT LTG, Date Established 11/05/17   Gait Training Goal PT LTG, Time to Achieve 5 - 7 days   Gait Training Goal PT LTG, [place] Level supervision required   Gait Training Goal PT LTG, Assist Device walker, rolling   Gait Training Goal PT LTG, Distance to Achieve 120   Gait Training Goal PT LTG, Outcome goal ongoing

## 2017-11-05 NOTE — PROGRESS NOTES
[facility] Medicine Services  PROGRESS NOTE    Patient Name: Avis Us  : 9/3/1929  MRN: [id number]    Date of Admission: 2017  Length of Stay: 4  Primary Care Physician: Carol Herzog MD    Subjective   Subjective   CC: F/U SOA, cough    HPI:  Patient sitting up in a chair in NAD.  Patient states that it she thinks she is ready to go home with her family takes care of her and after doing exercises.  States she is doing much better.  No family in room.  No adverse events overnight.      Review of Systems  Gen-no fevers, no chills  CV-no chest pain, no palpitations  Resp-+cough, no dyspnea  GI-no N/V/D, no abd pain  Otherwise ROS is negative except as mentioned in the HPI.    Objective   Objective   Vital Signs:   Temp:  [97.7 °F (36.5 °C)-97.9 °F (36.6 °C)] 97.9 °F (36.6 °C)  Heart Rate:  [68-83] 75  Resp:  [16-18] 18  BP: (115-122)/(62-73) 115/62  Physical Exam:  General: Alert, well-developed well-nourished female in no acute distress    Head: Normocephalic atraumatic    Eyes: PERRLA, EOMI, nonicteric, conjunctiva normal    ENT: Pink, moist mucous membranes    Neck: Supple, nontender, trachea midline without lymphadenopathy, JVD, nuchal rigidity.      Cardiovascular: RRR  no M/R/G  +2 DP pulses bilaterally    Respiratory: Nonlabored, symmetrical chest expansion, clear to auscultation bilaterally, but diminished in bases    Abdomen: Morbidly obese, soft, nontender, nondistended,  positive bowel sounds in all 4 quadrants     Extremities: FROM in upper and lower extremities bilaterally. negative for edema/cyanosis. Negative calf pain    Skin: Pink/warm/dry.  No rash or lesions noted    Neuro: Alert and oriented to person place time and situation, speech is clear, follows all commands, recent and remote memory intact    Psych: Patient is pleasant and cooperative.  Normal affect.  Negative suicidal ideation or homicidal ideation.    Results Reviewed:  I have personally  reviewed current lab, radiology, and data and agree.    Results from last 7 days  Lab Units 11/04/17  1503 11/04/17  0703 11/03/17  0504  11/01/17  1341   WBC 10*3/mm3 9.90 9.48 9.89  < > 17.94*   HEMOGLOBIN g/dL 10.9* 10.4* 9.8*  < > 10.4*   HEMATOCRIT % 35.8 33.9* 31.8*  < > 34.4*   PLATELETS 10*3/mm3 420 379 381  < > 410   INR   --   --   --   --  1.15   < > = values in this interval not displayed.    Results from last 7 days  Lab Units 11/05/17  0601 11/04/17  0703 11/03/17  0504 11/02/17  0359 11/01/17  2206 11/01/17  1341 11/01/17  0651   SODIUM mmol/L 144 143 140 141  --   --  139   POTASSIUM mmol/L 4.2 3.6 3.4* 3.8  --   --  4.8   CHLORIDE mmol/L 102 101 100 102  --   --  100   CO2 mmol/L 33.0* 33.0* 32.0* 29.0  --   --  30.0   BUN mg/dL 30* 25* 29* 27*  --   --  25*   CREATININE mg/dL 2.00* 1.80* 1.70* 1.80*  --   --  1.80*   GLUCOSE mg/dL 111* 122* 125* 117*  --   --  121*   CALCIUM mg/dL 9.4 9.1 8.8 9.4  --   --  10.0   ALT (SGPT) U/L  --   --   --  22  --   --  34   AST (SGOT) U/L  --   --   --  36*  --   --  56*   TROPONIN I ng/mL  --   --   --  11.131* 10.824* 14.653*  --      No results found for: BNP  No results found for: PHART    Microbiology Results Abnormal     Procedure Component Value - Date/Time    Blood Culture - Blood, [[id number]]  (Normal) Collected:  11/01/17 0830    Lab Status:  Preliminary result Specimen:  Blood from Arm, Right Updated:  11/05/17 0831     Blood Culture No growth at 4 days    Blood Culture - Blood, [[id number]]  (Normal) Collected:  11/01/17 0840    Lab Status:  Preliminary result Specimen:  Blood from Hand, Left Updated:  11/05/17 0831     Blood Culture No growth at 4 days    Influenza Antigen, Rapid - Swab, Nasopharynx [[id number]]  (Normal) Collected:  11/01/17 0705    Lab Status:  Final result Specimen:  Swab from Nasopharynx Updated:  11/01/17 0757     Influenza A Ag, EIA Negative     Influenza B Ag, EIA Negative        Imaging Results (last 24 hours)     ** No  results found for the last 24 hours. **        Results for orders placed during the hospital encounter of 11/01/17   Adult Transesophageal Echo (MICHEL) W/ Cont if Necessary Per Protocol    Narrative · Left ventricular systolic function is normal. Estimated EF = 60%.  · Moderate-to-severe mitral valve regurgitation is present  · Mild to moderate tricuspid valve regurgitation is present.        I have reviewed the medications.    Assessment/Plan   Assessment / Plan     Hospital Problem List     * (Principal)Sepsis    Overview Signed 2/28/2017 11:56 AM by Carol Herzog MD     Description: 10/14- hosp 24 hours- sepsis due to sinus infection         Left lower lobe pneumonia    Acute respiratory failure with hypoxia    NSTEMI (non-ST elevated myocardial infarction)    Acute kidney injury    ANDRIY (obstructive sleep apnea)    Diabetes mellitus    Acute diastolic CHF         Brief Hospital Course to date:  Avis Us is a 88 y.o. 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 Found to have hypoxia and LLL pneumonia.     Assessment & Plan:  --Continue Vanc and Zosyn Day 4 for broad coverage as she does meet HCAP criteria with recent admission and rehab stay at the [facility]. Follow cultures.  --Cardiology following for NSTEMI. Troponin peaked at 14.6. Treating medically. Stopped heparin drip after 48 hours. Continue ASA, Plavix, statin.  --Continue IV Lasix for acute CHF. Echo revealed severe MR and moderate to severe pulmonary HTN, EF 51%. Follow up MICHEL 11/3 with similar findings.   --Monitor Cr closely with diuresis. Stable today. Baseline in the past appears to be around 1.3-1.6, but more recently has been even lower. Repeat labs in AM.  --Stopped Lasix after 1 dose d/t increasng CR  --PT/OT evaluation pending. She may benefit from another  rehab stay but will see how she does;  patient states that she is not going to rehabilitation, but is going home with family who help her do physical therapy exercises and take care of her; patient is quite adamant about not going to rehabilitation.      Complex patient.     DVT Prophylaxis: SQ heparin    CODE STATUS: Conditional Code    Disposition: I expect the patient to be discharged home vs. Rehab/SNF in 2-3 days    COREY Lloyd  11/05/17  1:45 PM

## 2017-11-05 NOTE — PLAN OF CARE
Problem: Patient Care Overview (Adult)  Goal: Plan of Care Review  Outcome: Ongoing (interventions implemented as appropriate)    11/05/17 1056   Coping/Psychosocial Response Interventions   Plan Of Care Reviewed With patient   Outcome Evaluation   Outcome Summary/Follow up Plan OT eval complete. PT CGA x 2 STS and to ambualte 30 ft with RW. Pt presents with limitations in strenght, balance and ADL performance. OT will follow to advance pt toward PLOF. Recommend home with HHOtT         Problem: Inpatient Occupational Therapy  Goal: Bed Mobility Goal LTG- OT  Outcome: Ongoing (interventions implemented as appropriate)    11/05/17 1056   Bed Mobility OT LTG   Bed Mobility OT LTG, Date Established 11/05/17   Bed Mobility OT LTG, Time to Achieve by discharge   Bed Mobility OT LTG, Activity Type supine to sit/sit to supine   Bed Mobility OT LTG, [place] Level supervision required       Goal: Transfer Training Goal 1 LTG- OT  Outcome: Ongoing (interventions implemented as appropriate)    11/05/17 1056   Transfer Training OT LTG   Transfer Training OT LTG, Date Established 11/05/17   Transfer Training OT LTG, Time to Achieve by discharge   Transfer Training OT LTG, Activity Type toilet;bed to chair /chair to bed   Transfer Training OT LTG, [place] Level supervision required   Transfer Training OT LTG, Assist Device walker, rolling       Goal: Strength Goal LTG- OT  Outcome: Ongoing (interventions implemented as appropriate)    11/05/17 1056   Strength OT LTG   Strength Goal OT LTG, Date Established 11/05/17   Strength Goal OT LTG, Time to Achieve by discharge   Strength Goal OT LTG, Measure to Achieve Pt will complete 10-15 reps daily BUE AROM as needed to support ADLs       Goal: LB Dressing Goal LTG- OT  Outcome: Ongoing (interventions implemented as appropriate)    11/05/17 1056   LB Dressing OT LTG   LB Dressing Goal OT LTG, Date Established 11/05/17   LB Dressing Goal OT LTG, Time to Achieve by discharge    LB Dressing Goal OT LTG, [place] Level minimum assist (75% patient effort)

## 2017-11-05 NOTE — PROGRESS NOTES
"[facility] Progress Note     LOS: 4 days   Patient Care Team:  Carol Herzog MD as PCP - General  Carol Herzog MD as PCP - Family Medicine  Carol Herzog MD as PCP - Claims Attributed  PCP:  Carol Herzog MD    Chief Complaint:  cad    SUBJECTIVE:   Pt in bed .  Less dyspneic    Review of Systems:   All systems have been reviewed and are negative with the exception of those mentioned above.      OBJECTIVE:    Vital Sign Min/Max for last 24 hours  Temp  Min: 97.7 °F (36.5 °C)  Max: 97.9 °F (36.6 °C)   BP  Min: 115/62  Max: 122/73   Pulse  Min: 68  Max: 83   Resp  Min: 16  Max: 18   SpO2  Min: 94 %  Max: 100 %   Flow (L/min)  Min: 1  Max: 2   Weight  Min: 191 lb 9.6 oz (86.9 kg)  Max: 191 lb 9.6 oz (86.9 kg)     Flowsheet Rows         First Filed Value    Admission Height  66\" (167.6 cm) Documented at 11/01/2017 0556    Admission Weight  208 lb (94.3 kg) Documented at 11/01/2017 0556          Telemetry: sr      Intake/Output Summary (Last 24 hours) at 11/05/17 1408  Last data filed at 11/04/17 2032   Gross per 24 hour   Intake              120 ml   Output              725 ml   Net             -605 ml     Intake & Output (last 3 days)       11/02 0701 - 11/03 0700 11/03 0701 - 11/04 0700 11/04 0701 - 11/05 0700 11/05 0701 - 11/06 0700    P.O.   480     I.V. (mL/kg) 112 (1.2)       IV Piggyback 300 200      Total Intake(mL/kg) 412 (4.4) 200 (2.2) 480 (5.5)     Urine (mL/kg/hr) 1100 (0.5) 2020 (0.9) 1300 (0.6)     Stool 250 (0.1) 250 (0.1) 225 (0.1)     Total Output 1350 2270 1525      Net -284 -[phone] -4119                     Physical Exam:  Physical Exam   Constitutional: She is oriented to person, place, and time. She appears well-developed and well-nourished. No distress.   obese   Cardiovascular: Normal rate, regular rhythm, normal heart sounds and intact distal pulses.    No murmur heard.  Pulses:       Radial pulses are 2+ on the right side, " and 2+ on the left side.        Dorsalis pedis pulses are 2+ on the right side, and 2+ on the left side.        Posterior tibial pulses are 2+ on the right side, and 2+ on the left side.   Pulmonary/Chest: Effort normal. She has decreased breath sounds. She has no wheezes. She has no rales.   Abdominal: Soft. Bowel sounds are normal. There is no tenderness. There is no guarding.   Musculoskeletal: She exhibits no edema or tenderness.   Neurological: She is alert and oriented to person, place, and time.   Skin: Skin is warm and dry. No rash noted.   Psychiatric: She has a normal mood and affect.   Nursing note and vitals reviewed.       LABS/DIAGNOSTIC DATA:    Results from last 7 days  Lab Units 11/04/17  1503 11/04/17  0703 11/03/17  0504   WBC 10*3/mm3 9.90 9.48 9.89   HEMOGLOBIN g/dL 10.9* 10.4* 9.8*   HEMATOCRIT % 35.8 33.9* 31.8*   PLATELETS 10*3/mm3 420 379 381     No results found for: TROPONINT    Results from last 7 days  Lab Units 11/02/17  1038 11/02/17  0359 11/01/17  1341   INR   --   --  1.15   APTT seconds 34.1* 48.8 28.4*       Results from last 7 days  Lab Units 11/05/17  0601 11/04/17  0703 11/03/17  0504 11/02/17  0359 11/01/17  0651   SODIUM mmol/L 144 143 140 141 139   POTASSIUM mmol/L 4.2 3.6 3.4* 3.8 4.8   CHLORIDE mmol/L 102 101 100 102 100   CO2 mmol/L 33.0* 33.0* 32.0* 29.0 30.0   BUN mg/dL 30* 25* 29* 27* 25*   CREATININE mg/dL 2.00* 1.80* 1.70* 1.80* 1.80*   CALCIUM mg/dL 9.4 9.1 8.8 9.4 10.0   BILIRUBIN mg/dL  --   --   --  1.0 1.3*   ALK PHOS U/L  --   --   --  154* 199*   ALT (SGPT) U/L  --   --   --  22 34   AST (SGOT) U/L  --   --   --  36* 56*   GLUCOSE mg/dL 111* 122* 125* 117* 121*                   Results from last 7 days  Lab Units 11/01/17  0651   BNP pg/mL 2368.0*       Medication Review:     albuterol 2.5 mg Nebulization Q6H - RT   allopurinol 200 mg Oral Daily   aspirin 81 mg Oral Daily   budesonide-formoterol 2 puff Inhalation BID - RT   cetirizine 10 mg Oral Daily    vitamin D3 5,000 Units Oral Daily   clonazePAM 1 mg Oral BID   clopidogrel 75 mg Oral Daily   furosemide 40 mg Intravenous Daily   heparin (porcine) 5,000 Units Subcutaneous Q8H   levETIRAcetam 750 mg Oral Q12H   pantoprazole 40 mg Oral Q AM   pharmacy consult - MTM  Does not apply Daily   piperacillin-tazobactam 4.5 g Intravenous Q8H   rosuvastatin 10 mg Oral Nightly   vancomycin (dosing per levels)  Does not apply Daily   vitamin B-12 1,000 mcg Oral Daily        Pharmacy to dose vancomycin         Principal Problem:    Sepsis  Active Problems:    Left lower lobe pneumonia    Acute respiratory failure with hypoxia    NSTEMI (non-ST elevated myocardial infarction)    Acute kidney injury    ANDRIY (obstructive sleep apnea)    Diabetes mellitus    Acute diastolic CHF       ASSESSMENT/PLAN:  1. NSTEMI with acute CHF (DHF/MR)  Peak trop 14  - denies chest pain  - continue medical treatment with DAPT, trial of low dose Crestor, and received Heparin gtt for 48 hours (now on DVT PPX dose)   - Nl LVEF, NEW mod-severe MR(since 6/16/16)  - diuresing with IV Lasix 40mg BID transition to daily and hold acei w gfr issues      2. CKD with acute renal insufficiency  - stable, continue to monitor   - given IV lasix 846 am 11/5 will hold until am labs 11/6    3. LLL Pneumonia (??)  -IV  ABX per hospitalists

## 2017-11-05 NOTE — THERAPY TREATMENT NOTE
Acute Care - Occupational Therapy Initial Evaluation  [facility]     Patient Name: Avis Us  : 9/3/1929  MRN: [id number]  Today's Date: 2017  Onset of Illness/Injury or Date of Surgery Date: 17  Date of Referral to OT: 17  Referring Physician: MD Chinedu    Admit Date: 2017       ICD-10-CM ICD-9-CM   1. Pneumonia of left lower lobe due to infectious organism J18.1 486   2. Sepsis, due to unspecified organism A41.9 038.9     995.91   3. Hypoxia R09.02 799.02   4. Acute renal failure, unspecified acute renal failure type N17.9 584.9   5. Non-STEMI (non-ST elevated myocardial infarction) I21.4 410.70   6. Leukocytosis, unspecified type D72.829 288.60   7. Impaired functional mobility, balance, gait, and endurance Z74.09 V49.89   8. Impaired mobility and ADLs Z74.09 799.89     Patient Active Problem List   Diagnosis   • Abnormal liver function tests   • Acute myocardial infarction   • Anxiety   • Knee pain   • Asthma   • Calf cramp   • Candidal intertrigo   • Cataract   • Chest pain   • Chronic kidney disease, stage III (moderate)   • Chronic obstructive pulmonary disease   • Complete atrioventricular block   • Congestive heart failure   • Atherosclerosis of coronary artery   • Cough   • Depression   • Diabetes mellitus   • Dizziness   • Dysuria   • Edema   • Gastroesophageal reflux disease   • Primary hypertriglyceridemia   • Fatigue   • Fracture of patella   • Gout   • Headache   • Hyperlipidemia   • Hypertension   • Influenza due to influenza A virus   • Insomnia   • Leukocytosis   • Macrocytosis   • Macular degeneration   • Menopause present   • Night sweats   • Obstructive sleep apnea syndrome   • Osteoporosis   • Peripheral neuropathy   • Pneumonia   • Seizure disorder   • Sick sinus syndrome   • Sinus bradycardia   • Thrombophlebitis   • Cobalamin deficiency   • Vitamin D deficiency   • Physical debility   • Lumbar strain   • History of MI (myocardial infarction)   • Midline low  back pain without sciatica   • Arthralgia of lumbar spine   • Encounter for eye exam   • Parasites in stool   • Sepsis   • UTI (urinary tract infection)   • Acute on chronic Respiratory failure. Not requiring ventilatory support   • Acute on Chronic kidney disease (CKD), stage III (moderate)   • Exacerbation of COPD    • H/O SSS (sick sinus syndrome) s/p PPM 2014   • CHF (congestive heart failure)   • Anxiety and depression   • Macular degeneration   • ANDRIY on CPAP   • Seizure disorder on Keppra   • HTN and possible diastolic dysfunction   • HLD (hyperlipidemia)   • Obesity, BMI 33.7   • Recurrent pneumonia   • Complicated UTI (urinary tract infection)   • Senile atrophic vaginitis   • Acute cystitis with hematuria   • GERD (gastroesophageal reflux disease)   • Diastolic dysfunction   • Type 2 diabetes mellitus   • Colovesical fistula   • Weakness   • HCAP (healthcare-associated pneumonia)   • Left lower lobe pneumonia   • Acute respiratory failure with hypoxia   • NSTEMI (non-ST elevated myocardial infarction)   • Acute kidney injury   • ANDRIY (obstructive sleep apnea)   • Diabetes mellitus   • Acute diastolic CHF      Past Medical History:   Diagnosis Date   • Abnormal liver function test    • Anxiety    • Arthralgia of lumbar spine    • Asthma    • CAD (coronary artery disease)    • Calf cramp    • Candidal intertrigo    • Cataract    • Chest pain    • CHF (congestive heart failure)    • Chronic kidney disease, stage III (moderate)    • Chronic UTI (urinary tract infection)     last infection1 1/2 years ago. Cipro prophalactically   • Complete heart block    • COPD (chronic obstructive pulmonary disease)    • Cough    • Depression    • Diabetes mellitus    • Dizziness    • DVT (deep venous thrombosis)     1950's, last one 5 years   • Dysuria    • Edema    • Fatigue    • Fracture of patella    • GERD (gastroesophageal reflux disease)    • Gout    • Headache    • Hyperlipidemia    • Hypertension    •  Hypertriglyceridemia    • Influenza A    • Insomnia    • Kidney failure     early stages, due to Celebrex   • Knee pain    • Leukocytosis    • Macrocytosis    • Macular degeneration    • Menopause    • Myocardial infarction    • Night sweats    • ANDRIY (obstructive sleep apnea)    • Osteoporosis with fracture    • Parasites in stool    • Peripheral neuropathy    • Pneumonia    • Psoriasis    • Seizures     last one 5 years ago   • Sepsis    • Sinus bradycardia    • SSS (sick sinus syndrome)    • Thrombophlebitis    • UTI (urinary tract infection)    • Vitamin B12 deficiency    • Vitamin D deficiency      Past Surgical History:   Procedure Laterality Date   • CHOLECYSTECTOMY     • COLON RESECTION N/A 8/7/2017    Procedure: LAPAROSCOPIC COLOSTOMY;  Surgeon: Chioma Gordon MD;  Location:  [facility];  Service:    • COLONOSCOPY      10 years ago   • HERNIA REPAIR     • KYPHOPLASTY N/A 9/23/2016    Procedure: KYPHOPLASTY T12;  Surgeon: Charles Nguyen MD;  Location:  [facility];  Service:    • PACEMAKER IMPLANTATION     • UMBILICAL HERNIA REPAIR            OT ASSESSMENT FLOWSHEET (last 72 hours)      OT Evaluation       11/05/17 0856 11/05/17 0855             Rehab Evaluation    Document Type evaluation  -LF evaluation  -AC       Subjective Information agree to therapy  -LF agree to therapy  -AC       Patient Effort, Rehab Treatment good  -LF good  -AC       General Information    Patient Profile Review yes  -LF yes  -AC       Onset of Illness/Injury or Date of Surgery Date 11/01/17  -LF 11/01/17  -AC       Referring Physician MD Chinedu  -LF MD Chinedu  -       General Observations Patient in bed w/ HOB elevated, brushing teeth.  O2 NC, IV  -LF Pt in bed. O2 via NC  -AC       Pertinent History Of Current Problem to ER with onset SOA, productive cough, not feeling well.  diagnosed hypoxia and LLL pneumonia.  Recent. hospitalization w/ d/c 8-15-17 for cholevascular fistula w/ colostomy placement; had returned home after rehab  stay  -LF Admit with SOA and cough  -AC       Precautions/Limitations fall precautions;oxygen therapy device and L/min   colostomy  -LF fall precautions;oxygen therapy device and L/min   colostomy  -AC       Prior Level of Function independent:;all household mobility   w/c for community mobility; son assists tobed at night, meal  -LF independent:;all household mobility;ADL's   son and dtr assist with meds, meals  -AC       Equipment Currently Used at Home cane, straight;commode;wheelchair;walker, rolling;shower chair;rollator  -LF cane, straight;commode;walker, standard;wheelchair;rollator  -AC       Plans/Goals Discussed With patient;agreed upon  -LF patient;agreed upon  -AC       Risks Reviewed patient:;LOB;nausea/vomiting;change in vital signs;lines disloged  -LF patient:;LOB  -AC       Benefits Reviewed patient:;improve function;increase independence  -LF patient:;improve function;increase independence;increase strength;increase balance;increase knowledge  -AC       Barriers to Rehab previous functional deficit  -LF        Living Environment    Lives With alone  -LF alone  -AC       Living Arrangements house  -LF house  -AC       Home Accessibility stairs to enter home  -LF stairs to enter home;tub/shower is not walk in   has bsasement, but does not use  -AC       Number of Stairs to Enter Home 1  -LF 1  -AC       Living Environment Comment son and dtr assist with care  -LF son and dtr assist  -AC       Clinical Impression    Date of Referral to OT  11/03/17  -AC       OT Diagnosis  ADL decline  -AC       Patient/Family Goals Statement  Pt woul dlike to return to PLOF with self care  -AC       Criteria for Skilled Therapeutic Interventions Met  yes;treatment indicated  -AC       Rehab Potential  good, to achieve stated therapy goals  -AC       Therapy Frequency  daily  -AC       Anticipated Discharge Disposition  home with home health  -AC       Vital Signs    Pre SpO2 (%) 97  -LF        O2 Delivery Pre  Treatment supplemental O2   O2  -LF        Pain Assessment    Pain Assessment No/denies pain  -LF No/denies pain  -AC       Vision Assessment/Intervention    Visual Impairment  WFL with corrective lenses  -AC       Cognitive Assessment/Intervention    Current Cognitive/Communication Assessment functional  -LF functional  -AC       Orientation Status oriented to;person;place;time;situation  -LF oriented x 4  -AC       Follows Commands/Answers Questions 100% of the time  -% of the time  -AC       Personal Safety WNL/WFL  -LF WNL/WFL  -AC       Personal Safety Interventions fall prevention program maintained;gait belt;nonskid shoes/slippers when out of bed  -LF fall prevention program maintained;gait belt;nonskid shoes/slippers when out of bed  -AC       ROM (Range of Motion)    General ROM --   LE ROM WFL's  -LF no range of motion deficits identified  -AC       MMT (Manual Muscle Testing)    General MMT Assessment lower extremity strength deficits identified  -LF upper extremity strength deficits identified  -AC       General MMT Assessment Detail grossly 4+/5  -LF BUE grossly 4-/5  -AC       Bed Mobility, Assessment/Treatment    Bed Mobility, Assistive Device bed rails;head of bed elevated  -LF bed rails;head of bed elevated  -AC       Bed Mob, Supine to Sit, [place] contact guard assist  -LF contact guard assist  -AC       Bed Mobility, Safety Issues decreased use of arms for pushing/pulling;decreased use of legs for bridging/pushing  -LF decreased use of arms for pushing/pulling;decreased use of legs for bridging/pushing  -AC       Bed Mobility, Impairments strength decreased  -LF strength decreased  -AC       Transfer Assessment/Treatment    Transfers, Sit-Stand [place] contact guard assist  -LF contact guard assist  -AC       Transfers, Stand-Sit [place] contact guard assist  -LF contact guard assist  -AC       Transfers, Sit-Stand-Sit, Assist Device  rolling walker  -AC       Transfer,  Safety Issues other (see comments)   cues for hand placement  -LF        Transfer, Impairments strength decreased  -LF strength decreased  -AC       Transfer, Comment  Vcs for hand placement  -AC       Functional Mobility    Functional Mobility- Ind. Level  contact guard assist;2 person assist required  -AC       Functional Mobility- Device  rolling walker  -AC       Functional Mobility-Distance (Feet)  30  -AC       General Therapy Interventions    Planned Therapy Interventions  ADL retraining;balance training;bed mobility training;strengthening;transfer training  -AC       Positioning and Restraints    Pre-Treatment Position in bed  -LF in bed  -AC       Post Treatment Position chair  -LF chair  -AC       In Chair sitting;call light within reach;encouraged to call for assist;exit alarm on  -LF reclined;call light within reach;encouraged to call for assist;exit alarm on  -AC         User Key  (r) = Recorded By, (t) = Taken By, (c) = Cosigned By    Initials Name Effective Dates     Albina Stock OT 06/23/15 -     LF Sophie Park, PT 06/19/15 -            Occupational Therapy Education     Title: PT OT SLP Therapies (Active)     Topic: Occupational Therapy (Active)     Point: ADL training (Done)    Description: Instruct learner(s) on proper safety adaptation and remediation techniques during self care or transfers.   Instruct in proper use of assistive devices.    Learning Progress Summary    Learner Readiness Method Response Comment Documented by Status   Patient Acceptance E VU benefits of actiivty, role of OT  11/05/17 1055 Done                      User Key     Initials Effective Dates Name Provider Type Discipline     06/23/15 -  Albina Stock OT Occupational Therapist OT                  OT Recommendation and Plan  Anticipated Discharge Disposition: home with home health  Planned Therapy Interventions: ADL retraining, balance training, bed mobility training, strengthening, transfer training  Therapy  Frequency: daily  Plan of Care Review  Plan Of Care Reviewed With: patient  Outcome Summary/Follow up Plan: OT songal complete.  PT CGA x 2 STS and to ambualte 30 ft with RW.  Pt presents with limitations in strenght, balance and ADL performance.  OT will follow to advance pt toward PLOF. Recommend home with [facility]          OT Goals       11/05/17 1056          Bed Mobility OT LTG    Bed Mobility OT LTG, Date Established 11/05/17  -AC      Bed Mobility OT LTG, Time to Achieve by discharge  -AC      Bed Mobility OT LTG, Activity Type supine to sit/sit to supine  -AC      Bed Mobility OT LTG, [place] Level supervision required  -AC      Transfer Training OT LTG    Transfer Training OT LTG, Date Established 11/05/17  -AC      Transfer Training OT LTG, Time to Achieve by discharge  -AC      Transfer Training OT LTG, Activity Type toilet;bed to chair /chair to bed  -AC      Transfer Training OT LTG, [place] Level supervision required  -AC      Transfer Training OT LTG, Assist Device walker, rolling  -AC      Strength OT LTG    Strength Goal OT LTG, Date Established 11/05/17  -AC      Strength Goal OT LTG, Time to Achieve by discharge  -AC      Strength Goal OT LTG, Measure to Achieve Pt will complete 10-15 reps daily BUE AROM as needed to support ADLs  -AC      LB Dressing OT LTG    LB Dressing Goal OT LTG, Date Established 11/05/17  -AC      LB Dressing Goal OT LTG, Time to Achieve by discharge  -AC      LB Dressing Goal OT LTG, [place] Level minimum assist (75% patient effort)  -AC        User Key  (r) = Recorded By, (t) = Taken By, (c) = Cosigned By    Initials Name Provider Type    LOIS Stock OT Occupational Therapist                Outcome Measures       11/05/17 0856 11/05/17 0855       How much help from another person do you currently need...    Turning from your back to your side while in flat bed without using bedrails? 3  -LF      Moving from lying on back to sitting on the side of a flat  bed without bedrails? 3  -LF      Moving to and from a bed to a chair (including a wheelchair)? 3  -LF      Standing up from a chair using your arms (e.g., wheelchair, bedside chair)? 3  -LF      Climbing 3-5 steps with a railing? 2  -LF      To walk in hospital room? 3  -LF      AM-PAC 6 Clicks Score 17  -LF      How much help from another is currently needed...    Putting on and taking off regular lower body clothing?  2  -AC     Bathing (including washing, rinsing, and drying)  2  -AC     Toileting (which includes using toilet bed pan or urinal)  3  -AC     Putting on and taking off regular upper body clothing  3  -AC     Taking care of personal grooming (such as brushing teeth)  4  -AC     Eating meals  4  -AC     Score  18  -AC     Functional Assessment    Outcome Measure Options AM-PAC 6 Clicks Basic Mobility (PT)  -LF AM-PAC 6 Clicks Daily Activity (OT)  -AC       User Key  (r) = Recorded By, (t) = Taken By, (c) = Cosigned By    Initials Name Provider Type    AC Albina Stock OT Occupational Therapist     Sophie Park, HEIDI Physical Therapist          Time Calculation:   OT Start Time: 0855    Therapy Charges for Today     Code Description Service Date Service Provider Modifiers Qty    [id number]  OT EVAL LOW COMPLEXITY 4 11/5/2017 Albina Stcok OT GO 1               Albina Stock OT  11/5/2017

## 2017-11-05 NOTE — PLAN OF CARE
Problem: Patient Care Overview (Adult)  Goal: Discharge Needs Assessment  Outcome: Ongoing (interventions implemented as appropriate)    Problem: Fall Risk (Adult)  Goal: Absence of Falls  Outcome: Ongoing (interventions implemented as appropriate)

## 2017-11-05 NOTE — THERAPY EVALUATION
Acute Care - Physical Therapy Initial Evaluation  [facility]     Patient Name: Avis Us  : 9/3/1929  MRN: [id number]  Today's Date: 2017   Onset of Illness/Injury or Date of Surgery Date: 17  Date of Referral to PT: 17  Referring Physician: MD Chinedu      Admit Date: 2017     Visit Dx:    ICD-10-CM ICD-9-CM   1. Pneumonia of left lower lobe due to infectious organism J18.1 486   2. Sepsis, due to unspecified organism A41.9 038.9     995.91   3. Hypoxia R09.02 799.02   4. Acute renal failure, unspecified acute renal failure type N17.9 584.9   5. Non-STEMI (non-ST elevated myocardial infarction) I21.4 410.70   6. Leukocytosis, unspecified type D72.829 288.60   7. Impaired functional mobility, balance, gait, and endurance Z74.09 V49.89   8. Impaired mobility and ADLs Z74.09 799.89     Patient Active Problem List   Diagnosis   • Abnormal liver function tests   • Acute myocardial infarction   • Anxiety   • Knee pain   • Asthma   • Calf cramp   • Candidal intertrigo   • Cataract   • Chest pain   • Chronic kidney disease, stage III (moderate)   • Chronic obstructive pulmonary disease   • Complete atrioventricular block   • Congestive heart failure   • Atherosclerosis of coronary artery   • Cough   • Depression   • Diabetes mellitus   • Dizziness   • Dysuria   • Edema   • Gastroesophageal reflux disease   • Primary hypertriglyceridemia   • Fatigue   • Fracture of patella   • Gout   • Headache   • Hyperlipidemia   • Hypertension   • Influenza due to influenza A virus   • Insomnia   • Leukocytosis   • Macrocytosis   • Macular degeneration   • Menopause present   • Night sweats   • Obstructive sleep apnea syndrome   • Osteoporosis   • Peripheral neuropathy   • Pneumonia   • Seizure disorder   • Sick sinus syndrome   • Sinus bradycardia   • Thrombophlebitis   • Cobalamin deficiency   • Vitamin D deficiency   • Physical debility   • Lumbar strain   • History of MI (myocardial infarction)   •  Midline low back pain without sciatica   • Arthralgia of lumbar spine   • Encounter for eye exam   • Parasites in stool   • Sepsis   • UTI (urinary tract infection)   • Acute on chronic Respiratory failure. Not requiring ventilatory support   • Acute on Chronic kidney disease (CKD), stage III (moderate)   • Exacerbation of COPD    • H/O SSS (sick sinus syndrome) s/p PPM 2014   • CHF (congestive heart failure)   • Anxiety and depression   • Macular degeneration   • ANDRIY on CPAP   • Seizure disorder on Keppra   • HTN and possible diastolic dysfunction   • HLD (hyperlipidemia)   • Obesity, BMI 33.7   • Recurrent pneumonia   • Complicated UTI (urinary tract infection)   • Senile atrophic vaginitis   • Acute cystitis with hematuria   • GERD (gastroesophageal reflux disease)   • Diastolic dysfunction   • Type 2 diabetes mellitus   • Colovesical fistula   • Weakness   • HCAP (healthcare-associated pneumonia)   • Left lower lobe pneumonia   • Acute respiratory failure with hypoxia   • NSTEMI (non-ST elevated myocardial infarction)   • Acute kidney injury   • ANDRIY (obstructive sleep apnea)   • Diabetes mellitus   • Acute diastolic CHF      Past Medical History:   Diagnosis Date   • Abnormal liver function test    • Anxiety    • Arthralgia of lumbar spine    • Asthma    • CAD (coronary artery disease)    • Calf cramp    • Candidal intertrigo    • Cataract    • Chest pain    • CHF (congestive heart failure)    • Chronic kidney disease, stage III (moderate)    • Chronic UTI (urinary tract infection)     last infection1 1/2 years ago. Cipro prophalactically   • Complete heart block    • COPD (chronic obstructive pulmonary disease)    • Cough    • Depression    • Diabetes mellitus    • Dizziness    • DVT (deep venous thrombosis)     1950's, last one 5 years   • Dysuria    • Edema    • Fatigue    • Fracture of patella    • GERD (gastroesophageal reflux disease)    • Gout    • Headache    • Hyperlipidemia    • Hypertension    •  Hypertriglyceridemia    • Influenza A    • Insomnia    • Kidney failure     early stages, due to Celebrex   • Knee pain    • Leukocytosis    • Macrocytosis    • Macular degeneration    • Menopause    • Myocardial infarction    • Night sweats    • ANDRIY (obstructive sleep apnea)    • Osteoporosis with fracture    • Parasites in stool    • Peripheral neuropathy    • Pneumonia    • Psoriasis    • Seizures     last one 5 years ago   • Sepsis    • Sinus bradycardia    • SSS (sick sinus syndrome)    • Thrombophlebitis    • UTI (urinary tract infection)    • Vitamin B12 deficiency    • Vitamin D deficiency      Past Surgical History:   Procedure Laterality Date   • CHOLECYSTECTOMY     • COLON RESECTION N/A 8/7/2017    Procedure: LAPAROSCOPIC COLOSTOMY;  Surgeon: Chioma Gordon MD;  Location:  [facility];  Service:    • COLONOSCOPY      10 years ago   • HERNIA REPAIR     • KYPHOPLASTY N/A 9/23/2016    Procedure: KYPHOPLASTY T12;  Surgeon: Charles Nguyen MD;  Location:  [facility];  Service:    • PACEMAKER IMPLANTATION     • UMBILICAL HERNIA REPAIR            PT ASSESSMENT (last 72 hours)      PT Evaluation       11/05/17 0856       Rehab Evaluation    Document Type evaluation  -LF     Subjective Information agree to therapy  -LF     Patient Effort, Rehab Treatment good  -LF     General Information    Patient Profile Review yes  -LF     Onset of Illness/Injury or Date of Surgery Date 11/01/17  -LF     Referring Physician MD Chinedu  -LF     General Observations Patient in bed w/ HOB elevated, brushing teeth.  O2 NC, IV  -LF     Pertinent History Of Current Problem to ER with onset SOA, productive cough, not feeling well.  diagnosed hypoxia and LLL pneumonia.  Recent. hospitalization w/ d/c 8-15-17 for cholevascular fistula w/ colostomy placement; had returned home after rehab stay  -LF     Precautions/Limitations fall precautions;oxygen therapy device and L/min   colostomy  -LF     Prior Level of Function independent:;all  household mobility   w/c for community mobility; son assists tobed at night, meal  -LF     Equipment Currently Used at Home cane, straight;commode;wheelchair;walker, rolling;shower chair;rollator  -LF     Plans/Goals Discussed With patient;agreed upon  -LF     Risks Reviewed patient:;LOB;nausea/vomiting;change in vital signs;lines disloged  -LF     Benefits Reviewed patient:;improve function;increase independence  -LF     Barriers to Rehab previous functional deficit  -LF     Living Environment    Lives With alone  -LF     Living Arrangements house  -LF     Home Accessibility stairs to enter home  -LF     Number of Stairs to Enter Home 1  -LF     Living Environment Comment son and dtr assist with care  -LF     Clinical Impression    Date of Referral to PT 11/03/17  -LF     PT Diagnosis impaired mobility  -LF     Patient/Family Goals Statement return home  -LF     Criteria for Skilled Therapeutic Interventions Met yes;treatment indicated  -LF     Rehab Potential good, to achieve stated therapy goals  -LF     Vital Signs    Pre SpO2 (%) 97  -LF     O2 Delivery Pre Treatment supplemental O2   O2  -LF     Pain Assessment    Pain Assessment No/denies pain  -LF     Cognitive Assessment/Intervention    Current Cognitive/Communication Assessment functional  -LF     Orientation Status oriented to;person;place;time;situation  -LF     Follows Commands/Answers Questions 100% of the time  -LF     Personal Safety WNL/WFL  -LF     Personal Safety Interventions fall prevention program maintained;gait belt;nonskid shoes/slippers when out of bed  -LF     ROM (Range of Motion)    General ROM --   LE ROM WFL's  -LF     MMT (Manual Muscle Testing)    General MMT Assessment lower extremity strength deficits identified  -LF     General MMT Assessment Detail grossly 4+/5  -LF     Bed Mobility, Assessment/Treatment    Bed Mobility, Assistive Device bed rails;head of bed elevated  -LF     Bed Mob, Supine to Sit, [place] contact guard  assist  -LF     Bed Mobility, Safety Issues decreased use of arms for pushing/pulling;decreased use of legs for bridging/pushing  -LF     Bed Mobility, Impairments strength decreased  -LF     Transfer Assessment/Treatment    Transfers, Sit-Stand [place] contact guard assist  -LF     Transfers, Stand-Sit [place] contact guard assist  -LF     Transfer, Safety Issues other (see comments)   cues for hand placement  -LF     Transfer, Impairments strength decreased  -LF     Gait Assessment/Treatment    Gait, [place] Level 1 person + 1 person to manage equipment;contact guard assist  -LF     Gait, Assistive Device rolling walker  -LF     Gait, Distance (Feet) 30  -LF     Gait, Gait Deviations forward flexed posture;step length decreased  -LF     Gait, Safety Issues supplemental O2  -LF     Gait, Impairments strength decreased  -LF     Positioning and Restraints    Pre-Treatment Position in bed  -LF     Post Treatment Position chair  -LF     In Chair sitting;call light within reach;encouraged to call for assist;exit alarm on  -LF       User Key  (r) = Recorded By, (t) = Taken By, (c) = Cosigned By    Initials Name Provider Type     Sophie Park PT Physical Therapist          Physical Therapy Education     Title: PT OT SLP Therapies (Active)     Topic: Physical Therapy (Active)     Point: Mobility training (Active)    Learning Progress Summary    Learner Readiness Method Response Comment Documented by Status   Patient Acceptance E NR  LF 11/05/17 1047 Active               Point: Home exercise program (Active)    Learning Progress Summary    Learner Readiness Method Response Comment Documented by Status   Patient Acceptance E NR  LF 11/05/17 1047 Active               Point: Body mechanics (Active)    Learning Progress Summary    Learner Readiness Method Response Comment Documented by Status   Patient Acceptance E NR   11/05/17 1047 Active               Point: Precautions (Active)    Learning Progress  Summary    Learner Readiness Method Response Comment Documented by Status   Patient Acceptance E NR  LF 11/05/17 1047 Active                      User Key     Initials Effective Dates Name Provider Type Discipline    LF 06/19/15 -  Sophie Park, PT Physical Therapist PT                PT Recommendation and Plan  Anticipated Discharge Disposition: home with home health  PT Frequency: daily, per priority policy  Plan of Care Review  Plan Of Care Reviewed With: patient  Progress: progress toward functional goals as expected  Outcome Summary/Follow up Plan: Patient presents with generalized weakness and decreased mobility.  Patient ambulated 30' w/ RWx CGA. Will benefit from further P.T. to assist to PLOF          IP PT Goals       11/05/17 1048          Bed Mobility PT LTG    Bed Mobility PT LTG, Date Established 11/05/17  -LF      Bed Mobility PT LTG, Time to Achieve 5 - 7 days  -LF      Bed Mobility PT LTG, Activity Type all bed mobility  -LF      Bed Mobility PT LTG, [place] Level independent  -LF      Bed Mobility PT LTG, Outcome goal ongoing  -LF      Transfer Training PT LTG    Transfer Training PT LTG, Time to Achieve 5 - 7 days  -LF      Transfer Training PT LTG, Activity Type bed to chair /chair to bed;sit to stand/stand to sit  -LF      Transfer Training PT LTG, [place] Level independent  -LF      Transfer Training PT LTG, Assist Device walker, rolling  -LF      Transfer Training PT LTG, Outcome goal ongoing  -LF      Gait Training PT LTG    Gait Training Goal PT LTG, Date Established 11/05/17  -LF      Gait Training Goal PT LTG, Time to Achieve 5 - 7 days  -LF      Gait Training Goal PT LTG, [place] Level supervision required  -LF      Gait Training Goal PT LTG, Assist Device walker, rolling  -LF      Gait Training Goal PT LTG, Distance to Achieve 120  -LF      Gait Training Goal PT LTG, Outcome goal ongoing  -LF        User Key  (r) = Recorded By, (t) = Taken By, (c) = Cosigned By     Initials Name Provider Type     Sophie Park PT Physical Therapist                Outcome Measures       11/05/17 0856          How much help from another person do you currently need...    Turning from your back to your side while in flat bed without using bedrails? 3  -LF      Moving from lying on back to sitting on the side of a flat bed without bedrails? 3  -LF      Moving to and from a bed to a chair (including a wheelchair)? 3  -LF      Standing up from a chair using your arms (e.g., wheelchair, bedside chair)? 3  -LF      Climbing 3-5 steps with a railing? 2  -LF      To walk in hospital room? 3  -LF      AM-PAC 6 Clicks Score 17  -LF      Functional Assessment    Outcome Measure Options AM-PAC 6 Clicks Basic Mobility (PT)  -        User Key  (r) = Recorded By, (t) = Taken By, (c) = Cosigned By    Initials Name Provider Type     Sophie Park PT Physical Therapist           Time Calculation:         PT Charges       11/05/17 1051          Time Calculation    Start Time 0856  -      PT Received On 11/05/17  -      PT Goal Re-Cert Due Date 11/12/17  -        User Key  (r) = Recorded By, (t) = Taken By, (c) = Cosigned By    Initials Name Provider Type     Sophie Park PT Physical Therapist          Therapy Charges for Today     Code Description Service Date Service Provider Modifiers Qty    09693720583 HC PT EVAL LOW COMPLEXITY 4 11/5/2017 Sophie Park, PT GP 1          PT G-Codes  Outcome Measure Options: AM-PAC 6 Clicks Basic Mobility (PT)      Sophie Park PT  11/5/2017

## 2017-11-06 NOTE — PLAN OF CARE
Problem: Patient Care Overview (Adult)  Goal: Plan of Care Review    11/06/17 0231   Patient Care Overview   Progress progress toward functional goals as expected         Problem: Fall Risk (Adult)  Goal: Absence of Falls  Outcome: Ongoing (interventions implemented as appropriate)    11/06/17 0231   Fall Risk (Adult)   Absence of Falls making progress toward outcome         Problem: Pressure Ulcer Risk (Mack Scale) (Adult,Obstetrics,Pediatric)  Goal: Identify Related Risk Factors and Signs and Symptoms  Outcome: Ongoing (interventions implemented as appropriate)    11/06/17 0231   Pressure Ulcer Risk (Mack Scale)   Related Risk Factors (Pressure Ulcer Risk (Mack Scale)) age extremes;infection;mobility impaired       Goal: Skin Integrity  Outcome: Ongoing (interventions implemented as appropriate)    11/06/17 0231   Pressure Ulcer Risk (Mack Scale) (Adult,Obstetrics,Pediatric)   Skin Integrity making progress toward outcome

## 2017-11-06 NOTE — PROGRESS NOTES
"Pharmacy Consult-Vancomycin Dosing  Avis Us is a  88 y.o. female receiving vancomycin therapy for left lower lobe pneumonia.    Indication: HCAP  Consulting Provider: Hospitalist  ID Consult: No    Goal Trough: 15-20 mcg/mL    Current Antimicrobial Therapy  Vancomycin (dosing per levels)  Zosyn 4.5g IV q8h     Allergies  Allergies as of 11/01/2017 - Micah as Reviewed 11/01/2017   Allergen Reaction Noted   • Clarithromycin Anaphylaxis 05/02/2016   • Macrobid [nitrofurantoin monohyd macro] Swelling 06/28/2017   • Vioxx [rofecoxib] Other (See Comments) 09/23/2016   • Metoprolol Other (See Comments) 05/02/2016   • Statins Myalgia 02/28/2017   • Tramadol Hallucinations 05/02/2016     Labs    Results from last 7 days   Lab Units 11/06/17  0517 11/05/17  0601 11/04/17  0703   BUN mg/dL 33* 30* 25*   CREATININE mg/dL 1.90* 2.00* 1.80*     Results from last 7 days   Lab Units 11/06/17  0517 11/04/17  1503 11/04/17  0703   WBC 10*3/mm3 8.75 9.90 9.48     Evaluation of Dosing     Last Dose Received in the ED/Outside Facility:   11/1 @ 0856 vancomycin 2000 mg (21 mg/kg) IV x 1    Ht - 66\" (167.6 cm)  Wt - 200 lb 12.8 oz (91.1 kg)    Estimated Creatinine Clearance: 23.3 mL/min (by C-G formula based on Cr of 1.9).    I/O last 3 completed shifts:  In: 1928 [P.O.:1300; I.V.:328; IV Piggyback:300]  Out: 2275 [Urine:2050; Stool:225]    Microbiology and Radiology  Microbiology Results (last 10 days)       Procedure Component Value - Date/Time      Blood Culture - Blood, [[id number]]  (Normal) Collected:  11/01/17 0840    Lab Status:  Final result Specimen:  Blood from Hand, Left Updated:  11/06/17 0831     Blood Culture No growth at 5 days    Blood Culture - Blood, [[id number]]  (Normal) Collected:  11/01/17 0830    Lab Status:  Final result Specimen:  Blood from Arm, Right Updated:  11/06/17 0831     Blood Culture No growth at 5 days    Influenza Antigen, Rapid - Swab, Nasopharynx [[id number]]  (Normal) Collected:  11/01/17 0705 "    Lab Status:  Final result Specimen:  Swab from Nasopharynx Updated:  11/01/17 0757     Influenza A Ag, EIA Negative     Influenza B Ag, EIA Negative          Evaluation of Level    Patient has received vancomycin in a recent admission at [facility] (8/9-8/14). Vancomycin 2000 mg IV load, followed by vancomycin 1250 mg IV q24h which resulted in a trough of 18.5 mcg/mL with SCr 0.8. Dose was decreased to vancomycin 1000 mg IV q24h, but vanc was d/c'd before trough could be assessed.     Assessment/Plan:  1. Pharmacy to dose vancomycin for HCAP. Pt received vancomycin 2000 mg (21 mg/kg) IV x 1 in ED 11/1.   Due to advanced age and elevated SCr on admission, pharmacy will dose per levels. Baseline SCr ~0.8 (not improved)  No doses received since 11/1.  Discussed with Dr. Castañeda  2. Recommend Vancomycin 1250 mg IV x 1 today  3. Random vancomycin level tomorrow AM    Pharmacy will continue to monitor and adjust vancomycin dose as necessary based on renal function, cultures, labs, and clinical status.     Thank you,    Jewels Win, PharmD

## 2017-11-06 NOTE — PLAN OF CARE
Problem: Fall Risk (Adult)  Goal: Absence of Falls  Outcome: Ongoing (interventions implemented as appropriate)    11/06/17 0231   Fall Risk (Adult)   Absence of Falls making progress toward outcome

## 2017-11-06 NOTE — PROGRESS NOTES
Continued Stay Note  [facility]     Patient Name: Avis Us  MRN: [id number]  Today's Date: 11/6/2017    Admit Date: 11/1/2017          Discharge Plan       11/06/17 1231    Case Management/Social Work Plan    Plan update    Patient/Family In Agreement With Plan other (see comments)    Additional Comments Not medically ready for d/c. No change in d/c plan today. CM will continue to monitor.              Discharge Codes     None        Expected Discharge Date and Time     Expected Discharge Date Expected Discharge Time    Nov 8, 2017             Damaris Frausto, RN

## 2017-11-06 NOTE — PLAN OF CARE
Problem: Fall Risk (Adult)  Goal: Absence of Falls  Outcome: Ongoing (interventions implemented as appropriate)    11/06/17 0231   Fall Risk (Adult)   Absence of Falls making progress toward outcome         Problem: Pressure Ulcer Risk (Mack Scale) (Adult,Obstetrics,Pediatric)  Goal: Skin Integrity  Outcome: Ongoing (interventions implemented as appropriate)    11/06/17 0231   Pressure Ulcer Risk (Mack Scale) (Adult,Obstetrics,Pediatric)   Skin Integrity making progress toward outcome

## 2017-11-06 NOTE — PROGRESS NOTES
"  [facility]  PROGRESS NOTE    Date of Admission: 11/1/2017  Length of Stay: 5  Primary Care Physician: Carol Herzog MD    Chief Complaint: f/u NSTEMI, CHF   Problem List:   1. Admission for acute CHF, NSTEMI and pneumonia November 2017  2. NSTEMI in setting of above  a. Left heart catheterization for NSTEMI, 07/24/2014, nonobstructive coronary artery disease. LVEF normal.   b. Echocardiogram, 07/23/2014: Mild diastolic dysfunction, LVEF 60-65%.  c. Admission for NSTEMI in setting of pneumonia and CHF 11/2017  d. Echo 11/2/2017: biatrial and RV enlargementl normal global and segmental LV motion, severe MR, mod-severe PAH  e. MICHEL 11/3/17: LVSF normal, moderate to severe MR, mild to mod TR  3. Sick sinus syndrome/intermittent complete heart block/prolonged QT interval:  a. [employer] pacemaker implant, 07/29/2014, Dr. Zen Leal at [facility].   b. Complication of 12 mm left apical pneumothorax, resolved by discharge.   c. Dual-lead dislodgement:  i. Replacement of atrial and RV leads, 12/19/2014, Dr. Ted Gentile.  4. History of recurrent/chronic UTI's.   5. Recent admission for colovesical fistula, s/p lap colostomy 9/7/2017 per Dr. Gordon   6. Chronic asthma.    7. 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 Hypertension.   9. Hyperlipidemia, intolerant to statins.   10. Non-insulin dependent diabetes mellitus type 2.   11. Obstructive sleep apnea:   a. Currently on CPAP.   12. Obesity. BMI 37.   13. History of DVT.   14. History of TIA.   15. History of gout.   16. Surgeries:   a. Cholecystectomy.  b. Hernia repair.   c. Pacemaker implant.     Subjective      Patient states she feels better. No chest pain or shortness of breath, has ambulated a little. Still has productive cough       Objective   Vitals: /66  Pulse 72  Temp 98.7 °F (37.1 °C) (Oral)   Resp 18  Ht 66\" (167.6 cm)  Wt 200 lb 12.8 oz (91.1 kg)  SpO2 95%  BMI " 32.41 kg/m2    Physical Exam:  GENERAL: Alert, cooperative, in no acute distress.   HEENT: Fundoscopic deferred, otherwise unremarkable.  NECK: No Jugular venous distention, adenopathy, or thyromegaly noted.   HEART: No discrete PMI is noted. Regular rhythm, normal rate, and + systolic murmur  LUNGS: Clear to auscultation bilaterally. No wheezing, rales or ronchi.  ABDOMEN: Flat without evidence of organomegaly, masses, or tenderness.  NEUROLOGIC: No focal abnormalities involving strength or sensation are noted.   EXTREMITIES: No clubbing, cyanosis, or edema noted.    Results:    Results from last 7 days  Lab Units 11/06/17  0517 11/04/17  1503 11/04/17  0703   WBC 10*3/mm3 8.75 9.90 9.48   HEMOGLOBIN g/dL 9.9* 10.9* 10.4*   HEMATOCRIT % 32.1* 35.8 33.9*   PLATELETS 10*3/mm3 367 420 379       Results from last 7 days  Lab Units 11/06/17  0517 11/05/17  0601 11/04/17  0703   SODIUM mmol/L 143 144 143   POTASSIUM mmol/L 3.6 4.2 3.6   CHLORIDE mmol/L 101 102 101   CO2 mmol/L 33.0* 33.0* 33.0*   BUN mg/dL 33* 30* 25*   CREATININE mg/dL 1.90* 2.00* 1.80*   GLUCOSE mg/dL 106* 111* 122*      Lab Results   Component Value Date    CHOL 214 (H) 02/28/2017    TRIG 173 (H) 02/28/2017    HDL 53 02/28/2017    LDLDIRECT 126 02/28/2017    AST 36 (H) 11/02/2017    ALT 22 11/02/2017       Results from last 7 days  Lab Units 11/06/17  0517 11/01/17  0651   BNP pg/mL 712.0* 2368.0*       Results from last 7 days  Lab Units 11/02/17  1038 11/02/17  0359 11/01/17  1341   PROTIME Seconds  --   --  12.6*   INR   --   --  1.15   APTT seconds 34.1* 48.8 28.4*       Results from last 7 days  Lab Units 11/02/17  0359 11/01/17  2206 11/01/17  1341   TROPONIN I ng/mL 11.131* 10.824* 14.653*       Intake/Output Summary (Last 24 hours) at 11/06/17 1209  Last data filed at 11/06/17 0621   Gross per 24 hour   Intake             1808 ml   Output             1000 ml   Net              808 ml     I personally reviewed the patient's EKG/Telemetry  data    Current Medications:    albuterol 2.5 mg Nebulization Q6H - RT   [START ON 11/7/2017] allopurinol 100 mg Oral Daily   aspirin 81 mg Oral Daily   budesonide-formoterol 2 puff Inhalation BID - RT   cetirizine 10 mg Oral Daily   vitamin D3 5,000 Units Oral Daily   clonazePAM 1 mg Oral BID   clopidogrel 75 mg Oral Daily   heparin (porcine) 5,000 Units Subcutaneous Q8H   levETIRAcetam 750 mg Oral Q12H   pantoprazole 40 mg Oral Q AM   pharmacy consult - MTM  Does not apply Daily   piperacillin-tazobactam 4.5 g Intravenous Q8H   rosuvastatin 10 mg Oral Nightly   vancomycin (dosing per levels)  Does not apply Daily   vitamin B-12 1,000 mcg Oral Daily       Pharmacy to dose vancomycin        Assessment and Plan:   1. NSTEMI with acute CHF (DHF/MR)   - Normal EF with mod-severe MR and no evidence of SBE on MICHEL  - BNP down to ~700 from 2000 on admission  - clinically improved  - diuretics discontinued over weekend secondary to rise in Cr  - medical management of NSTEMI with DAPT and low dose Crestor, asymptomatic     2. CKD with acute renal insufficiency  - low-dose ACE-I discontinued over weekend  - Cr 2--1.9 today    3. LLL pneumonia   - per hospitalists     I, Zen Mcknight MD, personally performed the services described as documented by the above named individual. I have made any necessary edits and it is both accurate and complete 11/6/2017  5:40 PM      Scribed for Zen Mcknight MD by Mayda Prescott PA-C.

## 2017-11-07 NOTE — PROGRESS NOTES
[facility] Medicine Services  PROGRESS NOTE    Patient Name: Avis Us  : 9/3/1929  MRN: [id number]    Date of Admission: 2017  Length of Stay: 5  Primary Care Physician: Carol Herzog MD    Subjective   Subjective   CC: F/U SOA, cough    HPI:  Patient continues to feel better.  Good appetite, breathing better, weaning oxygen  Review of Systems  Gen-no fevers, no chills  CV-no chest pain, no palpitations  Resp-+cough, no dyspnea  GI-no N/V/D, no abd pain  Otherwise ROS is negative except as mentioned in the HPI.    Objective   Objective   Vital Signs:   Temp:  [98 °F (36.7 °C)-98.7 °F (37.1 °C)] 98 °F (36.7 °C)  Heart Rate:  [69-86] 75  Resp:  [18-25] 18  BP: (121-125)/(63-66) 125/63  Physical Exam:  Patient is alert and talkative in no distress at rest  Neck is without mass or JVD  Heart is Reg wo murmur  Lungs are clear wo wheeze or crackle  Abd is soft without HSM or mass, not tender or distended  MAEW  Skin is without rash  Neurologic exam in nonfocal   Mood is appropriate    Results Reviewed:  I have personally reviewed current lab, radiology, and data and agree.    Results from last 7 days  Lab Units 17  0517 17  1503 17  0703  17  1341   WBC 10*3/mm3 8.75 9.90 9.48  < > 17.94*   HEMOGLOBIN g/dL 9.9* 10.9* 10.4*  < > 10.4*   HEMATOCRIT % 32.1* 35.8 33.9*  < > 34.4*   PLATELETS 10*3/mm3 367 420 379  < > 410   INR   --   --   --   --  1.15   < > = values in this interval not displayed.    Results from last 7 days  Lab Units 17  0517 17  0601 17  0703  17  0359 17  2206 17  1341 17  0651   SODIUM mmol/L 143 144 143  < > 141  --   --  139   POTASSIUM mmol/L 3.6 4.2 3.6  < > 3.8  --   --  4.8   CHLORIDE mmol/L 101 102 101  < > 102  --   --  100   CO2 mmol/L 33.0* 33.0* 33.0*  < > 29.0  --   --  30.0   BUN mg/dL 33* 30* 25*  < > 27*  --   --  25*   CREATININE mg/dL 1.90* 2.00* 1.80*  < > 1.80*  --   --   1.80*   GLUCOSE mg/dL 106* 111* 122*  < > 117*  --   --  121*   CALCIUM mg/dL 9.4 9.4 9.1  < > 9.4  --   --  10.0   ALT (SGPT) U/L  --   --   --   --  22  --   --  34   AST (SGOT) U/L  --   --   --   --  36*  --   --  56*   TROPONIN I ng/mL  --   --   --   --  11.131* 10.824* 14.653*  --    < > = values in this interval not displayed.  BNP   Date Value Ref Range Status   11/06/2017 712.0 (H) 0.0 - 100.0 pg/mL Final     No results found for: PHART    Microbiology Results Abnormal     Procedure Component Value - Date/Time    Blood Culture - Blood, [[id number]]  (Normal) Collected:  11/01/17 0830    Lab Status:  Final result Specimen:  Blood from Arm, Right Updated:  11/06/17 0831     Blood Culture No growth at 5 days    Blood Culture - Blood, [[id number]]  (Normal) Collected:  11/01/17 0840    Lab Status:  Final result Specimen:  Blood from Hand, Left Updated:  11/06/17 0831     Blood Culture No growth at 5 days    Influenza Antigen, Rapid - Swab, Nasopharynx [[id number]]  (Normal) Collected:  11/01/17 0705    Lab Status:  Final result Specimen:  Swab from Nasopharynx Updated:  11/01/17 0757     Influenza A Ag, EIA Negative     Influenza B Ag, EIA Negative        Imaging Results (last 24 hours)     Procedure Component Value Units Date/Time    XR Chest 1 View [[id number]] Updated:  11/06/17 1703        Results for orders placed during the hospital encounter of 11/01/17   Adult Transesophageal Echo (MICHEL) W/ Cont if Necessary Per Protocol    Narrative · Left ventricular systolic function is normal. Estimated EF = 60%.  · Moderate-to-severe mitral valve regurgitation is present  · Mild to moderate tricuspid valve regurgitation is present.        I have reviewed the medications.    Assessment/Plan   Assessment / Plan     Hospital Problem List     * (Principal)Sepsis    Overview Signed 2/28/2017 11:56 AM by Carol Herzog MD     Description: 10/14- hosp 24 hours- sepsis due to sinus infection         Left lower lobe  pneumonia    Acute respiratory failure with hypoxia    NSTEMI (non-ST elevated myocardial infarction)    Acute kidney injury    ANDRIY (obstructive sleep apnea)    Diabetes mellitus    Acute diastolic CHF         Brief Hospital Course to date:  Avis Us is a 88 y.o. 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 Found to have hypoxia and LLL pneumonia.     Assessment & Plan:  --Continue Vanc and Zosyn Day 5  for broad coverage as she does meet HCAP criteria with recent admission and rehab stay at the [place]. Follow cultures.  --Cardiology following for NSTEMI. Troponin peaked at 14.6. Treating medically. Stopped heparin drip after 48 hours. Continue ASA, Plavix, statin.  --Continue IV Lasix for acute CHF. Echo revealed severe MR and moderate to severe pulmonary HTN, EF 51%. Follow up MICHEL 11/3 with similar findings.   --Monitor Cr closely with diuresis. Stable today. Baseline in the past appears to be around 1.3-1.6, but more recently has been even lower. Repeat labs in AM.  --Stopped Lasix after 1 dose d/t increasng CR  --PT/OT evaluation pending. She may benefit from another rehab stay but will see how she does;  patient states that she is not going to rehabilitation, but is going home with family who help her do physical therapy exercises and take care of her; patient is quite adamant about not going to rehabilitation.      Patient is doing great considering!!  Possibly home tomorrow.    DVT Prophylaxis: SQ heparin    CODE STATUS: Conditional Code    Disposition: I expect the patient to be discharged home home 1-2 days    Neela Castañeda MD  11/06/17  10:37 PM

## 2017-11-07 NOTE — PROGRESS NOTES
Continued Stay Note  [facility]     Patient Name: Avis Us  MRN: [id number]  Today's Date: 11/7/2017    Admit Date: 11/1/2017          Discharge Plan       11/07/17 1137    Case Management/Social Work Plan    Plan Home at DC    Patient/Family In Agreement With Plan yes    Additional Comments I spoke with the pt and her daughter. They have no DC needs. The pt and daughter do not feel HH is needed at DC.              Discharge Codes     None        Expected Discharge Date and Time     Expected Discharge Date Expected Discharge Time    Nov 8, 2017             Anjelica Moctezuma RN

## 2017-11-07 NOTE — PROGRESS NOTES
"  [facility]  PROGRESS NOTE    Date of Admission: 11/1/2017  Length of Stay: 6  Primary Care Physician: Carol Herzog MD    Chief Complaint: f/u NSTEMI, CHF/MR    Subjective      Feels well this morning, no shortness of breath or chest pain       Objective   Vitals: /63 (BP Location: Right arm, Patient Position: Lying)  Pulse 75  Temp 98 °F (36.7 °C) (Oral)   Resp 16  Ht 66\" (167.6 cm)  Wt 203 lb 9.6 oz (92.4 kg)  SpO2 98%  BMI 32.86 kg/m2    Physical Exam:  GENERAL: Alert, cooperative, in no acute distress.   HEENT: Fundoscopic deferred, otherwise unremarkable.  NECK: No Jugular venous distention, adenopathy, or thyromegaly noted.   HEART: No discrete PMI is noted. Regular rhythm, normal rate, + murmur  LUNGS: No wheezing, rales or ronchi. 99% on 2 L NC  ABDOMEN: Flat without evidence of organomegaly, masses, or tenderness.  NEUROLOGIC: No focal abnormalities involving strength or sensation are noted.   EXTREMITIES: No clubbing, cyanosis, or edema noted.     Results:    Results from last 7 days  Lab Units 11/06/17  0517 11/04/17  1503 11/04/17  0703   WBC 10*3/mm3 8.75 9.90 9.48   HEMOGLOBIN g/dL 9.9* 10.9* 10.4*   HEMATOCRIT % 32.1* 35.8 33.9*   PLATELETS 10*3/mm3 367 420 379       Results from last 7 days  Lab Units 11/06/17  0517 11/05/17  0601 11/04/17  0703   SODIUM mmol/L 143 144 143   POTASSIUM mmol/L 3.6 4.2 3.6   CHLORIDE mmol/L 101 102 101   CO2 mmol/L 33.0* 33.0* 33.0*   BUN mg/dL 33* 30* 25*   CREATININE mg/dL 1.90* 2.00* 1.80*   GLUCOSE mg/dL 106* 111* 122*        Results from last 7 days  Lab Units 11/06/17  0517 11/01/17  0651   BNP pg/mL 712.0* 2368.0*       Results from last 7 days  Lab Units 11/02/17  1038 11/02/17 0359 11/01/17  1341   PROTIME Seconds  --   --  12.6*   INR   --   --  1.15   APTT seconds 34.1* 48.8 28.4*       Results from last 7 days  Lab Units 11/02/17 0359 11/01/17 2206 11/01/17  1341   TROPONIN I ng/mL 11.131* " 10.824* 14.653*       Intake/Output Summary (Last 24 hours) at 11/07/17 0857  Last data filed at 11/07/17 0834   Gross per 24 hour   Intake              590 ml   Output              450 ml   Net              140 ml     I personally reviewed the patient's EKG/Telemetry data    Current Medications:    albuterol 2.5 mg Nebulization Q6H - RT   allopurinol 100 mg Oral Daily   aspirin 81 mg Oral Daily   budesonide-formoterol 2 puff Inhalation BID - RT   cetirizine 10 mg Oral Daily   vitamin D3 5,000 Units Oral Daily   clonazePAM 1 mg Oral BID   clopidogrel 75 mg Oral Daily   heparin (porcine) 5,000 Units Subcutaneous Q8H   levETIRAcetam 750 mg Oral Q12H   pantoprazole 40 mg Oral Q AM   pharmacy consult - MTM  Does not apply Daily   piperacillin-tazobactam 4.5 g Intravenous Q8H   rosuvastatin 10 mg Oral Nightly   vancomycin (dosing per levels)  Does not apply Daily   vitamin B-12 1,000 mcg Oral Daily       Pharmacy to dose vancomycin        Assessment and Plan:     1. NSTEMI with acute CHF (DHF/MR)   - Normal EF with mod-severe MR and no evidence of SBE on MICHEL  - BNP down to ~700 from 2000 on admission  - she remains stable and asymptomatic from cardiac perspective  - continue BMT    2. CKD with acute renal insufficiency  - low-dose ACE-I discontinued over weekend due to rise in Cr  - will need diuretic with close f/u at discharge     3. LLL pneumonia   - per hospitalists     I, Zen Mcknight MD, personally performed the services described as documented by the above named individual. I have made any necessary edits and it is both accurate and complete 11/7/2017  10:40 AM        Scribed for Zen Mcknight MD by Mayda Prescott PA-C.

## 2017-11-07 NOTE — TELEPHONE ENCOUNTER
Ok for Juany or Debbie or Rhina or Leila to see this JAYSON>  Thanks, sparkle STEPHENS WITH [facility] CALLED PT IS BEING DISCHARGED TODAY, SHE HAD PNEUMONIA AND SHE NEEDS A 1 WEEK FU APPT.  YOU ARE BOOKED- PT DOES HAVE AN APPT ON 11/28/17 TO SEE YOU BUT THEY SAID NO SHE NEEDS IN SOONER.    PLEASE ADVISE HER DAUGHTER WILL CALL BACK LATER FOR THE APPT.

## 2017-11-07 NOTE — DISCHARGE INSTRUCTIONS
DO not take lasix or lisinopril as blood pressure is low right now ordered.  Aspirin and plavix are new.  Finished 7 days of antibiotics for pneumonia.

## 2017-11-07 NOTE — PROGRESS NOTES
"Pharmacy Consult-Vancomycin Dosing  Avis Us is a  88 y.o. female receiving vancomycin therapy for left lower lobe pneumonia.    Indication: HCAP  Consulting Provider: Hospitalist  ID Consult: No    Goal Trough: 15-20 mcg/mL    Current Antimicrobial Therapy  Vancomycin (dosing per levels)  Zosyn 4.5g IV q8h     Allergies  Allergies as of 11/01/2017 - Micah as Reviewed 11/01/2017   Allergen Reaction Noted   • Clarithromycin Anaphylaxis 05/02/2016   • Macrobid [nitrofurantoin monohyd macro] Swelling 06/28/2017   • Vioxx [rofecoxib] Other (See Comments) 09/23/2016   • Metoprolol Other (See Comments) 05/02/2016   • Statins Myalgia 02/28/2017   • Tramadol Hallucinations 05/02/2016     Labs    Results from last 7 days   Lab Units 11/06/17  0517 11/05/17  0601 11/04/17  0703   BUN mg/dL 33* 30* 25*   CREATININE mg/dL 1.90* 2.00* 1.80*     Results from last 7 days   Lab Units 11/06/17  0517 11/04/17  1503 11/04/17  0703   WBC 10*3/mm3 8.75 9.90 9.48     Evaluation of Dosing     Last Dose Received in the ED/Outside Facility:   11/1 @ 0856 vancomycin 2000 mg (21 mg/kg) IV x 1    Ht - 66\" (167.6 cm)  Wt - 203 lb 9.6 oz (92.4 kg)    Estimated Creatinine Clearance: 23.4 mL/min (by C-G formula based on Cr of 1.9).    I/O last 3 completed shifts:  In: 1618 [P.O.:820; I.V.:498; IV Piggyback:300]  Out: 1050 [Urine:950; Stool:100]    Microbiology and Radiology  Microbiology Results (last 10 days)       Procedure Component Value - Date/Time      Blood Culture - Blood, [[id number]]  (Normal) Collected:  11/01/17 0840    Lab Status:  Final result Specimen:  Blood from Hand, Left Updated:  11/06/17 0831     Blood Culture No growth at 5 days    Blood Culture - Blood, [[id number]]  (Normal) Collected:  11/01/17 0830    Lab Status:  Final result Specimen:  Blood from Arm, Right Updated:  11/06/17 0831     Blood Culture No growth at 5 days    Influenza Antigen, Rapid - Swab, Nasopharynx [[id number]]  (Normal) Collected:  11/01/17 0705    " Lab Status:  Final result Specimen:  Swab from Nasopharynx Updated:  11/01/17 0757     Influenza A Ag, EIA Negative     Influenza B Ag, EIA Negative          Evaluation of Level    Patient has received vancomycin in a recent admission at [facility] (8/9-8/14). Vancomycin 2000 mg IV load, followed by vancomycin 1250 mg IV q24h which resulted in a trough of 18.5 mcg/mL with SCr 0.8. Dose was decreased to vancomycin 1000 mg IV q24h, but vanc was d/c'd before trough could be assessed.     Assessment/Plan:  1. Pharmacy to dose vancomycin for HCAP. Pt received vancomycin 2000 mg (21 mg/kg) IV x 1 in ED 11/1.   Due to advanced age and elevated SCr on admission, pharmacy will dose per levels. Baseline SCr ~0.8 (not improved)  Received Vancomycin 1.25 gm IV x1 dose 11/6 @ 0859  2. Recommend Vancomycin 1000 mg IV x 1 today  3. Random vancomycin level tomorrow AM    Pharmacy will continue to monitor and adjust vancomycin dose as necessary based on renal function, cultures, labs, and clinical status.     Thank you,    Jewels Win, PharmD

## 2017-11-07 NOTE — NURSING NOTE
Patient possibly home today. Established colostomy. No need for WOC nurse at this time. Please contact WOC nurse if needs arise. Thanks

## 2017-11-07 NOTE — PLAN OF CARE
Problem: Patient Care Overview (Adult)  Goal: Plan of Care Review  Outcome: Ongoing (interventions implemented as appropriate)    11/07/17 0315   Patient Care Overview   Progress progress toward functional goals as expected         Problem: Fall Risk (Adult)  Goal: Absence of Falls  Outcome: Ongoing (interventions implemented as appropriate)    11/07/17 0315   Fall Risk (Adult)   Absence of Falls making progress toward outcome         Problem: Pressure Ulcer Risk (Mack Scale) (Adult,Obstetrics,Pediatric)  Goal: Identify Related Risk Factors and Signs and Symptoms  Outcome: Ongoing (interventions implemented as appropriate)    11/07/17 0315   Pressure Ulcer Risk (Mack Scale)   Related Risk Factors (Pressure Ulcer Risk (Mack Scale)) age extremes;body weight extremes;infection;mobility impaired       Goal: Skin Integrity  Outcome: Ongoing (interventions implemented as appropriate)    11/07/17 0315   Pressure Ulcer Risk (Mack Scale) (Adult,Obstetrics,Pediatric)   Skin Integrity making progress toward outcome

## 2017-11-13 NOTE — PROGRESS NOTES
Pt. Referred for Phase II Cardiac Rehab. Staff discussed benefits of exercise, program protocol, and educational material provided. Teach back verified.  Patient refused program as she does not drive or have any way to get to the hospital for Cardiac Rehab.

## 2017-11-13 NOTE — DISCHARGE SUMMARY
"    [facility] Medicine Services  DISCHARGE SUMMARY    Patient Name: Avis Us  : 9/3/1929  MRN: [id number]    Date of Admission: 2017  Date of Discharge:  2017  Length of Stay: 6  Primary Care Physician: Carol Herzog MD    Consults     Date and Time Order Name Status Description    2017 1325 Inpatient Consult to Cardiology Completed         Hospital Course     Presenting Problem:   Sepsis [A41.9]    Active Hospital Problems (** Indicates Principal Problem)    Diagnosis Date Noted   • Acute diastolic CHF  [I50.9] 2017   • Left lower lobe pneumonia [J18.1] 2017   • Acute respiratory failure with hypoxia [J96.01] 2017   • NSTEMI (non-ST elevated myocardial infarction) [I21.4] 2017   • Acute kidney injury [N17.9] 2017   • ANDRIY (obstructive sleep apnea) [G47.33] 2017   • Diabetes mellitus [E11.9] 2017      Resolved Hospital Problems    Diagnosis Date Noted Date Resolved   • **Sepsis [A41.9] 2016          Hospital Course:  Avis Us is a 88 y.o. 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 She had been discharged from [facility] to the [place] on 8/15/17 after treatment for UTI and colovesicular fistula with colostomy placement on 17 by Dr. Gordon. She had been home since the beginning of September and states she has been doing well: cooking a little bit, doing the dishes, and even some laundry. Last couple of days she started feeling bad. Had nausea and some right arm pain following her flu shot (however shot was given in her left arm, per daughter). Then had SOA and cough productive of thick sputum--but difficult to cough any of it up. Complained of fevers and was \"drenched\" with sweat. She was brought to the ER for evaluation which revealed several abnormalities. " CXR showed a LLL pneumonia along with pulmonary vascular congestion, WBC elevated at 24,000 and procalcitonin of 25. She also had ROBBIN and elevated troponin to 5.60 with no EKG changes noted. Was given Vanc/Zosyn, along with ASA/Plavix/Lovenox, and admitted for further management.   Her clinical course improved steadily everyday.  She was placed on heparin for her acute coronary syndrome and cardiology was consulted.  Her peak troponin peaked at 14.6, she was treated medically.  She was diuresed for her acute diastolic heart failure until her creatinine bumped.  She completed 7 days of antibiotics.  She hannah felt stable to be discharged home.           Day of Discharge     HPI:   Patient feels good and wants to go home    Review of Systems /  Good appetite, no nausea, no shortness of breath  Otherwise ROS is negative except as mentioned in the HPI.    Vital Signs:         Physical Exam:  Patient is alert and talkative in no distress at rest, she is sleepy but conversant  Neck is without mass or JVD  Heart is Reg wo murmur  Lungs are unlabored, and only fine crackles  Abd is soft without HSM or mass, not tender or distended  MAEW  Skin is without rash  Neurologic exam in nonfocal   Mood is appropriate      Pertinent  and/or Most Recent Results         Microbiology Results Abnormal     Procedure Component Value - Date/Time    Blood Culture - Blood, [[id number]]  (Normal) Collected:  11/01/17 0830    Lab Status:  Final result Specimen:  Blood from Arm, Right Updated:  11/06/17 0831     Blood Culture No growth at 5 days    Blood Culture - Blood, [[id number]]  (Normal) Collected:  11/01/17 0840    Lab Status:  Final result Specimen:  Blood from Hand, Left Updated:  11/06/17 0831     Blood Culture No growth at 5 days    Influenza Antigen, Rapid - Swab, Nasopharynx [[id number]]  (Normal) Collected:  11/01/17 0705    Lab Status:  Final result Specimen:  Swab from Nasopharynx Updated:  11/01/17 0757     Influenza A Ag, EIA  Negative     Influenza B Ag, EIA Negative          Imaging Results (all)     Procedure Component Value Units Date/Time    XR Chest 1 View [[id number]] Collected:  11/01/17 0610     Updated:  11/01/17 0717    Narrative:       EXAM:    XR Chest, 1 View    EXAM DATE/TIME:    11/1/2017 6:10 AM    CLINICAL HISTORY:    88 years old, female; Signs and symptoms; Dyspnea; Prior surgery; Surgery   date: 6+ months; Surgery type: Pacemaker; Patient HX: Dyspnea HX: History of mi   (myocardial infarction) senile atrophic vaginitis abnormal liver function tests   acute myocardial infarction anxiety knee pain asthma calf cramp candidal   intertrigo cataract chest pain chronic kidney disease, stage iii (moderate)   chronic obstructive pulmonary disease complete atrioventricular block   congestive heart failure atherosclerosis of coronary artery cough depression   diabetes mellitus dizziness dysuria edema gastroesophageal reflux disease   primary hypertriglyceridemia fatigue fracture of patella gout headache   hyperlipidemia hypertension influenza due to influenza a virus insomnia   leukocytosis macrocytosis macular degeneration menopause present night sweats   obstructive sleep apnea syndrome    TECHNIQUE:    Frontal view of the chest.    COMPARISON:    CR - XR CHEST 1 VW 2017-08-08 14:54    FINDINGS:    Lungs:  Increased pulmonary vascularity. Patchy opacities in left lung base.    Pleural space:  No large pleural effusion.  No pneumothorax.    Heart:  Borderline cardiomegaly with dual chamber pacemaker again noted.    Mediastinum:  Unremarkable.    Bones/joints:  Vertebroplasty T12.      Impression:         Pulmonary vascular congestion and left basilar atelectasis/infiltrate.    THIS DOCUMENT HAS BEEN ELECTRONICALLY SIGNED BY PREM ASHLEY MD    XR Chest 1 View [[id number]] Collected:  11/02/17 1003     Updated:  11/02/17 1315    Narrative:       EXAMINATION: XR CHEST 1 VW-      INDICATION: Followup pneumonia, CHF; J18.1-Lobar  pneumonia, unspecified  organism; A41.9-Sepsis, unspecified organism; R09.02-Hypoxemia;  N17.9-Acute kidney failure, unspecified; I21.4-Non-ST elevation (NSTEMI)  myocardial infarction; D72.829-Elevated white blood cell count,  unspecified.      COMPARISON: None.     FINDINGS:   1. There is mild cardiomegaly which is stable.  2. Obstructive lung disease is noted. There is mild hazy density left  base with blunting of the costophrenic angles.           Impression:       Mild cardiomegaly with mild congestive changes in the chest,  all stable from yesterday. Progressive disease is not identified.     D:  11/02/2017  E:  11/02/2017     This report was finalized on 11/2/2017 1:13 PM by Dr. Jaylen Christianson MD.       XR Chest 1 View [[id number]] Collected:  11/07/17 1002     Updated:  11/07/17 1028    Narrative:       EXAMINATION: XR CHEST 1 VW- 11/06/2017     INDICATION: J18.1-Lobar pneumonia, unspecified organism; A41.9-Sepsis,  unspecified organism; R09.02-Hypoxemia; N17.9-Acute kidney failure,  unspecified; I21.4-Non-ST elevation (NSTEMI) myocardial infarction;  D72.829-Elevated white blood cell count, unspecified; Z74.09-Other  reduced mobility     COMPARISON: 11/02/2017     FINDINGS: The cardiac silhouette is normal. A dual-lead pacemaker is  well-positioned. There are chronic appearing pulmonary changes. There  has been little change since 11/02/2017.           Impression:       There has been no significant change since the previous  examination of 11/02/2017.       D:  11/07/2017  E:  11/07/2017     This report was finalized on 11/7/2017 10:26 AM by Dr. Sky Scanlon MD.             Results for orders placed during the hospital encounter of 11/01/17   Adult Transesophageal Echo (MICHEL) W/ Cont if Necessary Per Protocol    Narrative · Left ventricular systolic function is normal. Estimated EF = 60%.  · Moderate-to-severe mitral valve regurgitation is present  · Mild to moderate tricuspid valve regurgitation is  present.            Discharge Details      Avis Us   Home Medication Instructions STEPHY:[id number]    Printed on:11/13/17 3991   Medication Information                      albuterol (PROVENTIL HFA;VENTOLIN HFA) 108 (90 Base) MCG/ACT inhaler  Inhale 2 puffs Every 4 (Four) Hours As Needed for Wheezing.             allopurinol (ZYLOPRIM) 100 MG tablet  Take 200 mg by mouth Daily.             aspirin 81 MG tablet  Take 81 mg by mouth Daily. In evening             budesonide-formoterol (SYMBICORT) 80-4.5 MCG/ACT inhaler  Inhale 2 puffs 2 (Two) Times a Day.             Cholecalciferol (VITAMIN D3) 5000 UNITS tablet  Take 5,000 Units by mouth Daily.             clonazePAM (KlonoPIN) 1 MG tablet  Take 1 tablet by mouth 2 (Two) Times a Day. For seizure disorder             clopidogrel (PLAVIX) 75 MG tablet  Take 1 tablet by mouth Daily.             colchicine 0.6 MG tablet  Take 0.6 mg by mouth Daily As Needed for Muscle / Joint Pain.             diphenhydrAMINE-acetaminophen (TYLENOL PM)  MG tablet per tablet  Take 1 tablet by mouth Every Night.             fluocinonide (LIDEX) 0.05 % external solution  Apply  topically 2 (two) times a day.             furosemide (LASIX) 40 MG tablet  Take 1 tablet by mouth Daily. Only take one half tab if sbp over 120             levETIRAcetam (KEPPRA) 750 MG tablet  Take 1 tablet by mouth two  times daily             Loratadine (CLARITIN) 10 MG capsule  Take 10 mg by mouth Daily As Needed (asthma).             magnesium oxide (MAG-OX) 400 MG tablet  Take 400 mg by mouth Daily.             mirtazapine (REMERON) 15 MG tablet  Take 15 mg by mouth At Night As Needed.             Multiple Vitamins-Minerals (EYE HEALTH) capsule  Take 1 capsule by mouth Daily.             pantoprazole (PROTONIX) 40 MG EC tablet  Take 1 tablet by mouth  daily             polyethylene glycol (MIRALAX) packet  Take 17 g by mouth Daily As Needed.             potassium chloride (MICRO-K) 10 MEQ CR  capsule  Take 1 capsule by mouth Daily. Only take with lasix             rosuvastatin (CRESTOR) 10 MG tablet  Take 1 tablet by mouth Every Night.             vitamin B-12 (CYANOCOBALAMIN) 1000 MCG tablet  Take 1,000 mcg by mouth Daily.                   Discharge Disposition:  Home or Self Care    Discharge Diet:  low fat    Discharge Activity:  as tolerated    Special Instructions:    Future Appointments  Date Time Provider Department Center   11/14/2017 9:00 AM Carol Herzog MD [facility] None   11/28/2017 1:15 PM Carol Herzog MD [facility] None       Additional Instructions for the Follow-ups that You Need to Schedule     Discharge Follow-up with PCP    As directed    Follow Up Details:  see in one week                 Time Spent on Discharge:  40 minutes    Neela Castañeda MD  11/13/17  2:46 PM

## 2017-11-14 PROBLEM — I34.0 NON-RHEUMATIC MITRAL REGURGITATION: Status: ACTIVE | Noted: 2017-01-01

## 2017-11-28 NOTE — PROGRESS NOTES
"Avis Us 88 y.o.  CC:Follow-up; Diabetes (Type II, last eye exam 2 months ago by Amna Fuentes MD); Hypertension; Hyperlipidemia; and Gout    [place]: Follow-up; Diabetes (Type II, last eye exam 2 months ago by Amna Fuentes MD); Hypertension; Hyperlipidemia; and Gout    Blood sugar and 90 day average sugar reviewed  Results for orders placed or performed in visit on 11/28/17   POC Glycosylated Hemoglobin (Hb A1C)   Result Value Ref Range    Hemoglobin A1C 5.9 %   POC Glucose Fingerstick   Result Value Ref Range    Glucose 122 70 - 130 mg/dL     Average sugar is 120   Patient would like refill of tussionex   In interim has been hospitalized for LLL pneumonia  Had UTI with fistula prior to this  Known asthma, she uses tussionex when she starts coughing (will trigger bout of bronchospasm and she has problems calming down)  She has not seen lung specialist and has declined this   She is on long acting inhaler and also has rescue inhaler at home   She has oxygen at home- wearing 24 hours a day   Reviewed [facility] - last refill tussionex was 8/17  She has not seen her pcp recently   Discussed need for her to maintain a primary care provider  Paperwork for [facility] benefits filled out- patient requires continual assistance due to colostomy, mild cognitive impairment, weakness and shortness of breath, fall risk     Allergies   Allergen Reactions   • Clarithromycin Anaphylaxis   • Macrobid [Nitrofurantoin Monohyd Macro] Swelling     Flash pulmonary edema   • Vioxx [Rofecoxib] Other (See Comments)     Affects kidney function   • Metoprolol Other (See Comments)     Severe hypotension   • Statins Myalgia     Weakness    • Tramadol Hallucinations     \"crazy out of her head\"       Current Outpatient Prescriptions:   •  albuterol (PROVENTIL HFA;VENTOLIN HFA) 108 (90 Base) MCG/ACT inhaler, Inhale 2 puffs Every 4 (Four) Hours As Needed for Wheezing., Disp: , Rfl:   •  allopurinol (ZYLOPRIM) 100 MG tablet, Take 200 mg by mouth Daily., Disp: " , Rfl:   •  aspirin 81 MG tablet, Take 81 mg by mouth Daily. In evening, Disp: , Rfl:   •  budesonide-formoterol (SYMBICORT) 80-4.5 MCG/ACT inhaler, Inhale 2 puffs 2 (Two) Times a Day., Disp: , Rfl:   •  Cholecalciferol (VITAMIN D3) 5000 UNITS tablet, Take 5,000 Units by mouth Daily., Disp: , Rfl:   •  clonazePAM (KlonoPIN) 1 MG tablet, Take 1 tablet by mouth 2 (Two) Times a Day. For seizure disorder, Disp: 60 tablet, Rfl: 3  •  clopidogrel (PLAVIX) 75 MG tablet, Take 1 tablet by mouth Daily., Disp: 30 tablet, Rfl: 1  •  colchicine 0.6 MG tablet, Take 0.6 mg by mouth Daily As Needed for Muscle / Joint Pain., Disp: , Rfl:   •  diphenhydrAMINE-acetaminophen (TYLENOL PM)  MG tablet per tablet, Take 1 tablet by mouth Every Night., Disp: , Rfl:   •  fluocinonide (LIDEX) 0.05 % external solution, Apply  topically 2 (two) times a day., Disp: 60 mL, Rfl: 3  •  furosemide (LASIX) 40 MG tablet, Take 1 tablet by mouth Daily. Only take one half tab if sbp over 120, Disp: 30 tablet, Rfl: 1  •  levETIRAcetam (KEPPRA) 750 MG tablet, TAKE 1 TABLET BY MOUTH TWO  TIMES DAILY, Disp: 180 tablet, Rfl: 0  •  Loratadine (CLARITIN) 10 MG capsule, Take 10 mg by mouth Daily As Needed (asthma)., Disp: , Rfl:   •  magnesium oxide (MAG-OX) 400 MG tablet, Take 400 mg by mouth Daily., Disp: , Rfl:   •  mirtazapine (REMERON) 15 MG tablet, Take 15 mg by mouth At Night As Needed., Disp: , Rfl:   •  Multiple Vitamins-Minerals (EYE HEALTH) capsule, Take 1 capsule by mouth Daily., Disp: , Rfl:   •  pantoprazole (PROTONIX) 40 MG EC tablet, Take 1 tablet by mouth  daily, Disp: 90 tablet, Rfl: 0  •  polyethylene glycol (MIRALAX) packet, Take 17 g by mouth Daily As Needed., Disp: , Rfl:   •  potassium chloride (MICRO-K) 10 MEQ CR capsule, Take 1 capsule by mouth Daily. Only take with lasix, Disp: 30 capsule, Rfl: 0  •  rosuvastatin (CRESTOR) 10 MG tablet, Take 1 tablet by mouth Every Night., Disp: 30 tablet, Rfl: 1  •  vitamin B-12 (CYANOCOBALAMIN)  1000 MCG tablet, Take 1,000 mcg by mouth Daily., Disp: , Rfl:   Patient Active Problem List    Diagnosis   • Non-rheumatic mitral regurgitation-mod to severe by echo 11/17 [I34.0]   • Acute diastolic CHF  [I50.9]   • Left lower lobe pneumonia [J18.1]   • Acute respiratory failure with hypoxia [J96.01]   • NSTEMI (non-ST elevated myocardial infarction) [I21.4]   • Acute kidney injury [N17.9]   • ANDRIY (obstructive sleep apnea) [G47.33]   • Diabetes mellitus [E11.9]   • HCAP (healthcare-associated pneumonia) [J18.9]   • GERD (gastroesophageal reflux disease) [K21.9]   • Diastolic dysfunction [I51.9]   • Type 2 diabetes mellitus [E11.9]   • Colovesical fistula [N32.1]   • Weakness [R53.1]   • Complicated UTI (urinary tract infection) [N39.0]     Overview Note:     Chronic. Followed by Dr. Duque. Has been on low tabatha Cipro for suppression but last culture in June 2017 grew fluquinalone resistant E Coli and Enterococcus.      • Senile atrophic vaginitis [N95.2]     Overview Note:     Just started on topical premarin cream by Dr. Duque     • Acute cystitis with hematuria [N30.01]   • Recurrent pneumonia [J18.9]   • Acute on chronic Respiratory failure. Not requiring ventilatory support [J96.90]   • Acute on Chronic kidney disease (CKD), stage III (moderate) [N18.3]   • Exacerbation of COPD  [J44.9]   • H/O SSS (sick sinus syndrome) s/p PPM 2014 [I49.5]   • CHF (congestive heart failure) [I50.9]     Overview Note:     Hx of mild diastolic dysfunction.     • Anxiety and depression [F41.8]   • Macular degeneration [H35.30]   • ANDRIY on CPAP [G47.33, Z99.89]   • Seizure disorder on Keppra [G40.909]   • HTN and possible diastolic dysfunction [I10]   • HLD (hyperlipidemia) [E78.5]   • Obesity, BMI 33.7 [E66.9]   • Arthralgia of lumbar spine [M54.5]     Overview Note:     Impression: 03/03/2016 - using walker, needs bedside table (eating in lift chair)   continue therapy with exercises- has made alot of progress and cautioned her  about need to continue  Impression: 10/12/2015 - see above, same;      • Encounter for eye exam [Z01.00]   • Parasites in stool [B82.9]   • UTI (urinary tract infection) [N39.0]     Overview Note:     Impression: 06/16/2015 - dip ua;      • Midline low back pain without sciatica [M54.5]   • Physical debility [R53.81]   • Lumbar strain [S39.012A]   • History of MI (myocardial infarction) [I25.2]   • Abnormal liver function tests [R79.89]     Overview Note:     Impression: 03/03/2016 - mild elevation- will reassess next appt  Impression: 10/12/2015 - check cmp  Impression: 03/11/2015 - update lfts  Impression: 11/03/2014 - update lfts; Description: chronic  Impression: 03/03/2016 - mild elevation- will reassess next appt  Impression: 10/12/2015 - check cmp  Impression: 03/11/2015 - update lfts  Impression: 11/03/2014 - update lfts; Description: chronic     • Acute myocardial infarction [I21.9]     Overview Note:     Description: In Hospital for UTI- 7/14- high troponin  Description: In Hospital for UTI- 7/14- high troponin     • Anxiety [F41.9]   • Knee pain [M25.569]     Overview Note:     Impression: 10/12/2015 - s/p injection - shot 3 weeks ago  try ultram- rx provided;   Impression: 03/03/2016 - using walker, needs bedside table (eating in lift chair)   continue therapy with exercises- has made alot of progress and cautioned her about need to continue  Impression: 10/12/2015 - see above, same;      • Asthma [J45.909]     Overview Note:     Impression: 03/11/2015 - refill albuterol, symbicort  rx provided for tussinex for cough  Impression: 08/11/2014 - taper prednisone as tolerated;   Impression: 03/11/2015 - refill albuterol, symbicort  rx provided for tussinex for cough  Impression: 08/11/2014 - taper prednisone as tolerated;      • Cataract [H26.9]     Overview Note:     Description: Post cataract opacification 7/14 - opinion Dr Fuentes, referred Dr Rosado for laser     • Chronic kidney disease, stage III  (moderate) [N18.3]     Overview Note:     Impression: 03/03/2016 - reassess 3-4 months- get lab hospitalization  Impression: 10/12/2015 - check cmp  Impression: 11/03/2014 - check cmp  Impression: 08/11/2014 - having lab work to f/u this on Thursday  Impression: 07/22/2014 - check cmp  Impression: 04/08/2014 - update creat.; Description: [facility] 11/5  Impression: 03/03/2016 - reassess 3-4 months- get lab hospitalization  Impression: 10/12/2015 - check cmp  Impression: 11/03/2014 - check cmp  Impression: 08/11/2014 - having lab work to f/u this on Thursday  Impression: 07/22/2014 - check cmp  Impression: 04/08/2014 - update creat.; Description: [facility] 11/5     • Chronic obstructive pulmonary disease [J44.9]     Overview Note:     Impression: 01/13/2016 - Sent in order for lifetime supply of albuterol for nebulizer.; Description: severe  Impression: 01/13/2016 - Sent in order for lifetime supply of albuterol for nebulizer.; Description: severe     • 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 Recheck chest xr to make sure resolved.  Impression: 08/11/2014 - s/p MI- f/u Dr Thompson- difficult due to kidney function; Description: [facility]- stress test 7/7  echo mod mr  Impression: 01/13/2016 - Mild CHF seen on cxr during admission. Recheck chest xr to make sure resolved.  Impression: 08/11/2014 - s/p MI- f/u Dr Thompson- difficult due to kidney function; Description: [facility]- stress test 7/7  echo mod mr     • Atherosclerosis of coronary artery [I25.10]   • Cough [R05]     Overview Note:     Impression: 03/03/2016 - refill tussionex, call if persistant - h/o recurrent pneumonia but currently clear - cough at night, discussed eating early, no late snacks, keep hob elevated, no productive cough, no fever or  chills  Impression: 03/11/2015 - s/p influenza A with known chronic asthma  treated with tamiflu, ceftin and prednisone  oxygen level is low;   Impression: 03/03/2016 - refill tussionex, call if persistant - h/o recurrent pneumonia but currently clear - cough at night, discussed eating early, no late snacks, keep hob elevated, no productive cough, no fever or chills  Impression: 03/11/2015 - s/p influenza A with known chronic asthma  treated with tamiflu, ceftin and prednisone  oxygen level is low;      • Depression [F32.9]   • Diabetes mellitus [E11.9]     Overview Note:     Impression: 03/03/2016 - blood sugar 107 and hgn a1c 6.2%   is overdue for eye exam- macular degen getting injections   ur alb due 6/15   neuropathy and foot care stable, no changes- is wearing diabetic shoes  Impression: 10/12/2015 - check cmp, hgn a1c discussed with her- good control - blood sugar 84, hgn a1c 6.3%  is due eye exam   no foot lesion   no callus or ulcer  ur alb due  3/16  Impression: 06/16/2015 - blood sugar is 113, hgn a1c 6.4%  check ur alb today  Impression: 03/11/2015 - check hgn a1c  Impression: 11/03/2014 - excellent blood sugar control - a1c 6.1  reviewed with patient, is up to date with eye exam  neuropathy stable  update ur alb next ov  Impression: 08/11/2014 - continue to monitor sugars, taper prednisone  Impression: 07/22/2014 - blood sugar 115, hgn a1c 6.3% (average 130 or so)  is up to date with eye exam, no neuropathy  ur alb due but has uti  Impression: 04/08/2014 - blood sugar 148, hgn a1c 6.4% (average 135)  is up to date with eye exam, no neuropathy, due ur alb.    discussed blood sugar and goals for a1c.  No severe low blood sugars.;   A1C 6.4 6/28/2016  Impression: 03/03/2016 - blood sugar 107 and hgn a1c 6.2%   is overdue for eye exam- macular degen getting injections   ur alb due 6/15   neuropathy and foot care stable, no changes- is wearing diabetic shoes  Impression: 10/12/2015 - check cmp, hgn a1c  discussed with her- good control - blood sugar 84, hgn a1c 6.3%  is due eye exam   no foot lesion   no callus or ulcer  ur alb due  3/16  Impression: 06/16/2015 - blood sugar is 113, hgn a1c 6.4%  check ur alb today  Impression: 03/11/2015 - check hgn a1c  Impression: 11/03/2014 - excellent blood sugar control - a1c 6.1  reviewed with patient, is up to date with eye exam  neuropathy stable  update ur alb next ov  Impression: 08/11/2014 - continue to monitor sugars, taper prednisone  Impression: 07/22/2014 - blood sugar 115, hgn a1c 6.3% (average 130 or so)  is up to date with eye exam, no neuropathy  ur alb due but has uti  Impression: 04/08/2014 - blood sugar 148, hgn a1c 6.4% (average 135)  is up to date with eye exam, no neuropathy, due ur alb.    discussed blood sugar and goals for a1c.  No severe low blood sugars.;      • Dizziness [R42]   • Dysuria [R30.0]   • Edema [R60.9]   • Gastroesophageal reflux disease [K21.9]   • Primary hypertriglyceridemia [E78.1]     Overview Note:     Impression: 03/03/2016 - check at next visit fasting  Impression: 10/12/2015 - check flp  Impression: 03/11/2015 - check flp  Impression: 04/08/2014 - check flp;   Impression: 03/03/2016 - check at next visit fasting  Impression: 10/12/2015 - check flp  Impression: 03/11/2015 - check flp  Impression: 04/08/2014 - check flp;      • Fatigue [R53.83]   • Fracture of patella [S82.009A]   • Gout [M10.9]   • Headache [R51]   • Hyperlipidemia [E78.5]     Overview Note:     Impression: 10/12/2015 - check flp  Impression: 03/11/2015 - check flp  Impression: 11/03/2014 - check flp  Impression: 08/11/2014 - refill atorvastatin- check lab from hosp.  Impression: 07/22/2014 - update flp  Impression: 04/08/2014 - check flp;   Impression: 10/12/2015 - check flp  Impression: 03/11/2015 - check flp  Impression: 11/03/2014 - check flp  Impression: 08/11/2014 - refill atorvastatin- check lab from hosp.  Impression: 07/22/2014 - update flp  Impression:  04/08/2014 - check flp;      • Hypertension [I10]     Overview Note:     Impression: 03/03/2016 - bp is good  Impression: 10/12/2015 - bp is better with recheck  Impression: 06/16/2015 - bp is good  Impression: 03/11/2015 - bp is good today  Impression: 11/03/2014 - bp is good today- recently reduced bp med due to low bp and reduced diuretic due to impairing kidney function  Impression: 08/11/2014 - bp is good  Impression: 07/22/2014 - bp is good  Impression: 04/08/2014 - bp is good;   Impression: 03/03/2016 - bp is good  Impression: 10/12/2015 - bp is better with recheck  Impression: 06/16/2015 - bp is good  Impression: 03/11/2015 - bp is good today  Impression: 11/03/2014 - bp is good today- recently reduced bp med due to low bp and reduced diuretic due to impairing kidney function  Impression: 08/11/2014 - bp is good  Impression: 07/22/2014 - bp is good  Impression: 04/08/2014 - bp is good;      • Influenza due to influenza A virus [J10.1]     Overview Note:     Impression: 03/02/2015 - rapid influenza screen pos for influenza a  given o2 sat 86% in office, prior h/o pneumonia and baseline asthma now associated with high fever and confusioin I would recommend she be referred to hospital for further treatment.  Dr Lambert on call- discussed with him and will refer to admission;   Impression: 03/02/2015 - rapid influenza screen pos for influenza a  given o2 sat 86% in office, prior h/o pneumonia and baseline asthma now associated with high fever and confusioin I would recommend she be referred to hospital for further treatment.  Dr Lambert on call- discussed with him and will refer to admission;      • Insomnia [G47.00]   • Leukocytosis [D72.829]   • Macrocytosis [D75.89]     Overview Note:     Impression: 11/03/2014 - check b12 levels; Description: on ppi and carafate     • Macular degeneration [H35.30]   • Menopause present [Z78.0]   • Night sweats [R61]     Overview Note:     Impression: 07/22/2014 - has uti (rx  provided)  culture obtained;   Impression: 07/22/2014 - has uti (rx provided)  culture obtained;      • Obstructive sleep apnea syndrome [G47.33]     Overview Note:     Impression: 03/03/2016 - significant improvement with new mask, cpap    continue current therapy.  Impression: 01/13/2016 - Faxed respiratory therapy order to [facility]. They will send order with recommendations based on evaluation.;   Impression: 03/03/2016 - significant improvement with new mask, cpap    continue current therapy.  Impression: 01/13/2016 - Faxed respiratory therapy order to [facility]. They will send order with recommendations based on evaluation.;      • Osteoporosis [M81.0]     Overview Note:     Description: 8/5  Description: 8/5     • Peripheral neuropathy [G62.9]   • Pneumonia [J18.9]     Overview Note:     Impression: 03/03/2016 - recurrent x 5, has had swallowing study and is employing techniques advised, using symbicort  Impression: 01/13/2016 - Recheck chest xray. Completed doxycycline and steroid taper.;   Impression: 03/03/2016 - recurrent x 5, has had swallowing study and is employing techniques advised, using symbicort  Impression: 01/13/2016 - Recheck chest xray. Completed doxycycline and steroid taper.;      • Seizure disorder [G40.909]   • Sick sinus syndrome [I49.5]     Overview Note:     Description: 1/15- [employer] pacemaker interrogation     • Sinus bradycardia [R00.1]     Overview Note:     Description: [employer] eval 7/10, s/p pacemaker 7/14 [facility]  Description: [employer] eval 7/10, s/p pacemaker 7/14 [facility]     • Thrombophlebitis [I80.9]     Overview Note:     Description: superficial  Description: superficial     • Cobalamin deficiency [E53.8]     Overview Note:     Impression: 10/12/2015 - normal levels last check  Impression: 03/11/2015 - vitamin b12 levels;   Impression: 10/12/2015 - normal levels last check  Impression: 03/11/2015 - vitamin b12 levels;      • Vitamin D deficiency [E55.9]     Overview Note:      Impression: 03/11/2015 - check vitamin D levels  Impression: 11/03/2014 - update vitamin d levels- daughter just restarted supplement  Impression: 04/08/2014 - check vitamin D levels;   Impression: 03/11/2015 - check vitamin D levels  Impression: 11/03/2014 - update vitamin d levels- daughter just restarted supplement  Impression: 04/08/2014 - check vitamin D levels;        Review of Systems   Constitutional: Positive for fatigue and unexpected weight change. Negative for activity change, appetite change, chills, diaphoresis and fever.   HENT: Negative for congestion, dental problem, drooling, ear discharge, ear pain, facial swelling, hearing loss, mouth sores, nosebleeds, postnasal drip, rhinorrhea, sinus pressure, sneezing, sore throat, tinnitus, trouble swallowing and voice change.    Eyes: Negative for photophobia, pain, discharge, redness, itching and visual disturbance.   Respiratory: Positive for cough, shortness of breath and wheezing. Negative for apnea, choking, chest tightness and stridor.    Cardiovascular: Positive for leg swelling (improved ). Negative for chest pain and palpitations.   Gastrointestinal: Negative for abdominal distention, abdominal pain, anal bleeding, blood in stool, constipation, diarrhea, nausea, rectal pain and vomiting.   Endocrine: Negative for cold intolerance, heat intolerance, polydipsia, polyphagia and polyuria.   Genitourinary: Negative for decreased urine volume, difficulty urinating, dysuria, enuresis, flank pain, frequency, genital sores, hematuria and urgency.   Musculoskeletal: Positive for back pain, gait problem and myalgias. Negative for arthralgias, joint swelling, neck pain and neck stiffness.   Skin: Negative for color change, pallor, rash and wound.   Allergic/Immunologic: Negative for environmental allergies, food allergies and immunocompromised state.   Neurological: Positive for weakness and numbness. Negative for dizziness, tremors, seizures, syncope, facial  "asymmetry, speech difficulty, light-headedness and headaches.   Hematological: Negative for adenopathy. Does not bruise/bleed easily.   Psychiatric/Behavioral: Negative for agitation, behavioral problems, confusion, decreased concentration, dysphoric mood, hallucinations, self-injury, sleep disturbance and suicidal ideas. The patient is not nervous/anxious and is not hyperactive.      Social History     Social History   • Marital status:      Spouse name: N/A   • Number of children: 2   • Years of education: N/A     Occupational History   • Not on file.     Social History Main Topics   • Smoking status: Never Smoker   • Smokeless tobacco: Never Used   • Alcohol use No   • Drug use: No   • Sexual activity: Defer     Other Topics Concern   • Not on file     Social History Narrative    The patient lives with her children.  Requires help with all ADL's and prepare meals, grocery shopping,  medications.     Family History   Problem Relation Age of Onset   • Heart disease Father    • Heart disease Paternal Grandmother    • Heart disease Paternal Grandfather    • Cancer Mother    • Stomach cancer Other      Family history     /60  Pulse 59  Ht 66\" (167.6 cm)  Wt 205 lb (93 kg)  SpO2 94%  BMI 33.09 kg/m2  Physical Exam   Constitutional: She is oriented to person, place, and time. She appears well-developed and well-nourished.   HENT:   Head: Normocephalic and atraumatic.   Nose: Nose normal.   Mouth/Throat: Oropharynx is clear and moist.   Eyes: Conjunctivae, EOM and lids are normal. Pupils are equal, round, and reactive to light.   Neck: Trachea normal and normal range of motion. Neck supple. Carotid bruit is not present. No tracheal deviation present. No thyroid mass and no thyromegaly present.   Cardiovascular: Normal rate, regular rhythm, normal heart sounds and intact distal pulses.  Exam reveals no gallop and no friction rub.    No murmur heard.  Pulmonary/Chest: Effort normal and breath sounds " normal. No respiratory distress. She has no wheezes.   Musculoskeletal: Normal range of motion. She exhibits no edema or deformity.    Avis had a diabetic foot exam performed today.   During the foot exam she had a monofilament test performed.    Neurological Sensory Findings - Altered hot/cold right ankle/foot discrimination and altered hot/cold left ankle/foot discrimination. Altered sharp/dull right ankle/foot discrimination and altered sharp/dull left ankle/foot discrimination. Right ankle/foot altered proprioception and left ankle/foot altered proprioception.    Vascular Status -  Her exam exhibits right foot vasculature normal and right foot edema. Her exam exhibits left foot vasculature normal and left foot edema.   Skin Integrity  -  Her right foot skin is intact.     Avis 's left foot skin is intact. .  Lymphadenopathy:     She has no cervical adenopathy.   Neurological: She is alert and oriented to person, place, and time. She has normal reflexes. She displays normal reflexes. No cranial nerve deficit.   Skin: Skin is warm and dry. No rash noted. No cyanosis or erythema. Nails show no clubbing.   Psychiatric: She has a normal mood and affect. Her speech is normal and behavior is normal. Judgment and thought content normal. Cognition and memory are normal.   Nursing note and vitals reviewed.    Results for orders placed or performed during the hospital encounter of 11/01/17   Influenza Antigen, Rapid - Swab, Nasopharynx   Result Value Ref Range    Influenza A Ag, EIA Negative Negative    Influenza B Ag, EIA Negative Negative   Blood Culture - Blood,   Result Value Ref Range    Blood Culture No growth at 5 days    Blood Culture - Blood,   Result Value Ref Range    Blood Culture No growth at 5 days    Comprehensive Metabolic Panel   Result Value Ref Range    Glucose 121 (H) 70 - 100 mg/dL    BUN 25 (H) 9 - 23 mg/dL    Creatinine 1.80 (H) 0.60 - 1.30 mg/dL    Sodium 139 132 - 146 mmol/L    Potassium 4.8 3.5 -  5.5 mmol/L    Chloride 100 99 - 109 mmol/L    CO2 30.0 20.0 - 31.0 mmol/L    Calcium 10.0 8.7 - 10.4 mg/dL    Total Protein 6.6 5.7 - 8.2 g/dL    Albumin 4.00 3.20 - 4.80 g/dL    ALT (SGPT) 34 7 - 40 U/L    AST (SGOT) 56 (H) 0 - 33 U/L    Alkaline Phosphatase 199 (H) 25 - 100 U/L    Total Bilirubin 1.3 (H) 0.3 - 1.2 mg/dL    eGFR Non African Amer 27 (L) >60 mL/min/1.73    Globulin 2.6 gm/dL    A/G Ratio 1.5 1.5 - 2.5 g/dL    BUN/Creatinine Ratio 13.9 7.0 - 25.0    Anion Gap 9.0 3.0 - 11.0 mmol/L   CBC Auto Differential   Result Value Ref Range    WBC 23.68 (H) 3.50 - 10.80 10*3/mm3    RBC 3.83 (L) 3.89 - 5.14 10*6/mm3    Hemoglobin 11.9 11.5 - 15.5 g/dL    Hematocrit 39.0 34.5 - 44.0 %    .8 (H) 80.0 - 99.0 fL    MCH 31.1 (H) 27.0 - 31.0 pg    MCHC 30.5 (L) 32.0 - 36.0 g/dL    RDW 14.7 (H) 11.3 - 14.5 %    RDW-SD 54.4 (H) 37.0 - 54.0 fl    MPV 10.4 6.0 - 12.0 fL    Platelets 462 (H) 150 - 450 10*3/mm3    Neutrophil % 85.6 (H) 41.0 - 71.0 %    Lymphocyte % 6.7 (L) 24.0 - 44.0 %    Monocyte % 7.1 0.0 - 12.0 %    Eosinophil % 0.0 0.0 - 3.0 %    Basophil % 0.1 0.0 - 1.0 %    Immature Grans % 0.5 0.0 - 0.6 %    Neutrophils, Absolute 20.28 (H) 1.50 - 8.30 10*3/mm3    Lymphocytes, Absolute 1.58 0.60 - 4.80 10*3/mm3    Monocytes, Absolute 1.67 (H) 0.00 - 1.00 10*3/mm3    Eosinophils, Absolute 0.00 0.00 - 0.30 10*3/mm3    Basophils, Absolute 0.03 0.00 - 0.20 10*3/mm3    Immature Grans, Absolute 0.12 (H) 0.00 - 0.03 10*3/mm3   Urinalysis With / Culture If Indicated - Urine, Catheter   Result Value Ref Range    Color, UA Dark Yellow (A) Yellow, Straw    Appearance, UA Cloudy (A) Clear    pH, UA <=5.0 5.0 - 8.0    Specific Gravity, UA 1.021 1.001 - 1.030    Glucose, UA Negative Negative    Ketones, UA Negative Negative    Bilirubin, UA Negative Negative    Blood, UA Negative Negative    Protein, UA 30 mg/dL (1+) (A) Negative    Leuk Esterase, UA Trace (A) Negative    Nitrite, UA Negative Negative    Urobilinogen, UA 1.0  E.U./dL 0.2 - 1.0 E.U./dL   BNP   Result Value Ref Range    BNP 2368.0 (H) 0.0 - 100.0 pg/mL   Lactic Acid, Plasma   Result Value Ref Range    Lactate 1.6 0.5 - 2.0 mmol/L   Procalcitonin   Result Value Ref Range    Procalcitonin 25.21 ng/mL   Urinalysis, Microscopic Only - Urine, Clean Catch   Result Value Ref Range    RBC, UA 0-2 None Seen, 0-2 /HPF    WBC, UA 0-2 (A) None Seen /HPF    Bacteria, UA None Seen None Seen, Trace /HPF    Squamous Epithelial Cells, UA 0-2 None Seen, 0-2 /HPF    Hyaline Casts, UA 0-6 0 - 6 /LPF    Methodology Automated Microscopy    Scan Slide   Result Value Ref Range    RBC Morphology Normal Normal    WBC Morphology Normal Normal    Platelet Morphology Normal Normal   Troponin   Result Value Ref Range    Troponin I 14.653 (C) <=0.039 ng/mL   Sodium, Urine, Random - Urine, Clean Catch   Result Value Ref Range    Sodium, Urine 22 (L) 30 - 90 mmol/L   Creatinine, Urine, Random - Urine, Clean Catch   Result Value Ref Range    Creatinine, Urine 251.8 mg/dL   Urea Nitrogen, Urine - Urine, Clean Catch   Result Value Ref Range    Urea Nitrogen, Urine 583 mg/dL   Protime-INR   Result Value Ref Range    Protime 12.6 (H) 9.6 - 11.5 Seconds    INR 1.15    aPTT   Result Value Ref Range    PTT 28.4 (L) 45.0 - 60.0 seconds   CBC Auto Differential   Result Value Ref Range    WBC 17.94 (H) 3.50 - 10.80 10*3/mm3    RBC 3.42 (L) 3.89 - 5.14 10*6/mm3    Hemoglobin 10.4 (L) 11.5 - 15.5 g/dL    Hematocrit 34.4 (L) 34.5 - 44.0 %    .6 (H) 80.0 - 99.0 fL    MCH 30.4 27.0 - 31.0 pg    MCHC 30.2 (L) 32.0 - 36.0 g/dL    RDW 14.3 11.3 - 14.5 %    RDW-SD 52.6 37.0 - 54.0 fl    MPV 10.2 6.0 - 12.0 fL    Platelets 410 150 - 450 10*3/mm3    Neutrophil % 69.9 41.0 - 71.0 %    Lymphocyte % 19.0 (L) 24.0 - 44.0 %    Monocyte % 10.4 0.0 - 12.0 %    Eosinophil % 0.1 0.0 - 3.0 %    Basophil % 0.3 0.0 - 1.0 %    Immature Grans % 0.3 0.0 - 0.6 %    Neutrophils, Absolute 12.53 (H) 1.50 - 8.30 10*3/mm3    Lymphocytes,  Absolute 3.41 0.60 - 4.80 10*3/mm3    Monocytes, Absolute 1.87 (H) 0.00 - 1.00 10*3/mm3    Eosinophils, Absolute 0.02 0.00 - 0.30 10*3/mm3    Basophils, Absolute 0.06 0.00 - 0.20 10*3/mm3    Immature Grans, Absolute 0.05 (H) 0.00 - 0.03 10*3/mm3   Scan Slide   Result Value Ref Range    RBC Morphology Normal Normal    WBC Morphology Normal Normal    Platelet Morphology Normal Normal   aPTT   Result Value Ref Range    PTT 48.8 45.0 - 60.0 seconds   Troponin   Result Value Ref Range    Troponin I 10.824 (C) <=0.039 ng/mL   Troponin   Result Value Ref Range    Troponin I 11.131 (C) <=0.039 ng/mL   CBC Auto Differential   Result Value Ref Range    WBC 11.66 (H) 3.50 - 10.80 10*3/mm3    RBC 3.50 (L) 3.89 - 5.14 10*6/mm3    Hemoglobin 10.7 (L) 11.5 - 15.5 g/dL    Hematocrit 35.2 34.5 - 44.0 %    .6 (H) 80.0 - 99.0 fL    MCH 30.6 27.0 - 31.0 pg    MCHC 30.4 (L) 32.0 - 36.0 g/dL    RDW 14.7 (H) 11.3 - 14.5 %    RDW-SD 53.5 37.0 - 54.0 fl    MPV 10.2 6.0 - 12.0 fL    Platelets 363 150 - 450 10*3/mm3    Neutrophil % 66.1 41.0 - 71.0 %    Lymphocyte % 19.1 (L) 24.0 - 44.0 %    Monocyte % 11.7 0.0 - 12.0 %    Eosinophil % 2.3 0.0 - 3.0 %    Basophil % 0.5 0.0 - 1.0 %    Immature Grans % 0.3 0.0 - 0.6 %    Neutrophils, Absolute 7.70 1.50 - 8.30 10*3/mm3    Lymphocytes, Absolute 2.23 0.60 - 4.80 10*3/mm3    Monocytes, Absolute 1.36 (H) 0.00 - 1.00 10*3/mm3    Eosinophils, Absolute 0.27 0.00 - 0.30 10*3/mm3    Basophils, Absolute 0.06 0.00 - 0.20 10*3/mm3    Immature Grans, Absolute 0.04 (H) 0.00 - 0.03 10*3/mm3   Comprehensive Metabolic Panel   Result Value Ref Range    Glucose 117 (H) 70 - 100 mg/dL    BUN 27 (H) 9 - 23 mg/dL    Creatinine 1.80 (H) 0.60 - 1.30 mg/dL    Sodium 141 132 - 146 mmol/L    Potassium 3.8 3.5 - 5.5 mmol/L    Chloride 102 99 - 109 mmol/L    CO2 29.0 20.0 - 31.0 mmol/L    Calcium 9.4 8.7 - 10.4 mg/dL    Total Protein 5.9 5.7 - 8.2 g/dL    Albumin 3.50 3.20 - 4.80 g/dL    ALT (SGPT) 22 7 - 40 U/L     AST (SGOT) 36 (H) 0 - 33 U/L    Alkaline Phosphatase 154 (H) 25 - 100 U/L    Total Bilirubin 1.0 0.3 - 1.2 mg/dL    eGFR Non African Amer 27 (L) >60 mL/min/1.73    Globulin 2.4 gm/dL    A/G Ratio 1.5 1.5 - 2.5 g/dL    BUN/Creatinine Ratio 15.0 7.0 - 25.0    Anion Gap 10.0 3.0 - 11.0 mmol/L   aPTT   Result Value Ref Range    PTT 34.1 (L) 45.0 - 60.0 seconds   Basic Metabolic Panel   Result Value Ref Range    Glucose 125 (H) 70 - 100 mg/dL    BUN 29 (H) 9 - 23 mg/dL    Creatinine 1.70 (H) 0.60 - 1.30 mg/dL    Sodium 140 132 - 146 mmol/L    Potassium 3.4 (L) 3.5 - 5.5 mmol/L    Chloride 100 99 - 109 mmol/L    CO2 32.0 (H) 20.0 - 31.0 mmol/L    Calcium 8.8 8.7 - 10.4 mg/dL    eGFR Non African Amer 28 (L) >60 mL/min/1.73    BUN/Creatinine Ratio 17.1 7.0 - 25.0    Anion Gap 8.0 3.0 - 11.0 mmol/L   CBC (No Diff)   Result Value Ref Range    WBC 9.89 3.50 - 10.80 10*3/mm3    RBC 3.16 (L) 3.89 - 5.14 10*6/mm3    Hemoglobin 9.8 (L) 11.5 - 15.5 g/dL    Hematocrit 31.8 (L) 34.5 - 44.0 %    .6 (H) 80.0 - 99.0 fL    MCH 31.0 27.0 - 31.0 pg    MCHC 30.8 (L) 32.0 - 36.0 g/dL    RDW 14.3 11.3 - 14.5 %    RDW-SD 52.6 37.0 - 54.0 fl    MPV 10.5 6.0 - 12.0 fL    Platelets 381 150 - 450 10*3/mm3   CBC (No Diff)   Result Value Ref Range    WBC 9.48 3.50 - 10.80 10*3/mm3    RBC 3.31 (L) 3.89 - 5.14 10*6/mm3    Hemoglobin 10.4 (L) 11.5 - 15.5 g/dL    Hematocrit 33.9 (L) 34.5 - 44.0 %    .4 (H) 80.0 - 99.0 fL    MCH 31.4 (H) 27.0 - 31.0 pg    MCHC 30.7 (L) 32.0 - 36.0 g/dL    RDW 14.7 (H) 11.3 - 14.5 %    RDW-SD 54.8 (H) 37.0 - 54.0 fl    MPV 10.5 6.0 - 12.0 fL    Platelets 379 150 - 450 10*3/mm3   Basic Metabolic Panel   Result Value Ref Range    Glucose 122 (H) 70 - 100 mg/dL    BUN 25 (H) 9 - 23 mg/dL    Creatinine 1.80 (H) 0.60 - 1.30 mg/dL    Sodium 143 132 - 146 mmol/L    Potassium 3.6 3.5 - 5.5 mmol/L    Chloride 101 99 - 109 mmol/L    CO2 33.0 (H) 20.0 - 31.0 mmol/L    Calcium 9.1 8.7 - 10.4 mg/dL    eGFR Non African  Amer 27 (L) >60 mL/min/1.73    BUN/Creatinine Ratio 13.9 7.0 - 25.0    Anion Gap 9.0 3.0 - 11.0 mmol/L   CBC Auto Differential   Result Value Ref Range    WBC 9.90 3.50 - 10.80 10*3/mm3    RBC 3.48 (L) 3.89 - 5.14 10*6/mm3    Hemoglobin 10.9 (L) 11.5 - 15.5 g/dL    Hematocrit 35.8 34.5 - 44.0 %    .9 (H) 80.0 - 99.0 fL    MCH 31.3 (H) 27.0 - 31.0 pg    MCHC 30.4 (L) 32.0 - 36.0 g/dL    RDW 14.9 (H) 11.3 - 14.5 %    RDW-SD 55.8 (H) 37.0 - 54.0 fl    MPV 10.4 6.0 - 12.0 fL    Platelets 420 150 - 450 10*3/mm3    Neutrophil % 63.1 41.0 - 71.0 %    Lymphocyte % 23.1 (L) 24.0 - 44.0 %    Monocyte % 8.7 0.0 - 12.0 %    Eosinophil % 4.4 (H) 0.0 - 3.0 %    Basophil % 0.6 0.0 - 1.0 %    Immature Grans % 0.1 0.0 - 0.6 %    Neutrophils, Absolute 6.24 1.50 - 8.30 10*3/mm3    Lymphocytes, Absolute 2.29 0.60 - 4.80 10*3/mm3    Monocytes, Absolute 0.86 0.00 - 1.00 10*3/mm3    Eosinophils, Absolute 0.44 (H) 0.00 - 0.30 10*3/mm3    Basophils, Absolute 0.06 0.00 - 0.20 10*3/mm3    Immature Grans, Absolute 0.01 0.00 - 0.03 10*3/mm3   Basic Metabolic Panel   Result Value Ref Range    Glucose 111 (H) 70 - 100 mg/dL    BUN 30 (H) 9 - 23 mg/dL    Creatinine 2.00 (H) 0.60 - 1.30 mg/dL    Sodium 144 132 - 146 mmol/L    Potassium 4.2 3.5 - 5.5 mmol/L    Chloride 102 99 - 109 mmol/L    CO2 33.0 (H) 20.0 - 31.0 mmol/L    Calcium 9.4 8.7 - 10.4 mg/dL    eGFR Non African Amer 24 (L) >60 mL/min/1.73    BUN/Creatinine Ratio 15.0 7.0 - 25.0    Anion Gap 9.0 3.0 - 11.0 mmol/L   Basic Metabolic Panel   Result Value Ref Range    Glucose 106 (H) 70 - 100 mg/dL    BUN 33 (H) 9 - 23 mg/dL    Creatinine 1.90 (H) 0.60 - 1.30 mg/dL    Sodium 143 132 - 146 mmol/L    Potassium 3.6 3.5 - 5.5 mmol/L    Chloride 101 99 - 109 mmol/L    CO2 33.0 (H) 20.0 - 31.0 mmol/L    Calcium 9.4 8.7 - 10.4 mg/dL    eGFR Non African Amer 25 (L) >60 mL/min/1.73    BUN/Creatinine Ratio 17.4 7.0 - 25.0    Anion Gap 9.0 3.0 - 11.0 mmol/L   CBC (No Diff)   Result Value Ref  Range    WBC 8.75 3.50 - 10.80 10*3/mm3    RBC 3.15 (L) 3.89 - 5.14 10*6/mm3    Hemoglobin 9.9 (L) 11.5 - 15.5 g/dL    Hematocrit 32.1 (L) 34.5 - 44.0 %    .9 (H) 80.0 - 99.0 fL    MCH 31.4 (H) 27.0 - 31.0 pg    MCHC 30.8 (L) 32.0 - 36.0 g/dL    RDW 14.7 (H) 11.3 - 14.5 %    RDW-SD 54.2 (H) 37.0 - 54.0 fl    MPV 10.6 6.0 - 12.0 fL    Platelets 367 150 - 450 10*3/mm3   BNP   Result Value Ref Range    .0 (H) 0.0 - 100.0 pg/mL   Vancomycin, Random   Result Value Ref Range    Vancomycin Random 14.70 5.00 - 40.00 mcg/mL   POC Troponin, Rapid   Result Value Ref Range    Troponin I 5.60 (C) 0.00 - 0.07 ng/mL   POC Troponin, Rapid   Result Value Ref Range    Troponin I 5.46 (C) 0.00 - 0.07 ng/mL   POC Glucose Fingerstick   Result Value Ref Range    Glucose 96 70 - 130 mg/dL   POC Glucose Fingerstick   Result Value Ref Range    Glucose 121 70 - 130 mg/dL   POC Glucose Fingerstick   Result Value Ref Range    Glucose 145 (H) 70 - 130 mg/dL   POC Glucose Fingerstick   Result Value Ref Range    Glucose 131 (H) 70 - 130 mg/dL   POC Glucose Fingerstick   Result Value Ref Range    Glucose 115 70 - 130 mg/dL   POC Glucose Fingerstick   Result Value Ref Range    Glucose 171 (H) 70 - 130 mg/dL   POC Glucose Fingerstick   Result Value Ref Range    Glucose 130 70 - 130 mg/dL   POC Glucose Fingerstick   Result Value Ref Range    Glucose 119 70 - 130 mg/dL   POC Glucose Fingerstick   Result Value Ref Range    Glucose 138 (H) 70 - 130 mg/dL   POC Glucose Fingerstick   Result Value Ref Range    Glucose 139 (H) 70 - 130 mg/dL   POC Glucose Fingerstick   Result Value Ref Range    Glucose 136 (H) 70 - 130 mg/dL   POC Glucose Fingerstick   Result Value Ref Range    Glucose 98 70 - 130 mg/dL   POC Glucose Fingerstick   Result Value Ref Range    Glucose 133 (H) 70 - 130 mg/dL   POC Glucose Fingerstick   Result Value Ref Range    Glucose 132 (H) 70 - 130 mg/dL   POC Glucose Fingerstick   Result Value Ref Range    Glucose 132 (H) 70 -  130 mg/dL   POC Glucose Fingerstick   Result Value Ref Range    Glucose 147 (H) 70 - 130 mg/dL   POC Glucose Fingerstick   Result Value Ref Range    Glucose 123 70 - 130 mg/dL   POC Glucose Fingerstick   Result Value Ref Range    Glucose 127 70 - 130 mg/dL   POC Glucose Fingerstick   Result Value Ref Range    Glucose 131 (H) 70 - 130 mg/dL   POC Glucose Fingerstick   Result Value Ref Range    Glucose 151 (H) 70 - 130 mg/dL   POC Glucose Fingerstick   Result Value Ref Range    Glucose 157 (H) 70 - 130 mg/dL   POC Glucose Fingerstick   Result Value Ref Range    Glucose 119 70 - 130 mg/dL   POC Glucose Fingerstick   Result Value Ref Range    Glucose 146 (H) 70 - 130 mg/dL   Adult Transthoracic Echo Complete W/ Cont if Necessary Per Protocol   Result Value Ref Range    BSA 2.0 m^2    IVSd 1.2 cm    LVIDd 4.7 cm    LVIDs 3.0 cm    LVPWd 1.1 cm    IVS/LVPW 1.0     FS 36.8 %    EDV(Teich) 104.5 ml    ESV(Teich) 34.9 ml    EF(Teich) 66.6 %    EDV(cubed) 106.6 ml    ESV(cubed) 26.9 ml    EF(cubed) 74.7 %    LV mass(C)d 202.4 grams    LV mass(C)dI 100.8 grams/m^2    SV(Teich) 69.6 ml    SI(Teich) 34.6 ml/m^2    SV(cubed) 79.7 ml    SI(cubed) 39.7 ml/m^2    MV Diam 3.1 cm    TV flow diam 3.2 cm    TV flow area 8.3 cm^2    LA dimension 4.5 cm    LVOT diam 1.9 cm    LVOT area 2.9 cm^2    LVOT area(traced) 2.8 cm^2    BH CV ECHO LANA - MPA DIAM 3.5 cm    BH CV ECHO LANA - MPA AREA 9.4 cm^2    LVLd ap4 8.3 cm    EDV(MOD-sp4) 132.0 ml    LVLs ap4 7.6 cm    ESV(MOD-sp4) 58.0 ml    EF(MOD-sp4) 51 %    LVLd ap2 8.0 cm    EDV(MOD-sp2) 150.0 ml    LVLs ap2 7.3 cm    ESV(MOD-sp2) 80.0 ml    EF(MOD-sp2) 46.7 %    SV(MOD-sp4) 74.0 ml    SI(MOD-sp4) 36.8 ml/m^2    SV(MOD-sp2) 70.0 ml    SI(MOD-sp2) 34.8 ml/m^2    Ao root area (BSA corrected) 46.7 ml/m^2    CONTRAST EF 4CH 56.1 ml/m^2    LV Diastolic corrected for BSA 65.7 ml/m^2    LV Systolic corrected for BSA 28.9 ml/m^2    MV E max fariba 131.6 cm/sec    MV A max fariba 114.5 cm/sec    MV E/A  1.1     LV IVRT 0.09 sec    MV area (1 diam) 7.5 cm^2    MV Flow area(1diam) 7.5 cm^2    MV dec time 0.16 sec    MR max giorgio 518.5 cm/sec    MR max .5 mmHg    MR mean giorgio 381.9 cm/sec    MR mean PG 67.8 mmHg    MR .5 cm    MR PISA 3.7 cm^2    MR flow rate 101.2 cm^3/sec    MR ERO 0.2 cm^2    MR volume 33.1 ml    MR PISA radius 0.76 cm    MR alias giorgio 27.7 cm/sec    PA acc slope 728.9 cm/sec^2    PA acc time 0.11 sec    PI end-d giorgio 136.9 cm/sec    TR max giorgio 294 cm/sec    PA pr(Accel) 31.5 mmHg     CV ECHO LANA - BZI_BMI 32.6 kilograms/m^2     CV ECHO LANA - BSA(HAYCOCK) 2.1 m^2     CV ECHO LANA - BZI_METRIC_WEIGHT 91.6 kg     CV ECHO LANA - BZI_METRIC_HEIGHT 167.6 cm     CV VAS BP LEFT /58 mmHg    TDI S' 8.55 cm/sec    RV Base 5.00 cm    RV Length 7.00 cm    RV Mid 4.40 cm    E/E' ratio 15.1     LA Volume Index 46.3 mL/m2    Echo EF Estimated 51 %    Lat Peak E' Giorgio 11.1 cm/sec    Med Peak E' Giorgio 7.02 cm/sec    RAP systole 8 mmHg    RVSP(TR) 43 mmHg    TR max grad 35 mmHg    TAPSE (>1.6) 1.60 cm2   Adult Transesophageal Echo (MICHEL) W/ Cont if Necessary Per Protocol   Result Value Ref Range    BH CV VAS BP LEFT /59 mmHg    Echo EF Estimated 60 %     Avis was seen today for follow-up, diabetes, hypertension, hyperlipidemia and gout.    Diagnoses and all orders for this visit:    Type 2 diabetes mellitus without complication, unspecified long term insulin use status  -     POC Glycosylated Hemoglobin (Hb A1C)  -     POC Glucose Fingerstick      Problem List Items Addressed This Visit        Cardiovascular and Mediastinum    Hyperlipidemia     Reviewed last chol - check flp today          Relevant Medications    pneumococcal conj. 13-valent (PREVNAR-13) vaccine 0.5 mL (Completed)    Other Relevant Orders    TSH (Completed)    Comprehensive Metabolic Panel (Completed)    CBC & Differential (Completed)    Lipid Panel (Completed)    BNP (Completed)    CBC Auto Differential (Completed)     HTN and possible diastolic dysfunction     bp well controlled and she appears well compensated today   Continue to monitor          Relevant Medications    pneumococcal conj. 13-valent (PREVNAR-13) vaccine 0.5 mL (Completed)    Other Relevant Orders    TSH (Completed)    Comprehensive Metabolic Panel (Completed)    CBC & Differential (Completed)    Lipid Panel (Completed)    BNP (Completed)    CBC Auto Differential (Completed)    Acute diastolic CHF      Compensated currently with good bp control  Check bnp today- last eval over 700             Respiratory    Asthma     S/p recent pneumonia- lungs are clear today            Relevant Medications    pneumococcal conj. 13-valent (PREVNAR-13) vaccine 0.5 mL (Completed)    Other Relevant Orders    TSH (Completed)    Comprehensive Metabolic Panel (Completed)    CBC & Differential (Completed)    Lipid Panel (Completed)    BNP (Completed)    CBC Auto Differential (Completed)    Recurrent pneumonia     Finished with antibiotics- continue to monitor   prevnar today  Had pneumovax 2010         Relevant Medications    HYDROcod Polst-CPM Polst ER (TUSSIONEX PENNKINETIC) 10-8 MG/5ML ER suspension    pneumococcal conj. 13-valent (PREVNAR-13) vaccine 0.5 mL (Completed)    Other Relevant Orders    TSH (Completed)    Comprehensive Metabolic Panel (Completed)    CBC & Differential (Completed)    Lipid Panel (Completed)    BNP (Completed)    CBC Auto Differential (Completed)       Endocrine    Type 2 diabetes mellitus - Primary     Blood sugar and 90 day average sugar reviewed  Results for orders placed or performed in visit on 11/28/17   TSH   Result Value Ref Range    TSH 2.352 0.350 - 5.350 mIU/mL   Comprehensive Metabolic Panel   Result Value Ref Range    Glucose 96 70 - 100 mg/dL    BUN 28 (H) 9 - 23 mg/dL    Creatinine 1.60 (H) 0.60 - 1.30 mg/dL    Sodium 143 132 - 146 mmol/L    Potassium 4.6 3.5 - 5.5 mmol/L    Chloride 101 99 - 109 mmol/L    CO2 36.0 (H) 20.0 - 31.0 mmol/L     Calcium 9.3 8.7 - 10.4 mg/dL    Total Protein 6.1 5.7 - 8.2 g/dL    Albumin 3.90 3.20 - 4.80 g/dL    ALT (SGPT) 9 7 - 40 U/L    AST (SGOT) 11 0 - 33 U/L    Alkaline Phosphatase 150 (H) 25 - 100 U/L    Total Bilirubin 0.4 0.3 - 1.2 mg/dL    eGFR Non African Amer 30 (L) >60 mL/min/1.73    Globulin 2.2 gm/dL    A/G Ratio 1.8 1.5 - 2.5 g/dL    BUN/Creatinine Ratio 17.5 7.0 - 25.0    Anion Gap 6.0 3.0 - 11.0 mmol/L   Lipid Panel   Result Value Ref Range    Total Cholesterol 148 0 - 200 mg/dL    Triglycerides 163 (H) 0 - 150 mg/dL    HDL Cholesterol 49 40 - 60 mg/dL    LDL Cholesterol  71 0 - 130 mg/dL   BNP   Result Value Ref Range    .0 (H) 0.0 - 100.0 pg/mL   CBC Auto Differential   Result Value Ref Range    WBC 8.76 3.50 - 10.80 10*3/mm3    RBC 3.57 (L) 3.89 - 5.14 10*6/mm3    Hemoglobin 11.4 (L) 11.5 - 15.5 g/dL    Hematocrit 37.1 34.5 - 44.0 %    .9 (H) 80.0 - 99.0 fL    MCH 31.9 (H) 27.0 - 31.0 pg    MCHC 30.7 (L) 32.0 - 36.0 g/dL    RDW 14.3 11.3 - 14.5 %    RDW-SD 54.7 (H) 37.0 - 54.0 fl    MPV 10.6 6.0 - 12.0 fL    Platelets 426 150 - 450 10*3/mm3    Neutrophil % 59.3 41.0 - 71.0 %    Lymphocyte % 24.8 24.0 - 44.0 %    Monocyte % 11.8 0.0 - 12.0 %    Eosinophil % 3.3 (H) 0.0 - 3.0 %    Basophil % 0.7 0.0 - 1.0 %    Immature Grans % 0.1 0.0 - 0.6 %    Neutrophils, Absolute 5.20 1.50 - 8.30 10*3/mm3    Lymphocytes, Absolute 2.17 0.60 - 4.80 10*3/mm3    Monocytes, Absolute 1.03 (H) 0.00 - 1.00 10*3/mm3    Eosinophils, Absolute 0.29 0.00 - 0.30 10*3/mm3    Basophils, Absolute 0.06 0.00 - 0.20 10*3/mm3    Immature Grans, Absolute 0.01 0.00 - 0.03 10*3/mm3   POC Glycosylated Hemoglobin (Hb A1C)   Result Value Ref Range    Hemoglobin A1C 5.9 %   POC Glucose Fingerstick   Result Value Ref Range    Glucose 122 70 - 130 mg/dL     Doing well overall  No low sugars  Continue to monitor   utd with eye exam  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 13-valent (PREVNAR-13) vaccine 0.5 mL (Completed)    Other Relevant Orders    POC Glycosylated Hemoglobin (Hb A1C) (Completed)    POC Glucose Fingerstick (Completed)    TSH (Completed)    Comprehensive Metabolic Panel (Completed)    CBC & Differential (Completed)    Lipid Panel (Completed)    BNP (Completed)    CBC Auto Differential (Completed)       Genitourinary    Complicated UTI (urinary tract infection)     S/p surgery for fistula- no further problems          Relevant Medications    pneumococcal conj. 13-valent (PREVNAR-13) vaccine 0.5 mL (Completed)    Other Relevant Orders    TSH (Completed)    Comprehensive Metabolic Panel (Completed)    CBC & Differential (Completed)    Lipid Panel (Completed)    BNP (Completed)    CBC Auto Differential (Completed)        Return in about 3 months (around 2/28/2018) for Recheck 30 min .    Kenia Addison MA  Signed Carol Herzog MD

## 2017-11-29 NOTE — ASSESSMENT & PLAN NOTE
Blood sugar and 90 day average sugar reviewed  Results for orders placed or performed in visit on 11/28/17   TSH   Result Value Ref Range    TSH 2.352 0.350 - 5.350 mIU/mL   Comprehensive Metabolic Panel   Result Value Ref Range    Glucose 96 70 - 100 mg/dL    BUN 28 (H) 9 - 23 mg/dL    Creatinine 1.60 (H) 0.60 - 1.30 mg/dL    Sodium 143 132 - 146 mmol/L    Potassium 4.6 3.5 - 5.5 mmol/L    Chloride 101 99 - 109 mmol/L    CO2 36.0 (H) 20.0 - 31.0 mmol/L    Calcium 9.3 8.7 - 10.4 mg/dL    Total Protein 6.1 5.7 - 8.2 g/dL    Albumin 3.90 3.20 - 4.80 g/dL    ALT (SGPT) 9 7 - 40 U/L    AST (SGOT) 11 0 - 33 U/L    Alkaline Phosphatase 150 (H) 25 - 100 U/L    Total Bilirubin 0.4 0.3 - 1.2 mg/dL    eGFR Non African Amer 30 (L) >60 mL/min/1.73    Globulin 2.2 gm/dL    A/G Ratio 1.8 1.5 - 2.5 g/dL    BUN/Creatinine Ratio 17.5 7.0 - 25.0    Anion Gap 6.0 3.0 - 11.0 mmol/L   Lipid Panel   Result Value Ref Range    Total Cholesterol 148 0 - 200 mg/dL    Triglycerides 163 (H) 0 - 150 mg/dL    HDL Cholesterol 49 40 - 60 mg/dL    LDL Cholesterol  71 0 - 130 mg/dL   BNP   Result Value Ref Range    .0 (H) 0.0 - 100.0 pg/mL   CBC Auto Differential   Result Value Ref Range    WBC 8.76 3.50 - 10.80 10*3/mm3    RBC 3.57 (L) 3.89 - 5.14 10*6/mm3    Hemoglobin 11.4 (L) 11.5 - 15.5 g/dL    Hematocrit 37.1 34.5 - 44.0 %    .9 (H) 80.0 - 99.0 fL    MCH 31.9 (H) 27.0 - 31.0 pg    MCHC 30.7 (L) 32.0 - 36.0 g/dL    RDW 14.3 11.3 - 14.5 %    RDW-SD 54.7 (H) 37.0 - 54.0 fl    MPV 10.6 6.0 - 12.0 fL    Platelets 426 150 - 450 10*3/mm3    Neutrophil % 59.3 41.0 - 71.0 %    Lymphocyte % 24.8 24.0 - 44.0 %    Monocyte % 11.8 0.0 - 12.0 %    Eosinophil % 3.3 (H) 0.0 - 3.0 %    Basophil % 0.7 0.0 - 1.0 %    Immature Grans % 0.1 0.0 - 0.6 %    Neutrophils, Absolute 5.20 1.50 - 8.30 10*3/mm3    Lymphocytes, Absolute 2.17 0.60 - 4.80 10*3/mm3    Monocytes, Absolute 1.03 (H) 0.00 - 1.00 10*3/mm3    Eosinophils, Absolute 0.29 0.00 - 0.30  10*3/mm3    Basophils, Absolute 0.06 0.00 - 0.20 10*3/mm3    Immature Grans, Absolute 0.01 0.00 - 0.03 10*3/mm3   POC Glycosylated Hemoglobin (Hb A1C)   Result Value Ref Range    Hemoglobin A1C 5.9 %   POC Glucose Fingerstick   Result Value Ref Range    Glucose 122 70 - 130 mg/dL     Doing well overall  No low sugars  Continue to monitor   utd with eye exam  Neuropathy without foot callus or ulceration, does have foot deformity with edema (better than previously)  Ur alb difficult due to ostomy  F/u 3-4 months

## 2017-12-04 NOTE — TELEPHONE ENCOUNTER
LOV: 11/28/17  Pls advise on refills: historical prescriber in chart for allopurinol and lisinopril not on active med list.

## 2018-01-01 ENCOUNTER — APPOINTMENT (OUTPATIENT)
Dept: GENERAL RADIOLOGY | Facility: HOSPITAL | Age: 83
End: 2018-01-01

## 2018-01-01 ENCOUNTER — CLINICAL SUPPORT NO REQUIREMENTS (OUTPATIENT)
Dept: CARDIOLOGY | Facility: CLINIC | Age: 83
End: 2018-01-01

## 2018-01-01 ENCOUNTER — TELEPHONE (OUTPATIENT)
Dept: INTERNAL MEDICINE | Facility: CLINIC | Age: 83
End: 2018-01-01

## 2018-01-01 ENCOUNTER — TELEPHONE (OUTPATIENT)
Dept: ENDOCRINOLOGY | Facility: CLINIC | Age: 83
End: 2018-01-01

## 2018-01-01 ENCOUNTER — APPOINTMENT (OUTPATIENT)
Dept: CARDIOLOGY | Facility: HOSPITAL | Age: 83
End: 2018-01-01

## 2018-01-01 ENCOUNTER — OFFICE VISIT (OUTPATIENT)
Dept: ENDOCRINOLOGY | Facility: CLINIC | Age: 83
End: 2018-01-01

## 2018-01-01 ENCOUNTER — HOSPITAL ENCOUNTER (INPATIENT)
Facility: HOSPITAL | Age: 83
LOS: 5 days | Discharge: HOSPICE | End: 2018-08-20
Attending: EMERGENCY MEDICINE | Admitting: INTERNAL MEDICINE

## 2018-01-01 ENCOUNTER — HOSPITAL ENCOUNTER (INPATIENT)
Facility: HOSPITAL | Age: 83
LOS: 1 days | End: 2018-08-21
Attending: FAMILY MEDICINE | Admitting: FAMILY MEDICINE

## 2018-01-01 VITALS
HEART RATE: 68 BPM | SYSTOLIC BLOOD PRESSURE: 138 MMHG | WEIGHT: 203 LBS | DIASTOLIC BLOOD PRESSURE: 64 MMHG | OXYGEN SATURATION: 94 % | HEIGHT: 66 IN | BODY MASS INDEX: 32.62 KG/M2

## 2018-01-01 VITALS
DIASTOLIC BLOOD PRESSURE: 67 MMHG | BODY MASS INDEX: 39.92 KG/M2 | OXYGEN SATURATION: 91 % | HEART RATE: 89 BPM | HEIGHT: 62 IN | SYSTOLIC BLOOD PRESSURE: 145 MMHG | RESPIRATION RATE: 20 BRPM | TEMPERATURE: 97.8 F | WEIGHT: 216.93 LBS

## 2018-01-01 VITALS
RESPIRATION RATE: 26 BRPM | TEMPERATURE: 98.4 F | OXYGEN SATURATION: 94 % | DIASTOLIC BLOOD PRESSURE: 65 MMHG | SYSTOLIC BLOOD PRESSURE: 156 MMHG | HEART RATE: 91 BPM

## 2018-01-01 DIAGNOSIS — I44.30 ATRIOVENTRICULAR BLOC: ICD-10-CM

## 2018-01-01 DIAGNOSIS — R65.20 SEVERE SEPSIS (HCC): ICD-10-CM

## 2018-01-01 DIAGNOSIS — E87.20 LACTIC ACIDOSIS: ICD-10-CM

## 2018-01-01 DIAGNOSIS — G62.89 OTHER POLYNEUROPATHY: ICD-10-CM

## 2018-01-01 DIAGNOSIS — E78.1 PRIMARY HYPERTRIGLYCERIDEMIA: ICD-10-CM

## 2018-01-01 DIAGNOSIS — N39.0 ACUTE UTI (URINARY TRACT INFECTION): ICD-10-CM

## 2018-01-01 DIAGNOSIS — E55.9 VITAMIN D DEFICIENCY: ICD-10-CM

## 2018-01-01 DIAGNOSIS — A41.9 SEPSIS, DUE TO UNSPECIFIED ORGANISM: Primary | ICD-10-CM

## 2018-01-01 DIAGNOSIS — A41.9 SEVERE SEPSIS (HCC): ICD-10-CM

## 2018-01-01 DIAGNOSIS — R39.15 URGENCY OF URINATION: Primary | ICD-10-CM

## 2018-01-01 DIAGNOSIS — E87.29 RESPIRATORY ACIDOSIS: ICD-10-CM

## 2018-01-01 DIAGNOSIS — I44.2 COMPLETE ATRIOVENTRICULAR BLOCK (HCC): ICD-10-CM

## 2018-01-01 DIAGNOSIS — I95.9 HYPOTENSION, UNSPECIFIED HYPOTENSION TYPE: ICD-10-CM

## 2018-01-01 DIAGNOSIS — E78.2 MIXED HYPERLIPIDEMIA: ICD-10-CM

## 2018-01-01 DIAGNOSIS — J96.02 ACUTE RESPIRATORY FAILURE WITH HYPERCAPNIA (HCC): ICD-10-CM

## 2018-01-01 DIAGNOSIS — E11.9 TYPE 2 DIABETES MELLITUS WITHOUT COMPLICATION, UNSPECIFIED LONG TERM INSULIN USE STATUS: Primary | ICD-10-CM

## 2018-01-01 DIAGNOSIS — I10 ESSENTIAL HYPERTENSION: ICD-10-CM

## 2018-01-01 LAB
ALBUMIN SERPL-MCNC: 2.95 G/DL (ref 3.2–4.8)
ALBUMIN SERPL-MCNC: 3.15 G/DL (ref 3.2–4.8)
ALBUMIN SERPL-MCNC: 3.24 G/DL (ref 3.2–4.8)
ALBUMIN SERPL-MCNC: 3.8 G/DL (ref 3.2–4.8)
ALBUMIN SERPL-MCNC: 4.12 G/DL (ref 3.2–4.8)
ALBUMIN/GLOB SERPL: 1.4 G/DL (ref 1.5–2.5)
ALBUMIN/GLOB SERPL: 1.4 G/DL (ref 1.5–2.5)
ALBUMIN/GLOB SERPL: 1.5 G/DL (ref 1.5–2.5)
ALBUMIN/GLOB SERPL: 1.6 G/DL (ref 1.5–2.5)
ALBUMIN/GLOB SERPL: 1.7 G/DL (ref 1.5–2.5)
ALP SERPL-CCNC: 179 U/L (ref 25–100)
ALP SERPL-CCNC: 183 U/L (ref 25–100)
ALP SERPL-CCNC: 193 U/L (ref 25–100)
ALP SERPL-CCNC: 206 U/L (ref 25–100)
ALP SERPL-CCNC: 217 U/L (ref 25–100)
ALT SERPL W P-5'-P-CCNC: 10 U/L (ref 7–40)
ALT SERPL W P-5'-P-CCNC: 35 U/L (ref 7–40)
ALT SERPL W P-5'-P-CCNC: 41 U/L (ref 7–40)
ALT SERPL W P-5'-P-CCNC: 49 U/L (ref 7–40)
ALT SERPL W P-5'-P-CCNC: 9 U/L (ref 7–40)
ANION GAP SERPL CALCULATED.3IONS-SCNC: 10 MMOL/L (ref 3–11)
ANION GAP SERPL CALCULATED.3IONS-SCNC: 10 MMOL/L (ref 3–11)
ANION GAP SERPL CALCULATED.3IONS-SCNC: 8 MMOL/L (ref 3–11)
ANION GAP SERPL CALCULATED.3IONS-SCNC: 9 MMOL/L (ref 3–11)
ARTERIAL PATENCY WRIST A: ABNORMAL
ARTERIAL PATENCY WRIST A: POSITIVE
ARTICHOKE IGE QN: 130 MG/DL (ref 0–130)
AST SERPL-CCNC: 20 U/L (ref 0–33)
AST SERPL-CCNC: 25 U/L (ref 0–33)
AST SERPL-CCNC: 27 U/L (ref 0–33)
AST SERPL-CCNC: 30 U/L (ref 0–33)
AST SERPL-CCNC: 9 U/L (ref 0–33)
ATMOSPHERIC PRESS: ABNORMAL MMHG
BACTERIA SPEC AEROBE CULT: ABNORMAL
BACTERIA SPEC AEROBE CULT: NORMAL
BACTERIA SPEC AEROBE CULT: NORMAL
BACTERIA SPEC RESP CULT: NORMAL
BACTERIA UR QL AUTO: ABNORMAL /HPF
BACTERIA UR QL AUTO: ABNORMAL /HPF
BASE EXCESS BLDA CALC-SCNC: -1.3 MMOL/L (ref 0–2)
BASE EXCESS BLDA CALC-SCNC: -2.3 MMOL/L (ref 0–2)
BASE EXCESS BLDA CALC-SCNC: -4.6 MMOL/L (ref 0–2)
BASE EXCESS BLDA CALC-SCNC: 0.3 MMOL/L (ref 0–2)
BASE EXCESS BLDA CALC-SCNC: 1.5 MMOL/L (ref 0–2)
BASOPHILS # BLD AUTO: 0.03 10*3/MM3 (ref 0–0.2)
BASOPHILS # BLD AUTO: 0.06 10*3/MM3 (ref 0–0.2)
BASOPHILS NFR BLD AUTO: 0.1 % (ref 0–1)
BASOPHILS NFR BLD AUTO: 0.6 % (ref 0–1)
BDY SITE: ABNORMAL
BH CV ECHO MEAS - AO MAX PG (FULL): 3.8 MMHG
BH CV ECHO MEAS - AO MAX PG: 6 MMHG
BH CV ECHO MEAS - AO MEAN PG (FULL): 3.2 MMHG
BH CV ECHO MEAS - AO MEAN PG: 4.4 MMHG
BH CV ECHO MEAS - AO ROOT AREA: 5.3 CM^2
BH CV ECHO MEAS - AO ROOT DIAM: 2.6 CM
BH CV ECHO MEAS - AO V2 MAX: 123.7 CM/SEC
BH CV ECHO MEAS - AO V2 MEAN: 96.1 CM/SEC
BH CV ECHO MEAS - AO V2 VTI: 27.9 CM
BH CV ECHO MEAS - ASC AORTA: 3 CM
BH CV ECHO MEAS - AVA(I,A): 1.8 CM^2
BH CV ECHO MEAS - AVA(I,D): 1.8 CM^2
BH CV ECHO MEAS - AVA(V,A): 1.8 CM^2
BH CV ECHO MEAS - AVA(V,D): 1.8 CM^2
BH CV ECHO MEAS - BZI_METRIC_HEIGHT: 711.2 CM
BH CV ECHO MEAS - EDV(CUBED): 68.2 ML
BH CV ECHO MEAS - EDV(TEICH): 73.6 ML
BH CV ECHO MEAS - EF(CUBED): 50.3 %
BH CV ECHO MEAS - EF(TEICH): 42.8 %
BH CV ECHO MEAS - ESV(CUBED): 33.9 ML
BH CV ECHO MEAS - ESV(TEICH): 42.1 ML
BH CV ECHO MEAS - FS: 20.8 %
BH CV ECHO MEAS - IVS/LVPW: 1.1
BH CV ECHO MEAS - IVSD: 1.5 CM
BH CV ECHO MEAS - LAT PEAK E' VEL: 7.8 CM/SEC
BH CV ECHO MEAS - LV MASS(C)D: 219.5 GRAMS
BH CV ECHO MEAS - LV MAX PG: 2.2 MMHG
BH CV ECHO MEAS - LV MEAN PG: 1.2 MMHG
BH CV ECHO MEAS - LV V1 MAX: 74 CM/SEC
BH CV ECHO MEAS - LV V1 MEAN: 50 CM/SEC
BH CV ECHO MEAS - LV V1 VTI: 17.1 CM
BH CV ECHO MEAS - LVIDD: 4.1 CM
BH CV ECHO MEAS - LVIDS: 3.2 CM
BH CV ECHO MEAS - LVOT AREA (M): 3.1 CM^2
BH CV ECHO MEAS - LVOT AREA: 3 CM^2
BH CV ECHO MEAS - LVOT DIAM: 2 CM
BH CV ECHO MEAS - LVPWD: 1.4 CM
BH CV ECHO MEAS - MED PEAK E' VEL: 4.28 CM/SEC
BH CV ECHO MEAS - MV A MAX VEL: 120.9 CM/SEC
BH CV ECHO MEAS - MV DEC TIME: 0.2 SEC
BH CV ECHO MEAS - MV E MAX VEL: 131.8 CM/SEC
BH CV ECHO MEAS - MV E/A: 1.1
BH CV ECHO MEAS - MV MAX PG: 6.9 MMHG
BH CV ECHO MEAS - MV MEAN PG: 2.6 MMHG
BH CV ECHO MEAS - MV V2 MAX: 131.8 CM/SEC
BH CV ECHO MEAS - MV V2 MEAN: 72.2 CM/SEC
BH CV ECHO MEAS - MV V2 VTI: 32 CM
BH CV ECHO MEAS - MVA(VTI): 1.6 CM^2
BH CV ECHO MEAS - PA ACC SLOPE: 544.7 CM/SEC^2
BH CV ECHO MEAS - PA ACC TIME: 0.12 SEC
BH CV ECHO MEAS - PA MAX PG: 2.7 MMHG
BH CV ECHO MEAS - PA PR(ACCEL): 23.5 MMHG
BH CV ECHO MEAS - PA V2 MAX: 82 CM/SEC
BH CV ECHO MEAS - RVSP: 36 MMHG
BH CV ECHO MEAS - SV(AO): 147.1 ML
BH CV ECHO MEAS - SV(CUBED): 34.3 ML
BH CV ECHO MEAS - SV(LVOT): 51 ML
BH CV ECHO MEAS - SV(TEICH): 31.5 ML
BH CV ECHO MEAS - TAPSE (>1.6): 1.97 CM2
BH CV ECHO MEAS - TR MAX VEL: 272 CM/SEC
BH CV ECHO MEASUREMENTS AVERAGE E/E' RATIO: 21.82
BH CV XLRA - RV BASE: 3.1 CM
BH CV XLRA - RV LENGTH: 6.6 CM
BH CV XLRA - RV MID: 2.4 CM
BH CV XLRA - TDI S': 8.99 CM/SEC
BILIRUB BLD-MCNC: NEGATIVE MG/DL
BILIRUB SERPL-MCNC: 0.4 MG/DL (ref 0.3–1.2)
BILIRUB SERPL-MCNC: 0.4 MG/DL (ref 0.3–1.2)
BILIRUB SERPL-MCNC: 0.5 MG/DL (ref 0.3–1.2)
BILIRUB SERPL-MCNC: 0.6 MG/DL (ref 0.3–1.2)
BILIRUB SERPL-MCNC: 0.9 MG/DL (ref 0.3–1.2)
BILIRUB UR QL STRIP: NEGATIVE
BILIRUB UR QL STRIP: NEGATIVE
BNP SERPL-MCNC: 530 PG/ML (ref 0–100)
BUN BLD-MCNC: 21 MG/DL (ref 9–23)
BUN BLD-MCNC: 26 MG/DL (ref 9–23)
BUN BLD-MCNC: 29 MG/DL (ref 9–23)
BUN BLD-MCNC: 30 MG/DL (ref 9–23)
BUN BLD-MCNC: 38 MG/DL (ref 9–23)
BUN BLD-MCNC: 38 MG/DL (ref 9–23)
BUN BLDA-MCNC: 29 MG/DL (ref 8–26)
BUN/CREAT SERPL: 14 (ref 7–25)
BUN/CREAT SERPL: 15.6 (ref 7–25)
BUN/CREAT SERPL: 16.5 (ref 7–25)
BUN/CREAT SERPL: 16.6 (ref 7–25)
BUN/CREAT SERPL: 21.1 (ref 7–25)
BUN/CREAT SERPL: 22.1 (ref 7–25)
CA-I BLDA-SCNC: 1.27 MMOL/L (ref 1.2–1.32)
CA-I SERPL ISE-MCNC: 1.24 MMOL/L (ref 1.12–1.32)
CA-I SERPL ISE-MCNC: 1.29 MMOL/L (ref 1.12–1.32)
CA-I SERPL ISE-MCNC: 1.33 MMOL/L (ref 1.12–1.32)
CALCIUM SPEC-SCNC: 8.5 MG/DL (ref 8.7–10.4)
CALCIUM SPEC-SCNC: 8.8 MG/DL (ref 8.7–10.4)
CALCIUM SPEC-SCNC: 9 MG/DL (ref 8.7–10.4)
CALCIUM SPEC-SCNC: 9.1 MG/DL (ref 8.7–10.4)
CALCIUM SPEC-SCNC: 9.3 MG/DL (ref 8.7–10.4)
CALCIUM SPEC-SCNC: 9.5 MG/DL (ref 8.7–10.4)
CHLORIDE BLDA-SCNC: 99 MMOL/L (ref 98–109)
CHLORIDE SERPL-SCNC: 100 MMOL/L (ref 99–109)
CHLORIDE SERPL-SCNC: 105 MMOL/L (ref 99–109)
CHLORIDE SERPL-SCNC: 107 MMOL/L (ref 99–109)
CHLORIDE SERPL-SCNC: 107 MMOL/L (ref 99–109)
CHLORIDE SERPL-SCNC: 109 MMOL/L (ref 99–109)
CHLORIDE SERPL-SCNC: 98 MMOL/L (ref 99–109)
CHOLEST SERPL-MCNC: 212 MG/DL (ref 0–200)
CK MB SERPL-CCNC: 1.08 NG/ML (ref 0–5)
CK SERPL-CCNC: 61 U/L (ref 26–174)
CLARITY UR: ABNORMAL
CLARITY UR: ABNORMAL
CLARITY, POC: ABNORMAL
CO2 BLDA-SCNC: 24.6 MMOL/L (ref 22–33)
CO2 BLDA-SCNC: 25.2 MMOL/L (ref 22–33)
CO2 BLDA-SCNC: 26.1 MMOL/L (ref 22–33)
CO2 BLDA-SCNC: 27.5 MMOL/L (ref 22–33)
CO2 BLDA-SCNC: 27.8 MMOL/L (ref 22–33)
CO2 BLDA-SCNC: 30 MMOL/L (ref 24–29)
CO2 SERPL-SCNC: 23 MMOL/L (ref 20–31)
CO2 SERPL-SCNC: 25 MMOL/L (ref 20–31)
CO2 SERPL-SCNC: 26 MMOL/L (ref 20–31)
CO2 SERPL-SCNC: 26 MMOL/L (ref 20–31)
CO2 SERPL-SCNC: 29 MMOL/L (ref 20–31)
CO2 SERPL-SCNC: 32 MMOL/L (ref 20–31)
COHGB MFR BLD: 0.6 % (ref 0–2)
COHGB MFR BLD: 0.9 % (ref 0–2)
COHGB MFR BLD: 1 % (ref 0–2)
COHGB MFR BLD: 1.2 % (ref 0–2)
COHGB MFR BLD: 1.2 % (ref 0–2)
COLOR UR: YELLOW
CORTIS SERPL-MCNC: 40.2 MCG/DL
CREAT BLD-MCNC: 1.5 MG/DL (ref 0.6–1.3)
CREAT BLD-MCNC: 1.67 MG/DL (ref 0.6–1.3)
CREAT BLD-MCNC: 1.72 MG/DL (ref 0.6–1.3)
CREAT BLD-MCNC: 1.75 MG/DL (ref 0.6–1.3)
CREAT BLD-MCNC: 1.8 MG/DL (ref 0.6–1.3)
CREAT BLD-MCNC: 1.82 MG/DL (ref 0.6–1.3)
CREAT BLDA-MCNC: 1.6 MG/DL (ref 0.6–1.3)
CREAT UR-MCNC: 160 MG/DL
D-LACTATE SERPL-SCNC: 2 MMOL/L (ref 0.5–2)
D-LACTATE SERPL-SCNC: 2.5 MMOL/L (ref 0.5–2)
D-LACTATE SERPL-SCNC: 5.3 MMOL/L (ref 0.5–2)
DEPRECATED RDW RBC AUTO: 53.6 FL (ref 37–54)
DEPRECATED RDW RBC AUTO: 54 FL (ref 37–54)
DEPRECATED RDW RBC AUTO: 54.3 FL (ref 37–54)
DEPRECATED RDW RBC AUTO: 55.9 FL (ref 37–54)
EOSINOPHIL # BLD AUTO: 0.47 10*3/MM3 (ref 0–0.3)
EOSINOPHIL # BLD AUTO: 0.71 10*3/MM3 (ref 0–0.3)
EOSINOPHIL NFR BLD AUTO: 3.2 % (ref 0–3)
EOSINOPHIL NFR BLD AUTO: 5 % (ref 0–3)
ERYTHROCYTE [DISTWIDTH] IN BLOOD BY AUTOMATED COUNT: 14.4 % (ref 11.3–14.5)
ERYTHROCYTE [DISTWIDTH] IN BLOOD BY AUTOMATED COUNT: 14.5 % (ref 11.3–14.5)
ERYTHROCYTE [DISTWIDTH] IN BLOOD BY AUTOMATED COUNT: 14.5 % (ref 11.3–14.5)
ERYTHROCYTE [DISTWIDTH] IN BLOOD BY AUTOMATED COUNT: 14.6 % (ref 11.3–14.5)
ERYTHROCYTE [DISTWIDTH] IN BLOOD BY AUTOMATED COUNT: 14.6 % (ref 11.3–14.5)
ERYTHROCYTE [DISTWIDTH] IN BLOOD BY AUTOMATED COUNT: 14.7 % (ref 11.3–14.5)
GFR SERPL CREATININE-BSD FRML MDRD: 26 ML/MIN/1.73
GFR SERPL CREATININE-BSD FRML MDRD: 27 ML/MIN/1.73
GFR SERPL CREATININE-BSD FRML MDRD: 27 ML/MIN/1.73
GFR SERPL CREATININE-BSD FRML MDRD: 28 ML/MIN/1.73
GFR SERPL CREATININE-BSD FRML MDRD: 29 ML/MIN/1.73
GFR SERPL CREATININE-BSD FRML MDRD: 33 ML/MIN/1.73
GLOBULIN UR ELPH-MCNC: 1.9 GM/DL
GLOBULIN UR ELPH-MCNC: 2.3 GM/DL
GLOBULIN UR ELPH-MCNC: 2.7 GM/DL
GLUCOSE BLD-MCNC: 107 MG/DL (ref 70–100)
GLUCOSE BLD-MCNC: 123 MG/DL (ref 70–100)
GLUCOSE BLD-MCNC: 138 MG/DL (ref 70–100)
GLUCOSE BLD-MCNC: 148 MG/DL (ref 70–100)
GLUCOSE BLD-MCNC: 246 MG/DL (ref 70–100)
GLUCOSE BLD-MCNC: 92 MG/DL (ref 70–100)
GLUCOSE BLDC GLUCOMTR-MCNC: 103 MG/DL (ref 70–130)
GLUCOSE BLDC GLUCOMTR-MCNC: 105 MG/DL (ref 70–130)
GLUCOSE BLDC GLUCOMTR-MCNC: 105 MG/DL (ref 70–130)
GLUCOSE BLDC GLUCOMTR-MCNC: 108 MG/DL (ref 70–130)
GLUCOSE BLDC GLUCOMTR-MCNC: 122 MG/DL (ref 70–130)
GLUCOSE BLDC GLUCOMTR-MCNC: 136 MG/DL (ref 70–130)
GLUCOSE BLDC GLUCOMTR-MCNC: 136 MG/DL (ref 70–130)
GLUCOSE BLDC GLUCOMTR-MCNC: 138 MG/DL (ref 70–130)
GLUCOSE BLDC GLUCOMTR-MCNC: 143 MG/DL (ref 70–130)
GLUCOSE BLDC GLUCOMTR-MCNC: 155 MG/DL (ref 70–130)
GLUCOSE BLDC GLUCOMTR-MCNC: 166 MG/DL (ref 70–130)
GLUCOSE BLDC GLUCOMTR-MCNC: 236 MG/DL (ref 70–130)
GLUCOSE BLDC GLUCOMTR-MCNC: 65 MG/DL (ref 70–130)
GLUCOSE BLDC GLUCOMTR-MCNC: 73 MG/DL (ref 70–130)
GLUCOSE BLDC GLUCOMTR-MCNC: 82 MG/DL (ref 70–130)
GLUCOSE BLDC GLUCOMTR-MCNC: 89 MG/DL (ref 70–130)
GLUCOSE BLDC GLUCOMTR-MCNC: 97 MG/DL (ref 70–130)
GLUCOSE UR STRIP-MCNC: NEGATIVE MG/DL
GRAM STN SPEC: NORMAL
HBA1C MFR BLD: 5.9 % (ref 4.8–5.6)
HBA1C MFR BLD: 6 %
HCO3 BLDA-SCNC: 23.3 MMOL/L (ref 20–26)
HCO3 BLDA-SCNC: 24.1 MMOL/L (ref 20–26)
HCO3 BLDA-SCNC: 24.7 MMOL/L (ref 20–26)
HCO3 BLDA-SCNC: 25.3 MMOL/L (ref 20–26)
HCO3 BLDA-SCNC: 26.5 MMOL/L (ref 20–26)
HCT VFR BLD AUTO: 32.4 % (ref 34.5–44)
HCT VFR BLD AUTO: 32.6 % (ref 34.5–44)
HCT VFR BLD AUTO: 35.5 % (ref 34.5–44)
HCT VFR BLD AUTO: 39.5 % (ref 34.5–44)
HCT VFR BLD CALC: 29 %
HCT VFR BLD CALC: 30.2 %
HCT VFR BLD CALC: 31.7 %
HCT VFR BLD CALC: 32.6 %
HCT VFR BLD CALC: 34.6 %
HCT VFR BLDA CALC: 37 % (ref 38–51)
HDLC SERPL-MCNC: 52 MG/DL (ref 40–60)
HGB BLD-MCNC: 10 G/DL (ref 11.5–15.5)
HGB BLD-MCNC: 10.1 G/DL (ref 11.5–15.5)
HGB BLD-MCNC: 10.8 G/DL (ref 11.5–15.5)
HGB BLD-MCNC: 11.6 G/DL (ref 11.5–15.5)
HGB BLD-MCNC: 9.8 G/DL (ref 11.5–15.5)
HGB BLD-MCNC: 9.9 G/DL (ref 11.5–15.5)
HGB BLDA-MCNC: 10.3 G/DL (ref 14–18)
HGB BLDA-MCNC: 10.6 G/DL (ref 14–18)
HGB BLDA-MCNC: 11.3 G/DL (ref 14–18)
HGB BLDA-MCNC: 12.6 G/DL (ref 12–17)
HGB BLDA-MCNC: 9.4 G/DL (ref 14–18)
HGB BLDA-MCNC: 9.8 G/DL (ref 14–18)
HGB UR QL STRIP.AUTO: ABNORMAL
HGB UR QL STRIP.AUTO: ABNORMAL
HOLD SPECIMEN: NORMAL
HOROWITZ INDEX BLD+IHG-RTO: 35 %
HOROWITZ INDEX BLD+IHG-RTO: 35 %
HOROWITZ INDEX BLD+IHG-RTO: 40 %
HOROWITZ INDEX BLD+IHG-RTO: 60 %
HOROWITZ INDEX BLD+IHG-RTO: 60 %
HYALINE CASTS UR QL AUTO: ABNORMAL /LPF
HYALINE CASTS UR QL AUTO: ABNORMAL /LPF
IMM GRANULOCYTES # BLD: 0.03 10*3/MM3 (ref 0–0.03)
IMM GRANULOCYTES # BLD: 0.09 10*3/MM3 (ref 0–0.03)
IMM GRANULOCYTES NFR BLD: 0.3 % (ref 0–0.6)
IMM GRANULOCYTES NFR BLD: 0.4 % (ref 0–0.6)
KETONES UR QL STRIP: NEGATIVE
KETONES UR QL STRIP: NEGATIVE
KETONES UR QL: NEGATIVE
LEFT ATRIUM VOLUME INDEX: 20 ML/M2
LEUKOCYTE EST, POC: ABNORMAL
LEUKOCYTE ESTERASE UR QL STRIP.AUTO: ABNORMAL
LEUKOCYTE ESTERASE UR QL STRIP.AUTO: ABNORMAL
LV EF 2D ECHO EST: 30 %
LYMPHOCYTES # BLD AUTO: 1.82 10*3/MM3 (ref 0.6–4.8)
LYMPHOCYTES # BLD AUTO: 4.88 10*3/MM3 (ref 0.6–4.8)
LYMPHOCYTES NFR BLD AUTO: 19.5 % (ref 24–44)
LYMPHOCYTES NFR BLD AUTO: 22.1 % (ref 24–44)
MAGNESIUM SERPL-MCNC: 2.1 MG/DL (ref 1.3–2.7)
MAGNESIUM SERPL-MCNC: 2.1 MG/DL (ref 1.3–2.7)
MAGNESIUM SERPL-MCNC: 2.2 MG/DL (ref 1.3–2.7)
MAGNESIUM SERPL-MCNC: 2.2 MG/DL (ref 1.3–2.7)
MAXIMAL PREDICTED HEART RATE: 132 BPM
MCH RBC QN AUTO: 30.6 PG (ref 27–31)
MCH RBC QN AUTO: 31 PG (ref 27–31)
MCH RBC QN AUTO: 31 PG (ref 27–31)
MCH RBC QN AUTO: 31.4 PG (ref 27–31)
MCH RBC QN AUTO: 31.4 PG (ref 27–31)
MCH RBC QN AUTO: 31.6 PG (ref 27–31)
MCHC RBC AUTO-ENTMCNC: 29.4 G/DL (ref 32–36)
MCHC RBC AUTO-ENTMCNC: 30.1 G/DL (ref 32–36)
MCHC RBC AUTO-ENTMCNC: 30.4 G/DL (ref 32–36)
MCHC RBC AUTO-ENTMCNC: 30.4 G/DL (ref 32–36)
MCHC RBC AUTO-ENTMCNC: 30.7 G/DL (ref 32–36)
MCHC RBC AUTO-ENTMCNC: 31.2 G/DL (ref 32–36)
MCV RBC AUTO: 100.6 FL (ref 80–99)
MCV RBC AUTO: 101.3 FL (ref 80–99)
MCV RBC AUTO: 102.5 FL (ref 80–99)
MCV RBC AUTO: 103.2 FL (ref 80–99)
MCV RBC AUTO: 103.5 FL (ref 80–99)
MCV RBC AUTO: 105.6 FL (ref 80–99)
METHGB BLD QL: 0 %
METHGB BLD QL: 0.6 % (ref 0–1.5)
METHGB BLD QL: 0.6 % (ref 0–1.5)
METHGB BLD QL: 0.7 % (ref 0–1.5)
METHGB BLD QL: 0.9 % (ref 0–1.5)
MODALITY: ABNORMAL
MONOCYTES # BLD AUTO: 0.66 10*3/MM3 (ref 0–1)
MONOCYTES # BLD AUTO: 0.97 10*3/MM3 (ref 0–1)
MONOCYTES NFR BLD AUTO: 10.4 % (ref 0–12)
MONOCYTES NFR BLD AUTO: 3 % (ref 0–12)
NEUTROPHILS # BLD AUTO: 15.8 10*3/MM3 (ref 1.5–8.3)
NEUTROPHILS # BLD AUTO: 5.98 10*3/MM3 (ref 1.5–8.3)
NEUTROPHILS NFR BLD AUTO: 64.2 % (ref 41–71)
NEUTROPHILS NFR BLD AUTO: 71.6 % (ref 41–71)
NITRITE UR QL STRIP: POSITIVE
NITRITE UR QL STRIP: POSITIVE
NITRITE UR-MCNC: POSITIVE MG/ML
OXYHGB MFR BLDV: 92.1 % (ref 94–99)
OXYHGB MFR BLDV: 97.3 % (ref 94–99)
OXYHGB MFR BLDV: 97.6 % (ref 94–99)
OXYHGB MFR BLDV: 97.8 % (ref 94–99)
OXYHGB MFR BLDV: 99 % (ref 94–99)
PCO2 BLDA: 35.2 MM HG (ref 35–45)
PCO2 BLDA: 42.6 MM HG (ref 35–45)
PCO2 BLDA: 42.7 MM HG (ref 35–45)
PCO2 BLDA: 46.1 MM HG (ref 35–45)
PCO2 BLDA: 72.8 MM HG (ref 35–45)
PH BLDA: 7.15 PH UNITS (ref 7.35–7.45)
PH BLDA: 7.34 PH UNITS (ref 7.35–7.45)
PH BLDA: 7.35 PH UNITS (ref 7.35–7.45)
PH BLDA: 7.4 PH UNITS (ref 7.35–7.45)
PH BLDA: 7.45 PH UNITS (ref 7.35–7.45)
PH UR STRIP.AUTO: 5.5 [PH] (ref 5–8)
PH UR STRIP.AUTO: <=5 [PH] (ref 5–8)
PH UR: 5 [PH] (ref 5–8)
PHOSPHATE SERPL-MCNC: 2.3 MG/DL (ref 2.4–5.1)
PHOSPHATE SERPL-MCNC: 4 MG/DL (ref 2.4–5.1)
PHOSPHATE SERPL-MCNC: 4.7 MG/DL (ref 2.4–5.1)
PHOSPHATE SERPL-MCNC: 5 MG/DL (ref 2.4–5.1)
PLAT MORPH BLD: NORMAL
PLATELET # BLD AUTO: 338 10*3/MM3 (ref 150–450)
PLATELET # BLD AUTO: 356 10*3/MM3 (ref 150–450)
PLATELET # BLD AUTO: 390 10*3/MM3 (ref 150–450)
PLATELET # BLD AUTO: 401 10*3/MM3 (ref 150–450)
PLATELET # BLD AUTO: 425 10*3/MM3 (ref 150–450)
PLATELET # BLD AUTO: 484 10*3/MM3 (ref 150–450)
PMV BLD AUTO: 10.3 FL (ref 6–12)
PMV BLD AUTO: 10.3 FL (ref 6–12)
PMV BLD AUTO: 10.4 FL (ref 6–12)
PMV BLD AUTO: 10.5 FL (ref 6–12)
PMV BLD AUTO: 10.6 FL (ref 6–12)
PMV BLD AUTO: 10.8 FL (ref 6–12)
PO2 BLDA: 111 MM HG (ref 83–108)
PO2 BLDA: 118 MM HG (ref 83–108)
PO2 BLDA: 127 MM HG (ref 83–108)
PO2 BLDA: 199 MM HG (ref 83–108)
PO2 BLDA: 72.5 MM HG (ref 83–108)
POTASSIUM BLD-SCNC: 3.3 MMOL/L (ref 3.5–5.5)
POTASSIUM BLD-SCNC: 3.9 MMOL/L (ref 3.5–5.5)
POTASSIUM BLD-SCNC: 4 MMOL/L (ref 3.5–5.5)
POTASSIUM BLD-SCNC: 4.3 MMOL/L (ref 3.5–5.5)
POTASSIUM BLD-SCNC: 4.6 MMOL/L (ref 3.5–5.5)
POTASSIUM BLD-SCNC: 5.3 MMOL/L (ref 3.5–5.5)
POTASSIUM BLDA-SCNC: 4 MMOL/L (ref 3.5–4.9)
PROCALCITONIN SERPL-MCNC: 0.05 NG/ML
PROT SERPL-MCNC: 4.8 G/DL (ref 5.7–8.2)
PROT SERPL-MCNC: 5.4 G/DL (ref 5.7–8.2)
PROT SERPL-MCNC: 5.5 G/DL (ref 5.7–8.2)
PROT SERPL-MCNC: 6.1 G/DL (ref 5.7–8.2)
PROT SERPL-MCNC: 6.8 G/DL (ref 5.7–8.2)
PROT UR QL STRIP: ABNORMAL
PROT UR QL STRIP: NEGATIVE
PROT UR STRIP-MCNC: ABNORMAL MG/DL
RBC # BLD AUTO: 3.15 10*6/MM3 (ref 3.89–5.14)
RBC # BLD AUTO: 3.16 10*6/MM3 (ref 3.89–5.14)
RBC # BLD AUTO: 3.18 10*6/MM3 (ref 3.89–5.14)
RBC # BLD AUTO: 3.2 10*6/MM3 (ref 3.89–5.14)
RBC # BLD AUTO: 3.53 10*6/MM3 (ref 3.89–5.14)
RBC # BLD AUTO: 3.74 10*6/MM3 (ref 3.89–5.14)
RBC # UR STRIP: ABNORMAL /UL
RBC # UR: ABNORMAL /HPF
RBC # UR: ABNORMAL /HPF
RBC MORPH BLD: NORMAL
REF LAB TEST METHOD: ABNORMAL
REF LAB TEST METHOD: ABNORMAL
SAO2 % BLDCOA: 92.1 %
SAO2 % BLDCOA: 97.6 %
SAO2 % BLDCOA: 99 %
SODIUM BLD-SCNC: 138 MMOL/L (ref 132–146)
SODIUM BLD-SCNC: 138 MMOL/L (ref 132–146)
SODIUM BLD-SCNC: 140 MMOL/L (ref 132–146)
SODIUM BLD-SCNC: 140 MMOL/L (ref 132–146)
SODIUM BLD-SCNC: 141 MMOL/L (ref 132–146)
SODIUM BLD-SCNC: 143 MMOL/L (ref 132–146)
SODIUM BLDA-SCNC: 140 MMOL/L (ref 138–146)
SODIUM UR-SCNC: 23 MMOL/L (ref 30–90)
SP GR UR STRIP: 1.01 (ref 1–1.03)
SP GR UR STRIP: 1.01 (ref 1–1.03)
SP GR UR: 1.01 (ref 1–1.03)
SQUAMOUS #/AREA URNS HPF: ABNORMAL /HPF
SQUAMOUS #/AREA URNS HPF: ABNORMAL /HPF
STRESS TARGET HR: 112 BPM
T4 FREE SERPL-MCNC: 1.24 NG/DL (ref 0.89–1.76)
TRIGL SERPL-MCNC: 251 MG/DL (ref 0–150)
TROPONIN I SERPL-MCNC: 0 NG/ML (ref 0–0.07)
TROPONIN I SERPL-MCNC: 0.05 NG/ML
TROPONIN I SERPL-MCNC: 0.88 NG/ML
TROPONIN I SERPL-MCNC: 1.47 NG/ML
TROPONIN I SERPL-MCNC: 1.48 NG/ML
TROPONIN I SERPL-MCNC: 1.57 NG/ML
TSH SERPL DL<=0.05 MIU/L-ACNC: 2.31 MIU/ML (ref 0.35–5.35)
TSH SERPL DL<=0.05 MIU/L-ACNC: 4.48 MIU/ML (ref 0.35–5.35)
URATE SERPL-MCNC: 3.6 MG/DL (ref 3.1–7.8)
UROBILINOGEN UR QL STRIP: ABNORMAL
UROBILINOGEN UR QL STRIP: ABNORMAL
UROBILINOGEN UR QL: NORMAL
VANCOMYCIN SERPL-MCNC: 13.8 MCG/ML (ref 5–40)
WBC MORPH BLD: NORMAL
WBC NRBC COR # BLD: 12.95 10*3/MM3 (ref 3.5–10.8)
WBC NRBC COR # BLD: 13.32 10*3/MM3 (ref 3.5–10.8)
WBC NRBC COR # BLD: 13.98 10*3/MM3 (ref 3.5–10.8)
WBC NRBC COR # BLD: 21.12 10*3/MM3 (ref 3.5–10.8)
WBC NRBC COR # BLD: 22.08 10*3/MM3 (ref 3.5–10.8)
WBC NRBC COR # BLD: 9.33 10*3/MM3 (ref 3.5–10.8)
WBC UR QL AUTO: ABNORMAL /HPF
WBC UR QL AUTO: ABNORMAL /HPF
WHOLE BLOOD HOLD SPECIMEN: NORMAL
WHOLE BLOOD HOLD SPECIMEN: NORMAL

## 2018-01-01 PROCEDURE — 83880 ASSAY OF NATRIURETIC PEPTIDE: CPT | Performed by: EMERGENCY MEDICINE

## 2018-01-01 PROCEDURE — 25010000002 HEPARIN (PORCINE) PER 1000 UNITS: Performed by: NURSE PRACTITIONER

## 2018-01-01 PROCEDURE — 94799 UNLISTED PULMONARY SVC/PX: CPT

## 2018-01-01 PROCEDURE — 87086 URINE CULTURE/COLONY COUNT: CPT | Performed by: EMERGENCY MEDICINE

## 2018-01-01 PROCEDURE — 82330 ASSAY OF CALCIUM: CPT | Performed by: INTERNAL MEDICINE

## 2018-01-01 PROCEDURE — 36600 WITHDRAWAL OF ARTERIAL BLOOD: CPT | Performed by: NURSE PRACTITIONER

## 2018-01-01 PROCEDURE — 83605 ASSAY OF LACTIC ACID: CPT | Performed by: EMERGENCY MEDICINE

## 2018-01-01 PROCEDURE — 82962 GLUCOSE BLOOD TEST: CPT

## 2018-01-01 PROCEDURE — 25010000002 PROPOFOL 10 MG/ML EMULSION: Performed by: EMERGENCY MEDICINE

## 2018-01-01 PROCEDURE — 94640 AIRWAY INHALATION TREATMENT: CPT

## 2018-01-01 PROCEDURE — 25010000002 LORAZEPAM PER 2 MG: Performed by: FAMILY MEDICINE

## 2018-01-01 PROCEDURE — 80053 COMPREHEN METABOLIC PANEL: CPT | Performed by: INTERNAL MEDICINE

## 2018-01-01 PROCEDURE — 0BH17EZ INSERTION OF ENDOTRACHEAL AIRWAY INTO TRACHEA, VIA NATURAL OR ARTIFICIAL OPENING: ICD-10-PCS | Performed by: EMERGENCY MEDICINE

## 2018-01-01 PROCEDURE — 84100 ASSAY OF PHOSPHORUS: CPT | Performed by: INTERNAL MEDICINE

## 2018-01-01 PROCEDURE — 83735 ASSAY OF MAGNESIUM: CPT | Performed by: INTERNAL MEDICINE

## 2018-01-01 PROCEDURE — 82533 TOTAL CORTISOL: CPT | Performed by: NURSE PRACTITIONER

## 2018-01-01 PROCEDURE — 85014 HEMATOCRIT: CPT

## 2018-01-01 PROCEDURE — 25010000002 MORPHINE PER 10 MG: Performed by: NURSE PRACTITIONER

## 2018-01-01 PROCEDURE — 85027 COMPLETE CBC AUTOMATED: CPT | Performed by: INTERNAL MEDICINE

## 2018-01-01 PROCEDURE — 93294 REM INTERROG EVL PM/LDLS PM: CPT | Performed by: PHYSICIAN ASSISTANT

## 2018-01-01 PROCEDURE — 36556 INSERT NON-TUNNEL CV CATH: CPT | Performed by: NURSE PRACTITIONER

## 2018-01-01 PROCEDURE — 83605 ASSAY OF LACTIC ACID: CPT | Performed by: INTERNAL MEDICINE

## 2018-01-01 PROCEDURE — 25010000002 CEFTRIAXONE PER 250 MG: Performed by: INTERNAL MEDICINE

## 2018-01-01 PROCEDURE — 93005 ELECTROCARDIOGRAM TRACING: CPT | Performed by: INTERNAL MEDICINE

## 2018-01-01 PROCEDURE — 51702 INSERT TEMP BLADDER CATH: CPT

## 2018-01-01 PROCEDURE — 94002 VENT MGMT INPAT INIT DAY: CPT

## 2018-01-01 PROCEDURE — 82553 CREATINE MB FRACTION: CPT | Performed by: INTERNAL MEDICINE

## 2018-01-01 PROCEDURE — 82550 ASSAY OF CK (CPK): CPT | Performed by: INTERNAL MEDICINE

## 2018-01-01 PROCEDURE — 71045 X-RAY EXAM CHEST 1 VIEW: CPT

## 2018-01-01 PROCEDURE — 81003 URINALYSIS AUTO W/O SCOPE: CPT | Performed by: INTERNAL MEDICINE

## 2018-01-01 PROCEDURE — 87086 URINE CULTURE/COLONY COUNT: CPT | Performed by: INTERNAL MEDICINE

## 2018-01-01 PROCEDURE — 05HM33Z INSERTION OF INFUSION DEVICE INTO RIGHT INTERNAL JUGULAR VEIN, PERCUTANEOUS APPROACH: ICD-10-PCS | Performed by: NURSE PRACTITIONER

## 2018-01-01 PROCEDURE — 84484 ASSAY OF TROPONIN QUANT: CPT | Performed by: INTERNAL MEDICINE

## 2018-01-01 PROCEDURE — 25010000002 PIPERACILLIN SOD-TAZOBACTAM PER 1 G: Performed by: EMERGENCY MEDICINE

## 2018-01-01 PROCEDURE — 80202 ASSAY OF VANCOMYCIN: CPT

## 2018-01-01 PROCEDURE — 94760 N-INVAS EAR/PLS OXIMETRY 1: CPT

## 2018-01-01 PROCEDURE — 93005 ELECTROCARDIOGRAM TRACING: CPT | Performed by: EMERGENCY MEDICINE

## 2018-01-01 PROCEDURE — 84443 ASSAY THYROID STIM HORMONE: CPT | Performed by: NURSE PRACTITIONER

## 2018-01-01 PROCEDURE — 82805 BLOOD GASES W/O2 SATURATION: CPT | Performed by: NURSE PRACTITIONER

## 2018-01-01 PROCEDURE — 85025 COMPLETE CBC W/AUTO DIFF WBC: CPT | Performed by: EMERGENCY MEDICINE

## 2018-01-01 PROCEDURE — 99238 HOSP IP/OBS DSCHRG MGMT 30/<: CPT | Performed by: NURSE PRACTITIONER

## 2018-01-01 PROCEDURE — 99214 OFFICE O/P EST MOD 30 MIN: CPT | Performed by: INTERNAL MEDICINE

## 2018-01-01 PROCEDURE — 94003 VENT MGMT INPAT SUBQ DAY: CPT

## 2018-01-01 PROCEDURE — 99291 CRITICAL CARE FIRST HOUR: CPT | Performed by: INTERNAL MEDICINE

## 2018-01-01 PROCEDURE — 25010000002 LEVETIRACETAM IN NACL 0.82% 500 MG/100ML SOLUTION: Performed by: NURSE PRACTITIONER

## 2018-01-01 PROCEDURE — 84300 ASSAY OF URINE SODIUM: CPT | Performed by: NURSE PRACTITIONER

## 2018-01-01 PROCEDURE — 80061 LIPID PANEL: CPT | Performed by: INTERNAL MEDICINE

## 2018-01-01 PROCEDURE — 84484 ASSAY OF TROPONIN QUANT: CPT | Performed by: NURSE PRACTITIONER

## 2018-01-01 PROCEDURE — 83735 ASSAY OF MAGNESIUM: CPT | Performed by: NURSE PRACTITIONER

## 2018-01-01 PROCEDURE — 81001 URINALYSIS AUTO W/SCOPE: CPT | Performed by: EMERGENCY MEDICINE

## 2018-01-01 PROCEDURE — 87077 CULTURE AEROBIC IDENTIFY: CPT | Performed by: INTERNAL MEDICINE

## 2018-01-01 PROCEDURE — 25010000002 MORPHINE PER 10 MG: Performed by: FAMILY MEDICINE

## 2018-01-01 PROCEDURE — 87077 CULTURE AEROBIC IDENTIFY: CPT | Performed by: EMERGENCY MEDICINE

## 2018-01-01 PROCEDURE — 63710000001 INSULIN REGULAR HUMAN PER 5 UNITS: Performed by: NURSE PRACTITIONER

## 2018-01-01 PROCEDURE — 80047 BASIC METABLC PNL IONIZED CA: CPT

## 2018-01-01 PROCEDURE — 85027 COMPLETE CBC AUTOMATED: CPT | Performed by: NURSE PRACTITIONER

## 2018-01-01 PROCEDURE — 74018 RADEX ABDOMEN 1 VIEW: CPT

## 2018-01-01 PROCEDURE — 93306 TTE W/DOPPLER COMPLETE: CPT

## 2018-01-01 PROCEDURE — 93005 ELECTROCARDIOGRAM TRACING: CPT | Performed by: NURSE PRACTITIONER

## 2018-01-01 PROCEDURE — 31500 INSERT EMERGENCY AIRWAY: CPT

## 2018-01-01 PROCEDURE — 87040 BLOOD CULTURE FOR BACTERIA: CPT | Performed by: EMERGENCY MEDICINE

## 2018-01-01 PROCEDURE — 83036 HEMOGLOBIN GLYCOSYLATED A1C: CPT | Performed by: NURSE PRACTITIONER

## 2018-01-01 PROCEDURE — 85025 COMPLETE CBC W/AUTO DIFF WBC: CPT | Performed by: INTERNAL MEDICINE

## 2018-01-01 PROCEDURE — 99292 CRITICAL CARE ADDL 30 MIN: CPT | Performed by: INTERNAL MEDICINE

## 2018-01-01 PROCEDURE — 25010000002 FENTANYL CITRATE (PF) 100 MCG/2ML SOLUTION: Performed by: NURSE PRACTITIONER

## 2018-01-01 PROCEDURE — 83036 HEMOGLOBIN GLYCOSYLATED A1C: CPT | Performed by: INTERNAL MEDICINE

## 2018-01-01 PROCEDURE — 82570 ASSAY OF URINE CREATININE: CPT | Performed by: NURSE PRACTITIONER

## 2018-01-01 PROCEDURE — 84443 ASSAY THYROID STIM HORMONE: CPT | Performed by: INTERNAL MEDICINE

## 2018-01-01 PROCEDURE — 5A1945Z RESPIRATORY VENTILATION, 24-96 CONSECUTIVE HOURS: ICD-10-PCS | Performed by: INTERNAL MEDICINE

## 2018-01-01 PROCEDURE — 25010000002 MEROPENEM: Performed by: NURSE PRACTITIONER

## 2018-01-01 PROCEDURE — 94660 CPAP INITIATION&MGMT: CPT

## 2018-01-01 PROCEDURE — 93306 TTE W/DOPPLER COMPLETE: CPT | Performed by: INTERNAL MEDICINE

## 2018-01-01 PROCEDURE — 87186 SC STD MICRODIL/AGAR DIL: CPT | Performed by: INTERNAL MEDICINE

## 2018-01-01 PROCEDURE — B543ZZA ULTRASONOGRAPHY OF RIGHT JUGULAR VEINS, GUIDANCE: ICD-10-PCS | Performed by: NURSE PRACTITIONER

## 2018-01-01 PROCEDURE — 84550 ASSAY OF BLOOD/URIC ACID: CPT | Performed by: NURSE PRACTITIONER

## 2018-01-01 PROCEDURE — 84100 ASSAY OF PHOSPHORUS: CPT | Performed by: NURSE PRACTITIONER

## 2018-01-01 PROCEDURE — 93010 ELECTROCARDIOGRAM REPORT: CPT | Performed by: INTERNAL MEDICINE

## 2018-01-01 PROCEDURE — 85007 BL SMEAR W/DIFF WBC COUNT: CPT | Performed by: INTERNAL MEDICINE

## 2018-01-01 PROCEDURE — 25010000002 VANCOMYCIN 10 G RECONSTITUTED SOLUTION: Performed by: EMERGENCY MEDICINE

## 2018-01-01 PROCEDURE — 36600 WITHDRAWAL OF ARTERIAL BLOOD: CPT | Performed by: EMERGENCY MEDICINE

## 2018-01-01 PROCEDURE — 25010000002 MIDAZOLAM PER 1 MG: Performed by: EMERGENCY MEDICINE

## 2018-01-01 PROCEDURE — 84145 PROCALCITONIN (PCT): CPT | Performed by: EMERGENCY MEDICINE

## 2018-01-01 PROCEDURE — 25010000002 SULFUR HEXAFLUORIDE MICROSPH 60.7-25 MG RECONSTITUTED SUSPENSION: Performed by: INTERNAL MEDICINE

## 2018-01-01 PROCEDURE — 99291 CRITICAL CARE FIRST HOUR: CPT

## 2018-01-01 PROCEDURE — 80053 COMPREHEN METABOLIC PANEL: CPT | Performed by: EMERGENCY MEDICINE

## 2018-01-01 PROCEDURE — 87070 CULTURE OTHR SPECIMN AEROBIC: CPT | Performed by: INTERNAL MEDICINE

## 2018-01-01 PROCEDURE — 93296 REM INTERROG EVL PM/IDS: CPT | Performed by: PHYSICIAN ASSISTANT

## 2018-01-01 PROCEDURE — 25010000002 FUROSEMIDE PER 20 MG: Performed by: INTERNAL MEDICINE

## 2018-01-01 PROCEDURE — 94770: CPT

## 2018-01-01 PROCEDURE — 84439 ASSAY OF FREE THYROXINE: CPT | Performed by: INTERNAL MEDICINE

## 2018-01-01 PROCEDURE — 82805 BLOOD GASES W/O2 SATURATION: CPT | Performed by: EMERGENCY MEDICINE

## 2018-01-01 PROCEDURE — 87205 SMEAR GRAM STAIN: CPT | Performed by: INTERNAL MEDICINE

## 2018-01-01 PROCEDURE — 25010000002 PROPOFOL 1000 MG/ML EMULSION: Performed by: EMERGENCY MEDICINE

## 2018-01-01 PROCEDURE — 87186 SC STD MICRODIL/AGAR DIL: CPT | Performed by: EMERGENCY MEDICINE

## 2018-01-01 PROCEDURE — 25010000002 SUCCINYLCHOLINE PER 20 MG: Performed by: EMERGENCY MEDICINE

## 2018-01-01 PROCEDURE — 82947 ASSAY GLUCOSE BLOOD QUANT: CPT | Performed by: INTERNAL MEDICINE

## 2018-01-01 PROCEDURE — 25010000002 DIGOXIN PER 500 MCG: Performed by: INTERNAL MEDICINE

## 2018-01-01 PROCEDURE — 81001 URINALYSIS AUTO W/SCOPE: CPT | Performed by: INTERNAL MEDICINE

## 2018-01-01 PROCEDURE — 25010000002 LORAZEPAM PER 2 MG: Performed by: NURSE PRACTITIONER

## 2018-01-01 PROCEDURE — 84484 ASSAY OF TROPONIN QUANT: CPT

## 2018-01-01 RX ORDER — MORPHINE SULFATE 4 MG/ML
4 INJECTION, SOLUTION INTRAMUSCULAR; INTRAVENOUS
Status: CANCELLED | OUTPATIENT
Start: 2018-01-01 | End: 2018-08-27

## 2018-01-01 RX ORDER — DIGOXIN 0.25 MG/ML
250 INJECTION INTRAMUSCULAR; INTRAVENOUS ONCE
Status: COMPLETED | OUTPATIENT
Start: 2018-01-01 | End: 2018-01-01

## 2018-01-01 RX ORDER — NICOTINE POLACRILEX 4 MG
15 LOZENGE BUCCAL
Status: CANCELLED | OUTPATIENT
Start: 2018-01-01

## 2018-01-01 RX ORDER — MORPHINE SULFATE 4 MG/ML
4 INJECTION, SOLUTION INTRAMUSCULAR; INTRAVENOUS
Status: DISCONTINUED | OUTPATIENT
Start: 2018-01-01 | End: 2018-01-01 | Stop reason: HOSPADM

## 2018-01-01 RX ORDER — FENTANYL CITRATE 50 UG/ML
25 INJECTION, SOLUTION INTRAMUSCULAR; INTRAVENOUS
Status: CANCELLED | OUTPATIENT
Start: 2018-01-01 | End: 2018-08-25

## 2018-01-01 RX ORDER — CEFTRIAXONE SODIUM 1 G/50ML
1 INJECTION, SOLUTION INTRAVENOUS EVERY 24 HOURS
Status: DISCONTINUED | OUTPATIENT
Start: 2018-01-01 | End: 2018-01-01

## 2018-01-01 RX ORDER — ALBUTEROL SULFATE 90 UG/1
2 AEROSOL, METERED RESPIRATORY (INHALATION) DAILY PRN
COMMUNITY

## 2018-01-01 RX ORDER — BISACODYL 10 MG
10 SUPPOSITORY, RECTAL RECTAL DAILY PRN
Status: DISCONTINUED | OUTPATIENT
Start: 2018-01-01 | End: 2018-01-01 | Stop reason: HOSPADM

## 2018-01-01 RX ORDER — CLONAZEPAM 1 MG/1
TABLET ORAL
Qty: 60 TABLET | Refills: 3 | Status: SHIPPED | OUTPATIENT
Start: 2018-01-01 | End: 2018-01-01 | Stop reason: SDUPTHER

## 2018-01-01 RX ORDER — ACETAMINOPHEN 650 MG/1
650 SUPPOSITORY RECTAL EVERY 4 HOURS PRN
Status: DISCONTINUED | OUTPATIENT
Start: 2018-01-01 | End: 2018-01-01 | Stop reason: HOSPADM

## 2018-01-01 RX ORDER — IPRATROPIUM BROMIDE AND ALBUTEROL SULFATE 2.5; .5 MG/3ML; MG/3ML
3 SOLUTION RESPIRATORY (INHALATION) EVERY 4 HOURS PRN
Status: CANCELLED | OUTPATIENT
Start: 2018-01-01

## 2018-01-01 RX ORDER — ALBUTEROL SULFATE 2.5 MG/3ML
SOLUTION RESPIRATORY (INHALATION)
Qty: 120 VIAL | Refills: 10 | Status: SHIPPED | OUTPATIENT
Start: 2018-01-01

## 2018-01-01 RX ORDER — FUROSEMIDE 40 MG/1
40 TABLET ORAL DAILY
Qty: 30 TABLET | Refills: 1 | Status: SHIPPED | OUTPATIENT
Start: 2018-01-01 | End: 2018-01-01 | Stop reason: SDUPTHER

## 2018-01-01 RX ORDER — CEFTRIAXONE SODIUM 1 G/50ML
1 INJECTION, SOLUTION INTRAVENOUS EVERY 24 HOURS
Status: CANCELLED | OUTPATIENT
Start: 2018-01-01 | End: 2018-08-24

## 2018-01-01 RX ORDER — PROPOFOL 10 MG/ML
VIAL (ML) INTRAVENOUS
Status: COMPLETED | OUTPATIENT
Start: 2018-01-01 | End: 2018-01-01

## 2018-01-01 RX ORDER — POTASSIUM CHLORIDE 750 MG/1
10 TABLET, FILM COATED, EXTENDED RELEASE ORAL 2 TIMES DAILY WITH MEALS
COMMUNITY

## 2018-01-01 RX ORDER — NOREPINEPHRINE BIT/0.9 % NACL 8 MG/250ML
.02-.3 INFUSION BOTTLE (ML) INTRAVENOUS
Status: DISCONTINUED | OUTPATIENT
Start: 2018-01-01 | End: 2018-01-01

## 2018-01-01 RX ORDER — DEXTROSE MONOHYDRATE 25 G/50ML
25 INJECTION, SOLUTION INTRAVENOUS
Status: DISCONTINUED | OUTPATIENT
Start: 2018-01-01 | End: 2018-01-01 | Stop reason: HOSPADM

## 2018-01-01 RX ORDER — ALLOPURINOL 100 MG/1
TABLET ORAL
Qty: 180 TABLET | Refills: 0 | Status: SHIPPED | OUTPATIENT
Start: 2018-01-01

## 2018-01-01 RX ORDER — CHLORHEXIDINE GLUCONATE 0.12 MG/ML
15 RINSE ORAL EVERY 12 HOURS SCHEDULED
Status: DISCONTINUED | OUTPATIENT
Start: 2018-01-01 | End: 2018-01-01

## 2018-01-01 RX ORDER — ALLOPURINOL 100 MG/1
100 TABLET ORAL 2 TIMES DAILY
COMMUNITY

## 2018-01-01 RX ORDER — CEFTRIAXONE SODIUM 1 G/50ML
1 INJECTION, SOLUTION INTRAVENOUS EVERY 24 HOURS
Status: DISCONTINUED | OUTPATIENT
Start: 2018-01-01 | End: 2018-01-01 | Stop reason: HOSPADM

## 2018-01-01 RX ORDER — LORAZEPAM 2 MG/ML
0.5 INJECTION INTRAMUSCULAR EVERY 4 HOURS PRN
Status: DISCONTINUED | OUTPATIENT
Start: 2018-01-01 | End: 2018-01-01 | Stop reason: HOSPADM

## 2018-01-01 RX ORDER — POTASSIUM CHLORIDE 750 MG/1
CAPSULE, EXTENDED RELEASE ORAL
Qty: 90 CAPSULE | Refills: 0 | Status: SHIPPED | OUTPATIENT
Start: 2018-01-01

## 2018-01-01 RX ORDER — FENTANYL CITRATE 50 UG/ML
25 INJECTION, SOLUTION INTRAMUSCULAR; INTRAVENOUS
Status: DISCONTINUED | OUTPATIENT
Start: 2018-01-01 | End: 2018-01-01 | Stop reason: HOSPADM

## 2018-01-01 RX ORDER — HEPARIN SODIUM 5000 [USP'U]/ML
5000 INJECTION, SOLUTION INTRAVENOUS; SUBCUTANEOUS EVERY 8 HOURS SCHEDULED
Status: CANCELLED | OUTPATIENT
Start: 2018-01-01

## 2018-01-01 RX ORDER — LEVETIRACETAM 750 MG/1
TABLET ORAL
Qty: 180 TABLET
Start: 2018-01-01 | End: 2018-01-01 | Stop reason: SDUPTHER

## 2018-01-01 RX ORDER — ACETAMINOPHEN 650 MG/1
650 SUPPOSITORY RECTAL ONCE
Status: COMPLETED | OUTPATIENT
Start: 2018-01-01 | End: 2018-01-01

## 2018-01-01 RX ORDER — FUROSEMIDE 40 MG/1
20 TABLET ORAL DAILY
COMMUNITY

## 2018-01-01 RX ORDER — LEVETIRACETAM 5 MG/ML
500 INJECTION INTRAVASCULAR EVERY 12 HOURS SCHEDULED
Status: DISCONTINUED | OUTPATIENT
Start: 2018-01-01 | End: 2018-01-01 | Stop reason: HOSPADM

## 2018-01-01 RX ORDER — ACETAMINOPHEN 650 MG/1
650 SUPPOSITORY RECTAL EVERY 4 HOURS PRN
Status: CANCELLED | OUTPATIENT
Start: 2018-01-01

## 2018-01-01 RX ORDER — PANTOPRAZOLE SODIUM 40 MG/10ML
40 INJECTION, POWDER, LYOPHILIZED, FOR SOLUTION INTRAVENOUS
Status: CANCELLED | OUTPATIENT
Start: 2018-01-01

## 2018-01-01 RX ORDER — SULFAMETHOXAZOLE AND TRIMETHOPRIM 400; 80 MG/1; MG/1
1 TABLET ORAL 2 TIMES DAILY
Qty: 20 TABLET | Refills: 0 | Status: SHIPPED | OUTPATIENT
Start: 2018-01-01

## 2018-01-01 RX ORDER — GLYCOPYRROLATE 0.2 MG/ML
0.2 INJECTION INTRAMUSCULAR; INTRAVENOUS EVERY 4 HOURS PRN
Status: DISCONTINUED | OUTPATIENT
Start: 2018-01-01 | End: 2018-01-01 | Stop reason: HOSPADM

## 2018-01-01 RX ORDER — LEVETIRACETAM 750 MG/1
TABLET ORAL
Qty: 180 TABLET | Refills: 0 | Status: SHIPPED | OUTPATIENT
Start: 2018-01-01 | End: 2018-01-01 | Stop reason: SDUPTHER

## 2018-01-01 RX ORDER — FAMOTIDINE 10 MG/ML
20 INJECTION, SOLUTION INTRAVENOUS EVERY 12 HOURS SCHEDULED
Status: DISCONTINUED | OUTPATIENT
Start: 2018-01-01 | End: 2018-01-01

## 2018-01-01 RX ORDER — ACETAMINOPHEN 325 MG/1
650 TABLET ORAL EVERY 4 HOURS PRN
Status: DISCONTINUED | OUTPATIENT
Start: 2018-01-01 | End: 2018-01-01 | Stop reason: HOSPADM

## 2018-01-01 RX ORDER — CLONAZEPAM 1 MG/1
TABLET ORAL
Qty: 60 TABLET | Refills: 1 | Status: SHIPPED | OUTPATIENT
Start: 2018-01-01

## 2018-01-01 RX ORDER — ACETAMINOPHEN 325 MG/1
650 TABLET ORAL EVERY 4 HOURS PRN
Status: CANCELLED | OUTPATIENT
Start: 2018-01-01

## 2018-01-01 RX ORDER — GLYCOPYRROLATE 0.2 MG/ML
0.2 INJECTION INTRAMUSCULAR; INTRAVENOUS EVERY 4 HOURS PRN
Status: CANCELLED | OUTPATIENT
Start: 2018-01-01

## 2018-01-01 RX ORDER — FUROSEMIDE 10 MG/ML
40 INJECTION INTRAMUSCULAR; INTRAVENOUS EVERY 12 HOURS
Status: COMPLETED | OUTPATIENT
Start: 2018-01-01 | End: 2018-01-01

## 2018-01-01 RX ORDER — POTASSIUM CHLORIDE 750 MG/1
10 CAPSULE, EXTENDED RELEASE ORAL DAILY
Qty: 90 CAPSULE | Refills: 1 | Status: SHIPPED | OUTPATIENT
Start: 2018-01-01 | End: 2018-01-01 | Stop reason: SDUPTHER

## 2018-01-01 RX ORDER — CLOTRIMAZOLE AND BETAMETHASONE DIPROPIONATE 10; .5 MG/ML; MG/ML
LOTION TOPICAL
Qty: 30 ML | Refills: 3 | Status: SHIPPED | OUTPATIENT
Start: 2018-01-01

## 2018-01-01 RX ORDER — HEPARIN SODIUM 5000 [USP'U]/ML
5000 INJECTION, SOLUTION INTRAVENOUS; SUBCUTANEOUS EVERY 8 HOURS SCHEDULED
Status: DISCONTINUED | OUTPATIENT
Start: 2018-01-01 | End: 2018-01-01 | Stop reason: HOSPADM

## 2018-01-01 RX ORDER — FUROSEMIDE 40 MG/1
TABLET ORAL
Qty: 30 TABLET | Refills: 1 | OUTPATIENT
Start: 2018-01-01

## 2018-01-01 RX ORDER — DEXTROSE MONOHYDRATE 25 G/50ML
25 INJECTION, SOLUTION INTRAVENOUS
Status: CANCELLED | OUTPATIENT
Start: 2018-01-01

## 2018-01-01 RX ORDER — SODIUM CHLORIDE 0.9 % (FLUSH) 0.9 %
10 SYRINGE (ML) INJECTION AS NEEDED
Status: CANCELLED | OUTPATIENT
Start: 2018-01-01

## 2018-01-01 RX ORDER — SODIUM CHLORIDE 0.9 % (FLUSH) 0.9 %
1-10 SYRINGE (ML) INJECTION AS NEEDED
Status: CANCELLED | OUTPATIENT
Start: 2018-01-01

## 2018-01-01 RX ORDER — CLONAZEPAM 1 MG/1
1 TABLET ORAL 2 TIMES DAILY
COMMUNITY

## 2018-01-01 RX ORDER — DEXMEDETOMIDINE HYDROCHLORIDE 4 UG/ML
.2-1.5 INJECTION, SOLUTION INTRAVENOUS
Status: DISCONTINUED | OUTPATIENT
Start: 2018-01-01 | End: 2018-01-01

## 2018-01-01 RX ORDER — IPRATROPIUM BROMIDE AND ALBUTEROL SULFATE 2.5; .5 MG/3ML; MG/3ML
3 SOLUTION RESPIRATORY (INHALATION) EVERY 4 HOURS PRN
Status: DISCONTINUED | OUTPATIENT
Start: 2018-01-01 | End: 2018-01-01 | Stop reason: HOSPADM

## 2018-01-01 RX ORDER — LEVETIRACETAM 5 MG/ML
500 INJECTION INTRAVASCULAR EVERY 12 HOURS SCHEDULED
Status: CANCELLED | OUTPATIENT
Start: 2018-01-01

## 2018-01-01 RX ORDER — HALOPERIDOL 5 MG/ML
1 INJECTION INTRAMUSCULAR EVERY 6 HOURS PRN
Status: DISCONTINUED | OUTPATIENT
Start: 2018-01-01 | End: 2018-01-01 | Stop reason: HOSPADM

## 2018-01-01 RX ORDER — ALBUTEROL SULFATE 90 UG/1
2 AEROSOL, METERED RESPIRATORY (INHALATION) EVERY 4 HOURS PRN
Qty: 1 INHALER | Refills: 3 | Status: SHIPPED | OUTPATIENT
Start: 2018-01-01

## 2018-01-01 RX ORDER — SODIUM CHLORIDE 0.9 % (FLUSH) 0.9 %
1-10 SYRINGE (ML) INJECTION AS NEEDED
Status: DISCONTINUED | OUTPATIENT
Start: 2018-01-01 | End: 2018-01-01 | Stop reason: HOSPADM

## 2018-01-01 RX ORDER — LORAZEPAM 2 MG/ML
0.5 INJECTION INTRAMUSCULAR EVERY 4 HOURS PRN
Status: CANCELLED | OUTPATIENT
Start: 2018-01-01 | End: 2018-08-27

## 2018-01-01 RX ORDER — SULFAMETHOXAZOLE AND TRIMETHOPRIM 800; 160 MG/1; MG/1
1 TABLET ORAL 2 TIMES DAILY
Qty: 10 TABLET | Refills: 0 | Status: SHIPPED | OUTPATIENT
Start: 2018-01-01 | End: 2018-01-01

## 2018-01-01 RX ORDER — LISINOPRIL 10 MG/1
TABLET ORAL
Qty: 90 TABLET | Refills: 0 | Status: SHIPPED | OUTPATIENT
Start: 2018-01-01

## 2018-01-01 RX ORDER — MONTELUKAST SODIUM 4 MG/1
1 TABLET, CHEWABLE ORAL DAILY PRN
COMMUNITY

## 2018-01-01 RX ORDER — BUDESONIDE AND FORMOTEROL FUMARATE DIHYDRATE 80; 4.5 UG/1; UG/1
2 AEROSOL RESPIRATORY (INHALATION)
COMMUNITY

## 2018-01-01 RX ORDER — PANTOPRAZOLE SODIUM 40 MG/10ML
40 INJECTION, POWDER, LYOPHILIZED, FOR SOLUTION INTRAVENOUS
Status: DISCONTINUED | OUTPATIENT
Start: 2018-01-01 | End: 2018-01-01 | Stop reason: HOSPADM

## 2018-01-01 RX ORDER — SODIUM CHLORIDE 0.9 % (FLUSH) 0.9 %
10 SYRINGE (ML) INJECTION AS NEEDED
Status: DISCONTINUED | OUTPATIENT
Start: 2018-01-01 | End: 2018-01-01 | Stop reason: HOSPADM

## 2018-01-01 RX ORDER — LANOLIN ALCOHOL/MO/W.PET/CERES
1000 CREAM (GRAM) TOPICAL DAILY
COMMUNITY

## 2018-01-01 RX ORDER — PANTOPRAZOLE SODIUM 40 MG/1
40 TABLET, DELAYED RELEASE ORAL DAILY
COMMUNITY

## 2018-01-01 RX ORDER — FAMOTIDINE 20 MG/1
20 TABLET, FILM COATED ORAL 2 TIMES DAILY
Status: DISCONTINUED | OUTPATIENT
Start: 2018-01-01 | End: 2018-01-01

## 2018-01-01 RX ORDER — FUROSEMIDE 40 MG/1
TABLET ORAL
Qty: 30 TABLET | Refills: 1 | Status: SHIPPED | OUTPATIENT
Start: 2018-01-01 | End: 2018-01-01 | Stop reason: SDUPTHER

## 2018-01-01 RX ORDER — POTASSIUM CHLORIDE 750 MG/1
CAPSULE, EXTENDED RELEASE ORAL
Qty: 180 CAPSULE | Refills: 0 | Status: SHIPPED | OUTPATIENT
Start: 2018-01-01

## 2018-01-01 RX ORDER — IPRATROPIUM BROMIDE AND ALBUTEROL SULFATE 2.5; .5 MG/3ML; MG/3ML
3 SOLUTION RESPIRATORY (INHALATION)
Status: CANCELLED | OUTPATIENT
Start: 2018-01-01

## 2018-01-01 RX ORDER — LISINOPRIL 10 MG/1
10 TABLET ORAL DAILY
COMMUNITY

## 2018-01-01 RX ORDER — LISINOPRIL 10 MG/1
TABLET ORAL
Qty: 90 TABLET | Refills: 0 | Status: SHIPPED | OUTPATIENT
Start: 2018-01-01 | End: 2018-01-01 | Stop reason: SDUPTHER

## 2018-01-01 RX ORDER — NICOTINE POLACRILEX 4 MG
15 LOZENGE BUCCAL
Status: DISCONTINUED | OUTPATIENT
Start: 2018-01-01 | End: 2018-01-01 | Stop reason: HOSPADM

## 2018-01-01 RX ORDER — LEVOFLOXACIN 250 MG/1
250 TABLET ORAL DAILY
Qty: 7 TABLET | Refills: 0 | Status: SHIPPED | OUTPATIENT
Start: 2018-01-01 | End: 2018-01-01

## 2018-01-01 RX ORDER — PANTOPRAZOLE SODIUM 40 MG/1
TABLET, DELAYED RELEASE ORAL
Qty: 90 TABLET | Refills: 0 | Status: SHIPPED | OUTPATIENT
Start: 2018-01-01

## 2018-01-01 RX ORDER — CLARITHROMYCIN 125 MG/5ML
FOR SUSPENSION ORAL 2 TIMES DAILY
Status: ON HOLD | COMMUNITY
End: 2018-01-01

## 2018-01-01 RX ORDER — LORATADINE 10 MG/1
10 CAPSULE, LIQUID FILLED ORAL DAILY PRN
COMMUNITY

## 2018-01-01 RX ORDER — DIGOXIN 0.25 MG/ML
500 INJECTION INTRAMUSCULAR; INTRAVENOUS ONCE
Status: COMPLETED | OUTPATIENT
Start: 2018-01-01 | End: 2018-01-01

## 2018-01-01 RX ORDER — SUCCINYLCHOLINE CHLORIDE 20 MG/ML
INJECTION INTRAMUSCULAR; INTRAVENOUS
Status: COMPLETED | OUTPATIENT
Start: 2018-01-01 | End: 2018-01-01

## 2018-01-01 RX ORDER — LEVETIRACETAM 750 MG/1
750 TABLET ORAL EVERY 12 HOURS SCHEDULED
COMMUNITY

## 2018-01-01 RX ORDER — ASPIRIN 81 MG/1
81 TABLET ORAL DAILY
COMMUNITY

## 2018-01-01 RX ORDER — IPRATROPIUM BROMIDE AND ALBUTEROL SULFATE 2.5; .5 MG/3ML; MG/3ML
3 SOLUTION RESPIRATORY (INHALATION)
Status: DISCONTINUED | OUTPATIENT
Start: 2018-01-01 | End: 2018-01-01 | Stop reason: HOSPADM

## 2018-01-01 RX ORDER — ASPIRIN 81 MG/1
81 TABLET, CHEWABLE ORAL DAILY
Status: DISCONTINUED | OUTPATIENT
Start: 2018-01-01 | End: 2018-01-01

## 2018-01-01 RX ORDER — MIDAZOLAM HYDROCHLORIDE 5 MG/ML
INJECTION INTRAMUSCULAR; INTRAVENOUS
Status: COMPLETED | OUTPATIENT
Start: 2018-01-01 | End: 2018-01-01

## 2018-01-01 RX ORDER — DILTIAZEM HYDROCHLORIDE 60 MG/1
TABLET, FILM COATED ORAL
Qty: 20.4 G | Refills: 2 | Status: SHIPPED | OUTPATIENT
Start: 2018-01-01

## 2018-01-01 RX ORDER — FUROSEMIDE 40 MG/1
40 TABLET ORAL DAILY
Qty: 90 TABLET | Refills: 1 | Status: SHIPPED | OUTPATIENT
Start: 2018-01-01

## 2018-01-01 RX ORDER — PANTOPRAZOLE SODIUM 40 MG/1
TABLET, DELAYED RELEASE ORAL
Qty: 90 TABLET | Refills: 0 | Status: SHIPPED | OUTPATIENT
Start: 2018-01-01 | End: 2018-01-01 | Stop reason: SDUPTHER

## 2018-01-01 RX ORDER — SODIUM CHLORIDE, SODIUM LACTATE, POTASSIUM CHLORIDE, CALCIUM CHLORIDE 600; 310; 30; 20 MG/100ML; MG/100ML; MG/100ML; MG/100ML
100 INJECTION, SOLUTION INTRAVENOUS CONTINUOUS
Status: DISCONTINUED | OUTPATIENT
Start: 2018-01-01 | End: 2018-01-01

## 2018-01-01 RX ORDER — LEVETIRACETAM 750 MG/1
750 TABLET ORAL 2 TIMES DAILY
Qty: 180 TABLET | Refills: 0 | Status: SHIPPED | OUTPATIENT
Start: 2018-01-01

## 2018-01-01 RX ADMIN — HEPARIN SODIUM 5000 UNITS: 5000 INJECTION, SOLUTION INTRAVENOUS; SUBCUTANEOUS at 20:22

## 2018-01-01 RX ADMIN — SODIUM CHLORIDE 1503 ML: 9 INJECTION, SOLUTION INTRAVENOUS at 02:35

## 2018-01-01 RX ADMIN — DORNASE ALFA 2.5 MG: 1 SOLUTION RESPIRATORY (INHALATION) at 20:32

## 2018-01-01 RX ADMIN — FENTANYL CITRATE 25 MCG: 50 INJECTION INTRAMUSCULAR; INTRAVENOUS at 09:20

## 2018-01-01 RX ADMIN — DEXMEDETOMIDINE HYDROCHLORIDE 1 MCG/KG/HR: 4 INJECTION, SOLUTION INTRAVENOUS at 10:55

## 2018-01-01 RX ADMIN — FAMOTIDINE 20 MG: 20 TABLET ORAL at 21:10

## 2018-01-01 RX ADMIN — IPRATROPIUM BROMIDE AND ALBUTEROL SULFATE 3 ML: 2.5; .5 SOLUTION RESPIRATORY (INHALATION) at 13:26

## 2018-01-01 RX ADMIN — DEXMEDETOMIDINE HYDROCHLORIDE 1 MCG/KG/HR: 4 INJECTION, SOLUTION INTRAVENOUS at 00:26

## 2018-01-01 RX ADMIN — MORPHINE SULFATE 4 MG: 4 INJECTION, SOLUTION INTRAMUSCULAR; INTRAVENOUS at 00:03

## 2018-01-01 RX ADMIN — PROPOFOL 5 MG: 10 INJECTION, EMULSION INTRAVENOUS at 01:03

## 2018-01-01 RX ADMIN — HEPARIN SODIUM 5000 UNITS: 5000 INJECTION, SOLUTION INTRAVENOUS; SUBCUTANEOUS at 06:09

## 2018-01-01 RX ADMIN — LORAZEPAM 0.5 MG: 2 INJECTION INTRAMUSCULAR; INTRAVENOUS at 23:22

## 2018-01-01 RX ADMIN — IPRATROPIUM BROMIDE AND ALBUTEROL SULFATE 3 ML: 2.5; .5 SOLUTION RESPIRATORY (INHALATION) at 18:57

## 2018-01-01 RX ADMIN — SODIUM CHLORIDE 1000 ML: 9 INJECTION, SOLUTION INTRAVENOUS at 01:52

## 2018-01-01 RX ADMIN — IPRATROPIUM BROMIDE AND ALBUTEROL SULFATE 3 ML: 2.5; .5 SOLUTION RESPIRATORY (INHALATION) at 07:34

## 2018-01-01 RX ADMIN — IPRATROPIUM BROMIDE AND ALBUTEROL SULFATE 3 ML: 2.5; .5 SOLUTION RESPIRATORY (INHALATION) at 01:06

## 2018-01-01 RX ADMIN — CHLORHEXIDINE GLUCONATE 15 ML: 1.2 RINSE ORAL at 08:42

## 2018-01-01 RX ADMIN — IPRATROPIUM BROMIDE AND ALBUTEROL SULFATE 3 ML: 2.5; .5 SOLUTION RESPIRATORY (INHALATION) at 00:18

## 2018-01-01 RX ADMIN — FAMOTIDINE 20 MG: 20 TABLET ORAL at 09:30

## 2018-01-01 RX ADMIN — MORPHINE SULFATE 4 MG: 4 INJECTION, SOLUTION INTRAMUSCULAR; INTRAVENOUS at 23:42

## 2018-01-01 RX ADMIN — LEVETIRACETAM 500 MG: 5 INJECTION INTRAVENOUS at 08:04

## 2018-01-01 RX ADMIN — IPRATROPIUM BROMIDE AND ALBUTEROL SULFATE 3 ML: 2.5; .5 SOLUTION RESPIRATORY (INHALATION) at 07:12

## 2018-01-01 RX ADMIN — DEXMEDETOMIDINE HYDROCHLORIDE 1 MCG/KG/HR: 4 INJECTION, SOLUTION INTRAVENOUS at 06:16

## 2018-01-01 RX ADMIN — IPRATROPIUM BROMIDE AND ALBUTEROL SULFATE 3 ML: 2.5; .5 SOLUTION RESPIRATORY (INHALATION) at 18:51

## 2018-01-01 RX ADMIN — FAMOTIDINE 20 MG: 20 TABLET ORAL at 08:40

## 2018-01-01 RX ADMIN — SODIUM CHLORIDE, POTASSIUM CHLORIDE, SODIUM LACTATE AND CALCIUM CHLORIDE 100 ML/HR: 600; 310; 30; 20 INJECTION, SOLUTION INTRAVENOUS at 23:27

## 2018-01-01 RX ADMIN — HEPARIN SODIUM 5000 UNITS: 5000 INJECTION, SOLUTION INTRAVENOUS; SUBCUTANEOUS at 05:39

## 2018-01-01 RX ADMIN — FUROSEMIDE 40 MG: 10 INJECTION, SOLUTION INTRAMUSCULAR; INTRAVENOUS at 10:16

## 2018-01-01 RX ADMIN — HEPARIN SODIUM 5000 UNITS: 5000 INJECTION, SOLUTION INTRAVENOUS; SUBCUTANEOUS at 16:01

## 2018-01-01 RX ADMIN — SUCCINYLCHOLINE CHLORIDE 80 MG: 20 INJECTION, SOLUTION INTRAMUSCULAR; INTRAVENOUS at 00:48

## 2018-01-01 RX ADMIN — HEPARIN SODIUM 5000 UNITS: 5000 INJECTION, SOLUTION INTRAVENOUS; SUBCUTANEOUS at 13:15

## 2018-01-01 RX ADMIN — SODIUM CHLORIDE, POTASSIUM CHLORIDE, SODIUM LACTATE AND CALCIUM CHLORIDE 100 ML/HR: 600; 310; 30; 20 INJECTION, SOLUTION INTRAVENOUS at 14:28

## 2018-01-01 RX ADMIN — FENTANYL CITRATE 25 MCG: 50 INJECTION INTRAMUSCULAR; INTRAVENOUS at 07:57

## 2018-01-01 RX ADMIN — ACETAMINOPHEN 650 MG: 650 SUPPOSITORY RECTAL at 01:45

## 2018-01-01 RX ADMIN — IPRATROPIUM BROMIDE AND ALBUTEROL SULFATE 3 ML: 2.5; .5 SOLUTION RESPIRATORY (INHALATION) at 12:37

## 2018-01-01 RX ADMIN — DORNASE ALFA 2.5 MG: 1 SOLUTION RESPIRATORY (INHALATION) at 20:42

## 2018-01-01 RX ADMIN — MEROPENEM 1 G: 1 INJECTION, POWDER, FOR SOLUTION INTRAVENOUS at 04:57

## 2018-01-01 RX ADMIN — FENTANYL CITRATE 25 MCG: 50 INJECTION INTRAMUSCULAR; INTRAVENOUS at 18:18

## 2018-01-01 RX ADMIN — LORAZEPAM 0.5 MG: 2 INJECTION INTRAMUSCULAR; INTRAVENOUS at 13:15

## 2018-01-01 RX ADMIN — IPRATROPIUM BROMIDE AND ALBUTEROL SULFATE 3 ML: 2.5; .5 SOLUTION RESPIRATORY (INHALATION) at 00:10

## 2018-01-01 RX ADMIN — LEVETIRACETAM 500 MG: 5 INJECTION INTRAVENOUS at 08:56

## 2018-01-01 RX ADMIN — DORNASE ALFA 2.5 MG: 1 SOLUTION RESPIRATORY (INHALATION) at 19:43

## 2018-01-01 RX ADMIN — PROPOFOL 10 MCG/KG/MIN: 10 INJECTION, EMULSION INTRAVENOUS at 01:09

## 2018-01-01 RX ADMIN — CHLORHEXIDINE GLUCONATE 15 ML: 1.2 RINSE ORAL at 21:11

## 2018-01-01 RX ADMIN — DEXMEDETOMIDINE HYDROCHLORIDE 0.7 MCG/KG/HR: 4 INJECTION, SOLUTION INTRAVENOUS at 18:15

## 2018-01-01 RX ADMIN — DORNASE ALFA 2.5 MG: 1 SOLUTION RESPIRATORY (INHALATION) at 06:51

## 2018-01-01 RX ADMIN — INSULIN HUMAN 2 UNITS: 100 INJECTION, SOLUTION PARENTERAL at 12:00

## 2018-01-01 RX ADMIN — MEROPENEM 1 G: 1 INJECTION, POWDER, FOR SOLUTION INTRAVENOUS at 06:20

## 2018-01-01 RX ADMIN — LEVETIRACETAM 500 MG: 5 INJECTION INTRAVENOUS at 08:40

## 2018-01-01 RX ADMIN — MORPHINE SULFATE 4 MG: 4 INJECTION, SOLUTION INTRAMUSCULAR; INTRAVENOUS at 20:16

## 2018-01-01 RX ADMIN — LORAZEPAM 0.5 MG: 2 INJECTION INTRAMUSCULAR; INTRAVENOUS at 04:05

## 2018-01-01 RX ADMIN — SULFUR HEXAFLUORIDE 2 ML: KIT at 12:12

## 2018-01-01 RX ADMIN — FENTANYL CITRATE 25 MCG: 50 INJECTION INTRAMUSCULAR; INTRAVENOUS at 08:50

## 2018-01-01 RX ADMIN — LEVETIRACETAM 500 MG: 5 INJECTION INTRAVENOUS at 20:00

## 2018-01-01 RX ADMIN — GLYCOPYRROLATE 0.2 MG: 0.2 INJECTION, SOLUTION INTRAMUSCULAR; INTRAVENOUS at 16:00

## 2018-01-01 RX ADMIN — CEFTRIAXONE SODIUM 1 G: 1 INJECTION, SOLUTION INTRAVENOUS at 11:41

## 2018-01-01 RX ADMIN — HEPARIN SODIUM 5000 UNITS: 5000 INJECTION, SOLUTION INTRAVENOUS; SUBCUTANEOUS at 21:25

## 2018-01-01 RX ADMIN — CHLORHEXIDINE GLUCONATE 15 ML: 1.2 RINSE ORAL at 09:16

## 2018-01-01 RX ADMIN — TAZOBACTAM SODIUM AND PIPERACILLIN SODIUM 3.38 G: 375; 3 INJECTION, SOLUTION INTRAVENOUS at 00:55

## 2018-01-01 RX ADMIN — LEVETIRACETAM 500 MG: 5 INJECTION INTRAVENOUS at 09:30

## 2018-01-01 RX ADMIN — ASPIRIN 81 MG 81 MG: 81 TABLET ORAL at 08:40

## 2018-01-01 RX ADMIN — FENTANYL CITRATE 25 MCG: 50 INJECTION INTRAMUSCULAR; INTRAVENOUS at 04:38

## 2018-01-01 RX ADMIN — VANCOMYCIN HYDROCHLORIDE 1500 MG: 10 INJECTION, POWDER, LYOPHILIZED, FOR SOLUTION INTRAVENOUS at 00:56

## 2018-01-01 RX ADMIN — CEFTRIAXONE SODIUM 1 G: 1 INJECTION, SOLUTION INTRAVENOUS at 11:18

## 2018-01-01 RX ADMIN — MORPHINE SULFATE 4 MG: 4 INJECTION, SOLUTION INTRAMUSCULAR; INTRAVENOUS at 04:05

## 2018-01-01 RX ADMIN — DEXMEDETOMIDINE HYDROCHLORIDE 0.2 MCG/KG/HR: 4 INJECTION, SOLUTION INTRAVENOUS at 04:07

## 2018-01-01 RX ADMIN — LORAZEPAM 0.5 MG: 2 INJECTION INTRAMUSCULAR; INTRAVENOUS at 00:03

## 2018-01-01 RX ADMIN — FENTANYL CITRATE 25 MCG: 50 INJECTION INTRAMUSCULAR; INTRAVENOUS at 20:20

## 2018-01-01 RX ADMIN — MORPHINE SULFATE 4 MG: 4 INJECTION, SOLUTION INTRAMUSCULAR; INTRAVENOUS at 09:18

## 2018-01-01 RX ADMIN — DEXMEDETOMIDINE HYDROCHLORIDE 0.6 MCG/KG/HR: 4 INJECTION, SOLUTION INTRAVENOUS at 10:47

## 2018-01-01 RX ADMIN — FUROSEMIDE 40 MG: 10 INJECTION, SOLUTION INTRAMUSCULAR; INTRAVENOUS at 22:02

## 2018-01-01 RX ADMIN — MEROPENEM 1 G: 1 INJECTION, POWDER, FOR SOLUTION INTRAVENOUS at 17:15

## 2018-01-01 RX ADMIN — MORPHINE SULFATE 4 MG: 4 INJECTION, SOLUTION INTRAMUSCULAR; INTRAVENOUS at 23:22

## 2018-01-01 RX ADMIN — IPRATROPIUM BROMIDE AND ALBUTEROL SULFATE 3 ML: 2.5; .5 SOLUTION RESPIRATORY (INHALATION) at 13:16

## 2018-01-01 RX ADMIN — PANTOPRAZOLE SODIUM 40 MG: 40 INJECTION, POWDER, FOR SOLUTION INTRAVENOUS at 05:39

## 2018-01-01 RX ADMIN — DIGOXIN 250 MCG: 0.25 INJECTION INTRAMUSCULAR; INTRAVENOUS at 21:25

## 2018-01-01 RX ADMIN — LORAZEPAM 0.5 MG: 2 INJECTION INTRAMUSCULAR; INTRAVENOUS at 13:20

## 2018-01-01 RX ADMIN — CEFTRIAXONE SODIUM 1 G: 1 INJECTION, SOLUTION INTRAVENOUS at 11:50

## 2018-01-01 RX ADMIN — SODIUM CHLORIDE, POTASSIUM CHLORIDE, SODIUM LACTATE AND CALCIUM CHLORIDE 100 ML/HR: 600; 310; 30; 20 INJECTION, SOLUTION INTRAVENOUS at 09:18

## 2018-01-01 RX ADMIN — SODIUM CHLORIDE, POTASSIUM CHLORIDE, SODIUM LACTATE AND CALCIUM CHLORIDE 100 ML/HR: 600; 310; 30; 20 INJECTION, SOLUTION INTRAVENOUS at 04:08

## 2018-01-01 RX ADMIN — HEPARIN SODIUM 5000 UNITS: 5000 INJECTION, SOLUTION INTRAVENOUS; SUBCUTANEOUS at 05:28

## 2018-01-01 RX ADMIN — MEROPENEM 1 G: 1 INJECTION, POWDER, FOR SOLUTION INTRAVENOUS at 05:28

## 2018-01-01 RX ADMIN — DORNASE ALFA 2.5 MG: 1 SOLUTION RESPIRATORY (INHALATION) at 07:00

## 2018-01-01 RX ADMIN — LEVETIRACETAM 500 MG: 5 INJECTION INTRAVENOUS at 09:16

## 2018-01-01 RX ADMIN — FAMOTIDINE 20 MG: 20 TABLET ORAL at 09:16

## 2018-01-01 RX ADMIN — DEXMEDETOMIDINE HYDROCHLORIDE 1 MCG/KG/HR: 4 INJECTION, SOLUTION INTRAVENOUS at 23:27

## 2018-01-01 RX ADMIN — SODIUM CHLORIDE, POTASSIUM CHLORIDE, SODIUM LACTATE AND CALCIUM CHLORIDE 100 ML/HR: 600; 310; 30; 20 INJECTION, SOLUTION INTRAVENOUS at 02:22

## 2018-01-01 RX ADMIN — MORPHINE SULFATE 4 MG: 4 INJECTION, SOLUTION INTRAMUSCULAR; INTRAVENOUS at 22:55

## 2018-01-01 RX ADMIN — INSULIN HUMAN 2 UNITS: 100 INJECTION, SOLUTION PARENTERAL at 06:08

## 2018-01-01 RX ADMIN — GLYCOPYRROLATE 0.2 MG: 0.2 INJECTION, SOLUTION INTRAMUSCULAR; INTRAVENOUS at 20:16

## 2018-01-01 RX ADMIN — LORAZEPAM 0.5 MG: 2 INJECTION INTRAMUSCULAR; INTRAVENOUS at 11:27

## 2018-01-01 RX ADMIN — HEPARIN SODIUM 5000 UNITS: 5000 INJECTION, SOLUTION INTRAVENOUS; SUBCUTANEOUS at 06:20

## 2018-01-01 RX ADMIN — ASPIRIN 81 MG 81 MG: 81 TABLET ORAL at 09:30

## 2018-01-01 RX ADMIN — MORPHINE SULFATE 4 MG: 4 INJECTION, SOLUTION INTRAMUSCULAR; INTRAVENOUS at 13:20

## 2018-01-01 RX ADMIN — LORAZEPAM 0.5 MG: 2 INJECTION INTRAMUSCULAR; INTRAVENOUS at 09:18

## 2018-01-01 RX ADMIN — LORAZEPAM 0.5 MG: 2 INJECTION INTRAMUSCULAR; INTRAVENOUS at 20:17

## 2018-01-01 RX ADMIN — HEPARIN SODIUM 5000 UNITS: 5000 INJECTION, SOLUTION INTRAVENOUS; SUBCUTANEOUS at 14:28

## 2018-01-01 RX ADMIN — NOREPINEPHRINE BITARTRATE 0.07 MCG/KG/MIN: 8 SOLUTION at 18:14

## 2018-01-01 RX ADMIN — MIDAZOLAM HYDROCHLORIDE 2 MG: 5 INJECTION, SOLUTION INTRAMUSCULAR; INTRAVENOUS at 00:48

## 2018-01-01 RX ADMIN — LEVETIRACETAM 500 MG: 5 INJECTION INTRAVENOUS at 20:31

## 2018-01-01 RX ADMIN — IPRATROPIUM BROMIDE AND ALBUTEROL SULFATE 3 ML: 2.5; .5 SOLUTION RESPIRATORY (INHALATION) at 12:56

## 2018-01-01 RX ADMIN — FAMOTIDINE 20 MG: 20 TABLET ORAL at 20:22

## 2018-01-01 RX ADMIN — LEVETIRACETAM 500 MG: 5 INJECTION INTRAVENOUS at 11:18

## 2018-01-01 RX ADMIN — IPRATROPIUM BROMIDE AND ALBUTEROL SULFATE 3 ML: 2.5; .5 SOLUTION RESPIRATORY (INHALATION) at 07:46

## 2018-01-01 RX ADMIN — MEROPENEM 1 G: 1 INJECTION, POWDER, FOR SOLUTION INTRAVENOUS at 18:08

## 2018-01-01 RX ADMIN — GLYCOPYRROLATE 0.2 MG: 0.2 INJECTION, SOLUTION INTRAMUSCULAR; INTRAVENOUS at 23:42

## 2018-01-01 RX ADMIN — MORPHINE SULFATE 4 MG: 4 INJECTION, SOLUTION INTRAMUSCULAR; INTRAVENOUS at 13:15

## 2018-01-01 RX ADMIN — HEPARIN SODIUM 5000 UNITS: 5000 INJECTION, SOLUTION INTRAVENOUS; SUBCUTANEOUS at 13:59

## 2018-01-01 RX ADMIN — MORPHINE SULFATE 4 MG: 4 INJECTION, SOLUTION INTRAMUSCULAR; INTRAVENOUS at 16:00

## 2018-01-01 RX ADMIN — ASPIRIN 81 MG 81 MG: 81 TABLET ORAL at 09:16

## 2018-01-01 RX ADMIN — LEVETIRACETAM 500 MG: 5 INJECTION INTRAVENOUS at 21:10

## 2018-01-01 RX ADMIN — DEXMEDETOMIDINE HYDROCHLORIDE 0.9 MCG/KG/HR: 4 INJECTION, SOLUTION INTRAVENOUS at 07:31

## 2018-01-01 RX ADMIN — MIDAZOLAM HYDROCHLORIDE 3 MG: 5 INJECTION, SOLUTION INTRAMUSCULAR; INTRAVENOUS at 00:51

## 2018-01-01 RX ADMIN — IPRATROPIUM BROMIDE AND ALBUTEROL SULFATE 3 ML: 2.5; .5 SOLUTION RESPIRATORY (INHALATION) at 19:43

## 2018-01-01 RX ADMIN — GLYCOPYRROLATE 0.2 MG: 0.2 INJECTION, SOLUTION INTRAMUSCULAR; INTRAVENOUS at 14:25

## 2018-01-01 RX ADMIN — IPRATROPIUM BROMIDE AND ALBUTEROL SULFATE 3 ML: 2.5; .5 SOLUTION RESPIRATORY (INHALATION) at 01:48

## 2018-01-01 RX ADMIN — LEVETIRACETAM 500 MG: 5 INJECTION INTRAVENOUS at 21:50

## 2018-01-01 RX ADMIN — IPRATROPIUM BROMIDE AND ALBUTEROL SULFATE 3 ML: 2.5; .5 SOLUTION RESPIRATORY (INHALATION) at 19:19

## 2018-01-01 RX ADMIN — DEXMEDETOMIDINE HYDROCHLORIDE 0.8 MCG/KG/HR: 4 INJECTION, SOLUTION INTRAVENOUS at 03:06

## 2018-01-01 RX ADMIN — LEVETIRACETAM 500 MG: 5 INJECTION INTRAVENOUS at 20:22

## 2018-01-01 RX ADMIN — IPRATROPIUM BROMIDE AND ALBUTEROL SULFATE 3 ML: 2.5; .5 SOLUTION RESPIRATORY (INHALATION) at 23:53

## 2018-01-01 RX ADMIN — CHLORHEXIDINE GLUCONATE 15 ML: 1.2 RINSE ORAL at 20:22

## 2018-01-01 RX ADMIN — PANTOPRAZOLE SODIUM 40 MG: 40 INJECTION, POWDER, FOR SOLUTION INTRAVENOUS at 05:38

## 2018-01-01 RX ADMIN — IPRATROPIUM BROMIDE AND ALBUTEROL SULFATE 3 ML: 2.5; .5 SOLUTION RESPIRATORY (INHALATION) at 07:18

## 2018-01-01 RX ADMIN — DIGOXIN 500 MCG: 0.25 INJECTION INTRAMUSCULAR; INTRAVENOUS at 16:01

## 2018-01-01 RX ADMIN — SODIUM CHLORIDE 1000 ML: 9 INJECTION, SOLUTION INTRAVENOUS at 02:38

## 2018-01-01 RX ADMIN — HEPARIN SODIUM 5000 UNITS: 5000 INJECTION, SOLUTION INTRAVENOUS; SUBCUTANEOUS at 21:10

## 2018-01-01 RX ADMIN — IPRATROPIUM BROMIDE AND ALBUTEROL SULFATE 3 ML: 2.5; .5 SOLUTION RESPIRATORY (INHALATION) at 06:51

## 2018-01-01 RX ADMIN — NOREPINEPHRINE BITARTRATE 0.1 MCG/KG/MIN: 8 SOLUTION at 04:49

## 2018-01-01 RX ADMIN — SODIUM CHLORIDE, POTASSIUM CHLORIDE, SODIUM LACTATE AND CALCIUM CHLORIDE 100 ML/HR: 600; 310; 30; 20 INJECTION, SOLUTION INTRAVENOUS at 17:15

## 2018-01-01 RX ADMIN — DORNASE ALFA 2.5 MG: 1 SOLUTION RESPIRATORY (INHALATION) at 07:12

## 2018-01-01 RX ADMIN — DEXMEDETOMIDINE HYDROCHLORIDE 1.2 MCG/KG/HR: 4 INJECTION, SOLUTION INTRAVENOUS at 20:21

## 2018-01-01 RX ADMIN — IPRATROPIUM BROMIDE AND ALBUTEROL SULFATE 3 ML: 2.5; .5 SOLUTION RESPIRATORY (INHALATION) at 06:59

## 2018-02-02 NOTE — TELEPHONE ENCOUNTER
MS SKINNER'S DAUGHTER CALLED TO REPORT THAT MS SKINNER IS EXPERIENCING SHORTNESS OF BREATH. HER DAUGHTER IS CONCERNED THAT THIS MAY LEAD TO PNEUMONIA AS SHE HAS BEEN DX WITH PNEUMONIA SEVERAL TIMES IN THE PAST. SHE WOULD LIKE TO KNOW IF IT WOULD BE POSSIBLE FOR DR SRINIVASAN TO WRITE ORDERS FOR ABX AND PREDNISONE AS SHE DOES NOT WANT TO BRING MS SKINNER OUT DUE TO THE WIDESPREAD FLU.    CALL BACK [phone]

## 2018-02-02 NOTE — TELEPHONE ENCOUNTER
"Dee was advised the patient needs to be seen before meds can be prescribed and explained we need to have a diagnosis to know how best to treat her and the only way of knowing that is by seeing her  She states her mother is refusing to go out due to \"getting more germs\"  She was advised she can come into our office this weekend to see the APRN and can put a mask on upon arriving  She was also advised to take her to ER if dyspnea gets worse  She voiced understanding  "

## 2018-02-19 NOTE — TELEPHONE ENCOUNTER
Patient 's daughter - Jacquie was advised rx is ready to  and contract needs to be updated  She voiced understanding

## 2018-04-02 NOTE — TELEPHONE ENCOUNTER
PATIENT DAUGHTER CALLED TO GET A REFILL ON PATIENT'S MEDICATION AND WOULD LIKE TO HAVE THE NURSE CALL THIS NUMBER [phone] TO CALL THE MEDICATION IN.

## 2018-04-17 NOTE — PROGRESS NOTES
"Avis Hazelood 88 y.o.  [facility]:Follow-up; Diabetes (Type II, eye exam 3/2018); Hypertension; Hyperlipidemia; and Gout    [place]: Follow-up; Diabetes (Type II, eye exam 3/2018); Hypertension; Hyperlipidemia; and Gout    Blood sugar is 136 and hgn a1c is 6.0%  No illness since colostomy  (Dr Gordon)  bp is good  Is following low fat diet   C/o urine cloudy   C/o occ passing stool despite colostomy  C/o site of colostomy- cannot see stoma due to it being low on abdomen  Prefer to change site herself but cannot see correctly to do this.  Discuss site and also discussed stoma nurse to trouble shoot with   Is on low fat diet   No recurrent gout     Allergies   Allergen Reactions   • Clarithromycin Anaphylaxis   • Macrobid [Nitrofurantoin Monohyd Macro] Swelling     Flash pulmonary edema   • Vioxx [Rofecoxib] Other (See Comments)     Affects kidney function   • Metoprolol Other (See Comments)     Severe hypotension   • Statins Myalgia     Weakness    • Tramadol Hallucinations     \"crazy out of her head\"       Current Outpatient Prescriptions:   •  albuterol (PROVENTIL HFA;VENTOLIN HFA) 108 (90 Base) MCG/ACT inhaler, Inhale 2 puffs Every 4 (Four) Hours As Needed for Wheezing., Disp: , Rfl:   •  albuterol (PROVENTIL) (2.5 MG/3ML) 0.083% nebulizer solution, USE 1 VIAL IN NEBULIZER EVERY 6 HOURS FOR WHEEZING Generic: VENTOLIN, Disp: 120 vial, Rfl: 10  •  allopurinol (ZYLOPRIM) 100 MG tablet, Take 200 mg by mouth Daily., Disp: , Rfl:   •  allopurinol (ZYLOPRIM) 100 MG tablet, TAKE 2 TABLETS BY MOUTH  DAILY, Disp: 180 tablet, Rfl: 1  •  allopurinol (ZYLOPRIM) 100 MG tablet, TAKE 2 TABLETS BY MOUTH  DAILY, Disp: 180 tablet, Rfl: 0  •  aspirin 81 MG tablet, Take 81 mg by mouth Daily. In evening, Disp: , Rfl:   •  Cholecalciferol (VITAMIN D3) 5000 UNITS tablet, Take 5,000 Units by mouth Daily., Disp: , Rfl:   •  clonazePAM (KlonoPIN) 1 MG tablet, TAKE 1 TABLET BY MOUTH TWICE A DAY FOR SIEZURE DISORDER, Disp: 60 tablet, Rfl: 3  •  " clopidogrel (PLAVIX) 75 MG tablet, Take 1 tablet by mouth Daily., Disp: 30 tablet, Rfl: 1  •  clotrimazole-betamethasone (LOTRISONE) 1-0.05 % lotion, Apply topically BID prn, Disp: 30 mL, Rfl: 3  •  colchicine 0.6 MG tablet, Take 0.6 mg by mouth Daily As Needed for Muscle / Joint Pain., Disp: , Rfl:   •  diphenhydrAMINE-acetaminophen (TYLENOL PM)  MG tablet per tablet, Take 1 tablet by mouth Every Night., Disp: , Rfl:   •  fluocinonide (LIDEX) 0.05 % external solution, Apply  topically 2 (two) times a day., Disp: 60 mL, Rfl: 3  •  furosemide (LASIX) 40 MG tablet, Take 1 tablet by mouth Daily., Disp: 90 tablet, Rfl: 1  •  HYDROcod Polst-CPM Polst ER (TUSSIONEX PENNKINETIC) 10-8 MG/5ML ER suspension, Take 5 mL by mouth Every Night., Disp: 115 mL, Rfl: 0  •  levETIRAcetam (KEPPRA) 750 MG tablet, TAKE 1 TABLET BY MOUTH TWO  TIMES DAILY, Disp: 180 tablet, Rfl: 0  •  lisinopril (PRINIVIL,ZESTRIL) 10 MG tablet, TAKE 1 TABLET BY MOUTH  DAILY, Disp: 90 tablet, Rfl: 0  •  Loratadine (CLARITIN) 10 MG capsule, Take 10 mg by mouth Daily As Needed (asthma)., Disp: , Rfl:   •  magnesium oxide (MAG-OX) 400 MG tablet, Take 400 mg by mouth Daily., Disp: , Rfl:   •  mirtazapine (REMERON) 15 MG tablet, Take 15 mg by mouth At Night As Needed., Disp: , Rfl:   •  Multiple Vitamins-Minerals (EYE HEALTH) capsule, Take 1 capsule by mouth Daily., Disp: , Rfl:   •  pantoprazole (PROTONIX) 40 MG EC tablet, TAKE 1 TABLET BY MOUTH  DAILY, Disp: 90 tablet, Rfl: 0  •  polyethylene glycol (MIRALAX) packet, Take 17 g by mouth Daily As Needed., Disp: , Rfl:   •  potassium chloride (MICRO-K) 10 MEQ CR capsule, TAKE 2 CAPSULES BY MOUTH  DAILY, Disp: 180 capsule, Rfl: 0  •  rosuvastatin (CRESTOR) 10 MG tablet, Take 1 tablet by mouth Every Night., Disp: 30 tablet, Rfl: 1  •  SYMBICORT 80-4.5 MCG/ACT inhaler, INHALE 1 PUFF TWICE DAILY, Disp: 20.4 g, Rfl: 2  •  vitamin B-12 (CYANOCOBALAMIN) 1000 MCG tablet, Take 1,000 mcg by mouth Daily., Disp: , Rfl:    Patient Active Problem List    Diagnosis   • Non-rheumatic mitral regurgitation-mod to severe by echo 11/17 [I34.0]   • Acute diastolic CHF  [I50.9]   • Left lower lobe pneumonia [J18.1]   • Acute respiratory failure with hypoxia [J96.01]   • NSTEMI (non-ST elevated myocardial infarction) [I21.4]   • Acute kidney injury [N17.9]   • ANDRIY (obstructive sleep apnea) [G47.33]   • Diabetes mellitus [E11.9]   • HCAP (healthcare-associated pneumonia) [J18.9]   • GERD (gastroesophageal reflux disease) [K21.9]   • Diastolic dysfunction [I51.9]   • Type 2 diabetes mellitus [E11.9]     Assessment & Plan Note:     Blood sugar and 90 day average sugar reviewed  Results for orders placed or performed in visit on 04/17/18   Comprehensive Metabolic Panel   Result Value Ref Range    Glucose 107 (H) 70 - 100 mg/dL    BUN 21 9 - 23 mg/dL    Creatinine 1.50 (H) 0.60 - 1.30 mg/dL    Sodium 140 132 - 146 mmol/L    Potassium 3.3 (L) 3.5 - 5.5 mmol/L    Chloride 98 (L) 99 - 109 mmol/L    CO2 32.0 (H) 20.0 - 31.0 mmol/L    Calcium 9.3 8.7 - 10.4 mg/dL    Total Protein 6.1 5.7 - 8.2 g/dL    Albumin 3.80 3.20 - 4.80 g/dL    ALT (SGPT) 9 7 - 40 U/L    AST (SGOT) 9 0 - 33 U/L    Alkaline Phosphatase 193 (H) 25 - 100 U/L    Total Bilirubin 0.5 0.3 - 1.2 mg/dL    eGFR Non African Amer 33 (L) >60 mL/min/1.73    Globulin 2.3 gm/dL    A/G Ratio 1.7 1.5 - 2.5 g/dL    BUN/Creatinine Ratio 14.0 7.0 - 25.0    Anion Gap 10.0 3.0 - 11.0 mmol/L   TSH   Result Value Ref Range    TSH 2.305 0.350 - 5.350 mIU/mL   T4, Free   Result Value Ref Range    Free T4 1.24 0.89 - 1.76 ng/dL   Lipid Panel   Result Value Ref Range    Total Cholesterol 212 (H) 0 - 200 mg/dL    Triglycerides 251 (H) 0 - 150 mg/dL    HDL Cholesterol 52 40 - 60 mg/dL    LDL Cholesterol  130 0 - 130 mg/dL   CBC Auto Differential   Result Value Ref Range    WBC 9.33 3.50 - 10.80 10*3/mm3    RBC 3.53 (L) 3.89 - 5.14 10*6/mm3    Hemoglobin 10.8 (L) 11.5 - 15.5 g/dL    Hematocrit 35.5 34.5 - 44.0  %    .6 (H) 80.0 - 99.0 fL    MCH 30.6 27.0 - 31.0 pg    MCHC 30.4 (L) 32.0 - 36.0 g/dL    RDW 14.7 (H) 11.3 - 14.5 %    RDW-SD 54.0 37.0 - 54.0 fl    MPV 10.3 6.0 - 12.0 fL    Platelets 425 150 - 450 10*3/mm3    Neutrophil % 64.2 41.0 - 71.0 %    Lymphocyte % 19.5 (L) 24.0 - 44.0 %    Monocyte % 10.4 0.0 - 12.0 %    Eosinophil % 5.0 (H) 0.0 - 3.0 %    Basophil % 0.6 0.0 - 1.0 %    Immature Grans % 0.3 0.0 - 0.6 %    Neutrophils, Absolute 5.98 1.50 - 8.30 10*3/mm3    Lymphocytes, Absolute 1.82 0.60 - 4.80 10*3/mm3    Monocytes, Absolute 0.97 0.00 - 1.00 10*3/mm3    Eosinophils, Absolute 0.47 (H) 0.00 - 0.30 10*3/mm3    Basophils, Absolute 0.06 0.00 - 0.20 10*3/mm3    Immature Grans, Absolute 0.03 0.00 - 0.03 10*3/mm3   Urinalysis With / Culture If Indicated - Urine, Clean Catch   Result Value Ref Range    Color, UA Yellow Yellow, Straw    Appearance, UA Turbid (A) Clear    pH, UA 5.5 5.0 - 8.0    Specific Gravity, UA 1.010 1.001 - 1.030    Glucose, UA Negative Negative    Ketones, UA Negative Negative    Bilirubin, UA Negative Negative    Blood, UA Moderate (2+) (A) Negative    Protein, UA Trace (A) Negative    Leuk Esterase, UA Large (3+) (A) Negative    Nitrite, UA Positive (A) Negative    Urobilinogen, UA 0.2 E.U./dL 0.2 - 1.0 E.U./dL   Scan Slide   Result Value Ref Range    RBC Morphology Normal Normal    WBC Morphology Normal Normal    Platelet Morphology Normal Normal   Urinalysis, Microscopic Only - Urine, Clean Catch   Result Value Ref Range    RBC, UA 3-6 (A) None Seen, 0-2 /HPF    WBC, UA Too Numerous to Count (A) None Seen, 0-2 /HPF    Bacteria, UA 4+ (A) None Seen, Trace /HPF    Squamous Epithelial Cells, UA 13-20 (A) None Seen, 0-2 /HPF    Hyaline Casts, UA Unable to determine due to loaded field 0 - 6 /LPF    Methodology Manual Light Microscopy    POC Glycosylated Hemoglobin (Hb A1C)   Result Value Ref Range    Hemoglobin A1C 6.0 %   POC Glucose Fingerstick   Result Value Ref Range    Glucose  136 (A) 70 - 130 mg/dL     Average sugar 120  Is up to date with eye exam  No neuropathy   Ur alb due next visit   F/u 3-4 months      • Colovesical fistula [N32.1]   • Weakness [R53.1]   • Complicated UTI (urinary tract infection) [N39.0]     Overview Note:     Chronic. Followed by Dr. Duque. Has been on low tabatha Cipro for suppression but last culture in June 2017 grew fluquinalone resistant E Coli and Enterococcus.      • Senile atrophic vaginitis [N95.2]     Overview Note:     Just started on topical premarin cream by Dr. Duque     • Acute cystitis with hematuria [N30.01]   • Recurrent pneumonia [J18.9]   • Acute on chronic Respiratory failure. Not requiring ventilatory support [J96.90]   • Acute on Chronic kidney disease (CKD), stage III (moderate) [N18.3]   • Exacerbation of COPD  [J44.9]   • H/O SSS (sick sinus syndrome) s/p PPM 2014 [I49.5]   • CHF (congestive heart failure) [I50.9]     Overview Note:     Hx of mild diastolic dysfunction.     • Anxiety and depression [F41.8]   • Macular degeneration [H35.30]   • ANDRIY on CPAP [G47.33, Z99.89]   • Seizure disorder on Keppra [G40.909]   • HTN and possible diastolic dysfunction [I10]   • HLD (hyperlipidemia) [E78.5]   • Obesity, BMI 33.7 [E66.9]   • Arthralgia of lumbar spine [M54.5]     Overview Note:     Impression: 03/03/2016 - using walker, needs bedside table (eating in lift chair)   continue therapy with exercises- has made alot of progress and cautioned her about need to continue  Impression: 10/12/2015 - see above, same;      • Encounter for eye exam [Z01.00]   • Parasites in stool [B82.9]   • UTI (urinary tract infection) [N39.0]     Overview Note:     Impression: 06/16/2015 - dip ua;      • Midline low back pain without sciatica [M54.5]   • Physical debility [R53.81]   • Lumbar strain [S39.012A]   • History of MI (myocardial infarction) [I25.2]   • Abnormal liver function tests [R79.89]     Overview Note:     Impression: 03/03/2016 - mild elevation- will  reassess next appt  Impression: 10/12/2015 - check cmp  Impression: 03/11/2015 - update lfts  Impression: 11/03/2014 - update lfts; Description: chronic  Impression: 03/03/2016 - mild elevation- will reassess next appt  Impression: 10/12/2015 - check cmp  Impression: 03/11/2015 - update lfts  Impression: 11/03/2014 - update lfts; Description: chronic     • Acute myocardial infarction [I21.9]     Overview Note:     Description: In Hospital for UTI- 7/14- high troponin  Description: In Hospital for UTI- 7/14- high troponin     • Anxiety [F41.9]   • Knee pain [M25.569]     Overview Note:     Impression: 10/12/2015 - s/p injection - shot 3 weeks ago  try ultram- rx provided;   Impression: 03/03/2016 - using walker, needs bedside table (eating in lift chair)   continue therapy with exercises- has made alot of progress and cautioned her about need to continue  Impression: 10/12/2015 - see above, same;      • Asthma [J45.909]     Overview Note:     Impression: 03/11/2015 - refill albuterol, symbicort  rx provided for tussinex for cough  Impression: 08/11/2014 - taper prednisone as tolerated;   Impression: 03/11/2015 - refill albuterol, symbicort  rx provided for tussinex for cough  Impression: 08/11/2014 - taper prednisone as tolerated;      • Cataract [H26.9]     Overview Note:     Description: Post cataract opacification 7/14 - opinion Dr Fuentes, referred Dr Rosado for laser     • Chronic kidney disease, stage III (moderate) [N18.3]     Overview Note:     Impression: 03/03/2016 - reassess 3-4 months- get lab hospitalization  Impression: 10/12/2015 - check cmp  Impression: 11/03/2014 - check cmp  Impression: 08/11/2014 - having lab work to f/u this on Thursday  Impression: 07/22/2014 - check cmp  Impression: 04/08/2014 - update creat.; Description: Alports dz 11/5  Impression: 03/03/2016 - reassess 3-4 months- get lab hospitalization  Impression: 10/12/2015 - check cmp  Impression: 11/03/2014 - check cmp  Impression:  08/11/2014 - having lab work to f/u this on Thursday  Impression: 07/22/2014 - check cmp  Impression: 04/08/2014 - update creat.; Description: Nafisa naidu 11/5     • Chronic obstructive pulmonary disease [J44.9]     Overview Note:     Impression: 01/13/2016 - Sent in order for lifetime supply of albuterol for nebulizer.; Description: severe  Impression: 01/13/2016 - Sent in order for lifetime supply of albuterol for nebulizer.; Description: severe     • 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 Recheck chest xr to make sure resolved.  Impression: 08/11/2014 - s/p MI- f/u Dr Thompson- difficult due to kidney function; Description: Ronaldo- stress test 7/7  echo mod mr  Impression: 01/13/2016 - Mild CHF seen on cxr during admission. Recheck chest xr to make sure resolved.  Impression: 08/11/2014 - s/p MI- f/u Dr Thompson- difficult due to kidney function; Description: Ronaldo- stress test 7/7  echo mod mr     • Atherosclerosis of coronary artery [I25.10]   • Cough [R05]     Overview Note:     Impression: 03/03/2016 - refill tussionex, call if persistant - h/o recurrent pneumonia but currently clear - cough at night, discussed eating early, no late snacks, keep hob elevated, no productive cough, no fever or chills  Impression: 03/11/2015 - s/p influenza A with known chronic asthma  treated with tamiflu, ceftin and prednisone  oxygen level is low;   Impression: 03/03/2016 - refill tussionex, call if persistant - h/o recurrent pneumonia but currently clear - cough at night, discussed eating early, no late snacks, keep hob elevated, no productive cough, no fever or chills  Impression: 03/11/2015 - s/p influenza A with known chronic asthma  treated with tamiflu, ceftin and prednisone  oxygen level is low;      •  Depression [F32.9]   • Diabetes mellitus [E11.9]     Overview Note:     Impression: 03/03/2016 - blood sugar 107 and hgn a1c 6.2%   is overdue for eye exam- macular degen getting injections   ur alb due 6/15   neuropathy and foot care stable, no changes- is wearing diabetic shoes  Impression: 10/12/2015 - check cmp, hgn a1c discussed with her- good control - blood sugar 84, hgn a1c 6.3%  is due eye exam   no foot lesion   no callus or ulcer  ur alb due  3/16  Impression: 06/16/2015 - blood sugar is 113, hgn a1c 6.4%  check ur alb today  Impression: 03/11/2015 - check hgn a1c  Impression: 11/03/2014 - excellent blood sugar control - a1c 6.1  reviewed with patient, is up to date with eye exam  neuropathy stable  update ur alb next ov  Impression: 08/11/2014 - continue to monitor sugars, taper prednisone  Impression: 07/22/2014 - blood sugar 115, hgn a1c 6.3% (average 130 or so)  is up to date with eye exam, no neuropathy  ur alb due but has uti  Impression: 04/08/2014 - blood sugar 148, hgn a1c 6.4% (average 135)  is up to date with eye exam, no neuropathy, due ur alb.    discussed blood sugar and goals for a1c.  No severe low blood sugars.;   A1C 6.4 6/28/2016  Impression: 03/03/2016 - blood sugar 107 and hgn a1c 6.2%   is overdue for eye exam- macular degen getting injections   ur alb due 6/15   neuropathy and foot care stable, no changes- is wearing diabetic shoes  Impression: 10/12/2015 - check cmp, hgn a1c discussed with her- good control - blood sugar 84, hgn a1c 6.3%  is due eye exam   no foot lesion   no callus or ulcer  ur alb due  3/16  Impression: 06/16/2015 - blood sugar is 113, hgn a1c 6.4%  check ur alb today  Impression: 03/11/2015 - check hgn a1c  Impression: 11/03/2014 - excellent blood sugar control - a1c 6.1  reviewed with patient, is up to date with eye exam  neuropathy stable  update ur alb next ov  Impression: 08/11/2014 - continue to monitor sugars, taper prednisone  Impression: 07/22/2014 -  blood sugar 115, hgn a1c 6.3% (average 130 or so)  is up to date with eye exam, no neuropathy  ur alb due but has uti  Impression: 04/08/2014 - blood sugar 148, hgn a1c 6.4% (average 135)  is up to date with eye exam, no neuropathy, due ur alb.    discussed blood sugar and goals for a1c.  No severe low blood sugars.;      • Dizziness [R42]   • Dysuria [R30.0]   • Edema [R60.9]   • Gastroesophageal reflux disease [K21.9]   • Primary hypertriglyceridemia [E78.1]     Overview Note:     Impression: 03/03/2016 - check at next visit fasting  Impression: 10/12/2015 - check flp  Impression: 03/11/2015 - check flp  Impression: 04/08/2014 - check flp;   Impression: 03/03/2016 - check at next visit fasting  Impression: 10/12/2015 - check flp  Impression: 03/11/2015 - check flp  Impression: 04/08/2014 - check flp;        Assessment & Plan Note:     Check flp      • Fatigue [R53.83]   • Fracture of patella [S82.009A]   • Gout [M10.9]   • Headache [R51]   • Hyperlipidemia [E78.5]     Overview Note:     Impression: 10/12/2015 - check flp  Impression: 03/11/2015 - check flp  Impression: 11/03/2014 - check flp  Impression: 08/11/2014 - refill atorvastatin- check lab from hosp.  Impression: 07/22/2014 - update flp  Impression: 04/08/2014 - check flp;   Impression: 10/12/2015 - check flp  Impression: 03/11/2015 - check flp  Impression: 11/03/2014 - check flp  Impression: 08/11/2014 - refill atorvastatin- check lab from hosp.  Impression: 07/22/2014 - update flp  Impression: 04/08/2014 - check flp;        Assessment & Plan Note:     Check flp      • Hypertension [I10]     Overview Note:     Impression: 03/03/2016 - bp is good  Impression: 10/12/2015 - bp is better with recheck  Impression: 06/16/2015 - bp is good  Impression: 03/11/2015 - bp is good today  Impression: 11/03/2014 - bp is good today- recently reduced bp med due to low bp and reduced diuretic due to impairing kidney function  Impression: 08/11/2014 - bp is good   Impression: 07/22/2014 - bp is good  Impression: 04/08/2014 - bp is good;   Impression: 03/03/2016 - bp is good  Impression: 10/12/2015 - bp is better with recheck  Impression: 06/16/2015 - bp is good  Impression: 03/11/2015 - bp is good today  Impression: 11/03/2014 - bp is good today- recently reduced bp med due to low bp and reduced diuretic due to impairing kidney function  Impression: 08/11/2014 - bp is good  Impression: 07/22/2014 - bp is good  Impression: 04/08/2014 - bp is good;        Assessment & Plan Note:     bp is well controlled today - continue current medications      • Influenza due to influenza A virus [J10.1]     Overview Note:     Impression: 03/02/2015 - rapid influenza screen pos for influenza a  given o2 sat 86% in office, prior h/o pneumonia and baseline asthma now associated with high fever and confusioin I would recommend she be referred to hospital for further treatment.  Dr Lambert on call- discussed with him and will refer to admission;   Impression: 03/02/2015 - rapid influenza screen pos for influenza a  given o2 sat 86% in office, prior h/o pneumonia and baseline asthma now associated with high fever and confusioin I would recommend she be referred to hospital for further treatment.  Dr Lambert on call- discussed with him and will refer to admission;      • Insomnia [G47.00]   • Leukocytosis [D72.829]   • Macrocytosis [D75.89]     Overview Note:     Impression: 11/03/2014 - check b12 levels; Description: on ppi and carafate     • Macular degeneration [H35.30]   • Menopause present [Z78.0]   • Night sweats [R61]     Overview Note:     Impression: 07/22/2014 - has uti (rx provided)  culture obtained;   Impression: 07/22/2014 - has uti (rx provided)  culture obtained;      • Obstructive sleep apnea syndrome [G47.33]     Overview Note:     Impression: 03/03/2016 - significant improvement with new mask, cpap    continue current therapy.  Impression: 01/13/2016 - Faxed respiratory therapy  order to [facility]. They will send order with recommendations based on evaluation.;   Impression: 03/03/2016 - significant improvement with new mask, cpap    continue current therapy.  Impression: 01/13/2016 - Faxed respiratory therapy order to [facility]. They will send order with recommendations based on evaluation.;      • Osteoporosis [M81.0]     Overview Note:     Description: 8/5  Description: 8/5     • Peripheral neuropathy [G62.9]     Assessment & Plan Note:     Without callus or ulcer   Foot care discussed      • Pneumonia [J18.9]     Overview Note:     Impression: 03/03/2016 - recurrent x 5, has had swallowing study and is employing techniques advised, using symbicort  Impression: 01/13/2016 - Recheck chest xray. Completed doxycycline and steroid taper.;   Impression: 03/03/2016 - recurrent x 5, has had swallowing study and is employing techniques advised, using symbicort  Impression: 01/13/2016 - Recheck chest xray. Completed doxycycline and steroid taper.;      • Seizure disorder [G40.909]   • Sick sinus syndrome [I49.5]     Overview Note:     Description: 1/15- [employer] pacemaker interrogation     • Sinus bradycardia [R00.1]     Overview Note:     Description: [facility] eval 7/10, s/p pacemaker 7/14 [facility]  Description: [facility] eval 7/10, s/p pacemaker 7/14 [facility]     • Thrombophlebitis [I80.9]     Overview Note:     Description: superficial  Description: superficial     • Cobalamin deficiency [E53.8]     Overview Note:     Impression: 10/12/2015 - normal levels last check  Impression: 03/11/2015 - vitamin b12 levels;   Impression: 10/12/2015 - normal levels last check  Impression: 03/11/2015 - vitamin b12 levels;      • Vitamin D deficiency [E55.9]     Overview Note:     Impression: 03/11/2015 - check vitamin D levels  Impression: 11/03/2014 - update vitamin d levels- daughter just restarted supplement  Impression: 04/08/2014 - check vitamin D levels;   Impression: 03/11/2015 - check vitamin D levels   Impression: 11/03/2014 - update vitamin d levels- daughter just restarted supplement  Impression: 04/08/2014 - check vitamin D levels;        Assessment & Plan Note:     Update levels        Review of Systems   Constitutional: Negative for activity change, appetite change, chills, diaphoresis, fatigue, fever and unexpected weight change.   HENT: Negative for congestion, dental problem, drooling, ear discharge, ear pain, facial swelling, hearing loss, mouth sores, nosebleeds, postnasal drip, rhinorrhea, sinus pressure, sneezing, sore throat, tinnitus, trouble swallowing and voice change.    Eyes: Negative for photophobia, pain, discharge, redness, itching and visual disturbance.   Respiratory: Negative for apnea, cough, choking, chest tightness, shortness of breath, wheezing and stridor.    Cardiovascular: Negative for chest pain, palpitations and leg swelling.   Gastrointestinal: Negative for abdominal distention, abdominal pain, anal bleeding, blood in stool, constipation, diarrhea, nausea, rectal pain and vomiting.   Endocrine: Negative for cold intolerance, heat intolerance, polydipsia, polyphagia and polyuria.   Genitourinary: Negative for decreased urine volume, difficulty urinating, dysuria, enuresis, flank pain, frequency, genital sores, hematuria and urgency.        Cloudy urine    Musculoskeletal: Negative for arthralgias, back pain, gait problem, joint swelling, myalgias, neck pain and neck stiffness.   Skin: Negative for color change, pallor, rash and wound.   Allergic/Immunologic: Negative for environmental allergies, food allergies and immunocompromised state.   Neurological: Negative for dizziness, tremors, seizures, syncope, facial asymmetry, speech difficulty, weakness, light-headedness, numbness and headaches.   Hematological: Negative for adenopathy. Does not bruise/bleed easily.   Psychiatric/Behavioral: Negative for agitation, behavioral problems, confusion, decreased concentration, dysphoric  "mood, hallucinations, self-injury, sleep disturbance and suicidal ideas. The patient is not nervous/anxious and is not hyperactive.      Social History     Social History   • Marital status:      Spouse name: N/A   • Number of children: 2   • Years of education: N/A     Occupational History   • Not on file.     Social History Main Topics   • Smoking status: Never Smoker   • Smokeless tobacco: Never Used   • Alcohol use No   • Drug use: No   • Sexual activity: Defer     Other Topics Concern   • Not on file     Social History Narrative    The patient lives with her children.  Requires help with all ADL's and prepare meals, grocery shopping,  medications.     Family History   Problem Relation Age of Onset   • Heart disease Father    • Heart disease Paternal Grandmother    • Heart disease Paternal Grandfather    • Cancer Mother    • Stomach cancer Other      Family history     /64   Pulse 68   Ht 167.6 cm (66\")   Wt 92.1 kg (203 lb)   SpO2 94%   BMI 32.77 kg/m²   Physical Exam   Constitutional: She is oriented to person, place, and time. She appears well-developed and well-nourished.   HENT:   Head: Normocephalic and atraumatic.   Nose: Nose normal.   Mouth/Throat: Oropharynx is clear and moist.   Eyes: Conjunctivae, EOM and lids are normal. Pupils are equal, round, and reactive to light.   Neck: Trachea normal and normal range of motion. Neck supple. Carotid bruit is not present. No tracheal deviation present. No thyroid mass and no thyromegaly present.   Cardiovascular: Normal rate, regular rhythm, normal heart sounds and intact distal pulses.  Exam reveals no gallop and no friction rub.    No murmur heard.  Pulmonary/Chest: Effort normal and breath sounds normal. No respiratory distress. She has no wheezes.   Musculoskeletal: Normal range of motion. She exhibits no edema or deformity.    Avis had a diabetic foot exam performed today.   During the foot exam she had a monofilament test performed.    " Neurological Sensory Findings - Unaltered hot/cold right ankle/foot discrimination and unaltered hot/cold left ankle/foot discrimination. Unaltered sharp/dull right ankle/foot discrimination and unaltered sharp/dull left ankle/foot discrimination. No right ankle/foot altered proprioception and no left ankle/foot altered proprioception  Vascular Status -  Her right foot exhibits normal foot vasculature  and no edema. Her left foot exhibits normal foot vasculature  and no edema.  Skin Integrity  -  Her right foot skin is intact.Her left foot skin is intact..  Lymphadenopathy:     She has no cervical adenopathy.   Neurological: She is alert and oriented to person, place, and time. She has normal reflexes. She displays normal reflexes. No cranial nerve deficit.   Skin: Skin is warm and dry. No rash noted. No cyanosis or erythema. Nails show no clubbing.   Psychiatric: She has a normal mood and affect. Her speech is normal and behavior is normal. Judgment and thought content normal. Cognition and memory are normal.   Nursing note and vitals reviewed.    Results for orders placed or performed in visit on 04/17/18   Comprehensive Metabolic Panel   Result Value Ref Range    Glucose 107 (H) 70 - 100 mg/dL    BUN 21 9 - 23 mg/dL    Creatinine 1.50 (H) 0.60 - 1.30 mg/dL    Sodium 140 132 - 146 mmol/L    Potassium 3.3 (L) 3.5 - 5.5 mmol/L    Chloride 98 (L) 99 - 109 mmol/L    CO2 32.0 (H) 20.0 - 31.0 mmol/L    Calcium 9.3 8.7 - 10.4 mg/dL    Total Protein 6.1 5.7 - 8.2 g/dL    Albumin 3.80 3.20 - 4.80 g/dL    ALT (SGPT) 9 7 - 40 U/L    AST (SGOT) 9 0 - 33 U/L    Alkaline Phosphatase 193 (H) 25 - 100 U/L    Total Bilirubin 0.5 0.3 - 1.2 mg/dL    eGFR Non African Amer 33 (L) >60 mL/min/1.73    Globulin 2.3 gm/dL    A/G Ratio 1.7 1.5 - 2.5 g/dL    BUN/Creatinine Ratio 14.0 7.0 - 25.0    Anion Gap 10.0 3.0 - 11.0 mmol/L   TSH   Result Value Ref Range    TSH 2.305 0.350 - 5.350 mIU/mL   T4, Free   Result Value Ref Range    Free T4  1.24 0.89 - 1.76 ng/dL   Lipid Panel   Result Value Ref Range    Total Cholesterol 212 (H) 0 - 200 mg/dL    Triglycerides 251 (H) 0 - 150 mg/dL    HDL Cholesterol 52 40 - 60 mg/dL    LDL Cholesterol  130 0 - 130 mg/dL   CBC Auto Differential   Result Value Ref Range    WBC 9.33 3.50 - 10.80 10*3/mm3    RBC 3.53 (L) 3.89 - 5.14 10*6/mm3    Hemoglobin 10.8 (L) 11.5 - 15.5 g/dL    Hematocrit 35.5 34.5 - 44.0 %    .6 (H) 80.0 - 99.0 fL    MCH 30.6 27.0 - 31.0 pg    MCHC 30.4 (L) 32.0 - 36.0 g/dL    RDW 14.7 (H) 11.3 - 14.5 %    RDW-SD 54.0 37.0 - 54.0 fl    MPV 10.3 6.0 - 12.0 fL    Platelets 425 150 - 450 10*3/mm3    Neutrophil % 64.2 41.0 - 71.0 %    Lymphocyte % 19.5 (L) 24.0 - 44.0 %    Monocyte % 10.4 0.0 - 12.0 %    Eosinophil % 5.0 (H) 0.0 - 3.0 %    Basophil % 0.6 0.0 - 1.0 %    Immature Grans % 0.3 0.0 - 0.6 %    Neutrophils, Absolute 5.98 1.50 - 8.30 10*3/mm3    Lymphocytes, Absolute 1.82 0.60 - 4.80 10*3/mm3    Monocytes, Absolute 0.97 0.00 - 1.00 10*3/mm3    Eosinophils, Absolute 0.47 (H) 0.00 - 0.30 10*3/mm3    Basophils, Absolute 0.06 0.00 - 0.20 10*3/mm3    Immature Grans, Absolute 0.03 0.00 - 0.03 10*3/mm3   Urinalysis With / Culture If Indicated - Urine, Clean Catch   Result Value Ref Range    Color, UA Yellow Yellow, Straw    Appearance, UA Turbid (A) Clear    pH, UA 5.5 5.0 - 8.0    Specific Gravity, UA 1.010 1.001 - 1.030    Glucose, UA Negative Negative    Ketones, UA Negative Negative    Bilirubin, UA Negative Negative    Blood, UA Moderate (2+) (A) Negative    Protein, UA Trace (A) Negative    Leuk Esterase, UA Large (3+) (A) Negative    Nitrite, UA Positive (A) Negative    Urobilinogen, UA 0.2 E.U./dL 0.2 - 1.0 E.U./dL   Scan Slide   Result Value Ref Range    RBC Morphology Normal Normal    WBC Morphology Normal Normal    Platelet Morphology Normal Normal   Urinalysis, Microscopic Only - Urine, Clean Catch   Result Value Ref Range    RBC, UA 3-6 (A) None Seen, 0-2 /HPF    WBC, UA Too  Numerous to Count (A) None Seen, 0-2 /HPF    Bacteria, UA 4+ (A) None Seen, Trace /HPF    Squamous Epithelial Cells, UA 13-20 (A) None Seen, 0-2 /HPF    Hyaline Casts, UA Unable to determine due to loaded field 0 - 6 /LPF    Methodology Manual Light Microscopy    POC Glycosylated Hemoglobin (Hb A1C)   Result Value Ref Range    Hemoglobin A1C 6.0 %   POC Glucose Fingerstick   Result Value Ref Range    Glucose 136 (A) 70 - 130 mg/dL     Avis was seen today for follow-up, diabetes, hypertension, hyperlipidemia and gout.    Diagnoses and all orders for this visit:    Type 2 diabetes mellitus without complication, unspecified long term insulin use status  -     POC Glycosylated Hemoglobin (Hb A1C)  -     POC Glucose Fingerstick  -     Comprehensive Metabolic Panel  -     CBC & Differential  -     TSH  -     T4, Free  -     Lipid Panel  -     Cancel: Urinalysis With / Culture If Indicated - Urine, Clean Catch  -     CBC Auto Differential  -     Urinalysis With / Culture If Indicated - Urine, Clean Catch; Future  -     Urinalysis With / Culture If Indicated - Urine, Clean Catch  -     Scan Slide; Future  -     Scan Slide  -     Urinalysis, Microscopic Only - Urine, Clean Catch; Future  -     Urinalysis, Microscopic Only - Urine, Clean Catch  -     Urine Culture - Urine, Urine, Clean Catch; Future  -     Urine Culture - Urine, Urine, Clean Catch    Primary hypertriglyceridemia    Essential hypertension    Vitamin D deficiency    Mixed hyperlipidemia    Other polyneuropathy      Problem List Items Addressed This Visit        Cardiovascular and Mediastinum    Primary hypertriglyceridemia     Check flp          Hypertension     bp is well controlled today - continue current medications          Hyperlipidemia     Check flp             Digestive    Vitamin D deficiency     Update levels             Endocrine    Type 2 diabetes mellitus - Primary     Blood sugar and 90 day average sugar reviewed  Results for orders placed or  performed in visit on 04/17/18   Comprehensive Metabolic Panel   Result Value Ref Range    Glucose 107 (H) 70 - 100 mg/dL    BUN 21 9 - 23 mg/dL    Creatinine 1.50 (H) 0.60 - 1.30 mg/dL    Sodium 140 132 - 146 mmol/L    Potassium 3.3 (L) 3.5 - 5.5 mmol/L    Chloride 98 (L) 99 - 109 mmol/L    CO2 32.0 (H) 20.0 - 31.0 mmol/L    Calcium 9.3 8.7 - 10.4 mg/dL    Total Protein 6.1 5.7 - 8.2 g/dL    Albumin 3.80 3.20 - 4.80 g/dL    ALT (SGPT) 9 7 - 40 U/L    AST (SGOT) 9 0 - 33 U/L    Alkaline Phosphatase 193 (H) 25 - 100 U/L    Total Bilirubin 0.5 0.3 - 1.2 mg/dL    eGFR Non African Amer 33 (L) >60 mL/min/1.73    Globulin 2.3 gm/dL    A/G Ratio 1.7 1.5 - 2.5 g/dL    BUN/Creatinine Ratio 14.0 7.0 - 25.0    Anion Gap 10.0 3.0 - 11.0 mmol/L   TSH   Result Value Ref Range    TSH 2.305 0.350 - 5.350 mIU/mL   T4, Free   Result Value Ref Range    Free T4 1.24 0.89 - 1.76 ng/dL   Lipid Panel   Result Value Ref Range    Total Cholesterol 212 (H) 0 - 200 mg/dL    Triglycerides 251 (H) 0 - 150 mg/dL    HDL Cholesterol 52 40 - 60 mg/dL    LDL Cholesterol  130 0 - 130 mg/dL   CBC Auto Differential   Result Value Ref Range    WBC 9.33 3.50 - 10.80 10*3/mm3    RBC 3.53 (L) 3.89 - 5.14 10*6/mm3    Hemoglobin 10.8 (L) 11.5 - 15.5 g/dL    Hematocrit 35.5 34.5 - 44.0 %    .6 (H) 80.0 - 99.0 fL    MCH 30.6 27.0 - 31.0 pg    MCHC 30.4 (L) 32.0 - 36.0 g/dL    RDW 14.7 (H) 11.3 - 14.5 %    RDW-SD 54.0 37.0 - 54.0 fl    MPV 10.3 6.0 - 12.0 fL    Platelets 425 150 - 450 10*3/mm3    Neutrophil % 64.2 41.0 - 71.0 %    Lymphocyte % 19.5 (L) 24.0 - 44.0 %    Monocyte % 10.4 0.0 - 12.0 %    Eosinophil % 5.0 (H) 0.0 - 3.0 %    Basophil % 0.6 0.0 - 1.0 %    Immature Grans % 0.3 0.0 - 0.6 %    Neutrophils, Absolute 5.98 1.50 - 8.30 10*3/mm3    Lymphocytes, Absolute 1.82 0.60 - 4.80 10*3/mm3    Monocytes, Absolute 0.97 0.00 - 1.00 10*3/mm3    Eosinophils, Absolute 0.47 (H) 0.00 - 0.30 10*3/mm3    Basophils, Absolute 0.06 0.00 - 0.20 10*3/mm3     Immature Grans, Absolute 0.03 0.00 - 0.03 10*3/mm3   Urinalysis With / Culture If Indicated - Urine, Clean Catch   Result Value Ref Range    Color, UA Yellow Yellow, Straw    Appearance, UA Turbid (A) Clear    pH, UA 5.5 5.0 - 8.0    Specific Gravity, UA 1.010 1.001 - 1.030    Glucose, UA Negative Negative    Ketones, UA Negative Negative    Bilirubin, UA Negative Negative    Blood, UA Moderate (2+) (A) Negative    Protein, UA Trace (A) Negative    Leuk Esterase, UA Large (3+) (A) Negative    Nitrite, UA Positive (A) Negative    Urobilinogen, UA 0.2 E.U./dL 0.2 - 1.0 E.U./dL   Scan Slide   Result Value Ref Range    RBC Morphology Normal Normal    WBC Morphology Normal Normal    Platelet Morphology Normal Normal   Urinalysis, Microscopic Only - Urine, Clean Catch   Result Value Ref Range    RBC, UA 3-6 (A) None Seen, 0-2 /HPF    WBC, UA Too Numerous to Count (A) None Seen, 0-2 /HPF    Bacteria, UA 4+ (A) None Seen, Trace /HPF    Squamous Epithelial Cells, UA 13-20 (A) None Seen, 0-2 /HPF    Hyaline Casts, UA Unable to determine due to loaded field 0 - 6 /LPF    Methodology Manual Light Microscopy    POC Glycosylated Hemoglobin (Hb A1C)   Result Value Ref Range    Hemoglobin A1C 6.0 %   POC Glucose Fingerstick   Result Value Ref Range    Glucose 136 (A) 70 - 130 mg/dL     Average sugar 120  Is up to date with eye exam  No neuropathy   Ur alb due next visit   F/u 3-4 months          Relevant Orders    POC Glycosylated Hemoglobin (Hb A1C) (Completed)    POC Glucose Fingerstick (Completed)    Comprehensive Metabolic Panel (Completed)    CBC & Differential (Completed)    TSH (Completed)    T4, Free (Completed)    Lipid Panel (Completed)    CBC Auto Differential (Completed)    Urinalysis With / Culture If Indicated - Urine, Clean Catch (Completed)    Scan Slide (Completed)    Urinalysis, Microscopic Only - Urine, Clean Catch (Completed)    Urine Culture - Urine, Urine, Clean Catch       Nervous and Auditory    Peripheral  neuropathy     Without callus or ulcer   Foot care discussed            Other Visit Diagnoses    None.       Return in about 4 months (around 8/17/2018) for Recheck 30 min .    Carol Herzog MD  Signed Carol Herzog MD

## 2018-04-17 NOTE — TELEPHONE ENCOUNTER
PT WAS SEEN EARLIER BY DR. SRINIVASAN AND COULDN'T REMEMBER THE NAME OF THE MEDICATION THAT SHE NEEDED CALLED IN.     MEDICATION IS: CLOTRIMAZOLE/ BETA.. GENERIC FOR LOTRISONE    PLEASE ADVISE AND SEND TO [facility]. THANKS.

## 2018-04-18 NOTE — ASSESSMENT & PLAN NOTE
Blood sugar and 90 day average sugar reviewed  Results for orders placed or performed in visit on 04/17/18   Comprehensive Metabolic Panel   Result Value Ref Range    Glucose 107 (H) 70 - 100 mg/dL    BUN 21 9 - 23 mg/dL    Creatinine 1.50 (H) 0.60 - 1.30 mg/dL    Sodium 140 132 - 146 mmol/L    Potassium 3.3 (L) 3.5 - 5.5 mmol/L    Chloride 98 (L) 99 - 109 mmol/L    CO2 32.0 (H) 20.0 - 31.0 mmol/L    Calcium 9.3 8.7 - 10.4 mg/dL    Total Protein 6.1 5.7 - 8.2 g/dL    Albumin 3.80 3.20 - 4.80 g/dL    ALT (SGPT) 9 7 - 40 U/L    AST (SGOT) 9 0 - 33 U/L    Alkaline Phosphatase 193 (H) 25 - 100 U/L    Total Bilirubin 0.5 0.3 - 1.2 mg/dL    eGFR Non African Amer 33 (L) >60 mL/min/1.73    Globulin 2.3 gm/dL    A/G Ratio 1.7 1.5 - 2.5 g/dL    BUN/Creatinine Ratio 14.0 7.0 - 25.0    Anion Gap 10.0 3.0 - 11.0 mmol/L   TSH   Result Value Ref Range    TSH 2.305 0.350 - 5.350 mIU/mL   T4, Free   Result Value Ref Range    Free T4 1.24 0.89 - 1.76 ng/dL   Lipid Panel   Result Value Ref Range    Total Cholesterol 212 (H) 0 - 200 mg/dL    Triglycerides 251 (H) 0 - 150 mg/dL    HDL Cholesterol 52 40 - 60 mg/dL    LDL Cholesterol  130 0 - 130 mg/dL   CBC Auto Differential   Result Value Ref Range    WBC 9.33 3.50 - 10.80 10*3/mm3    RBC 3.53 (L) 3.89 - 5.14 10*6/mm3    Hemoglobin 10.8 (L) 11.5 - 15.5 g/dL    Hematocrit 35.5 34.5 - 44.0 %    .6 (H) 80.0 - 99.0 fL    MCH 30.6 27.0 - 31.0 pg    MCHC 30.4 (L) 32.0 - 36.0 g/dL    RDW 14.7 (H) 11.3 - 14.5 %    RDW-SD 54.0 37.0 - 54.0 fl    MPV 10.3 6.0 - 12.0 fL    Platelets 425 150 - 450 10*3/mm3    Neutrophil % 64.2 41.0 - 71.0 %    Lymphocyte % 19.5 (L) 24.0 - 44.0 %    Monocyte % 10.4 0.0 - 12.0 %    Eosinophil % 5.0 (H) 0.0 - 3.0 %    Basophil % 0.6 0.0 - 1.0 %    Immature Grans % 0.3 0.0 - 0.6 %    Neutrophils, Absolute 5.98 1.50 - 8.30 10*3/mm3    Lymphocytes, Absolute 1.82 0.60 - 4.80 10*3/mm3    Monocytes, Absolute 0.97 0.00 - 1.00 10*3/mm3    Eosinophils, Absolute 0.47 (H)  0.00 - 0.30 10*3/mm3    Basophils, Absolute 0.06 0.00 - 0.20 10*3/mm3    Immature Grans, Absolute 0.03 0.00 - 0.03 10*3/mm3   Urinalysis With / Culture If Indicated - Urine, Clean Catch   Result Value Ref Range    Color, UA Yellow Yellow, Straw    Appearance, UA Turbid (A) Clear    pH, UA 5.5 5.0 - 8.0    Specific Gravity, UA 1.010 1.001 - 1.030    Glucose, UA Negative Negative    Ketones, UA Negative Negative    Bilirubin, UA Negative Negative    Blood, UA Moderate (2+) (A) Negative    Protein, UA Trace (A) Negative    Leuk Esterase, UA Large (3+) (A) Negative    Nitrite, UA Positive (A) Negative    Urobilinogen, UA 0.2 E.U./dL 0.2 - 1.0 E.U./dL   Scan Slide   Result Value Ref Range    RBC Morphology Normal Normal    WBC Morphology Normal Normal    Platelet Morphology Normal Normal   Urinalysis, Microscopic Only - Urine, Clean Catch   Result Value Ref Range    RBC, UA 3-6 (A) None Seen, 0-2 /HPF    WBC, UA Too Numerous to Count (A) None Seen, 0-2 /HPF    Bacteria, UA 4+ (A) None Seen, Trace /HPF    Squamous Epithelial Cells, UA 13-20 (A) None Seen, 0-2 /HPF    Hyaline Casts, UA Unable to determine due to loaded field 0 - 6 /LPF    Methodology Manual Light Microscopy    POC Glycosylated Hemoglobin (Hb A1C)   Result Value Ref Range    Hemoglobin A1C 6.0 %   POC Glucose Fingerstick   Result Value Ref Range    Glucose 136 (A) 70 - 130 mg/dL     Average sugar 120  Is up to date with eye exam  No neuropathy   Ur alb due next visit   F/u 3-4 months

## 2018-04-23 NOTE — TELEPHONE ENCOUNTER
Dee martinez pt's daughter called and wants Kenia to call [facility] [phone] to get all information needed for potassium in a capsule so that pt can swallow it.    Please call dee back [phone]

## 2018-04-26 NOTE — TELEPHONE ENCOUNTER
PA form was received, completed and faxed back today for the potassium capsules (capsules only) and we will await a response

## 2018-05-11 NOTE — TELEPHONE ENCOUNTER
VO from Dr Herzog:  OK for her to bring by UA specimen due to urine urgency, frequency and urine looks cloudy  Bell was advised and cups placed at  to be picked up

## 2018-05-11 NOTE — TELEPHONE ENCOUNTER
Jacquie was advised we cannot just order a UA and pt needs to be seen to have the order and then rx if needed.  She would not schedule the available OV this afternoon at 5:45 but still wanted to ask Dr Herzog if she can just bring in a specimen

## 2018-05-11 NOTE — TELEPHONE ENCOUNTER
KARINA CALLED TODAY TO REQUEST A REPEAT UA FOR MS SKINNER. MS SKINNER HAS REPORTED TO HER THAT SHE FEELS SHE IS STILL EXPERIENCING UTI SX AFTER TAKING ABX. FIRST AVAILABLE APPT WITH [facility] WAS OFFERED AND DECLINED AS THE APPT WASN'T UNTIL 5:45 THIS AFTERNOON AND KARINA STATED THAT WAS TOO LATE TO HAVE MS SKINNER OUT.            [phone]

## 2018-05-12 NOTE — TELEPHONE ENCOUNTER
I was unaware of urine being dropped off yesterday and in container up front, I am unsure when this was dropped off so UA was not done until today when urine cup was noticed... Not sure if this affects test or not.     UA showed  S.015  PH: 5  DARIEN:500  NIT:pos  PRO: TR  GLU:norm  KET:neg  UBG:norm  ED:neg  BLD:50    I sent urine for culture.

## 2018-05-13 NOTE — TELEPHONE ENCOUNTER
Thanks gloria CRISOSTOMO sent antibiotic to pharmacy.   Thanks, [facility]

## 2018-05-14 NOTE — TELEPHONE ENCOUNTER
I lmom to advise Jacquie (her daughter) that pt does have a UTI and antibiotic was sent yesterday however culture received today showed first antibiotic was resistent so another one was sent today  Advised Jacquie to call back if she has any questions

## 2018-06-06 NOTE — TELEPHONE ENCOUNTER
PATIENT DAUGHTER STATES THAT PATIENT NEEDS A REFILL ON HER PROAIR INHALER AND WOULD LIKE FOR ARIANA OR DR SRINIVASAN TO CALL [facility] TO GET THE MEDICATION FILLED. [facility] IS [phone] IF THERE IS ANY QUESTIONS OR CONCERNS THE PT'S DAUGHTER CAN BE REACHED [phone]

## 2018-06-07 NOTE — TELEPHONE ENCOUNTER
Ok to send proair 1-2 puffs every 4-6 hours prn #1 inhaler with 3 refills  Prn is wheezing/ shortness of breath

## 2018-06-19 NOTE — TELEPHONE ENCOUNTER
[facility] HAS INFORMED PATIENT THAT THE INSURANCE COMPANY WILL NOT COVER THE PRO AIR. SHE NEEDS A  FORMULARY EXEMPTION FILLED OUT OR SHE NEEDS A DIFFERENT IN HALER PRESCRIBED TO HER. SHE IS ALMOST OUT.

## 2018-06-20 NOTE — TELEPHONE ENCOUNTER
PATIENT'S DAUGHTER CALLED BACK TO INFORM DR SRINIVASAN THAT THE PATIENT'S INSURANCE COMPANY WOULD LIKE FOR THE PATIENT TO TRY BREO INSTEAD OF THE PROAIR INHALER. IF THERE IS ANY QUESTIONS OR CONCERNS KARINA CAN BE REACHED [phone]

## 2018-06-21 NOTE — TELEPHONE ENCOUNTER
Dee was advised the rx sent on 6-8-18 was sent as albuterol inhaler (proair) and was not marked as dispense as written so a generic should have been given  She states she will call [facility] to discuss  She was advised to call back prn

## 2018-06-21 NOTE — TELEPHONE ENCOUNTER
breo is not the same as proair.   breo has a steroid component and a long acting beta agonist.  Has she had formal pulmonary tests (PFT?)  Who is her lung doctor?  Thanks, moiz

## 2018-06-21 NOTE — TELEPHONE ENCOUNTER
Dee states she just needs an generic albuterol inhaler (rescue) sent to pharmacy.  States she did have PFT about 3 years ago while in the hospital and was told at that time she needed the inhaler but she does not recall who the pulmonary specialist was and she has not seen them since that visit

## 2018-06-21 NOTE — TELEPHONE ENCOUNTER
Prescription sent was not marked as dispense as written so a generic should be able to be dispensed without additional prescription being sent.   Thanks, moiz

## 2018-07-27 NOTE — TELEPHONE ENCOUNTER
"Refill request received from [facility] for Keppra - I did not want to refill it but it was listed along with other refills that were approved so I had to \"no print\" it so the others were approved  The rx for keppra was approved from this office previously but I wasn't sure about refilling it  Please advise  "

## 2018-08-07 NOTE — TELEPHONE ENCOUNTER
Dr Herzog patient  Last OV was 4-  Last Rx was 2- for#60 with 3 refills  Next scheduled OV is 8-20-18  Contract was signed 12- - needs to be updated  sierra updated today  Please advise on refill

## 2018-08-15 PROBLEM — R56.9 SEIZURE (HCC): Status: ACTIVE | Noted: 2018-01-01

## 2018-08-15 PROBLEM — E87.20 LACTIC ACIDOSIS: Status: ACTIVE | Noted: 2018-01-01

## 2018-08-15 PROBLEM — K21.9 GERD (GASTROESOPHAGEAL REFLUX DISEASE): Status: ACTIVE | Noted: 2018-01-01

## 2018-08-15 PROBLEM — R65.20 SEVERE SEPSIS (HCC): Status: ACTIVE | Noted: 2018-01-01

## 2018-08-15 PROBLEM — I34.0 NON-RHEUMATIC MITRAL REGURGITATION: Status: ACTIVE | Noted: 2018-01-01

## 2018-08-15 PROBLEM — A41.9 SEVERE SEPSIS (HCC): Status: ACTIVE | Noted: 2018-01-01

## 2018-08-15 PROBLEM — A41.9 SEPSIS (HCC): Status: ACTIVE | Noted: 2018-01-01

## 2018-08-15 PROBLEM — E11.9 TYPE 2 DIABETES MELLITUS (HCC): Status: ACTIVE | Noted: 2018-01-01

## 2018-08-15 PROBLEM — I50.9 CHF (CONGESTIVE HEART FAILURE) (HCC): Status: ACTIVE | Noted: 2018-01-01

## 2018-08-15 PROBLEM — J96.22 ACUTE ON CHRONIC RESPIRATORY FAILURE WITH HYPOXIA AND HYPERCAPNIA (HCC): Status: ACTIVE | Noted: 2018-01-01

## 2018-08-15 PROBLEM — J44.9 COPD (CHRONIC OBSTRUCTIVE PULMONARY DISEASE) (HCC): Status: ACTIVE | Noted: 2018-01-01

## 2018-08-15 PROBLEM — N17.9 AKI (ACUTE KIDNEY INJURY) (HCC): Status: ACTIVE | Noted: 2018-01-01

## 2018-08-15 PROBLEM — N39.0 UTI (URINARY TRACT INFECTION): Status: ACTIVE | Noted: 2018-01-01

## 2018-08-15 PROBLEM — J96.21 ACUTE ON CHRONIC RESPIRATORY FAILURE WITH HYPOXIA AND HYPERCAPNIA (HCC): Status: ACTIVE | Noted: 2018-01-01

## 2018-08-15 PROBLEM — I21.A1 NON-ST ELEVATION MYOCARDIAL INFARCTION (NSTEMI), TYPE 2: Status: ACTIVE | Noted: 2018-01-01

## 2018-08-15 PROBLEM — G47.33 SLEEP APNEA, OBSTRUCTIVE: Status: ACTIVE | Noted: 2018-01-01

## 2018-08-15 NOTE — PROGRESS NOTES
Discharge Planning Assessment  [facility]     Patient Name: Avis Us  MRN: [id number]  Today's Date: 8/15/2018    Admit Date: 8/15/2018          Discharge Needs Assessment     Row Name 08/15/18 1525       Living Environment    Lives With alone    Current Living Arrangements home/apartment/condo    Provides Primary Care For no one, unable/limited ability to care for self    Family Caregiver if Needed child(salty), adult    Quality of Family Relationships supportive       Resource/Environmental Concerns    Resource/Environmental Concerns none       Transition Planning    Patient/Family Anticipated Services at Transition        Discharge Needs Assessment    Readmission Within the Last 30 Days no previous admission in last 30 days    Concerns to be Addressed adjustment to diagnosis/illness;discharge planning    Equipment Currently Used at Home walker, rolling;bipap/cpap;cane, straight;commode;power chair,(recliner lift);grab bar;oxygen    Anticipated Changes Related to Illness inability to care for self            Discharge Plan     Row Name 08/15/18 8009       Plan    Plan TBD    Plan Comments Patient sedated on vent.  Family not present at this time.  Information obtained from chart.  Patient resides in [facility], her son and daughter are caregivers.  Patient has at home a walker, CPAP, cane, bedside commode, recliner lift and oxygen.  Patient's oxygen is provided by [employer].  Patient has used [facility] in the past but is not current.  CM will continue to follow.        Destination     No service coordination in this encounter.      Durable Medical Equipment     No service coordination in this encounter.      Dialysis/Infusion     No service coordination in this encounter.      Home Medical Care     No service coordination in this encounter.      Social Care     No service coordination in this encounter.        Expected Discharge Date and Time     Expected Discharge Date Expected Discharge Time     Aug 22, 2018               Demographic Summary     Row Name 08/15/18 1525       General Information    Admission Type inpatient    Arrived From home    Referral Source admission list    Reason for Consult discharge planning    Preferred Language English       Contact Information    Permission Granted to Share Info With             Functional Status    No documentation.           Psychosocial    No documentation.           Abuse/Neglect    No documentation.           Legal    No documentation.           Substance Abuse    No documentation.           Patient Forms    No documentation.         Deysi Hardwick RN

## 2018-08-15 NOTE — ED PROVIDER NOTES
Subjective   MsMartínez Berkowitz is a 88 y.o. female who presents to the ED by EMS in respiratory distress. EMS reports pt was at home in bed and upon standing she slid to the floor stating she was going to vomit. Upon arrival, pt was lying on the floor unable to ambulate with SpO2 of 70 on room air.  EMS placed pt on 15L non-rebreather and SpO2 increased to 97%.  En route, pt was interactive, following commands, and answering all questions, however shortly prior to arrival pt became increasingly somnolent and confused. In ED now, pt opens eyes to verbal stimuli, follows commands, and will answer some simple yes or no questions, however she otherwise non-verbal. She denies chest pain and abd pain. Pt has hx of DMII, CHF, COPD.         History provided by:  EMS personnel  History limited by:  Severe respiratory distress  Shortness of Breath   Severity:  Severe  Onset quality:  Sudden  Timing:  Constant  Progression:  Worsening  Chronicity:  New  Relieved by:  Oxygen  Associated symptoms: no abdominal pain and no chest pain        Review of Systems   Unable to perform ROS: Severe respiratory distress   Respiratory: Positive for shortness of breath.    Cardiovascular: Negative for chest pain.   Gastrointestinal: Negative for abdominal pain.       Past Medical History:   Diagnosis Date   • Coronary artery disease    • CVA (cerebral vascular accident) (CMS/HCC)    • Diabetes mellitus (CMS/HCC)    • Diastolic dysfunction    • Emphysema of lung (CMS/HCC)    • GERD (gastroesophageal reflux disease)    • Gout    • Heart block, first degree    • HLD (hyperlipidemia)    • Hypertension    • Mitral valve regurgitation    • Seizure (CMS/HCC)    • Sleep apnea, obstructive        Allergies   Allergen Reactions   • Biaxin [Clarithromycin] Angioedema   • Metoprolol Angioedema   • Tramadol Confusion       Past Surgical History:   Procedure Laterality Date   • CARDIAC CATHETERIZATION     • CHOLECYSTECTOMY     • COLOSTOMY     • COLOVAGINAL  FISTULA REPAIR     • HERNIA REPAIR     • PACEMAKER IMPLANTATION         Family History   Problem Relation Age of Onset   • Cancer Mother    • Emphysema Father        Social History     Social History   • Marital status:    • Number of children: 2     Social History Main Topics   • Smoking status: Passive Smoke Exposure - Never Smoker   • Smokeless tobacco: Never Used   • Alcohol use No   • Drug use: No   • Sexual activity: Not Currently     Other Topics Concern   • Not on file         Objective   Physical Exam   Constitutional: She appears well-developed and well-nourished. She appears lethargic. No distress.   Pt is somnolent and in respiratory distress.    HENT:   Head: Normocephalic and atraumatic.   Eyes: Pupils are equal, round, and reactive to light. Conjunctivae and EOM are normal.   Neck: Normal range of motion. Neck supple.   Cardiovascular: Normal rate, regular rhythm and normal heart sounds.  Exam reveals no gallop and no friction rub.    No murmur heard.  Pulmonary/Chest: Tachypnea noted. She is in respiratory distress. She has decreased breath sounds (Poor inspiratory effort with decreased breath sounds in bases bilaterally). She has no wheezes. She has no rhonchi. She has no rales.   Abdominal: Soft. Bowel sounds are normal.   Ostomy bag in place in LLQ with appropriate ostomy output   Musculoskeletal: Normal range of motion. She exhibits no edema (No significant edema appreciated. Equal calf sizes. ) or deformity.   Pt is moving all extremities. She moves all fingers and toes bilaterally with normal range of motion.    Neurological: She appears lethargic. She is disoriented.   Skin: Skin is warm and dry. She is not diaphoretic.   Psychiatric:   Pt is barely able to say one word in between breaths, however she will intermittently give one word answers when asked questions. She opens eyes to voice but closes them very quickly.    Nursing note and vitals reviewed.      Intubation  Date/Time:  8/15/2018 12:31 AM  Performed by: ALFONSO FUNES  Authorized by: ALFONSO FUNES     Consent:     Consent obtained:  Emergent situation  Pre-procedure details:     Patient status:  Altered mental status    Mallampati score:  II    Pretreatment medications:  Midazolam    Paralytics:  Succinylcholine  Procedure details:     Preoxygenation:  Nonrebreather mask    CPR in progress: no      Intubation method:  Oral    Oral intubation technique:  Video-assisted    Laryngoscope blade:  Mac 3    Tube size (mm):  7.5    Tube type:  Cuffed    Number of attempts:  1    Ventilation between attempts: no      Cricoid pressure: no      Tube visualized through cords: yes    Placement assessment:     ETT to teeth:  23    Tube secured with:  ETT gee    Breath sounds:  Equal    Placement verification: chest rise, condensation, CXR verification, direct visualization, equal breath sounds, esophageal detector, ETCO2 detector, fiberoptic scope and tube exhalation      CXR findings:  ETT in proper place  Post-procedure details:     Patient tolerance of procedure:  Tolerated well, no immediate complications        Critical Care  Performed by: ALFONSO FUNES  Authorized by: ALFONSO FUNES     Critical care provider statement:     Critical care time (minutes):  60    Critical care time was exclusive of:  Separately billable procedures and treating other patients    Critical care was necessary to treat or prevent imminent or life-threatening deterioration of the following conditions:  Respiratory failure, shock and sepsis    Critical care was time spent personally by me on the following activities:  Development of treatment plan with patient or surrogate, discussions with consultants, evaluation of patient's response to treatment, examination of patient, obtaining history from patient or surrogate, blood draw for specimens, ordering and performing treatments and interventions, ordering and review of laboratory studies,  ordering and review of radiographic studies, pulse oximetry, re-evaluation of patient's condition, review of old charts and ventilator management             ED Course  ED Course as of Aug 15 0437   Wed Aug 15, 2018   0133 Dr. Cyr re-examined pt  [AT]   0134 Family arrives to ED and Dr. Cyr discusses plan of care with family at bedside.   [AT]   0135 Family reports pt was at baseline at 2200 and at 2300 pt suddenly became SoA.   [AT]   0139 Family notes pt's colostomy was placed about 1 year ago by Dr. Mtz secondary to chronic UTI/fistula.   [AT]   0150 Dr. Cyr paged intensivist, Dr. Torres   [AT]   0216 Dr. Cyr spoke with intensivist who will accept pt to ICU.   [AT]   0308 Dr. Cyr re-examined pt and updated family on plan of care.   [AT]      ED Course User Index  [AT] August Mohan     Recent Results (from the past 24 hour(s))   Lactic Acid, Plasma    Collection Time: 08/15/18 12:32 AM   Result Value Ref Range    Lactate 5.3 (C) 0.5 - 2.0 mmol/L   Procalcitonin    Collection Time: 08/15/18 12:32 AM   Result Value Ref Range    Procalcitonin 0.05 <=0.25 ng/mL   Comprehensive Metabolic Panel    Collection Time: 08/15/18 12:40 AM   Result Value Ref Range    Glucose 246 (H) 70 - 100 mg/dL    BUN 26 (H) 9 - 23 mg/dL    Creatinine 1.67 (H) 0.60 - 1.30 mg/dL    Sodium 138 132 - 146 mmol/L    Potassium 3.9 3.5 - 5.5 mmol/L    Chloride 100 99 - 109 mmol/L    CO2 29.0 20.0 - 31.0 mmol/L    Calcium 9.5 8.7 - 10.4 mg/dL    Total Protein 6.8 5.7 - 8.2 g/dL    Albumin 4.12 3.20 - 4.80 g/dL    ALT (SGPT) 10 7 - 40 U/L    AST (SGOT) 20 0 - 33 U/L    Alkaline Phosphatase 217 (H) 25 - 100 U/L    Total Bilirubin 0.4 0.3 - 1.2 mg/dL    eGFR Non African Amer 29 (L) >60 mL/min/1.73    Globulin 2.7 gm/dL    A/G Ratio 1.5 1.5 - 2.5 g/dL    BUN/Creatinine Ratio 15.6 7.0 - 25.0    Anion Gap 9.0 3.0 - 11.0 mmol/L   BNP    Collection Time: 08/15/18 12:40 AM   Result Value Ref Range    .0 (H) 0.0 - 100.0  pg/mL   Light Blue Top    Collection Time: 08/15/18 12:40 AM   Result Value Ref Range    Extra Tube hold for add-on    Green Top (Gel)    Collection Time: 08/15/18 12:40 AM   Result Value Ref Range    Extra Tube Hold for add-ons.    Lavender Top    Collection Time: 08/15/18 12:40 AM   Result Value Ref Range    Extra Tube hold for add-on    Gold Top - SST    Collection Time: 08/15/18 12:40 AM   Result Value Ref Range    Extra Tube Hold for add-ons.    CBC Auto Differential    Collection Time: 08/15/18 12:40 AM   Result Value Ref Range    WBC 22.08 (H) 3.50 - 10.80 10*3/mm3    RBC 3.74 (L) 3.89 - 5.14 10*6/mm3    Hemoglobin 11.6 11.5 - 15.5 g/dL    Hematocrit 39.5 34.5 - 44.0 %    .6 (H) 80.0 - 99.0 fL    MCH 31.0 27.0 - 31.0 pg    MCHC 29.4 (L) 32.0 - 36.0 g/dL    RDW 14.5 11.3 - 14.5 %    RDW-SD 55.9 (H) 37.0 - 54.0 fl    MPV 10.5 6.0 - 12.0 fL    Platelets 484 (H) 150 - 450 10*3/mm3    Neutrophil % 71.6 (H) 41.0 - 71.0 %    Lymphocyte % 22.1 (L) 24.0 - 44.0 %    Monocyte % 3.0 0.0 - 12.0 %    Eosinophil % 3.2 (H) 0.0 - 3.0 %    Basophil % 0.1 0.0 - 1.0 %    Immature Grans % 0.4 0.0 - 0.6 %    Neutrophils, Absolute 15.80 (H) 1.50 - 8.30 10*3/mm3    Lymphocytes, Absolute 4.88 (H) 0.60 - 4.80 10*3/mm3    Monocytes, Absolute 0.66 0.00 - 1.00 10*3/mm3    Eosinophils, Absolute 0.71 (H) 0.00 - 0.30 10*3/mm3    Basophils, Absolute 0.03 0.00 - 0.20 10*3/mm3    Immature Grans, Absolute 0.09 (H) 0.00 - 0.03 10*3/mm3   POC CHEM 8    Collection Time: 08/15/18 12:40 AM   Result Value Ref Range    Glucose 236 (H) 70 - 130 mg/dL    BUN 29 (H) 8 - 26 mg/dL    Creatinine 1.60 (H) 0.60 - 1.30 mg/dL    Sodium 140 138 - 146 mmol/L    Potassium 4.0 3.5 - 4.9 mmol/L    Chloride 99 98 - 109 mmol/L    Total CO2 30 (H) 24 - 29 mmol/L    Hemoglobin 12.6 12.0 - 17.0 g/dL    Hematocrit 37 (L) 38 - 51 %    Ionized Calcium 1.27 1.20 - 1.32 mmol/L   Hemoglobin A1c    Collection Time: 08/15/18 12:40 AM   Result Value Ref Range    Hemoglobin  A1C 5.90 (H) 4.80 - 5.60 %   TSH    Collection Time: 08/15/18 12:40 AM   Result Value Ref Range    TSH 4.484 0.350 - 5.350 mIU/mL   Blood Gas, Arterial    Collection Time: 08/15/18 12:43 AM   Result Value Ref Range    Site Arterial: left radial     Remi's Test Positive     pH, Arterial 7.149 (C) 7.350 - 7.450 pH units    pCO2, Arterial 72.8 (C) 35.0 - 45.0 mm Hg    pO2, Arterial 199.0 (H) 83.0 - 108.0 mm Hg    HCO3, Arterial 25.3 20.0 - 26.0 mmol/L    Base Excess, Arterial -4.6 (L) 0.0 - 2.0 mmol/L    O2 Saturation, Arterial 97.6 %    Hemoglobin, Blood Gas 11.3 (L) 14 - 18 g/dL    Hematocrit, Blood Gas 34.6 %    Oxyhemoglobin 97.6 94 - 99 %    Methemoglobin 0.90 0.00 - 1.50 %    Carboxyhemoglobin 0.6 0 - 2 %    CO2 Content 27.5 22 - 33    Barometric Pressure for Blood Gas  mmHg    Modality Cannula     FIO2 60 %   POC Troponin, Rapid    Collection Time: 08/15/18 12:46 AM   Result Value Ref Range    Troponin I 0.00 0.00 - 0.07 ng/mL   Urinalysis With Culture If Indicated - Urine, Catheter    Collection Time: 08/15/18  1:25 AM   Result Value Ref Range    Color, UA Yellow Yellow, Straw    Appearance, UA Cloudy (A) Clear    pH, UA <=5.0 5.0 - 8.0    Specific Gravity, UA 1.014 1.001 - 1.030    Glucose, UA Negative Negative    Ketones, UA Negative Negative    Bilirubin, UA Negative Negative    Blood, UA Small (1+) (A) Negative    Protein, UA Negative Negative    Leuk Esterase, UA Large (3+) (A) Negative    Nitrite, UA Positive (A) Negative    Urobilinogen, UA 0.2 E.U./dL 0.2 - 1.0 E.U./dL   Urinalysis, Microscopic Only - Urine, Clean Catch    Collection Time: 08/15/18  1:25 AM   Result Value Ref Range    RBC, UA 7-12 (A) None Seen, 0-2 /HPF    WBC, UA Too Numerous to Count (A) None Seen, 0-2 /HPF    Bacteria, UA 4+ (A) None Seen, Trace /HPF    Squamous Epithelial Cells, UA None Seen None Seen, 0-2 /HPF    Hyaline Casts, UA 0-6 0 - 6 /LPF    Methodology Automated Microscopy    POC Glucose Once    Collection Time: 08/15/18   4:02 AM   Result Value Ref Range    Glucose 122 70 - 130 mg/dL     Note: In addition to lab results from this visit, the labs listed above may include labs taken at another facility or during a different encounter within the last 24 hours. Please correlate lab times with ED admission and discharge times for further clarification of the services performed during this visit.    XR Chest 1 View   Final Result   1. Bibasilar atelectatic change/infiltrates are visualized. There are patchy    hazy opacification of the right lung, which is a progression. Clinical    correlation and follow-up radiographs are recommended.    2. There is a diffuse increase in interstitial markings, which is nonspecific.    Possible etiologies include interstitial edema and atypical infection.    3. The lungs are hyperinflated, consistent with COPD.    4. There is prominence of the pulmonary vascularity.    5. There is mild right apical pleural thickening.    6. There is borderline cardiomegaly.   7. Endotracheal tube is identified.      THIS DOCUMENT HAS BEEN ELECTRONICALLY SIGNED BY JEREMIE CHANG MD      XR Chest 1 View    (Results Pending)     Vitals:    08/15/18 0302 08/15/18 0325 08/15/18 0340 08/15/18 0400   BP:    106/59   BP Location:       Patient Position:       Pulse: 78  87 88   Resp:   16    Temp:  99.7 °F (37.6 °C) 99.3 °F (37.4 °C)    TempSrc:  Core Core    SpO2: 99%  100% 100%   Weight:       Height:         Medications   sodium chloride 0.9 % flush 10 mL (not administered)   dextrose (GLUTOSE) oral gel 15 g (not administered)   dextrose (D50W) solution 25 g (not administered)   glucagon (human recombinant) (GLUCAGEN DIAGNOSTIC) injection 1 mg (not administered)   chlorhexidine (PERIDEX) 0.12 % solution 15 mL (not administered)   sodium chloride 0.9 % flush 1-10 mL (not administered)   heparin (porcine) 5000 UNIT/ML injection 5,000 Units (not administered)   lactated ringers infusion (100 mL/hr Intravenous New Bag 8/15/18  0408)   ipratropium-albuterol (DUO-NEB) nebulizer solution 3 mL (not administered)   ipratropium-albuterol (DUO-NEB) nebulizer solution 3 mL ( Nebulization Canceled Entry 8/15/18 0357)   insulin regular (humuLIN R,novoLIN R) injection 0-9 Units (0 Units Subcutaneous Not Given 8/15/18 0409)   acetaminophen (TYLENOL) tablet 650 mg (not administered)     Or   acetaminophen (TYLENOL) suppository 650 mg (not administered)   dexmedetomidine (PRECEDEX) 400 mcg/100 mL (4 mcg/mL) infusion (0.8 mcg/kg/hr × 79.4 kg Intravenous Rate/Dose Change 8/15/18 0414)   fentaNYL citrate (PF) (SUBLIMAZE) injection 25 mcg (not administered)   Pharmacy to dose vancomycin (not administered)   famotidine (PEPCID) tablet 20 mg (not administered)   vancomycin (dosing per levels) (not administered)   levETIRAcetam in NaCl 0.82% (KEPPRA) IVPB 500 mg (not administered)   aspirin chewable tablet 81 mg (not administered)   meropenem (MERREM) 1 g/100 mL 0.9% NS VTB (mbp) (not administered)   meropenem (MERREM) 1 g/100 mL 0.9% NS VTB (mbp) (not administered)   dornase alpha (PULMOZYME) nebulizer solution 2.5 mg (not administered)   piperacillin-tazobactam (ZOSYN) 3.375 g in iso-osmotic dextrose 50 ml (premix) (0 g Intravenous Stopped 8/15/18 0125)   vancomycin 1500 mg/500 mL 0.9% NS IVPB ([facility]) (0 mg/kg × 79.4 kg Intravenous Stopped 8/15/18 0329)   midazolam (VERSED) injection (3 mg Intravenous Given 8/15/18 0051)   succinylcholine (ANECTINE) injection (80 mg Intravenous Given 8/15/18 0048)   acetaminophen (TYLENOL) suppository 650 mg (650 mg Rectal Given 8/15/18 0145)   Propofol (DIPRIVAN) injection (5 mg Intravenous Given 8/15/18 0103)   sodium chloride 0.9 % bolus 1,000 mL (0 mL Intravenous Stopped 8/15/18 0222)   sodium chloride 0.9% - IBW for BMI > 30 bolus 1,503 mL (1,503 mL Intravenous New Bag 8/15/18 0235)   sodium chloride 0.9 % bolus 1,000 mL (0 mL Intravenous Stopped 8/15/18 0324)     ECG/EMG Results (last 24 hours)     Procedure Component  Value Units Date/Time    ECG 12 Lead [[id number]] Collected:  08/15/18 0030     Updated:  08/15/18 0031                        MDM  Number of Diagnoses or Management Options  Acute respiratory failure with hypercapnia (CMS/HCC): new and requires workup  Acute UTI (urinary tract infection): new and requires workup  Hypotension, unspecified hypotension type: new and requires workup  Lactic acidosis: new and requires workup  Respiratory acidosis: new and requires workup  Sepsis, due to unspecified organism (CMS/HCC): new and requires workup  Diagnosis management comments: Patient presents in respiratory distress with impending respiratory failure.    She is somnolent and her PCO2 was identified to be 76 with a pH of 7.14.    Nonrebreather oxygen was placed, the patient was intubated using RSI technique.  The intubation was performed successfully, and tube was verified to be in appropriate position.    X-ray is concerning for pneumonia, urine is concerning for acute infection.  Blood cultures have been drawn, and antibiotics in form of Zosyn and vancomycin initiated.    Intubated patient was noted be hypotensive and therefore IV fluid was administered, and sedation, in form of propofol, was decreased, and blood pressure improved.    I discussed the patient with the intensivist, Dr. Gerhardtstein, who will admit.       Amount and/or Complexity of Data Reviewed  Clinical lab tests: ordered and reviewed  Tests in the radiology section of CPT®: ordered and reviewed  Obtain history from someone other than the patient: yes  Discuss the patient with other providers: yes  Independent visualization of images, tracings, or specimens: yes    Risk of Complications, Morbidity, and/or Mortality  Presenting problems: high  Diagnostic procedures: high  Management options: high    Critical Care  Total time providing critical care: 30-74 minutes    Patient Progress  Patient progress: stable      Final diagnoses:   Sepsis, due to  unspecified organism (CMS/[facility])   Acute UTI (urinary tract infection)   Acute respiratory failure with hypercapnia (CMS/[facility] located [facility])   Respiratory acidosis   Lactic acidosis   Hypotension, unspecified hypotension type       Documentation assistance provided by harish Mohan.  Information recorded by the scribe was done at my direction and has been verified and validated by me.     August Mohan  08/15/18 0104       Abdirizak Cyr MD  08/15/18 0437

## 2018-08-15 NOTE — PLAN OF CARE
Problem: Ventilation, Mechanical Invasive (Adult)  Intervention: Prevent Airway Displacement/Mechanical Dysfunction   08/15/18 1600   Prevent Airway Displacement/Mechanical Dysfunction   Airway Safety Measures manual resuscitator/mask/valve in room;suction at bedside       Goal: Signs and Symptoms of Listed Potential Problems Will be Absent, Minimized or Managed (Ventilation, Mechanical Invasive)  Outcome: Ongoing (interventions implemented as appropriate)

## 2018-08-15 NOTE — PROGRESS NOTES
"                  Clinical Nutrition     Nutrition Support Assessment  Reason for Visit:   Identified at risk by screening criteria      Patient Name: Avis Us  YOB: 1929  MRN: [id number]  Date of Encounter: 08/15/18 11:05 AM  Admission date: 8/15/2018          Nutrition Assessment   Assessment       Hospital Problem List  Principal Problem:    Severe sepsis of urinary origin (CMS/[facility])  Active Problems:    ROBBIN (acute kidney injury) (CMS/[facility])    Acute on chronic respiratory failure with hypoxia and hypercapnia (CMS/[facility])    Lactic acidosis    Type 2 diabetes mellitus (CMS/[facility])    CHF (congestive heart failure) (CMS/[facility])    COPD (chronic obstructive pulmonary disease) (CMS/[facility])    UTI (urinary tract infection), chronic H/O E Coli UTI    Seizure (CMS/[facility])    GERD (gastroesophageal reflux disease)    Sleep apnea, obstructive    Non-rheumatic mitral regurgitation    Sepsis (CMS/[facility])      PMH: She  has a past medical history of Coronary artery disease; CVA (cerebral vascular accident) (CMS/[facility]); Diabetes mellitus (CMS/[facility]); Diastolic dysfunction; Emphysema of lung (CMS/[facility]); GERD (gastroesophageal reflux disease); Gout; Heart block, first degree; HLD (hyperlipidemia); Hypertension; Mitral valve regurgitation; Seizure (CMS/[facility]); and Sleep apnea, obstructive.   PSxH: She  has a past surgical history that includes Cardiac catheterization; Colostomy; Hernia repair; Cholecystectomy; Pacemaker Implantation; and Colovaginal Fistula Repair.     ARF/VENT 8-15-18      Reported/Observed/Food/Nutrition Related History:     Pt intubated, sedated    Per RN: levophed increased to 0.09mcg, has had 40ml UOP this am      Anthropometrics     Height: 157.5 cm (62\")  Last filed wt: Weight: 95 kg (209 lb 7 oz) (08/15/18 4415)  Weight Method: Bed scale    BMI: 38  Obese Class II: 35-39.9kg/m2    Ideal Body Weight (IBW) (kg): 50.43      Labs reviewed       Results from last 7 days  Lab Units 08/15/18  0532 08/15/18  0040 "   GLUCOSE mg/dL 148* 246*   BUN mg/dL 29* 26*   CREATININE mg/dL 1.75* 1.60*  1.67*   SODIUM mmol/L 141 138   CHLORIDE mmol/L 105 100   POTASSIUM mmol/L 4.0 3.9   PHOSPHORUS mg/dL 2.3*  --    MAGNESIUM mg/dL 2.1  --    ALT (SGPT) U/L  --  10   LR@100ml/hr,     Results from last 7 days  Lab Units 08/15/18  0040   ALBUMIN g/dL 4.12         Results from last 7 days  Lab Units 08/15/18  0606 08/15/18  0402 08/15/18  0040   GLUCOSE mg/dL 166* 122 236*       Lab Results  Lab Value Date/Time   HGBA1C 5.90 (H) 08/15/2018 0040         Medications reviewed   Pertinent: LR@100ml, precedex, pepcid, insulin, abx, levophed        Intake/Ouptut 24 hrs (7:00AM - 6:59 AM)     Intake & Output (last day)       08/14 0701 - 08/15 0700 08/15 0701 - 08/16 0700    I.V. (mL/kg) 386.7 (4.1) 217 (2.3)    Other  10    IV Piggyback 3071.8     Total Intake(mL/kg) 3458.5 (36.4) 227 (2.4)    Urine (mL/kg/hr) 230 95 (0.2)    Other 100     Stool 500     Total Output 830 95    Net +2628.5 +132                       Needs Assessment       Height used 62in   Weight used ABW 209lb   IBW 110lb     Estimated need Method/Equation used Result    Energy/Calorie need  kcals/d 11-14kcal per kg 1045-1330kcal    PSU 2010 1474kcal        Protein   g/day 2g protein per kg IBW 100g protein    1.2g protein per kg ABW 114g protein        Fluid 20ml ABW 1900ml            Current Nutrition Prescription     PO: NPO Diet  No active supplement orders         Nutrition Diagnosis       Problem Inadequate oral intake   Etiology Per Clinical Status- VENT/ Pressors   Signs/Symptoms NPO                     Nutrition Intervention   1.  Follow treatment progress    When hemodynamically stable, consider enteral feed  RecPeptamen VHP @60ml/hr, free water @10ml/hr     At Target Goal Volume     % Est needs   Volume  1200ml     Energy/kcals 1200kcals 100%   Protein 112g pro 100%   fiber 5         Fluid in EN 1008mL    Total Fluid  1208    Meets 100% RDI NO          Goal:   General:  Nutrition support treatment    EN/PN: Initiate EN when stable        Monitoring/Evaluation:   I&O, Pertinent labs, Weight, Skin status, GI status, Symptoms      Will Continue to follow per protocol      Naz Calle RD  Time Spent: 60min

## 2018-08-15 NOTE — PLAN OF CARE
Problem: Fall Risk (Adult)  Goal: Absence of Fall  Outcome: Ongoing (interventions implemented as appropriate)      Problem: Ventilation, Mechanical Invasive (Adult)  Goal: Signs and Symptoms of Listed Potential Problems Will be Absent, Minimized or Managed (Ventilation, Mechanical Invasive)  Outcome: Ongoing (interventions implemented as appropriate)      Problem: Restraint, Nonbehavioral (Nonviolent)  Goal: Nonbehavioral (Nonviolent) Restraint: Preservation of Dignity and Wellbeing  Outcome: Ongoing (interventions implemented as appropriate)      Problem: Sepsis/Septic Shock (Adult)  Goal: Signs and Symptoms of Listed Potential Problems Will be Absent, Minimized or Managed (Sepsis/Septic Shock)  Outcome: Ongoing (interventions implemented as appropriate)      Problem: Skin Injury Risk (Adult)  Goal: Skin Health and Integrity  Outcome: Ongoing (interventions implemented as appropriate)      Problem: Patient Care Overview  Goal: Plan of Care Review  Outcome: Ongoing (interventions implemented as appropriate)   08/15/18 4464   Coping/Psychosocial   Plan of Care Reviewed With patient;daughter   Plan of Care Review   Progress no change   OTHER   Outcome Summary Patient remains on Levophed, unable to wean w/o significant drop in blood pressure. UOP inadequate. Echo performed. Remains on Precedex, tolerating well. EKG ordered for tomorrow morning.

## 2018-08-15 NOTE — PROGRESS NOTES
Pharmacokinetic Consult - Vancomycin Dosing    Avis Us is a 88 y.o. female who has been consulted for vancomycin dosing for sepsis/UTI.    Relevant clinical data and objective history reviewed:  Creatinine   Date Value Ref Range Status   08/15/2018 1.67 (H) 0.60 - 1.30 mg/dL Final   08/15/2018 1.60 (H) 0.60 - 1.30 mg/dL Final     BUN   Date Value Ref Range Status   08/15/2018 26 (H) 9 - 23 mg/dL Final     Estimated Creatinine Clearance: 23.7 mL/min (A) (by C-G formula based on SCr of 1.6 mg/dL (H)).  Lab Results   Component Value Date/Time    WBC 22.08 (H) 08/15/2018 12:40 AM    HGB 11.6 08/15/2018 12:40 AM    HGB 12.6 08/15/2018 12:40 AM    HCT 39.5 08/15/2018 12:40 AM    HCT 37 (L) 08/15/2018 12:40 AM    .6 (H) 08/15/2018 12:40 AM     (H) 08/15/2018 12:40 AM     Temp Readings from Last 3 Encounters:   08/15/18 (!) 101.1 °F (38.4 °C) (Core)       Assessment/Plan  The patient got a dose of 1500mg in the ED.  Given age and renal function, will get a random level with am labs starting 8/16.  Due to infection severity, will target a trough of 15-20.     Pharmacy will continue to follow the patient’s culture results and clinical progress daily.    Jaylen Charles, DelorisD

## 2018-08-15 NOTE — H&P
ICU ADMISSION NOTE    Chief complaint:   Acute on chronic respiratory failure, UTI, hypotension    Subjective     Patient is a 88 y.o. female who  has a past medical history of Coronary artery disease; CVA (cerebral vascular accident) (CMS/[facility]); Diabetes mellitus (CMS/[facility]); Diastolic dysfunction; Emphysema of lung (CMS/[facility]); GERD (gastroesophageal reflux disease); Gout; Heart block, first degree; Hypertension; and Seizure (CMS/[facility]).    She has been in her regular level of health except having more dyspnea over the last 2 weeks requiring her to use her rescue inhalers more.  She has also had a 2 week H/O UTI symptoms (pyuria, frequency) but was waiting til she saw her PCP on Friday.  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 Ultimately she underwent resection of her fistula and a colostomy. Frequent urinary tract infections improved postoperative period however her daughter reports that she been complaining of burning upon urination for several weeks. She is a lifetime nonsmoker. However she had significant secondhand smoke exposure from her father and her  prior to his death. She also has a known history of asthma. She wears oxygen 3 L nasal cannula continuously as well as CPAP nightly.    This evening the patient developed sudden respiratory distress, fever,  and c/o nausea.   EMS found her laying on the floor w/ a spO2 in the 70's.  She was transferred to our facility.  En route she was alert and interactive however she became confused and somnolent upon arrival in the ED and required intubation.  Blood gas prior to intubation, pH 7.15, CO2 73, O2 199.     Review of Systems  Review of Systems   Constitutional: Positive for fatigue and fever. Negative for appetite change, chills and diaphoresis.   Respiratory: Positive for cough and wheezing. Negative for chest tightness.    Cardiovascular: Negative.    Gastrointestinal: Positive for nausea. Negative for  "vomiting.   Genitourinary: Positive for dysuria and frequency.        Home Medications:  Lasix 40mg QD, lisinopril 10 mg QD, Symbicort 80/4.5 BID, ASA 81 mg Qd, protonix 40 mg Q D, Keppra 750 mg BID, Klonopin 1 mg BID, allopurinol 100mg BID    (Not in a hospital admission)    History  Past Medical History:   Diagnosis Date   • Coronary artery disease    • CVA (cerebral vascular accident) (CMS/HCC)    • Diabetes mellitus (CMS/HCC)    • Diastolic dysfunction    • Emphysema of lung (CMS/HCC)    • GERD (gastroesophageal reflux disease)    • Gout    • Heart block, first degree    • Hypertension    • Seizure (CMS/HCC)      Past Surgical History:   Procedure Laterality Date   • CARDIAC CATHETERIZATION     • CHOLECYSTECTOMY     • COLOSTOMY     • COLOVAGINAL FISTULA REPAIR     • HERNIA REPAIR     • PACEMAKER IMPLANTATION       Family History   Problem Relation Age of Onset   • Cancer Mother    • Emphysema Father      Social History   Substance Use Topics   • Smoking status: Passive Smoke Exposure - Never Smoker   • Smokeless tobacco: Never Used   • Alcohol use No       (Not in a hospital admission)  Allergies:  Biaxin [clarithromycin]; Metoprolol; and Tramadol      Objective     Vital Signs  Blood pressure 102/58, pulse 78, temperature (!) 101.1 °F (38.4 °C), temperature source Core, resp. rate (!) 40, height 157.5 cm (62\"), weight 79.4 kg (175 lb), SpO2 99 %.    Physical Exam:  General Appearance:  Overweight elderly woman, sedated    Head:  Normocephalic, atraumatic    Eyes:          Pupils small, equal, reactive    Ears:     Throat: Orally intubated    Neck: Trachea midline, no carotid bruit    Back:      Lungs:   Diffuse expiratory rhonchi     Heart:   + murmur, systolic, regular, paced    Abdomen:   Left lower quadrant colostomy, hypoactive bowel sounds, soft    Rectal:     Deferred   Extremities:    Pitting edema bilaterally    Pulses:   Faint pedal pulses    Skin: Warm and dry    Lymph nodes:    Neurologic:   Sedated " but does arouse with stimulation        Results Review:   Lab Results (last 24 hours)     Procedure Component Value Units Date/Time    TSH [[id number]]  (Normal) Collected:  08/15/18 0040    Specimen:  Blood Updated:  08/15/18 0301     TSH 4.484 mIU/mL     Blood Culture - Blood, [[id number]] Collected:  08/15/18 0032    Specimen:  Blood from Arm, Right Updated:  08/15/18 0237    Blood Culture - Blood, [[id number]] Collected:  08/15/18 0038    Specimen:  Blood from Hand, Right Updated:  08/15/18 0236    Cortisol [[id number]] Collected:  08/15/18 0040    Specimen:  Blood Updated:  08/15/18 0236    Procalcitonin [[id number]]  (Normal) Collected:  08/15/18 0032    Specimen:  Blood from Arm, Right Updated:  08/15/18 0157     Procalcitonin 0.05 ng/mL     Narrative:       As a Marker for Sepsis (Non-Neonates):   1. <0.5 ng/mL represents a low risk of severe sepsis and/or septic shock.  2. >2 ng/mL represents a high risk of severe sepsis and/or septic shock.    As a Marker for Lower Respiratory Tract Infections that require antibiotic therapy:    PCT on Admission     Antibiotic Therapy       6-12 Hrs later  > 0.5                Strongly Recommended             >0.25 - <0.5         Recommended  0.1 - 0.25           Discouraged              Remeasure/reassess PCT  <0.1                 Strongly Discouraged     Remeasure/reassess PCT                     PCT values of < 0.5 ng/mL do not exclude an infection, because localized infections (without systemic signs) may be associated with such low concentrations, or a systemic infection in its initial stages (< 6 hours). Furthermore, increased PCT can occur without infection. PCT concentrations between 0.5 and 2.0 ng/mL should be interpreted taking into account the patient's history. It is recommended to retest PCT within 6-24 hours if any concentrations < 2 ng/mL are obtained.    Lactic Acid, Plasma [[id number]]  (Abnormal) Collected:  08/15/18 0032    Specimen:  Blood from Arm, Right  Updated:  08/15/18 0154     Lactate 5.3 (C) mmol/L      Comment: Falsely depressed results may occur on samples drawn from patients receiving N-Acetylcysteine (NAC) or Metamizole.       Lactic Acid, Reflex Timer (This will reflex a repeat order 3-3:15 hours after ordered.) [[id number]] Collected:  08/15/18 0032    Specimen:  Blood from Arm, Right Updated:  08/15/18 0154    [place] Draw [[id number]] Collected:  08/15/18 0040    Specimen:  Blood Updated:  08/15/18 0145    Narrative:       The following orders were created for panel order [place] Draw.  Procedure                               Abnormality         Status                     ---------                               -----------         ------                     Light Blue Top[[id number]]                                   Final result               Green Top (Gel)[[id number]]                                  Final result               Lavender Top[[id number]]                                     Final result               Gold Top - SST[[id number]]                                   Final result               Green Top (No Gel)[[id number]]                                                            Please view results for these tests on the individual orders.    Light Blue Top [[id number]] Collected:  08/15/18 0040    Specimen:  Blood Updated:  08/15/18 0145     Extra Tube hold for add-on     Comment: Auto resulted       Green Top (Gel) [[id number]] Collected:  08/15/18 0040    Specimen:  Blood Updated:  08/15/18 0145     Extra Tube Hold for add-ons.     Comment: Auto resulted.       Lavender Top [[id number]] Collected:  08/15/18 0040    Specimen:  Blood Updated:  08/15/18 0145     Extra Tube hold for add-on     Comment: Auto resulted       Gold Top - SST [[id number]] Collected:  08/15/18 0040    Specimen:  Blood Updated:  08/15/18 0145     Extra Tube Hold for add-ons.     Comment: Auto resulted.       Urinalysis With Culture If Indicated - Urine, Catheter [[id number]]   (Abnormal) Collected:  08/15/18 0125    Specimen:  Urine from Urine, Catheter Updated:  08/15/18 0139     Color, UA Yellow     Appearance, UA Cloudy (A)     pH, UA <=5.0     Specific Gravity, UA 1.014     Glucose, UA Negative     Ketones, UA Negative     Bilirubin, UA Negative     Blood, UA Small (1+) (A)     Protein, UA Negative     Leuk Esterase, UA Large (3+) (A)     Nitrite, UA Positive (A)     Urobilinogen, UA 0.2 E.U./dL    Urinalysis, Microscopic Only - Urine, Clean Catch [[id number]]  (Abnormal) Collected:  08/15/18 0125    Specimen:  Urine from Urine, Catheter Updated:  08/15/18 0139     RBC, UA 7-12 (A) /HPF      WBC, UA Too Numerous to Count (A) /HPF      Bacteria, UA 4+ (A) /HPF      Squamous Epithelial Cells, UA None Seen /HPF      Hyaline Casts, UA 0-6 /LPF      Methodology Automated Microscopy    Urine Culture - Urine, [[id number]] Collected:  08/15/18 0125    Specimen:  Urine from Urine, Catheter Updated:  08/15/18 0139    BNP [[id number]]  (Abnormal) Collected:  08/15/18 0040    Specimen:  Blood Updated:  08/15/18 0134     .0 (H) pg/mL      Comment: Results may be falsely decreased if patient taking Biotin.       Comprehensive Metabolic Panel [[id number]]  (Abnormal) Collected:  08/15/18 0040    Specimen:  Blood Updated:  08/15/18 0126     Glucose 246 (H) mg/dL      BUN 26 (H) mg/dL      Creatinine 1.67 (H) mg/dL      Sodium 138 mmol/L      Potassium 3.9 mmol/L      Chloride 100 mmol/L      CO2 29.0 mmol/L      Calcium 9.5 mg/dL      Total Protein 6.8 g/dL      Albumin 4.12 g/dL      ALT (SGPT) 10 U/L      AST (SGOT) 20 U/L      Alkaline Phosphatase 217 (H) U/L      Total Bilirubin 0.4 mg/dL      eGFR Non African Amer 29 (L) mL/min/1.73      Globulin 2.7 gm/dL      A/G Ratio 1.5 g/dL      BUN/Creatinine Ratio 15.6     Anion Gap 9.0 mmol/L     Narrative:       [employer] Guidelines    Stage     Description        GFR  1         Normal or High     90+  2         Mild decrease       60-89  3         Moderate decrease  30-59  4         Severe decrease    15-29  5         Kidney failure     <15    POC Troponin, Rapid [[id number]]  (Normal) Collected:  08/15/18 0046    Specimen:  Blood Updated:  08/15/18 0100     Troponin I 0.00 ng/mL      Comment: Serial Number: [id number]:  [id number]       CBC & Differential [[id number]] Collected:  08/15/18 0040    Specimen:  Blood Updated:  08/15/18 0059    Narrative:       The following orders were created for panel order CBC & Differential.  Procedure                               Abnormality         Status                     ---------                               -----------         ------                     Scan Slide[[id number]]                                                                  CBC Auto Differential[[id number]]        Abnormal            Final result                 Please view results for these tests on the individual orders.    CBC Auto Differential [[id number]]  (Abnormal) Collected:  08/15/18 0040    Specimen:  Blood Updated:  08/15/18 0059     WBC 22.08 (H) 10*3/mm3      RBC 3.74 (L) 10*6/mm3      Hemoglobin 11.6 g/dL      Hematocrit 39.5 %      .6 (H) fL      MCH 31.0 pg      MCHC 29.4 (L) g/dL      RDW 14.5 %      RDW-SD 55.9 (H) fl      MPV 10.5 fL      Platelets 484 (H) 10*3/mm3      Neutrophil % 71.6 (H) %      Lymphocyte % 22.1 (L) %      Monocyte % 3.0 %      Eosinophil % 3.2 (H) %      Basophil % 0.1 %      Immature Grans % 0.4 %      Neutrophils, Absolute 15.80 (H) 10*3/mm3      Lymphocytes, Absolute 4.88 (H) 10*3/mm3      Monocytes, Absolute 0.66 10*3/mm3      Eosinophils, Absolute 0.71 (H) 10*3/mm3      Basophils, Absolute 0.03 10*3/mm3      Immature Grans, Absolute 0.09 (H) 10*3/mm3     Blood Gas, Arterial [[id number]]  (Abnormal) Collected:  08/15/18 0043    Specimen:  Arterial Blood Updated:  08/15/18 0049     Site Arterial: left radial     Remi's Test Positive     pH, Arterial 7.149 (C) pH units      pCO2,  Arterial 72.8 (C) mm Hg      pO2, Arterial 199.0 (H) mm Hg      HCO3, Arterial 25.3 mmol/L      Base Excess, Arterial -4.6 (L) mmol/L      O2 Saturation, Arterial 97.6 %      Hemoglobin, Blood Gas 11.3 (L) g/dL      Hematocrit, Blood Gas 34.6 %      Oxyhemoglobin 97.6 %      Methemoglobin 0.90 %      Carboxyhemoglobin 0.6 %      CO2 Content 27.5     Barometric Pressure for Blood Gas -- mmHg      Comment: N/A        Modality Cannula     FIO2 60 %     POC CHEM 8 [[id number]]  (Abnormal) Collected:  08/15/18 0040    Specimen:  Blood Updated:  08/15/18 0045     Glucose 236 (H) mg/dL      BUN 29 (H) mg/dL      Creatinine 1.60 (H) mg/dL      Sodium 140 mmol/L      Potassium 4.0 mmol/L      Chloride 99 mmol/L      Total CO2 30 (H) mmol/L      Hemoglobin 12.6 g/dL      Comment: Serial Number: [id number]:  [id number]        Hematocrit 37 (L) %      Ionized Calcium 1.27 mmol/L         Imaging Results (last 24 hours)     Procedure Component Value Units Date/Time    XR Chest 1 View [[id number]] Collected:  08/15/18 0030     Updated:  08/15/18 0246    Narrative:       EXAM:    XR Chest, 1 View     EXAM DATE/TIME:    8/15/2018 12:30 AM     CLINICAL HISTORY:    88 years old, female; Signs and symptoms and device placement; Ett placement   (vent status); Shortness of breath; Additional info: SOA triage protocol     TECHNIQUE:    XR of the chest, 1 view.     COMPARISON:    CR - XR CHEST 1 VW 2017-11-01 06:35     FINDINGS:    Tubes, lines and devices:  Endotracheal tube is in place with the tip   approximately 3.2 cm above the cas. A left-sided pacemaker is visualized.    Lungs:  Bibasilar atelectatic change/infiltrates are visualized. There are   patchy hazy opacification of the right lung, which is a progression. There is a   diffuse increase in interstitial markings, which is nonspecific. Possible   etiologies include interstitial edema and atypical infection. The lungs are   hyperinflated, consistent with COPD. There is  prominence of the pulmonary   vascularity.    Pleural space:  No pneumothorax. No pleural effusions. There is mild right   apical pleural thickening.    Heart/Mediastinum: There is borderline cardiomegaly.   Bones/joints:  There is stable vertebroplasty within the lower thoracic/upper   lumbar spine.       Impression:       1. Bibasilar atelectatic change/infiltrates are visualized. There are patchy   hazy opacification of the right lung, which is a progression. Clinical   correlation and follow-up radiographs are recommended.   2. There is a diffuse increase in interstitial markings, which is nonspecific.   Possible etiologies include interstitial edema and atypical infection.   3. The lungs are hyperinflated, consistent with COPD.   4. There is prominence of the pulmonary vascularity.   5. There is mild right apical pleural thickening.   6. There is borderline cardiomegaly.  7. Endotracheal tube is identified.    THIS DOCUMENT HAS BEEN ELECTRONICALLY SIGNED BY JEREMIE CHANG MD           PROBLEM LIST    Principal Problem:    Severe sepsis of urinary origin (CMS/HCC)  Active Problems:    Acute on chronic respiratory failure with hypoxia and hypercapnia (CMS/HCC)    ROBBIN (acute kidney injury) (CMS/HCC)    Lactic acidosis    Type 2 diabetes mellitus (CMS/HCC)    CHF (congestive heart failure) (CMS/HCC)    COPD (chronic obstructive pulmonary disease) (CMS/HCC)    UTI (urinary tract infection)    Seizure (CMS/HCC)    GERD (gastroesophageal reflux disease)    Sleep apnea, obstructive    88-year-old woman, nonsmoker with asthma, coronary artery disease, congestive heart failure, mitral valve regurgitation, Alport's syndrome with chronic kidney disease, diabetes, seizure disorder presenting with the sudden onset of severe respiratory distress. She required intubation in the emergency room. She had a temperature 101 and a white count of 22,000 suspicious for sepsis. Her pro-calcitonin however is low. Clinically she has a  urinary tract infection. However, perhaps her respiratory decline is secondary to congestive heart failure or a bronchitis. She has a previous history of coronary artery disease. Unfortunately her records have yet to be merged I cannot look up her cath report from 2014. She is ventricular paced with a previous history of severe bradycardia.    Assessment/Plan      [facility]  - C[facility]  - fluid administration, pressors if needed  - F/U lactic acid, cortisol, HA1c, renal function  - await Urine Cx, BC x 2 pending  - Vanco/Merrem (given recurrence of E coli and increased resistance to other ABX).  Can downgrade once organism known  - ECHO in am  -Troponin, CK with MB  - resume Ronny   - her daughter states that she should be in our system, will search for other encounters  - GI & DVT PPX  -Sliding-scale insulin      Louis Palacios, APRN  08/15/18  3:14 AM    I interviewed the patient's daughter, performed the above physical examination, reviewed her x-ray and lab work, dictated the above summary and modified the plan reflect my additional    Tami Torres MD    Time: 60min    This note was produced with a voice recognition program and may have uncorrected errors.

## 2018-08-15 NOTE — PROCEDURES
"Insert Central Line At Bedside  Date/Time: 8/15/2018 6:44 AM  Performed by: JACI MIGUEL  Authorized by: JACI MIGUEL   Consent: Verbal consent obtained. Written consent obtained.  Risks and benefits: risks, benefits and alternatives were discussed  Consent given by: power of   Patient understanding: patient states understanding of the procedure being performed  Patient consent: the patient's understanding of the procedure matches consent given  Procedure consent: procedure consent matches procedure scheduled  Relevant documents: relevant documents present and verified  Test results: test results available and properly labeled  Site marked: the operative site was marked  Required items: required blood products, implants, devices, and special equipment available  Patient identity confirmed: verbally with patient, arm band and hospital-assigned identification number  Time out: Immediately prior to procedure a \"time out\" was called to verify the correct patient, procedure, equipment, support staff and site/side marked as required.  Indications: vascular access  Anesthesia: local infiltration    Anesthesia:  Local Anesthetic: lidocaine 2% without epinephrine  Anesthetic total: 5 mL    Sedation:  Patient sedated: no  Preparation: skin prepped with 2% chlorhexidine  Skin prep agent dried: skin prep agent completely dried prior to procedure  Sterile barriers: all five maximum sterile barriers used - cap, mask, sterile gown, sterile gloves, and large sterile sheet  Hand hygiene: hand hygiene performed prior to central venous catheter insertion  Location details: right internal jugular  Patient position: flat  Catheter type: triple lumen  Pre-procedure: landmarks identified  Ultrasound guidance: yes  Sterile ultrasound techniques: sterile gel and sterile probe covers were used  Number of attempts: 1  Successful placement: yes  Post-procedure: line sutured and dressing applied  Assessment: blood return " through all ports,  free fluid flow,  placement verified by x-ray and no pneumothorax on x-ray  Patient tolerance: Patient tolerated the procedure well with no immediate complications

## 2018-08-15 NOTE — PROGRESS NOTES
Intensive Care Follow-up      LOS: 0 days     Ms. Avis Us, 88 y.o. female is followed for: Severe sepsis (CMS/HCC)     Subjective - Interval History     Patient remains intubated and sedated.  Continues to require Levophed drip for blood pressure support  On Precedex for control of agitation    The patient's relevant past medical, surgical and social history were reviewed and updated in Epic as appropriate.     Objective     Infusions:    dexmedetomidine 0.2-1.5 mcg/kg/hr Last Rate: 0.6 mcg/kg/hr (08/15/18 1047)   lactated ringers 100 mL/hr Last Rate: 100 mL/hr (08/15/18 0408)   norepinephrine 0.02-0.3 mcg/kg/min Last Rate: 0.09 mcg/kg/min (08/15/18 0846)   Pharmacy to dose vancomycin       Medications:    aspirin 81 mg Oral Daily   chlorhexidine 15 mL Mouth/Throat Q12H   dornase alpha 2.5 mg Inhalation BID - RT   famotidine 20 mg Oral BID   heparin (porcine) 5,000 Units Subcutaneous Q8H   insulin regular 0-9 Units Subcutaneous Q6H   ipratropium-albuterol 3 mL Nebulization Q6H - RT   levETIRAcetam 500 mg Intravenous Q12H   meropenem 1 g Intravenous Q12H   vancomycin (dosing per levels)  Does not apply Daily     Intake/Output       08/14/18 0700 - 08/15/18 0659 08/15/18 0700 - 08/16/18 0659    Intake (ml) 3458.5 885    Output (ml) 830 125    Net (ml) 2628.5 760    Last Weight  95 kg (209 lb 7 oz)  94.8 kg (209 lb)        Vital Sign Min/Max for last 24 hours  Temp  Min: 97.7 °F (36.5 °C)  Max: 101.1 °F (38.4 °C)   BP  Min: 64/34  Max: 171/99   Pulse  Min: 57  Max: 103   Resp  Min: 16  Max: 40   SpO2  Min: 91 %  Max: 100 %   Flow (L/min)  Min: 15  Max: 15        Physical Exam:   GENERAL: Intubated, mechanically ventilated, no distress   HEENT: Endotracheal tube and nasogastric tube in good position.  Right IJ deep line site okay   LUNGS: Decreased breath sounds bilaterally with crackles, no wheezes   HEART: Regular rate and rhythm with distant heart sounds   ABDOMEN: Obese, soft, quiet   EXTREMITIES: Trace  edema, no cyanosis   NEURO/PSYCH: Sedated and unresponsive      Results from last 7 days  Lab Units 08/15/18  0532 08/15/18  0040   WBC 10*3/mm3 21.12* 22.08*   HEMOGLOBIN g/dL 9.8* 11.6   HEMOGLOBIN, POC g/dL  --  12.6   PLATELETS 10*3/mm3 401 484*       Results from last 7 days  Lab Units 08/15/18  0532 08/15/18  0040   SODIUM mmol/L 141 138   POTASSIUM mmol/L 4.0 3.9   CO2 mmol/L 26.0 29.0   BUN mg/dL 29* 26*   CREATININE mg/dL 1.75* 1.60*  1.67*   MAGNESIUM mg/dL 2.1  --    PHOSPHORUS mg/dL 2.3*  --    GLUCOSE mg/dL 148* 246*     Estimated Creatinine Clearance: 23.9 mL/min (A) (by C-G formula based on SCr of 1.75 mg/dL (H)).      Results from last 7 days  Lab Units 08/15/18  0040   HEMOGLOBIN A1C % 5.90*         Results from last 7 days  Lab Units 08/15/18  0432   PH, ARTERIAL pH units 7.402   PCO2, ARTERIAL mm Hg 42.7   PO2 ART mm Hg 127.0*     Lab Results   Component Value Date    LACTATE 2.0 08/15/2018          Images: X-ray reveals cardiomegaly and interstitial infiltrates or atelectasis    I reviewed the patient's results and images.     Impression      Hospital Problem List     * (Principal)Severe sepsis of urinary origin (CMS/[facility])    ROBBIN (acute kidney injury) (CMS/[facility])    Acute on chronic respiratory failure with hypoxia and hypercapnia (CMS/[facility])    A/C Diastolic CHF    Moderate - severe MR    Non-ST elevation myocardial infarction (NSTEMI), type 2 (CMS/[facility])    Lactic acidosis    Type 2 diabetes mellitus (CMS/[facility])    COPD (chronic obstructive pulmonary disease) (CMS/[facility])    UTI (urinary tract infection), chronic H/O E Coli UTI    Seizure (CMS/[facility])    GERD (gastroesophageal reflux disease)    Sleep apnea, obstructive               Plan        Continue broad-spectrum empiric antibiotics for UTI and urosepsis  Continue vent support  Wean vasopressor support as able  Follow-up serial troponins and EKG     Talked with daughter regarding goals of care and CODE STATUS.  She indicates that her mother has a  "living will but at this point they want to continue current level of support \"for a while\".       Plan of care and goals reviewed with mulitdisciplinary team at daily rounds   I discussed the patient's findings and my recommendations with family and nursing staff     Critical Care time spent in direct patient care: 30 minutes (excluding procedure time, if applicable) including high complexity decision making to assess, manipulate, and support vital organ system failure in this individual who has impairment of one or more vital organ systems such that there is a high probability of imminent or life threatening deterioration in the patient’s condition.    RODDY Weinstein MD  Pulmonary and Critical Care Medicine     "

## 2018-08-15 NOTE — PLAN OF CARE
Problem: Fall Risk (Adult)  Goal: Identify Related Risk Factors and Signs and Symptoms  Outcome: Outcome(s) achieved Date Met: 08/15/18    Goal: Absence of Fall  Outcome: Ongoing (interventions implemented as appropriate)      Problem: Ventilation, Mechanical Invasive (Adult)  Goal: Signs and Symptoms of Listed Potential Problems Will be Absent, Minimized or Managed (Ventilation, Mechanical Invasive)  Outcome: Ongoing (interventions implemented as appropriate)      Problem: Restraint, Nonbehavioral (Nonviolent)  Goal: Rationale and Justification  Outcome: Ongoing (interventions implemented as appropriate)    Goal: Nonbehavioral (Nonviolent) Restraint: Absence of Injury/Harm  Outcome: Ongoing (interventions implemented as appropriate)    Goal: Nonbehavioral (Nonviolent) Restraint: Achievement of Discontinuation Criteria  Outcome: Ongoing (interventions implemented as appropriate)    Goal: Nonbehavioral (Nonviolent) Restraint: Preservation of Dignity and Wellbeing  Outcome: Ongoing (interventions implemented as appropriate)      Problem: Skin Injury Risk (Adult)  Goal: Identify Related Risk Factors and Signs and Symptoms  Outcome: Outcome(s) achieved Date Met: 08/15/18    Goal: Skin Health and Integrity  Outcome: Ongoing (interventions implemented as appropriate)      Problem: Patient Care Overview  Goal: Plan of Care Review  Outcome: Ongoing (interventions implemented as appropriate)   08/15/18 0643   Coping/Psychosocial   Plan of Care Reviewed With patient   Plan of Care Review   Progress no change   OTHER   Outcome Summary Admitted from ED at 0340. LR started at 100. Levophed added for hypotension. Central line placed. Precedex for sedation. Restraints applied. Liquid stool from colostomy.

## 2018-08-16 NOTE — PLAN OF CARE
Problem: Patient Care Overview  Goal: Plan of Care Review   08/16/18 1752   Coping/Psychosocial   Plan of Care Reviewed With patient   Plan of Care Review   Progress improving   OTHER   Outcome Summary Pt drowsy this morning, even with precedex off, unable to extubate until later this afternoon. Extubated at 1551 on 4L NC. Weaned to 2L NC shortly after with RR 14 and unlabored and oriented x4.  Around 1700, pt was confused to the year, RR 30-40, lungs sound wet, SPO2 95% on 2L NC,  C/O SOA.  RT and APRN notified - Bipap applied.

## 2018-08-16 NOTE — PLAN OF CARE
Problem: Patient Care Overview  Goal: Plan of Care Review  Outcome: Ongoing (interventions implemented as appropriate)   08/16/18 0612   Coping/Psychosocial   Plan of Care Reviewed With patient   Plan of Care Review   Progress improving   OTHER   Outcome Summary FC berg,  strong, ppm, anxious and mouthing that she is thirsty, trying to chew ett when awake, rhonchi clears with suction, minimal ostomy output, 275ml urine dark yellow, ivf infusing ef 30%, ng clamped, hypo bs, daughter at bedside, levo weaned overnight stopped at 0500, ekg this am

## 2018-08-16 NOTE — PROGRESS NOTES
Intensive Care Follow-up      LOS: 1 day     Ms. Avis Us, 88 y.o. female is followed for: Severe sepsis (CMS/HCC)     Subjective - Interval History     Remains mechanically ventilated.  Precedex just recently discontinued her  Weaned off Levophed since early this morning  Minimal secretions    The patient's relevant past medical, surgical and social history were reviewed and updated in Epic as appropriate.     Objective     Infusions:    dexmedetomidine 0.2-1.5 mcg/kg/hr Last Rate: Stopped (08/16/18 1005)   lactated ringers 100 mL/hr Last Rate: 100 mL/hr (08/16/18 0918)   norepinephrine 0.02-0.3 mcg/kg/min Last Rate: Stopped (08/16/18 0510)     Medications:    aspirin 81 mg Oral Daily   chlorhexidine 15 mL Mouth/Throat Q12H   dornase alpha 2.5 mg Inhalation BID - RT   famotidine 20 mg Oral BID   heparin (porcine) 5,000 Units Subcutaneous Q8H   insulin regular 0-9 Units Subcutaneous Q6H   ipratropium-albuterol 3 mL Nebulization Q6H - RT   levETIRAcetam 500 mg Intravenous Q12H   meropenem 1 g Intravenous Q12H     Intake/Output       08/15/18 0700 - 08/16/18 0659 08/16/18 0700 - 08/17/18 0659    Intake (ml) 3479.6 853    Output (ml) 515 110    Net (ml) 2964.6 743    Last Weight  99 kg (218 lb 4.1 oz)  --        Vital Sign Min/Max for last 24 hours  Temp  Min: 97.6 °F (36.4 °C)  Max: 99.1 °F (37.3 °C)   BP  Min: 108/70  Max: 141/77   Pulse  Min: 57  Max: 71   Resp  Min: 16  Max: 16   SpO2  Min: 96 %  Max: 100 %   No Data Recorded        Physical Exam:   GENERAL: Intubated, mechanically ventilated, no distress   HEENT: ET tube and nasogastric tube in good position   LUNGS: Basilar crackles, no wheezes   HEART: Regular rate and rhythm grade 2/6 systolic murmur   ABDOMEN: Obese, soft, nontender   EXTREMITIES: Trace edema without cyanosis   NEURO/PSYCH: Lethargic but arousable.  Moves all fours.  Opens eyes.  Follow some simple commands      Results from last 7 days  Lab Units 08/16/18  0426 08/15/18  0532  08/15/18  0040   WBC 10*3/mm3 13.98* 21.12* 22.08*   HEMOGLOBIN g/dL 10.1* 9.8* 11.6   HEMOGLOBIN, POC g/dL  --   --  12.6   PLATELETS 10*3/mm3 390 401 484*       Results from last 7 days  Lab Units 08/16/18  0426 08/15/18  0532 08/15/18  0040   SODIUM mmol/L 138 141 138   POTASSIUM mmol/L 4.6 4.0 3.9   CO2 mmol/L 23.0 26.0 29.0   BUN mg/dL 30* 29* 26*   CREATININE mg/dL 1.82* 1.75* 1.60*  1.67*   MAGNESIUM mg/dL 2.1 2.1  --    PHOSPHORUS mg/dL 4.0 2.3*  --    GLUCOSE mg/dL 138* 148* 246*     Estimated Creatinine Clearance: 23.5 mL/min (A) (by C-G formula based on SCr of 1.82 mg/dL (H)).      Results from last 7 days  Lab Units 08/15/18  0040   HEMOGLOBIN A1C % 5.90*         Results from last 7 days  Lab Units 08/16/18  0519   PH, ARTERIAL pH units 7.445   PCO2, ARTERIAL mm Hg 35.2   PO2 ART mm Hg 118.0*     Lab Results   Component Value Date    LACTATE 2.5 (C) 08/16/2018          Images: CXR reveals cardiomegaly, hardware, no consolidation or effusions.    I reviewed the patient's results and images.     Impression      Hospital Problem List     * (Principal)Severe sepsis of urinary origin (CMS/[facility])    ROBBIN (acute kidney injury) (CMS/[facility])    Acute on chronic respiratory failure with hypoxia and hypercapnia (CMS/HCC)    A/C Diastolic CHF    Moderate - severe MR    Non-ST elevation myocardial infarction (NSTEMI), type 2 (CMS/[facility])    Lactic acidosis    Type 2 diabetes mellitus (CMS/[facility])    COPD (chronic obstructive pulmonary disease) (CMS/[facility])    UTI (urinary tract infection), chronic H/O E Coli UTI    Seizure (CMS/[facility])    GERD (gastroesophageal reflux disease)    Sleep apnea, obstructive               Plan        Given culture results, will discontinue vancomycin but continued Merrem for now  Decrease vent support  Hopefully, can extubate soon.  Gas exchange and shock have improved  Follow-up on renal function    Continue to have daily discussions with family regarding goals of care.       Plan of care and goals  reviewed with mulitdisciplinary team at daily rounds   I discussed the patient's findings and my recommendations with family and nursing staff     Critical Care time spent in direct patient care: 35 minutes (excluding procedure time, if applicable) including high complexity decision making to assess, manipulate, and support vital organ system failure in this individual who has impairment of one or more vital organ systems such that there is a high probability of imminent or life threatening deterioration in the patient’s condition.    RODDY Weinstein MD  Pulmonary and Critical Care Medicine

## 2018-08-16 NOTE — PROGRESS NOTES
Clinical Nutrition     Multidisciplinary Rounds    Time: 20min  Patient Name: Avis Us  Date of Encounter: 08/16/18 10:27 AM  MRN: [id number]  Admission date: 8/15/2018    ROX. RD to continue to follow per protocol.      Per RN: pt off levophed, poor UOP, very restless    Possible plan to extubate per MD    Current diet: NPO Diet  No active supplement orders      Intervention:  Follow treatment plan  Care plan reviewed  EN recs provided    Follow up:   Per protocol      Naz Calle RD  10:27 AM

## 2018-08-17 NOTE — PLAN OF CARE
Problem: NPPV/CPAP (Adult)  Goal: Signs and Symptoms of Listed Potential Problems Will be Absent, Minimized or Managed (NPPV/CPAP)  Outcome: Ongoing (interventions implemented as appropriate)  None seen at mask   08/17/18 0534   Goal/Outcome Evaluation   Problems Assessed (NPPV/CPAP) skin breakdown   Problems Present (NPPV/CPAP) none

## 2018-08-17 NOTE — PLAN OF CARE
Problem: Patient Care Overview  Goal: Plan of Care Review   08/17/18 1148   Coping/Psychosocial   Plan of Care Reviewed With patient;daughter;son   Plan of Care Review   Progress no change   OTHER   Outcome Summary Tried to leave bipap off this morning, but pt did not tolerate. Her O2 sat was okay however she became tachypnic and very SOA. LR d/c'd. Gave one dose of Lasix with 220mL of urine shortly after, and then UOP trended back down.  Pt has living will not on file. Palliative Care consulted today. Goals of care discussion with daughter. No CPR, No Intubation, limited support - see orders. Family requested that the bipap be taken off the patient. Bipap off and placed on 3L NC around 1300. O2 sats drop to low 80's at times. BP stable.

## 2018-08-17 NOTE — PROGRESS NOTES
Clinical Nutrition   Reason For Visit: MDR, Follow-up protocol    Patient Name: Avis Us  YOB: 1929  MRN: [id number]  Date of Encounter: 08/17/18 12:25 PM  Admission date: 8/15/2018          Nutrition Assessment     Hospital Problem List  Principal Problem:    Severe sepsis of urinary origin (CMS/[facility])  Active Problems:    ROBBIN (acute kidney injury) (CMS/[facility])    Acute on chronic respiratory failure with hypoxia and hypercapnia (CMS/[facility])    Lactic acidosis    Type 2 diabetes mellitus (CMS/[facility])    A/C Diastolic CHF    COPD (chronic obstructive pulmonary disease) (CMS/[facility])    UTI (urinary tract infection), chronic H/O E Coli UTI    Seizure (CMS/[facility])    GERD (gastroesophageal reflux disease)    Sleep apnea, obstructive    Moderate - severe MR    Non-ST elevation myocardial infarction (NSTEMI), type 2 (CMS/[facility])          PMH: She  has a past medical history of Coronary artery disease; CVA (cerebral vascular accident) (CMS/[facility]); Diabetes mellitus (CMS/[facility]); Diastolic dysfunction; Emphysema of lung (CMS/[facility]); GERD (gastroesophageal reflux disease); Gout; Heart block, first degree; HLD (hyperlipidemia); Hypertension; Mitral valve regurgitation; Seizure (CMS/[facility]); and Sleep apnea, obstructive.   PSxH: She  has a past surgical history that includes Cardiac catheterization; Colostomy; Hernia repair; Cholecystectomy; Pacemaker Implantation; and Colovaginal Fistula Repair.     ARF/VENT extubated 8-16-18       Reported/Observed/Food/Nutrition Related History     Pt on BiPAP, not alert,  family,  at bedside    Per RN: plan to withdraw care          GI: wnl    SKIN: bruises        Labs reviewed   Labs reviewed: Yes    Results from last 7 days  Lab Units 08/17/18  0538 08/16/18  0426 08/15/18  0532   SODIUM mmol/L 140 138 141   POTASSIUM mmol/L 5.3 4.6 4.0   CHLORIDE mmol/L 107 107 105   CO2 mmol/L 25.0 23.0 26.0   BUN mg/dL 38* 30* 29*   CREATININE mg/dL 1.80* 1.82* 1.75*   GLUCOSE mg/dL 123* 138* 148*    CALCIUM mg/dL 8.8 9.0 8.5*   PHOSPHORUS mg/dL 5.0 4.0 2.3*   MAGNESIUM mg/dL 2.2 2.1 2.1       Results from last 7 days  Lab Units 08/17/18  0538 08/16/18  0426 08/15/18  0532 08/15/18  0040   WBC 10*3/mm3 13.32* 13.98* 21.12* 22.08*   ALBUMIN g/dL 2.95* 3.24  --  4.12       Results from last 7 days  Lab Units 08/17/18  1109 08/17/18  0518 08/16/18  2318 08/16/18  1740 08/16/18  1116 08/16/18  0516   GLUCOSE mg/dL 105 122 122 103 143* 138*       Lab Results  Lab Value Date/Time   HGBA1C 5.90 (H) 08/15/2018 0040     Medications reviewed   Medications reviewed: Yes      Intake/Ouptut 24 hrs (7:00AM - 6:59 AM)     Intake & Output (last day)       08/16 0701 - 08/17 0700 08/17 0701 - 08/18 0700    I.V. (mL/kg) 2822.8 (28.7) 404 (4.1)    Other 140     IV Piggyback 389.4 100    Total Intake(mL/kg) 3352.2 (34.1) 504 (5.1)    Urine (mL/kg/hr) 645 (0.3) 45 (0.1)    Stool 50     Total Output 695 45    Net +2657.2 +459                  Current Nutrition Prescription   PO: NPO Diet      Nutrition Diagnosis     Problem No Nutrition Diagnosis   Etiology    Signs/Symptoms                        Intervention   Intervention: Follow treatment progress    Goal:   General: Palliative care    Palliative Care consulted for GOC    Monitoring/Evaluation:       Monitoring/Evaluation: Per protocol  Naz Calle RD  Time Spent: 20min

## 2018-08-17 NOTE — SIGNIFICANT NOTE
08/17/18 1142   SLP Deferred Reason   SLP Deferred Reason (RN notified no further needs, moving to comfort measures. SLP signing off. )

## 2018-08-17 NOTE — PROGRESS NOTES
Intensive Care Follow-up      LOS: 2 days     Ms. Avis Us, 88 y.o. female is followed for: Severe sepsis (CMS/HCC)     Subjective - Interval History     Awake and alert  Confused at times  Has required restraints to protect lines  Requiring BiPAP for respiratory distress.  Currently on 35% FiO2  On low dose Precedex to help control agitation    The patient's relevant past medical, surgical and social history were reviewed and updated in Epic as appropriate.     Objective     Infusions:    dexmedetomidine 0.2-1.5 mcg/kg/hr Last Rate: 1 mcg/kg/hr (08/17/18 0616)   lactated ringers 100 mL/hr Last Rate: 100 mL/hr (08/17/18 0222)   norepinephrine 0.02-0.3 mcg/kg/min Last Rate: Stopped (08/16/18 0510)     Medications:    aspirin 81 mg Oral Daily   chlorhexidine 15 mL Mouth/Throat Q12H   dornase alpha 2.5 mg Inhalation BID - RT   famotidine 20 mg Oral BID   heparin (porcine) 5,000 Units Subcutaneous Q8H   insulin regular 0-9 Units Subcutaneous Q6H   ipratropium-albuterol 3 mL Nebulization Q6H - RT   levETIRAcetam 500 mg Intravenous Q12H   meropenem 1 g Intravenous Q12H     Intake/Output       08/16/18 0700 - 08/17/18 0659    Intake (ml) 3352.2    Output (ml) 695    Net (ml) 2657.2    Last Weight  98.4 kg (216 lb 14.9 oz)        Vital Sign Min/Max for last 24 hours  Temp  Min: 96.6 °F (35.9 °C)  Max: 99.5 °F (37.5 °C)   BP  Min: 91/55  Max: 152/89   Pulse  Min: 58  Max: 93   Resp  Min: 14  Max: 38   SpO2  Min: 93 %  Max: 100 %   Flow (L/min)  Min: 2  Max: 4        Physical Exam:   GENERAL: Awake, no overt distress   HEENT: Right IJ deep line site okay, no adenopathy   LUNGS: Decreased breath sounds bilaterally without wheezes   HEART: Irregular tachycardia, grade 2/6 systolic murmur   ABDOMEN: Obese, soft, nontender   EXTREMITIES: Trace lower extremity edema, no cyanosis   NEURO/PSYCH: Awake, alert, conversant, confused, moves all fours      Results from last 7 days  Lab Units 08/17/18  0538 08/16/18  0426  08/15/18  0532   WBC 10*3/mm3 13.32* 13.98* 21.12*   HEMOGLOBIN g/dL 10.0* 10.1* 9.8*   PLATELETS 10*3/mm3 338 390 401       Results from last 7 days  Lab Units 08/17/18  0538 08/16/18  0426 08/15/18  0532   SODIUM mmol/L 140 138 141   POTASSIUM mmol/L 5.3 4.6 4.0   CO2 mmol/L 25.0 23.0 26.0   BUN mg/dL 38* 30* 29*   CREATININE mg/dL 1.80* 1.82* 1.75*   MAGNESIUM mg/dL 2.2 2.1 2.1   PHOSPHORUS mg/dL 5.0 4.0 2.3*   GLUCOSE mg/dL 123* 138* 148*     Estimated Creatinine Clearance: 23.7 mL/min (A) (by C-G formula based on SCr of 1.8 mg/dL (H)).      Results from last 7 days  Lab Units 08/15/18  0040   HEMOGLOBIN A1C % 5.90*         Results from last 7 days  Lab Units 08/17/18  0400   PH, ARTERIAL pH units 7.338*   PCO2, ARTERIAL mm Hg 46.1*   PO2 ART mm Hg 72.5*     Lab Results   Component Value Date    LACTATE 2.5 (C) 08/16/2018          Images: Chest x-ray reveals cardiomegaly, hardware from her prior cardiac procedures, no consolidation.  Small bilateral pleural effusions cannot be excluded Right greater than left pulmonary infiltrate.    I reviewed the patient's results and images.     Impression      Hospital Problem List     * (Principal)Severe sepsis of urinary origin (CMS/[facility])    ROBBIN (acute kidney injury) (CMS/[facility])    Acute on chronic respiratory failure with hypoxia and hypercapnia (CMS/[facility])    A/C Diastolic CHF    Moderate - severe MR    Non-ST elevation myocardial infarction (NSTEMI), type 2 (CMS/[facility])    Lactic acidosis    Type 2 diabetes mellitus (CMS/[facility])    COPD (chronic obstructive pulmonary disease) (CMS/[facility])    UTI (urinary tract infection), chronic H/O E Coli UTI    Seizure (CMS/[facility])    GERD (gastroesophageal reflux disease)    Sleep apnea, obstructive               Plan        Tenuously remains off of vent support  Mobilize and see how she does off of BiPAP today  High risk for respiratory failure requiring reintubation  Confirmed with the patient's family at the bedside who does desire reintubation  if she were to fail  Given renal dysfunction, difficult to diuresis aggressively  Follow-up on urine cultures and simplify antibiotics based on results     Will ask palliative care to help with goals of care given this patient's severe, chronic underlying respiratory issues and poor prognosis     Plan of care and goals reviewed with mulitdisciplinary team at daily rounds   I discussed the patient's findings and my recommendations with patient, family and nursing staff     Critical Care time spent in direct patient care: 35 minutes (excluding procedure time, if applicable) including high complexity decision making to assess, manipulate, and support vital organ system failure in this individual who has impairment of one or more vital organ systems such that there is a high probability of imminent or life threatening deterioration in the patient’s condition.    RODDY Weinstein MD  Pulmonary and Critical Care Medicine

## 2018-08-17 NOTE — PLAN OF CARE
Problem: Patient Care Overview  Goal: Interprofessional Rounds/Family Conf  Outcome: Ongoing (interventions implemented as appropriate)  13:00 Palliative Team Conference: DEEPA Smith RN, CHPN; JOHN Ngo DO; KARINA Davis RN, CHPN; RODDY Wheat APRN; WICHO Perry MDiv   08/17/18 1355   Interdisciplinary Rounds/Family Conf   Summary Palliative consult for GOC/ACP. Dr. Ngo met with pt and daughter, then soon after received message that per family discussion, change to no CPR with limited interventions. More family expected to arrive later today; moving more toward comfort care. Palliative following for continuing GOC/POC discussion, family support.       Problem: Palliative Care (Adult)  Intervention: Promote Informed Decision Making and Goal Setting   08/17/18 1355   Coping/Psychosocial Interventions   Life Transition/Adjustment palliative care discussed;palliative care initiated     Intervention: Support/Optimize Psychosocial Response   08/17/18 1355   Psychosocial Support   Family/Support System Care presence promoted;support provided       Goal: Identify Related Risk Factors and Signs and Symptoms  Outcome: Ongoing (interventions implemented as appropriate)   08/17/18 1355   Palliative Care (Adult)   Palliative Care: Related Risk Factors advanced age;chronic illness;condition is progressive;worsening symptoms   Palliative Care: Signs and Symptoms breathlessness;fatigue     Goal: Maximized Comfort  Outcome: Ongoing (interventions implemented as appropriate)   08/17/18 1355   Palliative Care (Adult)   Maximized Comfort making progress toward outcome     Goal: Enhanced Quality of Life  Outcome: Ongoing (interventions implemented as appropriate)   08/17/18 1355   Palliative Care (Adult)   Enhanced Quality of Life making progress toward outcome

## 2018-08-17 NOTE — PLAN OF CARE
"Problem: Fall Risk (Adult)  Goal: Absence of Fall  Outcome: Ongoing (interventions implemented as appropriate)      Problem: Restraint, Nonbehavioral (Nonviolent)  Goal: Rationale and Justification  Outcome: Outcome(s) achieved Date Met: 08/17/18    Goal: Nonbehavioral (Nonviolent) Restraint: Absence of Injury/Harm  Outcome: Outcome(s) achieved Date Met: 08/17/18    Goal: Nonbehavioral (Nonviolent) Restraint: Achievement of Discontinuation Criteria  Outcome: Outcome(s) achieved Date Met: 08/17/18    Goal: Nonbehavioral (Nonviolent) Restraint: Preservation of Dignity and Wellbeing  Outcome: Outcome(s) achieved Date Met: 08/17/18      Problem: Sepsis/Septic Shock (Adult)  Goal: Signs and Symptoms of Listed Potential Problems Will be Absent, Minimized or Managed (Sepsis/Septic Shock)  Outcome: Ongoing (interventions implemented as appropriate)      Problem: Skin Injury Risk (Adult)  Goal: Skin Health and Integrity  Outcome: Ongoing (interventions implemented as appropriate)      Problem: Patient Care Overview  Goal: Plan of Care Review  Outcome: Ongoing (interventions implemented as appropriate)   08/17/18 6954   Coping/Psychosocial   Plan of Care Reviewed With patient   Plan of Care Review   Progress improving   OTHER   Outcome Summary Pt drowsy, but arouses and is cooperative. Some confusion, but daughter states patient has \"sundowners\" with every hospitalization. Precedex continues, but weaning.  Pt was able to tolerate bipap througout shift. RR 25-27, O2 sat >92%. Crackles noted. Minimally adequate UOP. Continues on LR at 100 ml/hr.  Pt was appropriate and did not attempt to remove bipap. Restraints off safely since 2200.  SBP 90s-110s.          "

## 2018-08-17 NOTE — PLAN OF CARE
Problem: Ventilation, Mechanical Invasive (Adult)  Goal: Signs and Symptoms of Listed Potential Problems Will be Absent, Minimized or Managed (Ventilation, Mechanical Invasive)  Outcome: Ongoing (interventions implemented as appropriate)  No breakdown at et tube   08/17/18 0544   Goal/Outcome Evaluation   Problems Assessed (Mechanical Ventilation, Invasive) artificial airway-induced skin/tissue breakdown   Problems Present (Mech Vent, Invasive) none

## 2018-08-17 NOTE — SIGNIFICANT NOTE
08/17/18 1108   SLP Deferred Reason   SLP Deferred Reason (On Bipap, not appropriate for dysphagia eval at this time. RN will call if any change today. Otherwise SLP will check back tomorrow. Thanks )

## 2018-08-17 NOTE — CONSULTS
Carol Herzog MD  Consulting physician: Js    Chief Complaint   Patient presents with   • Respiratory Distress         HPI: Patient is an 88-year-old female with multiple medical problems are chronic in nature.  Patient comes in the hospital with respiratory distress.  The patient was intubated for about 36-48 hours but was able to extubated.  Patient did okay once extubated for about the first hour or so but since then has declined becoming increasingly more dyspneic as well as having more respiratory distress.  Patient is now on the BiPAP.  The patient will wake up to give some history but she quickly falls back asleep especially when not being directly stimulated.      Past Medical History:   Diagnosis Date   • Coronary artery disease    • CVA (cerebral vascular accident) (CMS/[facility])    • Diabetes mellitus (CMS/[facility])    • Diastolic dysfunction    • Emphysema of lung (CMS/[facility])    • GERD (gastroesophageal reflux disease)    • Gout    • Heart block, first degree    • HLD (hyperlipidemia)    • Hypertension    • Mitral valve regurgitation    • Seizure (CMS/[facility])    • Sleep apnea, obstructive      Past Surgical History:   Procedure Laterality Date   • CARDIAC CATHETERIZATION     • CHOLECYSTECTOMY     • COLOSTOMY     • COLOVAGINAL FISTULA REPAIR     • HERNIA REPAIR     • PACEMAKER IMPLANTATION       Current Code Status     Date Active Code Status Order ID Comments User Context       8/15/2018  2:35 AM CPR [id number]  Louis Palacios APRN ED       Questions for Current Code Status     Question Answer Comment    Code Status CPR     Medical Interventions (Level of Support Prior to Arrest) Full         Current Facility-Administered Medications   Medication Dose Route Frequency Provider Last Rate Last Dose   • acetaminophen (TYLENOL) tablet 650 mg  650 mg Oral Q4H PRN Louis Palacios APRN        Or   • acetaminophen (TYLENOL) suppository 650 mg  650 mg Rectal Q4H PRN Louis Palacios APRN       • aspirin  chewable tablet 81 mg  81 mg Oral Daily Louis Palacios APRN   81 mg at 08/17/18 0930   • cefTRIAXone (ROCEPHIN) IVPB 1 g  1 g Intravenous Q24H Charles Weinstein MD       • dexmedetomidine (PRECEDEX) 400 mcg/100 mL (4 mcg/mL) infusion  0.2-1.5 mcg/kg/hr Intravenous Titrated Louis Palacios APRN 19.85 mL/hr at 08/17/18 0616 1 mcg/kg/hr at 08/17/18 0616   • dextrose (D50W) solution 25 g  25 g Intravenous Q15 Min PRN Louis Palacios APRN       • dextrose (GLUTOSE) oral gel 15 g  15 g Oral Q15 Min PRN Louis Palacios APRN       • dornase alpha (PULMOZYME) nebulizer solution 2.5 mg  2.5 mg Inhalation BID - RT Tami Torres MD   2.5 mg at 08/17/18 0712   • famotidine (PEPCID) tablet 20 mg  20 mg Oral BID Louis Palacios APRN   20 mg at 08/17/18 0930   • fentaNYL citrate (PF) (SUBLIMAZE) injection 25 mcg  25 mcg Intravenous Q30 Min PRN Louis Palacios APRN   25 mcg at 08/16/18 0920   • furosemide (LASIX) injection 40 mg  40 mg Intravenous Q12H Charles Weinstein MD   40 mg at 08/17/18 1016   • glucagon (human recombinant) (GLUCAGEN DIAGNOSTIC) injection 1 mg  1 mg Subcutaneous PRN Louis Palacios APRN       • heparin (porcine) 5000 UNIT/ML injection 5,000 Units  5,000 Units Subcutaneous Q8H Louis Palacios APRN   5,000 Units at 08/17/18 0620   • insulin regular (humuLIN R,novoLIN R) injection 0-9 Units  0-9 Units Subcutaneous Q6H Louis Palacios APRN   2 Units at 08/15/18 1200   • ipratropium-albuterol (DUO-NEB) nebulizer solution 3 mL  3 mL Nebulization Q4H PRN Louis Palacios APRN       • ipratropium-albuterol (DUO-NEB) nebulizer solution 3 mL  3 mL Nebulization Q6H - RT Louis Palacios APRN   3 mL at 08/17/18 0712   • levETIRAcetam in NaCl 0.82% (KEPPRA) IVPB 500 mg  500 mg Intravenous Q12H Louis Palacios APRN   500 mg at 08/17/18 0930   • norepinephrine (LEVOPHED) 8 mg/250 mL (32 mcg/mL) in sodium chloride 0.9% infusion (premix)  0.02-0.3 mcg/kg/min  "Intravenous Titrated Louis Palacios APRN   Stopped at 08/16/18 0510   • sodium chloride 0.9 % flush 1-10 mL  1-10 mL Intravenous PRN Louis Palacios APRN       • sodium chloride 0.9 % flush 10 mL  10 mL Intravenous PRN Abdirizak Cyr MD           dexmedetomidine 0.2-1.5 mcg/kg/hr Last Rate: 1 mcg/kg/hr (08/17/18 0616)   norepinephrine 0.02-0.3 mcg/kg/min Last Rate: Stopped (08/16/18 0510)     •  acetaminophen **OR** acetaminophen  •  dextrose  •  dextrose  •  fentaNYL citrate (PF)  •  glucagon (human recombinant)  •  ipratropium-albuterol  •  sodium chloride  •  sodium chloride  Allergies   Allergen Reactions   • Biaxin [Clarithromycin] Angioedema   • Metoprolol Angioedema   • Tramadol Confusion     Family History   Problem Relation Age of Onset   • Cancer Mother    • Emphysema Father      Social History     Social History   • Marital status:      Spouse name: N/A   • Number of children: 2   • Years of education: N/A     Occupational History   • Not on file.     Social History Main Topics   • Smoking status: Passive Smoke Exposure - Never Smoker   • Smokeless tobacco: Never Used   • Alcohol use No   • Drug use: No   • Sexual activity: Not Currently     Other Topics Concern   • Not on file     Social History Narrative   • No narrative on file     Review of Systems - all others reviewed and found negative except as mentioned in the history of present illness      BP 95/80 (BP Location: Left arm)   Pulse 60   Temp 96.6 °F (35.9 °C) (Core)   Resp (!) 35   Ht 157.5 cm (62.01\")   Wt 98.4 kg (216 lb 14.9 oz)   SpO2 100%   BMI 39.67 kg/m²     Intake/Output Summary (Last 24 hours) at 08/17/18 1100  Last data filed at 08/17/18 1000   Gross per 24 hour   Intake          3003.19 ml   Output              630 ml   Net          2373.19 ml     Physical Exam:      General Appearance:    BiPAP in place.  Patient will wake up for short periods but quickly falls back to sleep.   Head:    Normocephalic, " without obvious abnormality, atraumatic   Eyes:            Lids and lashes normal, conjunctivae and sclerae normal, no   icterus, no pallor, corneas clear, PERRLA   Ears:    Ears appear intact with no abnormalities noted   Throat:   No oral lesions, no thrush, oral mucosa moist   Neck:   No adenopathy, supple, trachea midline, no thyromegaly, no     carotid bruit, no JVD   Back:     No kyphosis present, no scoliosis present, no skin lesions,       erythema or scars, no tenderness to percussion or                   palpation,   range of motion normal   Lungs:     Diminished breathe sounds throughout    Heart:    Regular rhythm and normal rate, normal S1 and S2, no            murmur, no gallop, no rub, no click   Breast Exam:    Deferred   Abdomen:     Normal bowel sounds, no masses, no organomegaly, soft        non-tender, non-distended, no guarding, no rebound                 tenderness   Genitalia:    Deferred   Extremities:   Moves all extremities well, no edema, no cyanosis, no              redness   Pulses:   Pulses palpable and equal bilaterally   Skin:   No bleeding, bruising or rash   Lymph nodes:   No palpable adenopathy   Neurologic:   Cranial nerves 2 - 12 grossly intact, sensation intact, DTR        present and equal bilaterally         Results from last 7 days  Lab Units 08/17/18  0538   WBC 10*3/mm3 13.32*   HEMOGLOBIN g/dL 10.0*   HEMATOCRIT % 32.6*   PLATELETS 10*3/mm3 338       Results from last 7 days  Lab Units 08/17/18  0538   SODIUM mmol/L 140   POTASSIUM mmol/L 5.3   CHLORIDE mmol/L 107   CO2 mmol/L 25.0   BUN mg/dL 38*   CREATININE mg/dL 1.80*   CALCIUM mg/dL 8.8   BILIRUBIN mg/dL 0.6   ALK PHOS U/L 183*   ALT (SGPT) U/L 49*   AST (SGOT) U/L 27   GLUCOSE mg/dL 123*       Results from last 7 days  Lab Units 08/17/18  0538   SODIUM mmol/L 140   POTASSIUM mmol/L 5.3   CHLORIDE mmol/L 107   CO2 mmol/L 25.0   BUN mg/dL 38*   CREATININE mg/dL 1.80*   GLUCOSE mg/dL 123*   CALCIUM mg/dL 8.8     Imaging  Results (last 72 hours)     Procedure Component Value Units Date/Time    XR Chest 1 View [[id number]] Collected:  08/17/18 0813     Updated:  08/17/18 0917    Narrative:       EXAMINATION: XR CHEST 1 VW-      INDICATION: A41.9-Sepsis, unspecified organism; N39.0-Urinary tract  infection, site not specified; J96.02-Acute respiratory failure with  hypercapnia; E87.2-Acidosis; I95.9-Hypotension, unspecified;  R65.20-Severe sepsis without septic shock.      COMPARISON: 08/15/2018.     FINDINGS: Portable chest reveals removal of the endotracheal tube.  Remainder of the lines and tubes are in satisfactory position. There are  small bilateral pleural effusions with increased pulmonary vascularity  bilaterally. The heart is enlarged.           Impression:       Endotracheal tube removed in the interval. Remainder lines  and tubes are in satisfactory position. Increased pulmonary vascularity  bilaterally with small bilateral pleural effusions.     D:  08/17/2018  E:  08/17/2018     This report was finalized on 8/17/2018 9:15 AM by Dr. Dania Galvez MD.       XR Chest 1 View [[id number]] Collected:  08/16/18 0852     Updated:  08/16/18 1042    Narrative:       EXAMINATION: XR CHEST 1 VW- 08/16/2018     INDICATION: Resp Distress; A41.9-Sepsis, unspecified organism;  N39.0-Urinary tract infection, site not specified; J96.02-Acute  respiratory failure with hypercapnia; E87.2-Acidosis; E87.2-Acidosis;  I95.9-Hypotension, unspecified; A41.9-Sepsis, unspecified organism;  R65.20-Severe sepsis without septic shock      COMPARISON: 08/15/2018     FINDINGS: Support hardware unchanged and in satisfactory positioning.  Cardiac silhouette enlarged and similar to prior. Persistent trace right  and trace to small left pleural effusions with minimal left basilar  atelectasis.           Impression:       No significant interval change.     D:  08/16/2018  E:  08/16/2018     This report was finalized on 8/16/2018 10:39 AM by Dr. Sarmiento  Key.       XR Chest 1 View [[id number]] Collected:  08/15/18 1133     Updated:  08/15/18 1433    Narrative:       EXAMINATION: XR CHEST 1 VW-      INDICATION: Central line placement; A41.9-Sepsis, unspecified organism;  N39.0-Urinary tract infection, site not specified; J96.02-Acute  respiratory failure with hypercapnia; E87.2-Acidosis; I95.9-Hypotension,  unspecified.      COMPARISON: 08/15/2018, 12:50 AM.     FINDINGS: ET tube, NG tube and right IJ catheter appear to be in good  position. Left-sided dual lead pacemaker is again noted. Heart is mildly  enlarged. Vasculature is cephalized. There is mildly increased left  basilar atelectasis. No new chest disease is seen elsewhere.           Impression:       Stable pulmonary vascular congestion, increasing left  basilar atelectasis compared to 12:50 AM exam of same date.     D:  08/15/2018  E:  08/15/2018     This report was finalized on 8/15/2018 2:31 PM by DR. Rodger Mena MD.       XR Abdomen KUB [[id number]] Collected:  08/15/18 1050     Updated:  08/15/18 1155    Narrative:       EXAMINATION: XR ABDOMEN KUB-      INDICATION: NG placement; A41.9-Sepsis, unspecified organism;  N39.0-Urinary tract infection, site not specified; J96.02-Acute  respiratory failure with hypercapnia; E87.2-Acidosis; I95.9-Hypotension,  unspecified.      COMPARISON: None.     FINDINGS: Nasogastric tube terminates within the proximal stomach.  Nonspecific nonobstructive partially visualized bowel gas pattern with  paucity of air. Liang catheter in situ.           Impression:       Nasogastric tube terminates within the proximal stomach.     D:  08/15/2018  E:  08/15/2018     This report was finalized on 8/15/2018 11:53 AM by Dr. Torsten Ventura.       XR Chest 1 View [[id number]] Collected:  08/15/18 0030     Updated:  08/15/18 0246    Narrative:       EXAM:    XR Chest, 1 View     EXAM DATE/TIME:    8/15/2018 12:30 AM     CLINICAL HISTORY:    88 years old, female; Signs and symptoms and device  placement; Ett placement   (vent status); Shortness of breath; Additional info: SOA triage protocol     TECHNIQUE:    XR of the chest, 1 view.     COMPARISON:    CR - XR CHEST 1 VW 2017-11-01 06:35     FINDINGS:    Tubes, lines and devices:  Endotracheal tube is in place with the tip   approximately 3.2 cm above the cas. A left-sided pacemaker is visualized.    Lungs:  Bibasilar atelectatic change/infiltrates are visualized. There are   patchy hazy opacification of the right lung, which is a progression. There is a   diffuse increase in interstitial markings, which is nonspecific. Possible   etiologies include interstitial edema and atypical infection. The lungs are   hyperinflated, consistent with COPD. There is prominence of the pulmonary   vascularity.    Pleural space:  No pneumothorax. No pleural effusions. There is mild right   apical pleural thickening.    Heart/Mediastinum: There is borderline cardiomegaly.   Bones/joints:  There is stable vertebroplasty within the lower thoracic/upper   lumbar spine.       Impression:       1. Bibasilar atelectatic change/infiltrates are visualized. There are patchy   hazy opacification of the right lung, which is a progression. Clinical   correlation and follow-up radiographs are recommended.   2. There is a diffuse increase in interstitial markings, which is nonspecific.   Possible etiologies include interstitial edema and atypical infection.   3. The lungs are hyperinflated, consistent with COPD.   4. There is prominence of the pulmonary vascularity.   5. There is mild right apical pleural thickening.   6. There is borderline cardiomegaly.  7. Endotracheal tube is identified.    THIS DOCUMENT HAS BEEN ELECTRONICALLY SIGNED BY JEREMIE CHANG MD        Impression: Urosepsis  Dyspnea  Change in mental status  COPD  Goals of care  Plan: I did have a long discussion with the patient's daughter, I did try to include the patient as much as possible but this is very difficult  due to her quickly falling asleep and I am on for sure about how much the patient completely understands about the current situation.  I did talk to the daughter about overall coast status as well as goals of care.  My concern is the fact that if the patient is reintubated she will have a much smaller chance of being extubated.  I did stress this with the daughter as well as talking about chest compressions and shocking.  The daughters wanted talk to the rest of her family before making any decisions.    Time: More than 50% of time spent in counseling and coordination of care:  Total face-to-face/floor time 40 min.  Time spent in counseling 25 min. Counseling included the following topics: see above    Florin Ngo DO  08/17/18  11:00 AM

## 2018-08-18 NOTE — PROGRESS NOTES
Intensivist Note     8/18/2018  Hospital Day: 3  * No surgery found *      Ms. Avis Us, 88 y.o. female is followed for:  Principal Problem:    Severe sepsis of urinary origin (CMS/[facility])  Active Problems:    Lactic acidosis    Escherichia coli UTI. Has history of chronic chronic E Coli UTI    Moderate - severe MR    NSTEMI, type 2 (CMS/[facility])    ROBBIN (acute kidney injury) (CMS/[facility])    Acute/Chronic mixed respiratory failure requiring intubation ×36 hours (CMS/[facility])    A/C Diastolic CHF    Seizure (CMS/[facility])    Type 2 diabetes mellitus (CMS/[facility])    COPD (chronic obstructive pulmonary disease) (CMS/[facility])    GERD (gastroesophageal reflux disease)    Sleep apnea, obstructive       SUBJECTIVE     88-year-old white female lifelong nonsmoker with multiple chronic medical problems including CAD, hypertension and diastolic dysfunction, CVA, seizure disorder, asthma/COPD (due to secondhand smoke), chronic home oxygen at 3 L and nocturnal CPAP, diabetes mellitus, 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 Also has GERD, and gout. Had noted 2 weeks of increasing dyspnea prior to admission 8/15/18. Apparently developed acute respiratory distress associated with fever and was found on the floor by EMS with oxygen saturations in the 70. In route she became confused and somnolent, ABGs revealed a pH 7.15, PCO2 of 73, PO2 of 199 leading to intubation and ventilatory support. She turned out to have a sensitive Escherichia coli UTI with sepsis and was treated appropriately. Remains on Rocephin. In addition she was felt to have had a type II NSTEMI as well as acute kidney injury. On broad-spectrum antimicrobial coverage she seemed to stabilize and was able to be extubated after approximately 36 hours of ventilatory support. She developed some recurrent respiratory distress but this has gradually improved. Required BiPAP initially but now appears comfortable. Is  "off all pressors and no longer requiring Precedex. Palliative care is following and the patient is a written DNR. No further labs or x-rays are being obtained. She remains on Rocephin for her documented Escherichia coli UTI with sepsis.    At the present time she is calm. She seems confused but will interact and will follow commands to squeeze hands etc. She is not very conversant    The patient's relevant past medical, surgical and social history were reviewed and updated in Epic as appropriate.    OBJECTIVE     /80   Pulse 102   Temp 98.2 °F (36.8 °C) (Core)   Resp 12   Ht 157.5 cm (62.01\")   Wt 98.4 kg (216 lb 14.9 oz)   SpO2 100%   BMI 39.67 kg/m²   Flow (L/min): 4    Flowsheet Rows      First Filed Value   Admission Height  157.5 cm (62\") Documented at 08/15/2018 0029   Admission Weight  79.4 kg (175 lb) Documented at 08/15/2018 0029        Intake & Output (last day)       08/18 0701 - 08/19 0700    I.V. (mL/kg) 376.5 (3.8)    Total Intake(mL/kg) 376.5 (3.8)    Urine (mL/kg/hr) 500 (0.4)    Stool 0    Total Output 500    Net -123.5               Exam:  General Exam:  Elderly white female in NAD   HEENT: Pupils equal and reactive. Nose and throat clear.  Neck:                          Supple, no JVD, thyromegaly, or adenopathy. Right IJ line in place   Lungs:                        Scattered rhonchi with diminished breath sounds right chest   Cardiovascular:           RRR  without murmurs or gallops.HR 76   Abdomen: Soft nontender without organomegaly or masses.   and rectal: Deferred.  Extremities: No cyanosis  or clubbingand only trace lower extremity  edema.  Neurologic:                 Symmetric strength but diffusely weak. No focal deficits.Confused but cooperative    Chest X-Ray 8/18/18: Heart size enlarged. Dual-chamber permanent pacemaker in place with the generator over the left chest. Left lung appears clear. Hazy opacity over the right lung suggestive of a pleural " effusion.    INFUSIONS    dexmedetomidine 0.2-1.5 mcg/kg/hr Last Rate: Stopped (08/17/18 1400)   norepinephrine 0.02-0.3 mcg/kg/min Last Rate: Stopped (08/16/18 0510)         Results from last 7 days  Lab Units 08/18/18  0423 08/17/18  0538 08/16/18  0426   WBC 10*3/mm3 12.95* 13.32* 13.98*   HEMOGLOBIN g/dL 9.9* 10.0* 10.1*   HEMATOCRIT % 32.6* 32.6* 32.4*   PLATELETS 10*3/mm3 356 338 390       Results from last 7 days  Lab Units 08/18/18  0423 08/17/18  0538   SODIUM mmol/L 143 140   POTASSIUM mmol/L 4.3 5.3   CHLORIDE mmol/L 109 107   CO2 mmol/L 26.0 25.0   BUN mg/dL 38* 38*   CREATININE mg/dL 1.72* 1.80*   GLUCOSE mg/dL 92 123*   CALCIUM mg/dL 9.1 8.8       Results from last 7 days  Lab Units 08/18/18  0423 08/17/18  0538 08/16/18  0426   MAGNESIUM mg/dL 2.2 2.2 2.1   PHOSPHORUS mg/dL 4.7 5.0 4.0       Results from last 7 days  Lab Units 08/18/18  0423 08/17/18  0538 08/16/18  0426   ALK PHOS U/L 179* 183* 206*   BILIRUBIN mg/dL 0.4 0.6 0.9   ALT (SGPT) U/L 41* 49* 35   AST (SGOT) U/L 25 27 30       No results found for: SEDRATE  Lab Results   Component Value Date    .0 (H) 08/15/2018     Lab Results   Component Value Date    CKTOTAL 61 08/15/2018    CKMB 1.08 08/15/2018    TROPONINI 0.883 (C) 08/16/2018     Lab Results   Component Value Date    TSH 4.484 08/15/2018     Lab Results   Component Value Date    LACTATE 2.5 (C) 08/16/2018     Lab Results   Component Value Date    CORTISOL 40.20 08/15/2018         Results from last 7 days  Lab Units 08/17/18  0400 08/16/18  1839 08/16/18  0519 08/15/18  0432 08/15/18  0043   PH, ARTERIAL pH units 7.338* 7.347* 7.445 7.402 7.149*   PCO2, ARTERIAL mm Hg 46.1* 42.6 35.2 42.7 72.8*   PO2 ART mm Hg 72.5* 111.0* 118.0* 127.0* 199.0*   HCO3 ART mmol/L 24.7 23.3 24.1 26.5* 25.3   FIO2 % 35 35 40 60 60       I reviewed the patient's results, images and medication.    Assessment/Plan   ASSESSMENT      Principal Problem:    Severe sepsis of urinary origin  (CMS/[facility])  Active Problems:    Lactic acidosis    Escherichia coli UTI. Has history of chronic chronic E Coli UTI    Moderate - severe MR    NSTEMI, type 2 (CMS/[facility])    ROBBIN (acute kidney injury) (CMS/[facility])    Acute/Chronic mixed respiratory failure requiring intubation ×36 hours (CMS/[facility])    A/C Diastolic CHF    Seizure (CMS/[facility])    Type 2 diabetes mellitus (CMS/[facility])    COPD (chronic obstructive pulmonary disease) (CMS/[facility])    GERD (gastroesophageal reflux disease)    Sleep apnea, obstructive      DISCUSSION: Is on appropriate antimicrobial therapy for her Escherichia coli UTI. No longer septic and has normal blood pressure off pressors. Gas exchange adequate on nasal oxygen. More cooperative and no longer requiring Precedex for agitation. Despite this her situation remains tenuous and she could rapidly deteriorate. It appears that her CODE STATUS is a DO NOT RESUSCITATE and since she is only requiring IV antimicrobial therapy at present suspect she could be moved to the floor. We will make that decision tomorrow after discussing with palliative care.    PLAN     1. Continue antimicrobial therapy  2. If recurrent agitation would resume her home Klonopin  3. Hold off on resumption of her home antihypertensives  4. Will allow her to have a palliative diet. Speech signed off yesterday  5. Probably transfer to the floor tomorrow    Plan of care and goals reviewed with mulitdisciplinary team at daily rounds.    I discussed the patient's findings and my recommendations with patient and family    Time spent Critical care 35 min (It does not include procedure time).    Charles Ellis MD  Intensive Care Medicine  08/18/18 7:08 PM

## 2018-08-18 NOTE — PLAN OF CARE
Problem: Ventilation, Mechanical Invasive (Adult)  Goal: Signs and Symptoms of Listed Potential Problems Will be Absent, Minimized or Managed (Ventilation, Mechanical Invasive)  Outcome: Outcome(s) achieved Date Met: 08/18/18      Problem: Sepsis/Septic Shock (Adult)  Goal: Signs and Symptoms of Listed Potential Problems Will be Absent, Minimized or Managed (Sepsis/Septic Shock)  Outcome: Ongoing (interventions implemented as appropriate)      Problem: Skin Injury Risk (Adult)  Goal: Skin Health and Integrity  Outcome: Ongoing (interventions implemented as appropriate)      Problem: Patient Care Overview  Goal: Plan of Care Review  Outcome: Ongoing (interventions implemented as appropriate)   08/18/18 7710   OTHER   Outcome Summary Patient placed on bipap per request; Intermittently restless, PRN ativan/morphine given per family request x2; VSS; ~1300 UOP; Daughter remains at bedside;      Goal: Individualization and Mutuality  Outcome: Ongoing (interventions implemented as appropriate)    Goal: Discharge Needs Assessment  Outcome: Ongoing (interventions implemented as appropriate)    Goal: Interprofessional Rounds/Family Conf  Outcome: Ongoing (interventions implemented as appropriate)      Problem: NPPV/CPAP (Adult)  Goal: Signs and Symptoms of Listed Potential Problems Will be Absent, Minimized or Managed (NPPV/CPAP)  Outcome: Ongoing (interventions implemented as appropriate)      Problem: Palliative Care (Adult)  Goal: Identify Related Risk Factors and Signs and Symptoms  Outcome: Ongoing (interventions implemented as appropriate)    Goal: Maximized Comfort  Outcome: Ongoing (interventions implemented as appropriate)    Goal: Enhanced Quality of Life  Outcome: Ongoing (interventions implemented as appropriate)

## 2018-08-19 PROBLEM — I48.91 ATRIAL FIBRILLATION WITH RAPID VENTRICULAR RESPONSE (HCC): Status: ACTIVE | Noted: 2018-01-01

## 2018-08-19 PROBLEM — Z95.0 PRESENCE OF PERMANENT CARDIAC PACEMAKER: Status: ACTIVE | Noted: 2018-01-01

## 2018-08-19 NOTE — PLAN OF CARE
Problem: Sepsis/Septic Shock (Adult)  Goal: Signs and Symptoms of Listed Potential Problems Will be Absent, Minimized or Managed (Sepsis/Septic Shock)  Outcome: Ongoing (interventions implemented as appropriate)      Problem: Skin Injury Risk (Adult)  Goal: Skin Health and Integrity  Outcome: Ongoing (interventions implemented as appropriate)      Problem: NPPV/CPAP (Adult)  Goal: Signs and Symptoms of Listed Potential Problems Will be Absent, Minimized or Managed (NPPV/CPAP)  Outcome: Ongoing (interventions implemented as appropriate)      Problem: Palliative Care (Adult)  Goal: Identify Related Risk Factors and Signs and Symptoms  Outcome: Ongoing (interventions implemented as appropriate)

## 2018-08-19 NOTE — PLAN OF CARE
Problem: Patient Care Overview  Goal: Plan of Care Review  Outcome: Ongoing (interventions implemented as appropriate)   08/19/18 6642   Coping/Psychosocial   Plan of Care Reviewed With patient;daughter   Plan of Care Review   Progress declining   OTHER   Outcome Summary Patient on 4L NC, dig ordered for A-FIB rate control, family at bedsides, patient confused, not eating or drinking.

## 2018-08-19 NOTE — PROGRESS NOTES
Intensivist Note     8/19/2018  Hospital Day: 4  * No surgery found *      Ms. Avis Us, 88 y.o. female is followed for:  Principal Problem:    Severe sepsis of urinary origin (CMS/[facility])  Active Problems:    Lactic acidosis    Escherichia coli UTI. Has history of chronic chronic E Coli UTI    Moderate - severe MR    NSTEMI, type 2 (CMS/[facility])    Atrial fibrillation with rapid ventricular response (CMS/[facility])    Presence of permanent cardiac pacemaker    ROBBIN (acute kidney injury) (CMS/[facility])    Acute/Chronic mixed respiratory failure requiring intubation ×36 hours (CMS/[facility])    A/C Diastolic CHF    Seizure (CMS/[facility])    Type 2 diabetes mellitus (CMS/[facility])    COPD (chronic obstructive pulmonary disease) (CMS/[facility])    GERD (gastroesophageal reflux disease)    Sleep apnea, obstructive       SUBJECTIVE     88-year-old white female lifelong nonsmoker with multiple chronic medical problems including CAD, hypertension and diastolic dysfunction, CVA, seizure disorder, asthma/COPD (due to secondhand smoke), chronic home oxygen at 3 L and nocturnal CPAP, diabetes mellitus, 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 Also has GERD, and gout. Had noted 2 weeks of increasing dyspnea prior to admission 8/15/18. Apparently developed acute respiratory distress associated with fever and was found on the floor by EMS with oxygen saturations in the 70. In route she became confused and somnolent, ABGs revealed a pH 7.15, PCO2 of 73, PO2 of 199 leading to intubation and ventilatory support. She turned out to have a sensitive Escherichia coli UTI with sepsis and was treated appropriately. Remains on Rocephin. In addition she was felt to have had a type II NSTEMI as well as acute kidney injury. On broad-spectrum antimicrobial coverage she seemed to stabilize and was able to be extubated after approximately 36 hours of ventilatory support. She developed some recurrent  "respiratory distress but this subsequently improved. Required BiPAP initially but now appears comfortable on nasal oxygen with excellent saturations at 4 L. Is off all pressors and no longer requiring Precedex. Palliative care is following and the patient is a written DNR. No further labs or x-rays are being obtained. She remains on Rocephin for her documented Escherichia coli UTI with sepsis.     Remains lethargic and sleepy most of the time but will pull at lines occasionally. Will not interact with me today choosing to keep her eyes closed most of the time. When stimulated verbally or by touch will occasionally grunt a response but I am not sure she really understands me. Would not follow commands today.    Presently in atrial fibrillation and heart rate is up to 147. Daughter states that she develops anaphylaxis with beta blockers, and I do not want to start IV amiodarone so we will give digoxin.       The patient's relevant past medical, surgical and social history were reviewed and updated in Epic as appropriate.    OBJECTIVE     /87 (BP Location: Left arm, Patient Position: Lying)   Pulse (!) 131   Temp 98.8 °F (37.1 °C) (Core)   Resp 28   Ht 157.5 cm (62.01\")   Wt 98.4 kg (216 lb 14.9 oz)   SpO2 97%   BMI 39.67 kg/m²   Flow (L/min): 4    Flowsheet Rows      First Filed Value   Admission Height  157.5 cm (62\") Documented at 08/15/2018 0029   Admission Weight  79.4 kg (175 lb) Documented at 08/15/2018 0029        Intake & Output (last day)       08/18 0701 - 08/19 0700 08/19 0701 - 08/20 0700    I.V. (mL/kg) 376.5 (3.8)     Total Intake(mL/kg) 376.5 (3.8)     Urine (mL/kg/hr) 895 (0.4) 450 (0.4)    Stool 5     Total Output 900 450    Net -523.5 -450                Exam:  General Exam:  Well-developed female in good spirits. No acute distress.  HEENT: Pupils equal and reactive. Nose and throat clear.  Neck:                          Supple, no JVD, thyromegaly, or adenopathy. Right IJ catheter in " place  Lungs: Few scattered rhonchi and wheezes  Cardiovascular: Irregularly irregular rhythm with variable S1 and normal S2. No murmurs or gallops. Monitor indicates atrial fibrillation at a rate of 147  Abdomen: Soft nontender without organomegaly or masses.   and rectal: Liang catheter in place  Extremities: No cyanosis or clubbing but trace lower extremity edema.  Neurologic:                 Lethargic but will respond to loud verbal stimuli and painful stimuli. Not cooperative. Does not speak except to grunt and response to questions    Chest X-Ray 8/18/18: Cardiomegaly. Increased opacity right lung consistent with pleural effusion. Left lung fairly clear except some basilar left atelectasis. Dual-chamber permanent pacemaker in place.      Results from last 7 days  Lab Units 08/18/18  0423 08/17/18  0538 08/16/18  0426   WBC 10*3/mm3 12.95* 13.32* 13.98*   HEMOGLOBIN g/dL 9.9* 10.0* 10.1*   HEMATOCRIT % 32.6* 32.6* 32.4*   PLATELETS 10*3/mm3 356 338 390       Results from last 7 days  Lab Units 08/18/18  0423 08/17/18  0538   SODIUM mmol/L 143 140   POTASSIUM mmol/L 4.3 5.3   CHLORIDE mmol/L 109 107   CO2 mmol/L 26.0 25.0   BUN mg/dL 38* 38*   CREATININE mg/dL 1.72* 1.80*   GLUCOSE mg/dL 92 123*   CALCIUM mg/dL 9.1 8.8       Results from last 7 days  Lab Units 08/18/18  0423 08/17/18  0538 08/16/18  0426   MAGNESIUM mg/dL 2.2 2.2 2.1   PHOSPHORUS mg/dL 4.7 5.0 4.0       Results from last 7 days  Lab Units 08/18/18  0423 08/17/18  0538 08/16/18  0426   ALK PHOS U/L 179* 183* 206*   BILIRUBIN mg/dL 0.4 0.6 0.9   ALT (SGPT) U/L 41* 49* 35   AST (SGOT) U/L 25 27 30       No results found for: SEDRATE  Lab Results   Component Value Date    .0 (H) 08/15/2018     Lab Results   Component Value Date    CKTOTAL 61 08/15/2018    CKMB 1.08 08/15/2018    TROPONINI 0.883 (C) 08/16/2018     Lab Results   Component Value Date    TSH 4.484 08/15/2018     Lab Results   Component Value Date    LACTATE 2.5 (C) 08/16/2018      Lab Results   Component Value Date    CORTISOL 40.20 08/15/2018         Results from last 7 days  Lab Units 08/17/18  0400 08/16/18  1839 08/16/18  0519 08/15/18  0432 08/15/18  0043   PH, ARTERIAL pH units 7.338* 7.347* 7.445 7.402 7.149*   PCO2, ARTERIAL mm Hg 46.1* 42.6 35.2 42.7 72.8*   PO2 ART mm Hg 72.5* 111.0* 118.0* 127.0* 199.0*   HCO3 ART mmol/L 24.7 23.3 24.1 26.5* 25.3   FIO2 % 35 35 40 60 60         I reviewed the patient's results, images and medication.    Assessment/Plan   ASSESSMENT      Principal Problem:    Severe sepsis of urinary origin (CMS/[facility])  Active Problems:    Lactic acidosis    Escherichia coli UTI. Has history of chronic chronic E Coli UTI    Moderate - severe MR    NSTEMI, type 2 (CMS/[facility])    Atrial fibrillation with rapid ventricular response (CMS/[facility])    Presence of permanent cardiac pacemaker    ROBBIN (acute kidney injury) (CMS/[facility])    Acute/Chronic mixed respiratory failure requiring intubation ×36 hours (CMS/[facility])    A/C Diastolic CHF    Seizure (CMS/[facility])    Type 2 diabetes mellitus (CMS/[facility])    COPD (chronic obstructive pulmonary disease) (CMS/[facility])    GERD (gastroesophageal reflux disease)    Sleep apnea, obstructive      DISCUSSION: Is sleeping most of the time but occasionally becomes agitated and pulls it lines requiring restraints. Oral intake nil and if does not improve renal function will deteriorate. Has developed rapid A. fib today    PLAN     1. Cannot give metoprolol for rate control and I do not want to put her on amiodarone. Will try dig but will not fully digitalize  2. Comfort measures  3. Will have palliative care see in preparation for transfer to the floor  4. Continue Rocephin for UTI    Plan of care and goals reviewed with mulitdisciplinary team at daily rounds.    I discussed the patient's findings and my recommendations with patient, family and nursing staff    Time spent Critical care 35 min (It does not include procedure time).    Charles Ellis,  MD  Intensive Care Medicine  08/19/18 5:25 PM

## 2018-08-19 NOTE — PLAN OF CARE
Problem: Patient Care Overview  Goal: Plan of Care Review   08/18/18 1736 08/19/18 0400 08/19/18 0548   Coping/Psychosocial   Plan of Care Reviewed With --  patient;daughter --    Plan of Care Review   Progress no change --  --    OTHER   Outcome Summary --  --  Patient remains on 4L NC, palliative care diet in place, and tafoya; VSS; New Afib noted but not symptomatic; Daughter continues to stay at bedside, giving patient sips/flushing mouth despite education of aspiration;

## 2018-08-20 PROBLEM — A41.9 SEPSIS (HCC): Status: ACTIVE | Noted: 2018-01-01

## 2018-08-20 NOTE — DISCHARGE SUMMARY
Discharge Summary    Patient name: Avis Us  CSN: [id number]  MRN: [id number]  : 9/3/1929  Today's date: 2018     Date of Admission: 8/15/2018    Date of Discharge:  2018    Admitting Physician: RODDY Weinstein MD    Primary Care Provider: Carol Herzog MD    Consultations:   Dr. Ngo- Palliative Medicine      Admission Diagnosis:   Principal Problem:    Severe sepsis of urinary origin (CMS/[facility])  Active Problems:    Acute on chronic respiratory failure with hypoxia and hypercapnia (CMS/[facility])    ROBBIN (acute kidney injury) (CMS/[facility])    Lactic acidosis    Type 2 diabetes mellitus (CMS/[facility])    CHF (congestive heart failure) (CMS/[facility])    COPD (chronic obstructive pulmonary disease) (CMS/[facility])    UTI (urinary tract infection)    Seizure (CMS/[facility])    GERD (gastroesophageal reflux disease)    Sleep apnea, obstructive      Discharge Diagnoses:   Principal Problem:    Severe sepsis of urinary origin (CMS/[facility])  Active Problems:    ROBBIN (acute kidney injury) (CMS/[facility])    Acute/Chronic mixed respiratory failure requiring intubation ×36 hours (CMS/[facility])    Lactic acidosis    Type 2 diabetes mellitus (CMS/[facility])    A/C Diastolic CHF    COPD (chronic obstructive pulmonary disease) (CMS/[facility])    Escherichia coli UTI. Has history of chronic chronic E Coli UTI    Seizure (CMS/[facility])    GERD (gastroesophageal reflux disease)    Sleep apnea, obstructive    Moderate - severe MR    NSTEMI, type 2 (CMS/[facility])    Atrial fibrillation with rapid ventricular response (CMS/[facility])    Presence of permanent cardiac pacemaker      Procedures:  CVC Placement       History of Present Illness:  Patient is a 88 y.o. female who  has a past medical history of Coronary artery disease; CVA (cerebral vascular accident) (CMS/[facility]); Diabetes mellitus (CMS/HCC); Diastolic dysfunction; Emphysema of lung (CMS/[facility]); GERD (gastroesophageal reflux disease); Gout; Heart block, first degree; Hypertension; and Seizure (CMS/HCC).     She has been in her  regular level of health except having more dyspnea over the last 2 weeks requiring her to use her rescue inhalers more.  She has also had a 2 week H/O UTI symptoms (pyuria, frequency) but was waiting til she saw her PCP on Friday.  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 Ultimately she underwent resection of her fistula and a colostomy. Frequent urinary tract infections improved postoperative period however her daughter reports that she been complaining of burning upon urination for several weeks. She is a lifetime nonsmoker. However she had significant secondhand smoke exposure from her father and her  prior to his death. She also has a known history of asthma. She wears oxygen 3 L nasal cannula continuously as well as CPAP nightly.     This evening the patient developed sudden respiratory distress, fever,  and c/o nausea.   EMS found her laying on the floor w/ a spO2 in the 70's.  She was transferred to our facility.  En route she was alert and interactive however she became confused and somnolent upon arrival in the ED and required intubation.  Blood gas prior to intubation, pH 7.15, CO2 73, O2 199.     Hospital Course:  Avis Us is a 88 y.o. female who presented to [facility] as discussed in HPI.  She was admitted to ICU, placed on mechanical ventilation, and a central venous catheter was placed.  Patient had cultures obtained and empiric antimicrobials were initiated.  She required norepinephrine for blood pressure support and was placed on Precedex for agitation.  Hospital day #1 the patient was weaned off of norepinephrine but remained mechanically ventilated.  The following day patient was successfully extubated but required BiPAP for respiratory distress.  She was awake and alert but confused at times and was placed on low dose Precedex for agitation.  Patient was seen in consult by palliative medicine for goals of care  "discussion.  She continued to remain lethargic.  Further discussion with palliative medicine has led to family request for hospice for end-of-life care.  She was accepted and is now to be transferred to hospice floor today 8/20/18.    Vitals:  /67   Pulse 89   Temp 97.8 °F (36.6 °C) (Axillary)   Resp 20   Ht 157.5 cm (62.01\")   Wt 98.4 kg (216 lb 14.9 oz)   SpO2 91%   BMI 39.67 kg/m²     Advance Directives: Code Status and Medical Interventions:   Ordered at: 08/17/18 1537     Limited Support to NOT Include:    Vasopressors    Intubation    Dialysis    Cardioversion/Defibrillation    Blood Products    Artificial Nutrition    Antiarrhythmic Drugs    NIPPV (Non-Invasive Positive Pressure Support)     Code Status:    No CPR     Medical Interventions (Level of Support Prior to Arrest):    Limited        Physical Exam:  Telemetry:  Atrial Fibrillation.    Constitutional:  No acute distress.  Lethargic. Poorly responsive.    Cardiovascular: Normal rate, regular and rhythm. Normal heart sounds.  No murmurs, gallop or rub.   Respiratory: No respiratory distress. Normal respiratory effort.  Normal breath sounds  Clear to ascultation.    Abdominal:  Soft. No masses. Non-tender. No distension. No HSM.   Extremities: No digital cyanosis. No clubbing.  No peripheral edema.   Neurological:             Lethargic. Unresponsive.        Labs:    Results from last 7 days  Lab Units 08/18/18  0423   WBC 10*3/mm3 12.95*   HEMOGLOBIN g/dL 9.9*   HEMATOCRIT % 32.6*   PLATELETS 10*3/mm3 356       Results from last 7 days  Lab Units 08/18/18  0423   SODIUM mmol/L 143   POTASSIUM mmol/L 4.3   CHLORIDE mmol/L 109   CO2 mmol/L 26.0   BUN mg/dL 38*   CREATININE mg/dL 1.72*   CALCIUM mg/dL 9.1   BILIRUBIN mg/dL 0.4   ALK PHOS U/L 179*   ALT (SGPT) U/L 41*   AST (SGOT) U/L 25   GLUCOSE mg/dL 92         Magnesium   Date Value Ref Range Status   08/18/2018 2.2 1.3 - 2.7 mg/dL Final     Phosphorus   Date Value Ref Range Status "   08/18/2018 4.7 2.4 - 5.1 mg/dL Final                 Discharge Medications:     Discharge Medications      ASK your doctor about these medications      Instructions Start Date   albuterol 108 (90 Base) MCG/ACT inhaler  Commonly known as:  PROVENTIL HFA;VENTOLIN HFA   2 puffs, Inhalation, Daily PRN      allopurinol 100 MG tablet  Commonly known as:  ZYLOPRIM   100 mg, Oral, 2 Times Daily      aspirin 81 MG EC tablet   81 mg, Oral, Daily      budesonide-formoterol 80-4.5 MCG/ACT inhaler  Commonly known as:  SYMBICORT   2 puffs, Inhalation, 2 Times Daily - RT      cholecalciferol 26430 units capsule  Commonly known as:  VITAMIN D3   50,000 Units, Oral, Daily      CLARITIN 10 MG capsule  Generic drug:  Loratadine   10 mg, Oral, Daily PRN      clonazePAM 1 MG tablet  Commonly known as:  KlonoPIN   1 mg, Oral, 2 Times Daily      colestipol 1 g tablet  Commonly known as:  COLESTID   1 g, Oral, Daily PRN      EYE HEALTH capsule   2 tablets, Oral, Daily      furosemide 40 MG tablet  Commonly known as:  LASIX   20 mg, Oral, Daily, Only takes 1/2 of a 40 mg tablet (20 mg) if SBP is > 120      levETIRAcetam 750 MG tablet  Commonly known as:  KEPPRA   750 mg, Oral, Every 12 Hours Scheduled      lisinopril 10 MG tablet  Commonly known as:  PRINIVIL,ZESTRIL   10 mg, Oral, Daily      magnesium oxide 400 (241.3 Mg) MG tablet tablet  Commonly known as:  MAGOX   400 mg, Oral, Daily      pantoprazole 40 MG EC tablet  Commonly known as:  PROTONIX   40 mg, Oral, Daily      potassium chloride 10 MEQ CR tablet  Commonly known as:  K-DUR   10 mEq, Oral, 2 Times Daily With Meals      vitamin B-12 1000 MCG tablet  Commonly known as:  CYANOCOBALAMIN   1,000 mcg, Oral, Daily             Discharge Diet:   NPO    Activity at Discharge:   Bedrest    Follow-up Appointments  No future appointments.      Discharge Instructions:  To Hospice today       COREY Meadows, ACNP-BC  Pulmonary & Critical Care Medicine  08/20/18 2:23 PM     I  have seen and evaluated the patient on the day of discharge. I have made edits to the discharge summary as needed.     Mónica V Case, DO  Pulmonary and Critical Care Medicine    Time: Discharge 30 min    CC: Carol Herzog MD         [unfilled]

## 2018-08-20 NOTE — PLAN OF CARE
Problem: Palliative Care (Adult)  Intervention: Promote Informed Decision Making and Goal Setting   08/20/18 1040   Coping/Psychosocial Interventions   Life Transition/Adjustment end-of-life care initiated  (hospice consulted for transition to inpatient hospice)     Intervention: Support/Optimize Psychosocial Response   08/20/18 1040   Psychosocial Support   Family/Support System Care support provided;self-care encouraged;caregiver stress acknowledged

## 2018-08-20 NOTE — PROGRESS NOTES
Continued Stay Note  [facility]     Patient Name: Avis Us  MRN: [id number]  Today's Date: 8/20/2018    Admit Date: 8/15/2018          Discharge Plan     Row Name 08/20/18 1046       Plan    Plan Hospice consult    Plan Comments Hospice consult pending.  CM available.              Discharge Codes    No documentation.       Expected Discharge Date and Time     Expected Discharge Date Expected Discharge Time    Aug 22, 2018             Deysi Hardwick RN

## 2018-08-20 NOTE — PLAN OF CARE
Problem: Patient Care Overview  Goal: Plan of Care Review  Outcome: Ongoing (interventions implemented as appropriate)   08/20/18 0694   Coping/Psychosocial   Plan of Care Reviewed With patient;daughter   Plan of Care Review   Progress declining   OTHER   Outcome Summary pt on 2 L NC, VSS, remains in SR with PACs, arouses to voice but does not f/c, prn morphine administered for restlessness and work of breathing, order to transfer to med-surg.

## 2018-08-20 NOTE — PROGRESS NOTES
INTENSIVIST   PROGRESS NOTE     Hospital:  LOS: 5 days     Ms. Avis Us, 88 y.o. female is followed for a Chief Complaint of: Severe Sepsis, Encephalopathy      Subjective   S     Ms. Us is an 89yo F who presented with increasing dyspnea and was admitted on 8/15/18. She developed acute respiratory distress associated with fever and was found on the floor by EMS with O2 saturations in the 70s. In route, she became confused and somnolent. ABG revealed a pH of 7.15 and a pCO2 of 73. She was intubated and found to have an E. Coli UTI treated with Rocephin. She was extubated around 8/16/18. She initially required Bipap but has been weaned to nasal cannula. Unfortunately, her mental status has not improved. Palliative care is following and she is now DNR.     Interval History:  No events overnight. She remains poorly responsive. Family is at bedside.        The patient's relevant past medical, surgical and social history were reviewed and updated in Epic as appropriate.      ROS: Unable to obtain secondary to patient's mental status.     Objective   O     Vitals:  Temp  Min: 97.8 °F (36.6 °C)  Max: 99.3 °F (37.4 °C)  BP  Min: 119/52  Max: 175/77  Pulse  Min: 82  Max: 133  Resp  Min: 20  Max: 28  SpO2  Min: 87 %  Max: 97 % Flow (L/min)  Min: 2  Max: 4    Intake/Ouptut 24 hrs (7:00AM - 6:59 AM)  Intake & Output (last 3 days)       08/17 0701 - 08/18 0700 08/18 0701 - 08/19 0700 08/19 0701 - 08/20 0700 08/20 0701 - 08/21 0700    I.V. (mL/kg) 688.3 (7) 376.5 (3.8)  100 (1)    Other 60       IV Piggyback 250  500     Total Intake(mL/kg) 998.3 (10.1) 376.5 (3.8) 500 (5.1) 100 (1)    Urine (mL/kg/hr) 1525 (0.6) 895 (0.4) 875 (0.4) 75 (0.2)    Stool 10 5      Total Output 1535 900 875 75    Net -536.7 -523.5 -375 +25                    Physical Examination  Telemetry:  Atrial Fibrillation.    Constitutional:  No acute distress.  Lethargic. Poorly responsive.    Cardiovascular: Normal rate, regular and rhythm. Normal  heart sounds.  No murmurs, gallop or rub.   Respiratory: No respiratory distress. Normal respiratory effort.  Normal breath sounds  Clear to ascultation.    Abdominal:  Soft. No masses. Non-tender. No distension. No HSM.   Extremities: No digital cyanosis. No clubbing.  No peripheral edema.   Neurological:   Lethargic. Unresponsive.              Results from last 7 days  Lab Units 08/18/18  0423 08/17/18  0538 08/16/18  0426   WBC 10*3/mm3 12.95* 13.32* 13.98*   HEMOGLOBIN g/dL 9.9* 10.0* 10.1*   MCV fL 103.5* 102.5* 101.3*   PLATELETS 10*3/mm3 356 338 390       Results from last 7 days  Lab Units 08/18/18  0423 08/17/18  0538 08/16/18  0426   SODIUM mmol/L 143 140 138   POTASSIUM mmol/L 4.3 5.3 4.6   CO2 mmol/L 26.0 25.0 23.0   CREATININE mg/dL 1.72* 1.80* 1.82*   GLUCOSE mg/dL 92 123* 138*   MAGNESIUM mg/dL 2.2 2.2 2.1   PHOSPHORUS mg/dL 4.7 5.0 4.0     Estimated Creatinine Clearance: 24.8 mL/min (A) (by C-G formula based on SCr of 1.72 mg/dL (H)).    Results from last 7 days  Lab Units 08/18/18  0423 08/17/18  0538 08/16/18  0426   ALK PHOS U/L 179* 183* 206*   BILIRUBIN mg/dL 0.4 0.6 0.9   ALT (SGPT) U/L 41* 49* 35   AST (SGOT) U/L 25 27 30         Results from last 7 days  Lab Units 08/17/18  0400 08/16/18  1839 08/16/18  0519 08/15/18  0432 08/15/18  0043   PH, ARTERIAL pH units 7.338* 7.347* 7.445 7.402 7.149*   PCO2, ARTERIAL mm Hg 46.1* 42.6 35.2 42.7 72.8*   PO2 ART mm Hg 72.5* 111.0* 118.0* 127.0* 199.0*   FIO2 % 35 35 40 60 60       Images:  Imaging Results (last 24 hours)     ** No results found for the last 24 hours. **              Results: Reviewed.  I reviewed the patient's new laboratory and imaging results.  I independently reviewed the patient's new images.    Medications: Reviewed.    Assessment/Plan   A / P     Ms. Us is an 87yo F who presented with increasing dyspnea and was admitted on 8/15/18. She developed acute respiratory distress associated with fever and was found on the floor by  EMS with O2 saturations in the 70s. In route, she became confused and somnolent. ABG revealed a pH of 7.15 and a pCO2 of 73. She was intubated and found to have an E. Coli UTI treated with Rocephin. She was extubated around 8/16/18. She initially required Bipap but has been weaned to nasal cannula. Unfortunately, her mental status has not improved. Palliative care is following and she is now DNR.       Nutrition:   Diet Dysphagia; IV - Mechanical Soft No Mixed Consistencies; Honey Thick; Cardiac, Consistent Carbohydrate  Advance Directives:   Code Status and Medical Interventions:   Ordered at: 08/17/18 8357     Limited Support to NOT Include:    Vasopressors    Intubation    Dialysis    Cardioversion/Defibrillation    Blood Products    Artificial Nutrition    Antiarrhythmic Drugs    NIPPV (Non-Invasive Positive Pressure Support)     Code Status:    No CPR     Medical Interventions (Level of Support Prior to Arrest):    Limited       Hospital Problem List     * (Principal)Severe sepsis of urinary origin (CMS/[facility])    ROBBIN (acute kidney injury) (CMS/[facility])    Acute/Chronic mixed respiratory failure requiring intubation ×36 hours (CMS/[facility])    Lactic acidosis    Type 2 diabetes mellitus (CMS/[facility])    A/C Diastolic CHF    COPD (chronic obstructive pulmonary disease) (CMS/[facility])    Escherichia coli UTI. Has history of chronic chronic E Coli UTI    Seizure (CMS/[facility])    GERD (gastroesophageal reflux disease)    Sleep apnea, obstructive    Moderate - severe MR    NSTEMI, type 2 (CMS/[facility])    Atrial fibrillation with rapid ventricular response (CMS/[facility])    Presence of permanent cardiac pacemaker          Assessment / Plan:    1. Palliative following. Hospice consult today.   2. Finish course of Rocephin  3. Continue current care as is for now.   4. Okay for transfer to the floor when a bed is available.     Plan of care and goals reviewed during interdisciplinary rounds.  I discussed the patient's findings and my recommendations  with family, nursing staff and consulting provider    Time: was greater than 25 minutes.    Mónica Case, DO    Intensive Care Medicine and Pulmonary Medicine

## 2018-08-20 NOTE — PROGRESS NOTES
"Palliative Care Progress Note    Date of Admission: 8/15/2018    Subjective:  Patient remains unresponsive.  Family states that she's been unresponsive all weekend.  Current Code Status     Date Active Code Status Order ID Comments User Context       8/17/2018  3:37 PM No CPR [id number]  Tavon Echols MD Inpatient       Questions for Current Code Status     Question Answer Comment    Code Status No CPR     Medical Interventions (Level of Support Prior to Arrest) Limited     Limited Support to NOT Include Vasopressors      Intubation      Dialysis      Cardioversion/Defibrillation      Blood Products      Artificial Nutrition      Antiarrhythmic Drugs      NIPPV (Non-Invasive Positive Pressure Support)         No current facility-administered medications on file prior to encounter.      No current outpatient prescriptions on file prior to encounter.        •  acetaminophen **OR** acetaminophen  •  dextrose  •  dextrose  •  fentaNYL citrate (PF)  •  glucagon (human recombinant)  •  glycopyrrolate  •  ipratropium-albuterol  •  LORazepam  •  Morphine  •  sodium chloride  •  sodium chloride    Objective: /66   Pulse 93   Temp 97.8 °F (36.6 °C) (Axillary)   Resp 20   Ht 157.5 cm (62.01\")   Wt 98.4 kg (216 lb 14.9 oz)   SpO2 93%   BMI 39.67 kg/m²      Intake/Output Summary (Last 24 hours) at 08/20/18 1041  Last data filed at 08/20/18 1000   Gross per 24 hour   Intake              600 ml   Output              650 ml   Net              -50 ml     Physical Exam:      General Appearance:    Unresponsive, NAD   Head:    Normocephalic, without obvious abnormality, atraumatic   Eyes:            Lids and lashes normal, conjunctivae and sclerae normal, no   icterus, no pallor, corneas clear, PERRLA   Ears:    Ears appear intact with no abnormalities noted   Throat:   No oral lesions, no thrush, oral mucosa moist   Neck:   No adenopathy, supple, trachea midline, no thyromegaly, no     carotid bruit, no JVD "   Back:     No kyphosis present, no scoliosis present, no skin lesions,       erythema or scars, no tenderness to percussion or                   palpation,   range of motion normal   Lungs:     Clear to auscultation,respirations regular, even and                   unlabored    Heart:    Regular rhythm and normal rate, normal S1 and S2, no            murmur, no gallop, no rub, no click   Breast Exam:    Deferred   Abdomen:     Normal bowel sounds, no masses, no organomegaly, soft        non-tender, non-distended, no guarding, no rebound                 tenderness   Genitalia:    Deferred   Extremities:   Trace edema   Pulses:   Pulses palpable and equal bilaterally   Skin:   No bleeding, bruising or rash   Lymph nodes:   No palpable adenopathy           Results from last 7 days  Lab Units 08/18/18  0423   WBC 10*3/mm3 12.95*   HEMOGLOBIN g/dL 9.9*   HEMATOCRIT % 32.6*   PLATELETS 10*3/mm3 356       Results from last 7 days  Lab Units 08/18/18  0423   SODIUM mmol/L 143   POTASSIUM mmol/L 4.3   CHLORIDE mmol/L 109   CO2 mmol/L 26.0   BUN mg/dL 38*   CREATININE mg/dL 1.72*   CALCIUM mg/dL 9.1   BILIRUBIN mg/dL 0.4   ALK PHOS U/L 179*   ALT (SGPT) U/L 41*   AST (SGOT) U/L 25   GLUCOSE mg/dL 92       Impression: Urosepsis  Dyspnea  Change in mental status  COPD  Goals of care  Plan: At this point, did talk to daughter about inpatient hospice.  [facility] for end-of-life care.        Florin Ngo DO  08/20/18  10:41 AM

## 2018-08-20 NOTE — PLAN OF CARE
Problem: Patient Care Overview  Goal: Interprofessional Rounds/Family Conf  Outcome: Ongoing (interventions implemented as appropriate)  Palliative Team Attendance 1300. Pierce CHENG, Paolo Hoyos RN PN, Corry LERNER, Garrett Perry MDIV Sandra Clark RN [facility], Harmony Davis RN , [facility]   08/20/18 1300   Interdisciplinary Rounds/Family Conf   Summary Pt is comfort measures. transitiont to inpt hospice

## 2018-08-20 NOTE — PROGRESS NOTES
Multidisciplinary Rounds    Time: 20min  Patient Name: Avis Us  Date of Encounter: 08/20/18 9:23 AM  MRN: [id number]  Admission date: 8/15/2018      Reason for visit: MDR. RD to continue to follow per protocol.     Additional information obtained during MDR: Palliative following, plan to transfer to the floor.     Current diet: Diet Dysphagia; IV - Mechanical Soft No Mixed Consistencies; Honey Thick; Cardiac, Consistent Carbohydrate      Intervention:  Follow treatment plan  Care plan reviewed    Follow up:   Per protocol      May Delgado MS RD/GABY CNSC  9:23 AM

## 2018-08-21 NOTE — PROGRESS NOTES
Case Management Discharge Note    Final Note: Inpatient Hospice    Destination     No service has been selected for the patient.      Durable Medical Equipment     No service has been selected for the patient.      Dialysis/Infusion     No service has been selected for the patient.      Home Medical Care     No service has been selected for the patient.      Social Care     No service has been selected for the patient.             Final Discharge Disposition Code: 51 - hospice medical facility

## 2021-10-01 NOTE — PROGRESS NOTES
Continued Stay Note  [facility]     Patient Name: Avis Us  MRN: [id number]  Today's Date: 8/15/2017    Admit Date: 8/5/2017          Discharge Plan       08/15/17 1108    Case Management/Social Work Plan    Plan discharge plan    Patient/Family In Agreement With Plan yes    Additional Comments Pt. is prepared to transition to [facility] today.  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  [employer] rep has already been to pt's room and provided portable tank as well as instructions to family members that were present.  Family will provide transportation in private vehicle.  The family member who's car must be used has a personal appointment at 2pm today, so if pt. is not discharged by 12:30pm today, it will likely be after 3pm before family can transport pt.  CM will continue to follow for any further planning needs.               Discharge Codes     None            MELECIO Henry     PT IS OFF BIPAP AND PLACED ON 3L NC.

## 2022-11-01 NOTE — PROGRESS NOTES
Progressing as expected  Stoma looks good  Stools are loose    Ok for reg diet  Can d/c to rehab as far as I am concerned  F/u with me 2 weeks   Cimzia Pregnancy And Lactation Text: This medication crosses the placenta but can be considered safe in certain situations. Cimzia may be excreted in breast milk.

## 2024-10-08 NOTE — PROGRESS NOTES
.      CERTIFICATE OF RETURN TO WORK    October 8, 2024      Re:   Karla Jones  [id number]  [facility] [address]                        This is to certify that Karla Jones has been under my care from 10/8/2024 and should be excused from work to make appointment.         SIGNATURE:________Hope you are feeling better soon!     Yours in service,  MARCELO Strickland 10/8/2024 3:06 PM     ___________________________________,   10/8/2024      MARCELO Strickland           [facility]  [address] [place]  [address] [phone]  Ext. [phone]   Continued Stay Note  [facility]     Patient Name: Avis Us  MRN: [id number]  Today's Date: 8/17/2018    Admit Date: 8/15/2018          Discharge Plan     Row Name 08/17/18 0946       Plan    Plan Palliative consult.  TBD    Plan Comments Consult to Palliative care.              Discharge Codes    No documentation.       Expected Discharge Date and Time     Expected Discharge Date Expected Discharge Time    Aug 22, 2018             Billy Brewster RN

## (undated) DEVICE — INTENDED FOR TISSUE SEPARATION, AND OTHER PROCEDURES THAT REQUIRE A SHARP SURGICAL BLADE TO PUNCTURE OR CUT.: Brand: BARD-PARKER ® STAINLESS STEEL BLADES

## (undated) DEVICE — SUT VIC 12X27 D8116 BX/12

## (undated) DEVICE — SPNG LAP PREWASH SFTPK 18X18IN 5PK STRL

## (undated) DEVICE — ENDOCUT SCISSOR TIP, DISPOSABLE: Brand: RENEW

## (undated) DEVICE — ADAPT ST INFUS ADMIN SYR 70IN

## (undated) DEVICE — ANTIBACTERIAL UNDYED BRAIDED (POLYGLACTIN 910), SYNTHETIC ABSORBABLE SUTURE: Brand: COATED VICRYL

## (undated) DEVICE — ENCORE® LATEX MICRO SIZE 7, STERILE LATEX POWDER-FREE SURGICAL GLOVE: Brand: ENCORE

## (undated) DEVICE — [HIGH FLOW HEATED INSUFFLATOR TUBING,  DO NOT USE IF PACKAGE IS DAMAGED]

## (undated) DEVICE — DRSNG WND BORDR/ADHS NONADHR/GZ LF 4X4IN STRL

## (undated) DEVICE — MEDI-VAC NON-CONDUCTIVE SUCTION TUBING: Brand: CARDINAL HEALTH

## (undated) DEVICE — [HIGH FLOW INSUFFLATOR,  DO NOT USE IF PACKAGE IS DAMAGED,  KEEP DRY,  KEEP AWAY FROM SUNLIGHT,  PROTECT FROM HEAT AND RADIOACTIVE SOURCES.]: Brand: PNEUMOSURE

## (undated) DEVICE — FLTR HME STR UNIV W/SMPL PORT

## (undated) DEVICE — SAFESECURE,SECUREMENT,FOLEY CATH,STERILE: Brand: MEDLINE

## (undated) DEVICE — ENDOPATH XCEL BLADELESS TROCARS WITH STABILITY SLEEVES: Brand: ENDOPATH XCEL

## (undated) DEVICE — ELECTRD BLD EXT EDGE 1P COAT 6.5IN STRL

## (undated) DEVICE — PK LAP LASR CHOLE 10

## (undated) DEVICE — MEDI-VAC YANKAUER SUCTION HANDLE W/BULBOUS TIP: Brand: CARDINAL HEALTH

## (undated) DEVICE — ENDOPATH XCEL UNIVERSAL TROCAR STABLILITY SLEEVES: Brand: ENDOPATH XCEL

## (undated) DEVICE — WIPE THERAWASH SLV SPEC CARE 2PK

## (undated) DEVICE — IRRIGATOR BULB ASEPTO 60CC STRL

## (undated) DEVICE — MEDI-VAC YANKAUER SUCTION HANDLE: Brand: CARDINAL HEALTH

## (undated) DEVICE — SUT PDS 1 CTX 36IN VIO PDP371T

## (undated) DEVICE — SUT MNCRYL PLS ANTIB UD 3/0 PS2 27IN

## (undated) DEVICE — SUT VIC 3/0 TIES J104T

## (undated) DEVICE — FLTR PLUMEPORT LAP W/CONN STRL

## (undated) DEVICE — ACCESS PLATFORM FOR MINIMALLY INVASIVE SURGERY.: Brand: GELPORT® LAPAROSCOPIC  SYSTEM

## (undated) DEVICE — ENCORE® LATEX MICRO SIZE 6.5, STERILE LATEX POWDER-FREE SURGICAL GLOVE: Brand: ENCORE

## (undated) DEVICE — DRSNG WAFER OSTOMY DURA W FLANGE 1 1/2

## (undated) DEVICE — TOTAL TRAY, URNMTR, SILV, LF, 16FR 10ML: Brand: MEDLINE

## (undated) DEVICE — AIRWY 90MM NO9

## (undated) DEVICE — 3M™ IOBAN™ 2 ANTIMICROBIAL INCISE DRAPE 6650EZ: Brand: IOBAN™ 2

## (undated) DEVICE — DEV LAP LIGASURE BLNT SEALER/DIV MARYLAND 37CM

## (undated) DEVICE — SYS SKIN CLS DERMABOND PRINEO W/22CM MESH TP

## (undated) DEVICE — SKIN AFFIX SURG ADHESIVE 72/CS 0.55ML: Brand: MEDLINE

## (undated) DEVICE — DUAL LUMEN STOMACH TUBE,ANTI-REFLUX VALVE: Brand: SALEM SUMP

## (undated) DEVICE — PENCL E/S HNDSWCH ROCKRBTN HOLSTR 10FT

## (undated) DEVICE — SOL LR 1000ML

## (undated) DEVICE — VISUALIZATION SYSTEM: Brand: CLEARIFY

## (undated) DEVICE — TUBING, SUCTION, 1/4" X 10', STRAIGHT: Brand: MEDLINE